# Patient Record
Sex: MALE | Race: WHITE | Employment: OTHER | URBAN - METROPOLITAN AREA
[De-identification: names, ages, dates, MRNs, and addresses within clinical notes are randomized per-mention and may not be internally consistent; named-entity substitution may affect disease eponyms.]

---

## 2010-05-07 LAB — EJECTION FRACTION: 30

## 2013-02-12 LAB — EJECTION FRACTION: 33

## 2015-07-01 LAB — EJECTION FRACTION: 45

## 2017-01-19 ENCOUNTER — TELEPHONE (OUTPATIENT)
Dept: FAMILY MEDICINE | Facility: CLINIC | Age: 77
End: 2017-01-19

## 2017-01-19 NOTE — TELEPHONE ENCOUNTER
.  Name of caller:   Tomás  Relationship to pt:  self  Reason for call:   Pt calling he got mail stating he is due for coloscopy but needs to know if he really needs one because he would have to start lovanox  Best phone number to reach pt at is:   [phone]  Ok to leave a message with medical info?   yes

## 2017-01-30 NOTE — TELEPHONE ENCOUNTER
Called [facility] - some of the GI doctors do, do it if the pt is healthy then they will do it up to the age of 80    Please advise     Thank you     Myra Holguin RN, BSN  [facility]

## 2017-01-30 NOTE — TELEPHONE ENCOUNTER
Since you had polyps on your last test in 2012 the gastroenterologist recommend a recheck in 5 years.  That being said they also often recommend not continuing screening after age 75 years old.       note to RNs: Please call [facility] and ask what they would like to do for this 75 yo with prior h/o polyps.

## 2017-02-02 NOTE — TELEPHONE ENCOUNTER
I called him and he would like to have the colonoscopy and will come in to adjust his anticoagulation prior to the procedure.

## 2017-02-13 ENCOUNTER — ANTICOAGULATION THERAPY VISIT (OUTPATIENT)
Dept: NURSING | Facility: CLINIC | Age: 77
End: 2017-02-13
Payer: COMMERCIAL

## 2017-02-13 DIAGNOSIS — Z95.2 S/P MITRAL VALVE REPLACEMENT: ICD-10-CM

## 2017-02-13 DIAGNOSIS — Z79.01 LONG-TERM (CURRENT) USE OF ANTICOAGULANTS: ICD-10-CM

## 2017-02-13 LAB — INR POINT OF CARE: 3.6 (ref 0.86–1.14)

## 2017-02-13 PROCEDURE — 36416 COLLJ CAPILLARY BLOOD SPEC: CPT

## 2017-02-13 PROCEDURE — 99207 ZZC NO CHARGE NURSE ONLY: CPT

## 2017-02-13 PROCEDURE — 85610 PROTHROMBIN TIME: CPT | Mod: QW

## 2017-02-13 NOTE — PROGRESS NOTES
ANTICOAGULATION FOLLOW-UP CLINIC VISIT    Patient Name:  Tomás Cruz  Date:  2/13/2017  Contact Type:  Face to Face    SUBJECTIVE:        OBJECTIVE    INR Protime   Date Value Ref Range Status   02/13/2017 3.6 (A) 0.86 - 1.14 Final       ASSESSMENT / PLAN  INR assessment THER    Recheck INR In: 6 WEEKS    INR Location Clinic      Anticoagulation Summary as of 2/13/2017     INR goal 2.5-3.5   Today's INR 3.6!   Maintenance plan 2 mg (2 mg x 1) every day   Full instructions 2 mg every day   Weekly total 14 mg   No change documented Rose Marie Azul, RN   Plan last modified Rose Marie Azul RN (2/26/2016)   Next INR check 4/10/2017   Target end date     Indications   Long-term (current) use of anticoagulants [Z79.01] [Z79.01]  S/P mitral valve replacement [Z95.2]         Anticoagulation Episode Summary     [facility]    Preferred lab     Send INR reminders to RV NURSE    Comments       Anticoagulation Care Providers     Provider Role Specialty Phone number    Mahamed Groves MD [facility] [phone]            See the Encounter Report to view Anticoagulation Flowsheet and Dosing Calendar (Go to Encounters tab in chart review, and find the Anticoagulation Therapy Visit)        Rose Marie Azul RN

## 2017-02-13 NOTE — MR AVS SNAPSHOT
Tomás Cruz   2/13/2017 1:00 PM   Anticoagulation Therapy Visit    Description:  76 year old male   Provider:  [facility]   Department:  [facility] Nurse           INR as of 2/13/2017     Today's INR 3.6!      Anticoagulation Summary as of 2/13/2017     INR goal 2.5-3.5   Today's INR 3.6!   Full instructions 2 mg every day   Next INR check 4/10/2017    Indications   Long-term (current) use of anticoagulants [Z79.01] [Z79.01]  S/P mitral valve replacement [Z95.2]         Contact Numbers     Clinic Number:         February 2017 Details    Sun Mon Tue Wed Thu Fri Sat        1               2               3               4                 5               6               7               8               9               10               11                 12               13      2 mg   See details      14      2 mg         15      2 mg         16      2 mg         17      2 mg         18      2 mg           19      2 mg         20      2 mg         21      2 mg         22      2 mg         23      2 mg         24      2 mg         25      2 mg           26      2 mg         27      2 mg         28      2 mg              Date Details   02/13 This INR check               How to take your warfarin dose     To take:  2 mg Take 1 of the 2 mg tablets.           March 2017 Details    Sun Mon Tue Wed Thu Fri Sat        1      2 mg         2      2 mg         3      2 mg         4      2 mg           5      2 mg         6      2 mg         7      2 mg         8      2 mg         9      2 mg         10      2 mg         11      2 mg           12      2 mg         13      2 mg         14      2 mg         15      2 mg         16      2 mg         17      2 mg         18      2 mg           19      2 mg         20      2 mg         21      2 mg         22      2 mg         23      2 mg         24      2 mg         25      2 mg           26      2 mg         27      2 mg         28      2 mg         29      2 mg          30      2 mg         31      2 mg           Date Details   No additional details            How to take your warfarin dose     To take:  2 mg Take 1 of the 2 mg tablets.           April 2017 Details    Sun Mon Tue Wed Thu Fri Sat           1      2 mg           2      2 mg         3      2 mg         4      2 mg         5      2 mg         6      2 mg         7      2 mg         8      2 mg           9      2 mg         10            11               12               13               14               15                 16               17               18               19               20               21               22                 23               24               25               26               27               28               29                 30                      Date Details   No additional details    Date of next INR:  4/10/2017         How to take your warfarin dose     To take:  2 mg Take 1 of the 2 mg tablets.

## 2017-02-16 ENCOUNTER — DOCUMENTATION ONLY (OUTPATIENT)
Dept: VASCULAR SURGERY | Facility: CLINIC | Age: 77
End: 2017-02-16

## 2017-03-20 ENCOUNTER — ANTICOAGULATION THERAPY VISIT (OUTPATIENT)
Dept: NURSING | Facility: CLINIC | Age: 77
End: 2017-03-20
Payer: COMMERCIAL

## 2017-03-20 DIAGNOSIS — Z95.2 S/P MITRAL VALVE REPLACEMENT: ICD-10-CM

## 2017-03-20 DIAGNOSIS — Z79.01 LONG-TERM (CURRENT) USE OF ANTICOAGULANTS: ICD-10-CM

## 2017-03-20 LAB — INR POINT OF CARE: 2.5 (ref 0.86–1.14)

## 2017-03-20 PROCEDURE — 36416 COLLJ CAPILLARY BLOOD SPEC: CPT

## 2017-03-20 PROCEDURE — 85610 PROTHROMBIN TIME: CPT | Mod: QW

## 2017-03-20 PROCEDURE — 99207 ZZC NO CHARGE NURSE ONLY: CPT

## 2017-03-20 NOTE — PROGRESS NOTES
ANTICOAGULATION FOLLOW-UP CLINIC VISIT    Patient Name:  Tomás Cruz  Date:  3/20/2017  Contact Type:  Face to Face    SUBJECTIVE:     Patient Findings     Positives No Problem Findings           OBJECTIVE    INR Protime   Date Value Ref Range Status   03/20/2017 2.5 (A) 0.86 - 1.14 Final       ASSESSMENT / PLAN  INR assessment THER    Recheck INR In: 10 DAYS    INR Location Clinic      Anticoagulation Summary as of 3/20/2017     INR goal 2.5-3.5   Today's INR 2.5   Maintenance plan 2 mg (2 mg x 1) every day   Full instructions 3/30: Hold; 3/31: Hold; 4/1: Hold; 4/2: Hold; 4/3: Hold; 4/4: 4 mg; 4/5: 4 mg; 4/6: 4 mg; Otherwise 2 mg every day   Weekly total 14 mg   Plan last modified Rose Marie Azul, RN (2/26/2016)   Next INR check 4/7/2017   Target end date     Indications   Long-term (current) use of anticoagulants [Z79.01] [Z79.01]  S/P mitral valve replacement [Z95.2]         Anticoagulation Episode Summary     [facility]    Preferred lab     Send INR reminders to RV NURSE    Comments       Anticoagulation Care Providers     Provider Role Specialty Phone number    Mahamed Groves MD [facility] [phone]            See the Encounter Report to view Anticoagulation Flowsheet and Dosing Calendar (Go to Encounters tab in chart review, and find the Anticoagulation Therapy Visit)        Rose Marie Azul RN

## 2017-03-20 NOTE — MR AVS SNAPSHOT
Tomás Cruz   3/20/2017 1:00 PM   Anticoagulation Therapy Visit    Description:  76 year old male   Provider:   ANTICOAGULATION CLINIC   Department:  R[facility] Nurse           INR as of 3/20/2017     Today's INR 2.5      Anticoagulation Summary as of 3/20/2017     INR goal 2.5-3.5   Today's INR 2.5   Full instructions 3/30: Hold; 3/31: Hold; 4/1: Hold; 4/2: Hold; 4/3: Hold; 4/4: 4 mg; 4/5: 4 mg; 4/6: 4 mg; Otherwise 2 mg every day   Next INR check 4/7/2017    Indications   Long-term (current) use of anticoagulants [Z79.01] [Z79.01]  S/P mitral valve replacement [Z95.2]         Description     Start holding coumadin 5 days prior to procedure.  Two days after holding coumadin, start lovenox 4/1.    Date: Instructions  3/30: Hold Coumadin  3/31: Hold Coumadin  4/1: Hold coumadin, start Lovenox AM and PM  4/2: Hold coumadin, Lovenox aM and PM  4/3: Hold coumadin, Lovenox in AM only (if at least 24 hours prior to procedure)   4/4: Day of procedure- 4mg coumadin if OK with surgeon, no lovenox  4/5: Coumadin 4 mg, Lovenox PM  4/6: Coumadin 4 mg, Lovenox AM and PM  4/7: , Lovenox AM and INR appointment        Your next Anticoagulation Clinic appointment(s)     Apr 07, 2017 11:00 AM CDT   Anticoagulation [facility] with  [facility] CLINIC   [facility]    [address] [place] [id number] [phone]              Contact Numbers     Clinic Number:         March 2017 Details    Sun Mon Tue Wed Thu Fri Sat        1               2               3               4                 5               6               7               8               9               10               11                 12               13               14               15               16               17               18                 19               20      2 mg   See details      21      2 mg         22      2 mg         23      2 mg         24      2 mg         25      2  mg           26      2 mg         27      2 mg         28      2 mg         29      2 mg         30      Hold         31      Hold           Date Details   03/20 This INR check               How to take your warfarin dose     To take:  2 mg Take 1 of the 2 mg tablets.    Hold Do not take your warfarin dose. See the Details table to the right for additional instructions.                April 2017 Details    Sun Mon Tue Wed Thu Fri Sat           1      Hold   See details        2      Hold         3      Hold         4      4 mg         5      4 mg         6      4 mg         7            8                 9               10               11               12               13               14               15                 16               17               18               19               20               21               22                 23               24               25               26               27               28               29                 30                      Date Details   04/01 Hold dose   lovenox AM and PM       Date of next INR:  4/7/2017         How to take your warfarin dose     To take:  2 mg Take 1 of the 2 mg tablets.    To take:  4 mg Take 2 of the 2 mg tablets.    Hold Do not take your warfarin dose. See the Details table to the right for additional instructions.

## 2017-04-04 ENCOUNTER — TRANSFERRED RECORDS (OUTPATIENT)
Dept: HEALTH INFORMATION MANAGEMENT | Facility: CLINIC | Age: 77
End: 2017-04-04

## 2017-04-07 ENCOUNTER — ANTICOAGULATION THERAPY VISIT (OUTPATIENT)
Dept: NURSING | Facility: CLINIC | Age: 77
End: 2017-04-07
Payer: COMMERCIAL

## 2017-04-07 VITALS — SYSTOLIC BLOOD PRESSURE: 120 MMHG | DIASTOLIC BLOOD PRESSURE: 70 MMHG

## 2017-04-07 DIAGNOSIS — Z79.01 LONG-TERM (CURRENT) USE OF ANTICOAGULANTS: ICD-10-CM

## 2017-04-07 DIAGNOSIS — Z95.2 S/P MITRAL VALVE REPLACEMENT: ICD-10-CM

## 2017-04-07 LAB — INR POINT OF CARE: 2.5 (ref 0.86–1.14)

## 2017-04-07 PROCEDURE — 85610 PROTHROMBIN TIME: CPT | Mod: QW

## 2017-04-07 PROCEDURE — 36416 COLLJ CAPILLARY BLOOD SPEC: CPT

## 2017-04-07 PROCEDURE — 99207 ZZC NO CHARGE NURSE ONLY: CPT

## 2017-04-07 NOTE — PROGRESS NOTES
ANTICOAGULATION FOLLOW-UP CLINIC VISIT    Patient Name:  Tomás Cruz  Date:  4/7/2017  Contact Type:  Face to Face    SUBJECTIVE:     Patient Findings     Positives Intentional hold of therapy           OBJECTIVE    INR Protime   Date Value Ref Range Status   04/07/2017 2.5 (A) 0.86 - 1.14 Final       ASSESSMENT / PLAN  No question data found.  Anticoagulation Summary as of 4/7/2017     INR goal 2.5-3.5   Today's INR 2.5   Maintenance plan 2 mg (2 mg x 1) every day   Full instructions 2 mg every day   Weekly total 14 mg   No change documented Myra Holguin RN   Plan last modified Rose Marie Azul RN (2/26/2016)   Next INR check 4/21/2017   Target end date     Indications   Long-term (current) use of anticoagulants [Z79.01] [Z79.01]  S/P mitral valve replacement [Z95.2]         Anticoagulation Episode Summary     [facility]    Preferred lab     Send INR reminders to RV NURSE    Comments       Anticoagulation Care Providers     Provider Role Specialty Phone number    Mahamed Groves MD [facility] [phone]            See the Encounter Report to view Anticoagulation Flowsheet and Dosing Calendar (Go to Encounters tab in chart review, and find the Anticoagulation Therapy Visit)    Dosage adjustment made based on physician directed care plan.    Myra Holguin, RN

## 2017-04-07 NOTE — MR AVS SNAPSHOT
Tomás Cruz   4/7/2017 11:00 AM   Anticoagulation Therapy Visit    Description:  76 year old male   Provider:   ANTICOAGULATION CLINIC   Department:   Nurse           INR as of 4/7/2017     Today's INR 2.5      Anticoagulation Summary as of 4/7/2017     INR goal 2.5-3.5   Today's INR 2.5   Full instructions 2 mg every day   Next INR check 4/21/2017    Indications   Long-term (current) use of anticoagulants [Z79.01] [Z79.01]  S/P mitral valve replacement [Z95.2]         Your next Anticoagulation Clinic appointment(s)     Apr 07, 2017 11:00 AM CDT   Antico[facility]  [facility]    [address] [place] [id number] [phone]              Contact Numbers     Clinic Number:         April 2017 Details    Sun Mon Tue Wed Thu Fri Sat           1                 2               3               4               5               6               7      2 mg   See details      8      2 mg           9      2 mg         10      2 mg         11      2 mg         12      2 mg         13      2 mg         14      2 mg         15      2 mg           16      2 mg         17      2 mg         18      2 mg         19      2 mg         20      2 mg         21            22                 23               24               25               26               27               28               29                 30                      Date Details   04/07 This INR check       Date of next INR:  4/21/2017         How to take your warfarin dose     To take:  2 mg Take 1 of the 2 mg tablets.

## 2017-04-21 ENCOUNTER — ANTICOAGULATION THERAPY VISIT (OUTPATIENT)
Dept: NURSING | Facility: CLINIC | Age: 77
End: 2017-04-21
Payer: COMMERCIAL

## 2017-04-21 DIAGNOSIS — Z79.01 LONG-TERM (CURRENT) USE OF ANTICOAGULANTS: ICD-10-CM

## 2017-04-21 DIAGNOSIS — Z95.2 S/P MITRAL VALVE REPLACEMENT: ICD-10-CM

## 2017-04-21 LAB — INR POINT OF CARE: 2.8 (ref 0.86–1.14)

## 2017-04-21 PROCEDURE — 36416 COLLJ CAPILLARY BLOOD SPEC: CPT

## 2017-04-21 PROCEDURE — 85610 PROTHROMBIN TIME: CPT | Mod: QW

## 2017-04-21 NOTE — PROGRESS NOTES
ANTICOAGULATION FOLLOW-UP CLINIC VISIT    Patient Name:  Tomás Cruz  Date:  4/21/2017  Contact Type:  Face to Face    SUBJECTIVE:        OBJECTIVE    INR Protime   Date Value Ref Range Status   04/21/2017 2.8 (A) 0.86 - 1.14 Final       ASSESSMENT / PLAN  INR assessment THER    Recheck INR In: 6 WEEKS    INR Location Clinic      Anticoagulation Summary as of 4/21/2017     INR goal 2.5-3.5   Today's INR 2.8   Maintenance plan 2 mg (2 mg x 1) every day   Full instructions 2 mg every day   Weekly total 14 mg   No change documented Mojgan Brasher RN   Plan last modified Rose Marie Azul RN (2/26/2016)   Next INR check 6/2/2017   Target end date     Indications   Long-term (current) use of anticoagulants [Z79.01] [Z79.01]  S/P mitral valve replacement [Z95.2]         Anticoagulation Episode Summary     [facility]    Preferred lab     Send INR reminders to RV NURSE    Comments       Anticoagulation Care Providers     Provider Role Specialty Phone number    Mahamed Groves MD [facility] [phone]            See the Encounter Report to view Anticoagulation Flowsheet and Dosing Calendar (Go to Encounters tab in chart review, and find the Anticoagulation Therapy Visit)    Dosage adjustment made based on physician directed care plan.    Mojgan Brasher RN

## 2017-04-21 NOTE — MR AVS SNAPSHOT
Tomás ELISE Cruz   4/21/2017 9:15 AM   Anticoagulation Therapy Visit    Description:  76 year old male   Provider:  [facility]   Department:  [facility] Nurse           INR as of 4/21/2017     Today's INR 2.8      Anticoagulation Summary as of 4/21/2017     INR goal 2.5-3.5   Today's INR 2.8   Full instructions 2 mg every day   Next INR check 6/2/2017    Indications   Long-term (current) use of anticoagulants [Z79.01] [Z79.01]  S/P mitral valve replacement [Z95.2]         Contact Numbers     Clinic Number:         April 2017 Details    Sun Mon Tue Wed Thu Fri Sat           1                 2               3               4               5               6               7               8                 9               10               11               12               13               14               15                 16               17               18               19               20               21      2 mg   See details      22      2 mg           23      2 mg         24      2 mg         25      2 mg         26      2 mg         27      2 mg         28      2 mg         29      2 mg           30      2 mg                Date Details   04/21 This INR check               How to take your warfarin dose     To take:  2 mg Take 1 of the 2 mg tablets.           May 2017 Details    Sun Mon Tue Wed Thu Fri Sat      1      2 mg         2      2 mg         3      2 mg         4      2 mg         5      2 mg         6      2 mg           7      2 mg         8      2 mg         9      2 mg         10      2 mg         11      2 mg         12      2 mg         13      2 mg           14      2 mg         15      2 mg         16      2 mg         17      2 mg         18      2 mg         19      2 mg         20      2 mg           21      2 mg         22      2 mg         23      2 mg         24      2 mg         25      2 mg         26      2 mg         27      2 mg           28      2 mg         29      2  mg         30      2 mg         31      2 mg             Date Details   No additional details            How to take your warfarin dose     To take:  2 mg Take 1 of the 2 mg tablets.           June 2017 Details    Sun Mon Tue Wed Thu Fri Sat         1      2 mg         2            3                 4               5               6               7               8               9               10                 11               12               13               14               15               16               17                 18               19               20               21               22               23               24                 25               26               27               28               29               30                 Date Details   No additional details    Date of next INR:  6/2/2017         How to take your warfarin dose     To take:  2 mg Take 1 of the 2 mg tablets.

## 2017-06-06 ENCOUNTER — ANTICOAGULATION THERAPY VISIT (OUTPATIENT)
Dept: NURSING | Facility: CLINIC | Age: 77
End: 2017-06-06
Payer: COMMERCIAL

## 2017-06-06 DIAGNOSIS — R94.4 ABNORMAL RENAL FUNCTION TEST: ICD-10-CM

## 2017-06-06 DIAGNOSIS — Z79.01 LONG-TERM (CURRENT) USE OF ANTICOAGULANTS: ICD-10-CM

## 2017-06-06 DIAGNOSIS — R94.4 ABNORMAL RENAL FUNCTION TEST: Primary | ICD-10-CM

## 2017-06-06 DIAGNOSIS — Z95.2 S/P MITRAL VALVE REPLACEMENT: ICD-10-CM

## 2017-06-06 DIAGNOSIS — R97.20 ELEVATED PROSTATE SPECIFIC ANTIGEN (PSA): ICD-10-CM

## 2017-06-06 LAB — INR POINT OF CARE: 2.9 (ref 0.86–1.14)

## 2017-06-06 PROCEDURE — 80053 COMPREHEN METABOLIC PANEL: CPT | Performed by: FAMILY MEDICINE

## 2017-06-06 PROCEDURE — G0103 PSA SCREENING: HCPCS | Performed by: FAMILY MEDICINE

## 2017-06-06 PROCEDURE — 85610 PROTHROMBIN TIME: CPT | Mod: QW

## 2017-06-06 PROCEDURE — 36416 COLLJ CAPILLARY BLOOD SPEC: CPT

## 2017-06-06 NOTE — MR AVS SNAPSHOT
Tomás Cruz   6/6/2017 3:00 PM   Anticoagulation Therapy Visit    Description:  76 year old male   Provider:  [facility]   Department:  [facility]           INR as of 6/6/2017     Today's INR 2.9      Anticoagulation Summary as of 6/6/2017     INR goal 2.5-3.5   Today's INR 2.9   Full instructions 2 mg every day   Next INR check 7/20/2017    Indications   Long-term (current) use of anticoagulants [Z79.01] [Z79.01]  S/P mitral valve replacement [Z95.2]         Contact Numbers     Clinic Number:         June 2017 Details    Sun Mon Tue Wed Thu Fri Sat         1               2               3                 4               5               6      2 mg   See details      7      2 mg         8      2 mg         9      2 mg         10      2 mg           11      2 mg         12      2 mg         13      2 mg         14      2 mg         15      2 mg         16      2 mg         17      2 mg           18      2 mg         19      2 mg         20      2 mg         21      2 mg         22      2 mg         23      2 mg         24      2 mg           25      2 mg         26      2 mg         27      2 mg         28      2 mg         29      2 mg         30      2 mg           Date Details   06/06 This INR check               How to take your warfarin dose     To take:  2 mg Take 1 of the 2 mg tablets.           July 2017 Details    Sun Mon Tue Wed Thu Fri Sat           1      2 mg           2      2 mg         3      2 mg         4      2 mg         5      2 mg         6      2 mg         7      2 mg         8      2 mg           9      2 mg         10      2 mg         11      2 mg         12      2 mg         13      2 mg         14      2 mg         15      2 mg           16      2 mg         17      2 mg         18      2 mg         19      2 mg         20            21               22                 23               24               25               26               27               28                29                 30               31                     Date Details   No additional details    Date of next INR:  7/20/2017         How to take your warfarin dose     To take:  2 mg Take 1 of the 2 mg tablets.

## 2017-06-06 NOTE — PROGRESS NOTES
ANTICOAGULATION FOLLOW-UP CLINIC VISIT    Patient Name:  Tomás Cruz  Date:  6/6/2017  Contact Type:  Face to Face    SUBJECTIVE:        OBJECTIVE    INR Protime   Date Value Ref Range Status   06/06/2017 2.9 (A) 0.86 - 1.14 Final       ASSESSMENT / PLAN  INR assessment THER    Recheck INR In: 6 WEEKS    INR Location Clinic      Anticoagulation Summary as of 6/6/2017     INR goal 2.5-3.5   Today's INR 2.9   Maintenance plan 2 mg (2 mg x 1) every day   Full instructions 2 mg every day   Weekly total 14 mg   No change documented Rose Marie Azul RN   Plan last modified Rose Marie Azul RN (2/26/2016)   Next INR check 7/20/2017   Target end date     Indications   Long-term (current) use of anticoagulants [Z79.01] [Z79.01]  S/P mitral valve replacement [Z95.2]         Anticoagulation Episode Summary     [facility]    Preferred lab     Send INR reminders to RV NURSE    Comments       Anticoagulation Care Providers     Provider Role Specialty Phone number    Mahamed Groves MD [facility] [phone]            See the Encounter Report to view Anticoagulation Flowsheet and Dosing Calendar (Go to Encounters tab in chart review, and find the Anticoagulation Therapy Visit)        Rose Marie Azul RN

## 2017-06-07 LAB
ALBUMIN SERPL-MCNC: 3.9 G/DL (ref 3.4–5)
ALP SERPL-CCNC: 72 U/L (ref 40–150)
ALT SERPL W P-5'-P-CCNC: 20 U/L (ref 0–70)
ANION GAP SERPL CALCULATED.3IONS-SCNC: 8 MMOL/L (ref 3–14)
AST SERPL W P-5'-P-CCNC: 15 U/L (ref 0–45)
BILIRUB SERPL-MCNC: 0.6 MG/DL (ref 0.2–1.3)
BUN SERPL-MCNC: 30 MG/DL (ref 7–30)
CALCIUM SERPL-MCNC: 8.8 MG/DL (ref 8.5–10.1)
CHLORIDE SERPL-SCNC: 109 MMOL/L (ref 94–109)
CO2 SERPL-SCNC: 26 MMOL/L (ref 20–32)
CREAT SERPL-MCNC: 1.06 MG/DL (ref 0.66–1.25)
GFR SERPL CREATININE-BSD FRML MDRD: 68 ML/MIN/1.7M2
GLUCOSE SERPL-MCNC: 112 MG/DL (ref 70–99)
POTASSIUM SERPL-SCNC: 4.7 MMOL/L (ref 3.4–5.3)
PROT SERPL-MCNC: 6.9 G/DL (ref 6.8–8.8)
PSA SERPL-ACNC: 4 UG/L (ref 0–4)
SODIUM SERPL-SCNC: 143 MMOL/L (ref 133–144)

## 2017-06-08 NOTE — PROGRESS NOTES
Dear Tomás,    Here is a summary of your recent test results:  -Liver and gallbladder tests (ALT,AST, Alk phos,bilirubin) are normal.  -Kidney function (GFR) is normal.  -Sodium is normal.  -Potassium is normal.  -Glucose is slight elevated and may be sign of early diabetes (prediabetes). ADVISE:: low carbohydrate diet, exercise, try to lose weight (if necessary) and recheck glucose in 12 months. (GLU,A1C, DX: prediabetes)  -PSA (prostate specific antigen) test is normal.  This indicates a low likelihood of prostate cancer.  ADVISE: yearly recheck.     For additional lab test information, labtestsonline.org is an excellent reference.           Thank you very much for trusting me and [facility] [place].     Healthy regards,  Marc Groves MD

## 2017-07-17 ENCOUNTER — ANTICOAGULATION THERAPY VISIT (OUTPATIENT)
Dept: NURSING | Facility: CLINIC | Age: 77
End: 2017-07-17
Payer: COMMERCIAL

## 2017-07-17 DIAGNOSIS — Z79.01 LONG-TERM (CURRENT) USE OF ANTICOAGULANTS: ICD-10-CM

## 2017-07-17 DIAGNOSIS — Z95.2 S/P MITRAL VALVE REPLACEMENT: ICD-10-CM

## 2017-07-17 LAB — INR POINT OF CARE: 3 (ref 0.86–1.14)

## 2017-07-17 PROCEDURE — 85610 PROTHROMBIN TIME: CPT | Mod: QW

## 2017-07-17 PROCEDURE — 36416 COLLJ CAPILLARY BLOOD SPEC: CPT

## 2017-07-17 NOTE — PROGRESS NOTES
ANTICOAGULATION FOLLOW-UP CLINIC VISIT    Patient Name:  Tomás Cruz  Date:  7/17/2017  Contact Type:  Face to Face    SUBJECTIVE:        OBJECTIVE    INR Protime   Date Value Ref Range Status   07/17/2017 3.0 (A) 0.86 - 1.14 Final       ASSESSMENT / PLAN  INR assessment THER    Recheck INR In: 6 WEEKS    INR Location Clinic      Anticoagulation Summary as of 7/17/2017     INR goal 2.5-3.5   Today's INR 3.0   Maintenance plan 2 mg (2 mg x 1) every day   Full instructions 2 mg every day   Weekly total 14 mg   No change documented Mojgan Brasher RN   Plan last modified Rose Marie Azul RN (2/26/2016)   Next INR check 8/28/2017   Target end date     Indications   Long-term (current) use of anticoagulants [Z79.01] [Z79.01]  S/P mitral valve replacement [Z95.2]         Anticoagulation Episode Summary     [facility]    Preferred lab     Send INR reminders to RV NURSE    Comments       Anticoagulation Care Providers     Provider Role Specialty Phone number    Mahamed Groves MD [facility] [phone]            See the Encounter Report to view Anticoagulation Flowsheet and Dosing Calendar (Go to Encounters tab in chart review, and find the Anticoagulation Therapy Visit)    Dosage adjustment made based on physician directed care plan.    Mojgan Brasher RN

## 2017-07-17 NOTE — MR AVS SNAPSHOT
Tomás Cruz   7/17/2017 10:00 AM   Anticoagulation Therapy Visit    Description:  76 year old male   Provider:  [facility]   Department:  [facility]           INR as of 7/17/2017     Today's INR 3.0      Anticoagulation Summary as of 7/17/2017     INR goal 2.5-3.5   Today's INR 3.0   Full instructions 2 mg every day   Next INR check 8/28/2017    Indications   Long-term (current) use of anticoagulants [Z79.01] [Z79.01]  S/P mitral valve replacement [Z95.2]         Contact Numbers     Clinic Number:         July 2017 Details    Sun Mon Tue Wed Thu Fri Sat           1                 2               3               4               5               6               7               8                 9               10               11               12               13               14               15                 16               17      2 mg   See details      18      2 mg         19      2 mg         20      2 mg         21      2 mg         22      2 mg           23      2 mg         24      2 mg         25      2 mg         26      2 mg         27      2 mg         28      2 mg         29      2 mg           30      2 mg         31      2 mg               Date Details   07/17 This INR check               How to take your warfarin dose     To take:  2 mg Take 1 of the 2 mg tablets.           August 2017 Details    Sun Mon Tue Wed Thu Fri Sat       1      2 mg         2      2 mg         3      2 mg         4      2 mg         5      2 mg           6      2 mg         7      2 mg         8      2 mg         9      2 mg         10      2 mg         11      2 mg         12      2 mg           13      2 mg         14      2 mg         15      2 mg         16      2 mg         17      2 mg         18      2 mg         19      2 mg           20      2 mg         21      2 mg         22      2 mg         23      2 mg         24      2 mg         25      2 mg         26      2 mg           27      2  mg         28            29               30               31                  Date Details   No additional details    Date of next INR:  8/28/2017         How to take your warfarin dose     To take:  2 mg Take 1 of the 2 mg tablets.

## 2017-08-21 ENCOUNTER — TRANSFERRED RECORDS (OUTPATIENT)
Dept: HEALTH INFORMATION MANAGEMENT | Facility: CLINIC | Age: 77
End: 2017-08-21

## 2017-09-11 ENCOUNTER — ANTICOAGULATION THERAPY VISIT (OUTPATIENT)
Dept: NURSING | Facility: CLINIC | Age: 77
End: 2017-09-11
Payer: COMMERCIAL

## 2017-09-11 VITALS — DIASTOLIC BLOOD PRESSURE: 68 MMHG | SYSTOLIC BLOOD PRESSURE: 136 MMHG

## 2017-09-11 DIAGNOSIS — Z95.2 S/P MITRAL VALVE REPLACEMENT: ICD-10-CM

## 2017-09-11 DIAGNOSIS — Z79.01 LONG-TERM (CURRENT) USE OF ANTICOAGULANTS: ICD-10-CM

## 2017-09-11 LAB — INR POINT OF CARE: 2.7 (ref 0.86–1.14)

## 2017-09-11 PROCEDURE — 99207 ZZC NO CHARGE NURSE ONLY: CPT

## 2017-09-11 PROCEDURE — 85610 PROTHROMBIN TIME: CPT | Mod: QW

## 2017-09-11 PROCEDURE — 36416 COLLJ CAPILLARY BLOOD SPEC: CPT

## 2017-09-11 NOTE — MR AVS SNAPSHOT
Tomás Cruz   9/11/2017 10:45 AM   Anticoagulation Therapy Visit    Description:  77 year old male   Provider:   [facility]   Department:   Nurse           INR as of 9/11/2017     Today's INR 2.7      Anticoagulation Summary as of 9/11/2017     INR goal 2.5-3.5   Today's INR 2.7   Full instructions 2 mg every day   Next INR check 10/23/2017    Indications   Long-term (current) use of anticoagulants [Z79.01] [Z79.01]  S/P mitral valve replacement [Z95.2]         Your next Anticoagulation Clinic appointment(s)     Sep 11, 2017 10:45 AM CDT   Anticoagulation Visit with  [facility]AGULATION CLINIC   [facility]    [address] [place] [id number] [phone]              Contact Numbers     Clinic Number:         September 2017 Details    Sun Mon Tue Wed Thu Fri Sat          1               2                 3               4               5               6               7               8               9                 10               11      2 mg   See details      12      2 mg         13      2 mg         14      2 mg         15      2 mg         16      2 mg           17      2 mg         18      2 mg         19      2 mg         20      2 mg         21      2 mg         22      2 mg         23      2 mg           24      2 mg         25      2 mg         26      2 mg         27      2 mg         28      2 mg         29      2 mg         30      2 mg          Date Details   09/11 This INR check               How to take your warfarin dose     To take:  2 mg Take 1 of the 2 mg tablets.           October 2017 Details    Sun Mon Tue Wed Thu Fri Sat     1      2 mg         2      2 mg         3      2 mg         4      2 mg         5      2 mg         6      2 mg         7      2 mg           8      2 mg         9      2 mg         10      2 mg         11      2 mg         12      2 mg         13      2 mg         14      2  mg           15      2 mg         16      2 mg         17      2 mg         18      2 mg         19      2 mg         20      2 mg         21      2 mg           22      2 mg         23            24               25               26               27               28                 29               30               31                    Date Details   No additional details    Date of next INR:  10/23/2017         How to take your warfarin dose     To take:  2 mg Take 1 of the 2 mg tablets.

## 2017-09-11 NOTE — PROGRESS NOTES
ANTICOAGULATION FOLLOW-UP CLINIC VISIT    Patient Name:  Tomás Cruz  Date:  9/11/2017  Contact Type:  Face to Face    SUBJECTIVE:     Patient Findings     Positives No Problem Findings           OBJECTIVE    INR Protime   Date Value Ref Range Status   09/11/2017 2.7 (A) 0.86 - 1.14 Final       ASSESSMENT / PLAN  No question data found.  Anticoagulation Summary as of 9/11/2017     INR goal 2.5-3.5   Today's INR 2.7   Maintenance plan 2 mg (2 mg x 1) every day   Full instructions 2 mg every day   Weekly total 14 mg   No change documented Myra Holguin RN   Plan last modified Rose Marie Azul RN (2/26/2016)   Next INR check 10/23/2017   Target end date     Indications   Long-term (current) use of anticoagulants [Z79.01] [Z79.01]  S/P mitral valve replacement [Z95.2]         Anticoagulation Episode Summary     INR check [facility]    Preferred lab     Send INR reminders to RV NURSE    Comments       Anticoagulation Care Providers     Provider Role Specialty Phone number    Mahamed Groves MD [facility] [phone]            See the Encounter Report to view Anticoagulation Flowsheet and Dosing Calendar (Go to Encounters tab in chart review, and find the Anticoagulation Therapy Visit)    Dosage adjustment made based on physician directed care plan.    Myra Holguin, RN

## 2017-09-11 NOTE — MR AVS SNAPSHOT
After Visit Summary   9/11/2017    Tomás Cruz    MRN: [id number]           Patient Information     Date Of Birth          1940        Visit Information        Provider Department      9/11/2017 10:45 AM [facility]        Today's Diagnoses     Long-term (current) use of anticoagulants        S/P mitral valve replacement           Follow-ups after your visit        Who to contact     If you have questions or need follow up information about today's clinic visit or your schedule please contact [facility] directly at [phone].  Normal or non-critical lab and imaging results will be communicated to you by theDrophart, letter or phone within 4 business days after the clinic has received the results. If you do not hear from us within 7 days, please contact the clinic through [employer] or phone. If you have a critical or abnormal lab result, we will notify you by phone as soon as possible.  Submit refill requests through [employer] or call your pharmacy and they will forward the refill request to us. Please allow 3 business days for your refill to be completed.          Additional Information About Your Visit        MyChart Information     [employer] gives you secure access to your electronic health record. If you see a primary care provider, you can also send messages to your care team and make appointments. If you have questions, please call your primary care clinic.  If you do not have a primary care provider, please call [phone] and they will assist you.        Care EveryWhere ID     This is your Care EveryWhere ID. This could be used by other organizations to access your [place] medical records  [id number]         Blood Pressure from Last 3 Encounters:   09/11/17 136/68   04/07/17 120/70   12/22/16 132/78    Weight from Last 3 Encounters:   10/25/16 205 lb (93 kg)   10/12/15 200 lb (90.7 kg)   09/23/14 195 lb (88.5 kg)              We  Performed the Following     INR point of care        Primary Care Provider Office Phone # Fax #    Mahamed Groves [phone]       [address] [place] [id number]        Equal Access to Services     CONRAD SUAREZ : Hadii clayton levin georgeo Soelidia, waaxda luqadaha, qaybta kaalmada adeegyada, estela elizabeth hang gomes. So [place] [phone].    ATENCIÓN: Si habla español, tiene a graham disposición servicios gratuitos de asistencia lingüística. Llame al [phone].    We comply with applicable federal civil rights laws and [place] laws. We do not discriminate on the basis of race, color, national origin, age, disability sex, sexual orientation or gender identity.            Thank you!     Thank you for choosing [facility]  for your care. Our goal is always to provide you with excellent care. Hearing back from our patients is one way we can continue to improve our services. Please take a few minutes to complete the written survey that you may receive in the mail after your visit with us. Thank you!             Your Updated Medication List - Protect others around you: Learn how to safely use, store and throw away your medicines at www.disposemymeds.org.          This list is accurate as of: 9/11/17  2:52 PM.  Always use your most recent med list.                   Brand Name Dispense Instructions for use Diagnosis    amoxicillin 500 MG capsule    AMOXIL    4 capsule    Take 4 capsules (2,000 mg) by mouth once as needed Prior to Dental appts    S/P mitral valve replacement       ciprofloxacin 500 MG tablet    CIPRO    28 tablet    Take 1 tablet (500 mg) by mouth 2 times daily    Elevated prostate specific antigen (PSA)       enoxaparin 100 MG/ML injection    LOVENOX    9 Syringe    Inject 0.93 mLs (93 mg) Subcutaneous 2 times daily    Long-term (current) use of anticoagulants, S/P mitral valve replacement       lisinopril 20 MG tablet    PRINIVIL/ZESTRIL    90  tablet    Take 1 tablet (20 mg) by mouth daily    Essential hypertension with goal blood pressure less than 140/90       metoprolol 50 MG 24 hr tablet    TOPROL-XL    90 tablet    Take 1 tablet (50 mg) by mouth daily    Essential hypertension with goal blood pressure less than 140/90       pravastatin 40 MG tablet    PRAVACHOL    90 tablet    Take 1 tablet (40 mg) by mouth daily    Hyperlipidemia LDL goal <100       sildenafil 20 MG tablet    REVATIO/VIAGRA    30 tablet    TAKE 2-5 TABLETS ( MG) BY MOUTH DAILY AS NEEDED    Impotence of organic origin       warfarin 2 MG tablet    COUMADIN    90 tablet    TAKE ONE TABLET (2MG) BY MOUTH DAILY, EXCEPT TWO TABLETS (4MG) ON FRIDAYS    S/P mitral valve replacement

## 2017-10-27 ENCOUNTER — ANTICOAGULATION THERAPY VISIT (OUTPATIENT)
Dept: NURSING | Facility: CLINIC | Age: 77
End: 2017-10-27
Payer: COMMERCIAL

## 2017-10-27 VITALS — SYSTOLIC BLOOD PRESSURE: 142 MMHG | DIASTOLIC BLOOD PRESSURE: 81 MMHG | HEART RATE: 67 BPM

## 2017-10-27 DIAGNOSIS — Z79.01 LONG-TERM (CURRENT) USE OF ANTICOAGULANTS: ICD-10-CM

## 2017-10-27 DIAGNOSIS — Z23 NEED FOR PROPHYLACTIC VACCINATION AND INOCULATION AGAINST INFLUENZA: Primary | ICD-10-CM

## 2017-10-27 DIAGNOSIS — Z95.2 S/P MITRAL VALVE REPLACEMENT: ICD-10-CM

## 2017-10-27 LAB — INR POINT OF CARE: 2.7 (ref 0.86–1.14)

## 2017-10-27 PROCEDURE — 90662 IIV NO PRSV INCREASED AG IM: CPT

## 2017-10-27 PROCEDURE — 85610 PROTHROMBIN TIME: CPT | Mod: QW

## 2017-10-27 PROCEDURE — G0008 ADMIN INFLUENZA VIRUS VAC: HCPCS

## 2017-10-27 PROCEDURE — 36416 COLLJ CAPILLARY BLOOD SPEC: CPT

## 2017-10-27 NOTE — PROGRESS NOTES
Injectable Influenza Immunization Documentation    1.  Is the person to be vaccinated sick today?   No    2. Does the person to be vaccinated have an allergy to a component   of the vaccine?   No  Egg Allergy Algorithm Link    3. Has the person to be vaccinated ever had a serious reaction   to influenza vaccine in the past?   No    4. Has the person to be vaccinated ever had Guillain-Barré syndrome?   No    Form completed by     ANEL Ham, RN, PHN  [facility] [phone]

## 2017-10-27 NOTE — MR AVS SNAPSHOT
Tomás Cruz   10/27/2017 1:45 PM   Anticoagulation Therapy Visit    Description:  77 year old male   Provider:   [facility]ATION CLINIC   Department:   Nurse           INR as of 10/27/2017     Today's INR 2.7      Anticoagulation Summary as of 10/27/2017     INR goal 2.5-3.5   Today's INR 2.7   Full instructions 2 mg every day   Next INR check 12/8/2017    Indications   Long-term (current) use of anticoagulants [Z79.01] [Z79.01]  S/P mitral valve replacement [Z95.2]         Your next Anticoagulation Clinic appointment(s)     Oct 27, 2017  1:45 PM CDT   Anticoagulation Visit with  ANTICOAGULATION CLINIC   [facility]    [address] [place] [address] [phone]              Contact Numbers     Clinic Number:         October 2017 Details    Sun Mon Tue Wed Thu Fri Sat     1               2               3               4               5               6               7                 8               9               10               11               12               13               14                 15               16               17               18               19               20               21                 22               23               24               25               26               27      2 mg   See details      28      2 mg           29      2 mg         30      2 mg         31      2 mg              Date Details   10/27 This INR check               How to take your warfarin dose     To take:  2 mg Take 1 of the 2 mg tablets.           November 2017 Details    Sun Mon Tue Wed Thu Fri Sat        1      2 mg         2      2 mg         3      2 mg         4      2 mg           5      2 mg         6      2 mg         7      2 mg         8      2 mg         9      2 mg         10      2 mg         11      2 mg           12      2 mg         13      2 mg         14      2 mg         15      2 mg         16       2 mg         17      2 mg         18      2 mg           19      2 mg         20      2 mg         21      2 mg         22      2 mg         23      2 mg         24      2 mg         25      2 mg           26      2 mg         27      2 mg         28      2 mg         29      2 mg         30      2 mg            Date Details   No additional details            How to take your warfarin dose     To take:  2 mg Take 1 of the 2 mg tablets.           December 2017 Details    Sun Mon Tue Wed Thu Fri Sat          1      2 mg         2      2 mg           3      2 mg         4      2 mg         5      2 mg         6      2 mg         7      2 mg         8            9                 10               11               12               13               14               15               16                 17               18               19               20               21               22               23                 24               25               26               27               28               29               30                 31                      Date Details   No additional details    Date of next INR:  12/8/2017         How to take your warfarin dose     To take:  2 mg Take 1 of the 2 mg tablets.

## 2017-10-27 NOTE — PROGRESS NOTES
ANTICOAGULATION FOLLOW-UP CLINIC VISIT    Patient Name:  Tomás Cruz  Date:  10/27/2017  Contact Type:  Face to Face    SUBJECTIVE:     Patient Findings     Positives No Problem Findings           OBJECTIVE    INR Protime   Date Value Ref Range Status   10/27/2017 2.7 (A) 0.86 - 1.14 Final       ASSESSMENT / PLAN  INR assessment THER    Recheck INR In: 6 WEEKS    INR Location Clinic      Anticoagulation Summary as of 10/27/2017     INR goal 2.5-3.5   Today's INR 2.7   Maintenance plan 2 mg (2 mg x 1) every day   Full instructions 2 mg every day   Weekly total 14 mg   No change documented Rose Marie Madden RN   Plan last modified Rose Marie Azul RN (2/26/2016)   Next INR check 12/8/2017   Target end date     Indications   Long-term (current) use of anticoagulants [Z79.01] [Z79.01]  S/P mitral valve replacement [Z95.2]         Anticoagulation Episode Summary     INR check location [facility]    Preferred lab     Send INR reminders to RV NURSE    Comments       Anticoagulation Care Providers     Provider Role Specialty Phone number    Mahamed Groves MD [facility] [phone]            See the Encounter Report to view Anticoagulation Flowsheet and Dosing Calendar (Go to Encounters tab in chart review, and find the Anticoagulation Therapy Visit)    Dosage adjustment made based on physician directed care plan.    Rose Marie Madden RN

## 2017-11-01 DIAGNOSIS — E78.5 HYPERLIPIDEMIA LDL GOAL <100: ICD-10-CM

## 2017-11-01 RX ORDER — PRAVASTATIN SODIUM 40 MG
TABLET ORAL
Qty: 90 TABLET | Refills: 0 | Status: SHIPPED | OUTPATIENT
Start: 2017-11-01 | End: 2017-11-09

## 2017-11-01 NOTE — TELEPHONE ENCOUNTER
Requested Prescriptions   Pending Prescriptions Disp Refills     pravastatin (PRAVACHOL) 40 MG tablet [Pharmacy Med Name: PRAVASTATIN SODIUM 40 MG TAB] 90 tablet 3     Sig: TAKE 1 TABLET (40 MG) BY MOUTH DAILY    Statins Protocol Failed    11/1/2017  1:24 AM       Failed - LDL on file in past 12 months    Recent Labs   Lab Test  10/25/16   0937   LDL  89            Passed - No abnormal creatine kinase in past 12 months    No lab results found.         Passed - Recent or future visit with authorizing provider    Patient had office visit in the last year or has a visit in the next 30 days with authorizing provider.  See chart review.              Passed - Patient is age 18 or older        Medication is being filled for 1 time refill only due to:  Patient needs labs statins.   Naila Brasher RN  [facility]

## 2017-11-02 DIAGNOSIS — N52.9 IMPOTENCE OF ORGANIC ORIGIN: ICD-10-CM

## 2017-11-02 RX ORDER — SILDENAFIL CITRATE 20 MG/1
TABLET ORAL
Qty: 30 TABLET | Refills: 0 | Status: SHIPPED | OUTPATIENT
Start: 2017-11-02 | End: 2017-11-09

## 2017-11-02 NOTE — TELEPHONE ENCOUNTER
Medication Detail         Disp Refills Start End ANDREW     sildenafil (REVATIO/VIAGRA) 20 MG tablet 30 tablet 11 10/25/2016  No     Sig: TAKE 2-5 TABLETS ( MG) BY MOUTH DAILY AS NEEDED     Class: Local Print     Last Office Visit with FMAMADO, [facility]MILLIE or [facility] prescribing provider: 10/25/2016                                                   Next 5 appointments (look out 90 days)     Nov 09, 2017  8:40 AM CST   PHYSICAL with Mahamed Groves MD   [facility]    [address] [place] [address] [phone]                  PX scheduled for 11/09/2017 -  only filled for 30 days.     Laura Wolfe RN  [employer] [facility]

## 2017-11-07 NOTE — PATIENT INSTRUCTIONS
Services Typically covered by Medicare Recommended Completed   Vaccines    Pneumonoccol    Influenza    Hepatitis B (if medium/high risk)     Once for patients after age 65    Yearly  Medium/high risk factors:    End Stage Kidney Disease    Hemophiliacs who received Factor XIII or IX concentrates    Clients of institutions for developmentally disabled    Persons who live in same house as a Hepatitis B carrier    Homosexual men    Illicit injectable drug users    Health care workers     Mammogram Covered: One-time screen between age 35-39, annually for age 40+     Pap and Pelvic Exam Covered: Annually if  high risk,  or childbearing age with abnormal Pap in last 3 years.  Q24 months for all other women     Prostate Cancer Screening    Digital rectal exam    PSA Covered: Annually for all men > age 50     Corolrectal Cancer Screening Screening colonoscopy every 10 years, more often for high risk patients     Diabetes Self-Management Training Requires referral by treating physician for patient with diabetes     Diabetes Screening    Fasting blood sugar or glucose tolerance test   Once yearly, twice yearly if prediabetic     Cardiovascular Screening Blood Tests    Total Cholesterol    HDL    Triglycerides Every 5 years     Medical Nutrition Therapy for Diabetes or Renal Disease Requires referral by treating physician for patient with diabetes or kidney disease     Glaucoma Screening Annually for patients with one of the following risk factors:    Diabetes Mellitus    Family history of Glaucoma    -American age 50 and over    -American age 65 and over     Bone Mass Measurement Every 24 months if one of the following risk factors:    Estrogen deficiency    Vertebral abnormalities on x-ray indicative of Osteoporosis, Osteopenia, or Vertebral fracture    Receiving/expected to receive the equivalent of at least 5 mg of Prednisone per day for > 3 months    Hyperparathyroidism    Patient being monitored for  response to Osteoporosis Therapy     One-time AAA screen  Must be ordered as part of Medicare IPPE   Any patient with a family history of AAA    Males Age 65-75, with history of smoking at least 100 cigarettes in lifetime     Smoking Cessation Counseling Beneficiaries who use tobacco are eligible to receive 2 cessation attempts per year; each attempt includes maximum of 4 sessions     HIV Screening Annually for beneficiaries at increased risk:       Increased risk for HIV infection is defined in the  National Coverage Determinations (NCD) Manual,  Publication 100-03 Sections 190.14 (diagnostic) and 210.7 (screening). See http://www.cms.gov/manuals/downloads/kot893g8_Vckm7.pdf and http://www.cms.gov/manuals/downloads/tql358u4_Ovbu1.pdf on the Internet.  Three times per pregnancy for beneficiaries who are pregnant.     Future Annual Wellness Visit Annually, for all beneficiaries.       Preventive Health Recommendations:       Male Ages 65 and over    Yearly exam:             See your health care provider every year in order to  o   Review health changes.   o   Discuss preventive care.    o   Review your medicines if your doctor has prescribed any.    Talk with your health care provider about whether you should have a test to screen for prostate cancer (PSA).    Every 3 years, have a diabetes test (fasting glucose). If you are at risk for diabetes, you should have this test more often.    Every 5 years, have a cholesterol test. Have this test more often if you are at risk for high cholesterol or heart disease.     Every 10 years, have a colonoscopy. Or, have a yearly FIT test (stool test). These exams will check for colon cancer.    Talk to with your health care provider about screening for Abdominal Aortic Aneurysm if you have a family history of AAA or have a history of smoking.  Shots:     Get a flu shot each year.     Get a tetanus shot every 10 years.     Talk to your doctor about your pneumonia vaccines. There  are now two you should receive - Pneumovax (PPSV 23) and Prevnar (PCV 13).    Talk to your doctor about a shingles vaccine.     Talk to your doctor about the hepatitis B vaccine.  Nutrition:     Eat at least 5 servings of fruits and vegetables each day.     Eat whole-grain bread, whole-wheat pasta and brown rice instead of white grains and rice.     Talk to your doctor about Calcium and Vitamin D.   Lifestyle    Exercise for at least 150 minutes a week (30 minutes a day, 5 days a week). This will help you control your weight and prevent disease.     Limit alcohol to one drink per day.     No smoking.     Wear sunscreen to prevent skin cancer.     See your dentist every six months for an exam and cleaning.     See your eye doctor every 1 to 2 years to screen for conditions such as glaucoma, macular degeneration and cataracts.  Preventive Health Recommendations:       No smoking.   Wear sunscreen to prevent skin cancer.   See your dentist every six months for an exam and cleaning.   See your eye doctor every 1 to 2 years to screen for conditions such as glaucoma, macular degeneration and cataracts.

## 2017-11-07 NOTE — PROGRESS NOTES
"  SUBJECTIVE:   Tomás Cruz is a 77 year old male who presents for Preventive Visit.    Are you in the first 12 months of your Medicare Part B coverage?  No    Healthy Habits:    Do you get at least three servings of calcium containing foods daily (dairy, green leafy vegetables, etc.)? {YES/NO, DAIRY INTAKE:214987::\"yes\"}    Amount of exercise or daily activities, outside of work: {AMOUNT EXERCISE:053017}    Problems taking medications regularly {Yes /No default:690787::\"No\"}    Medication side effects: {Yes /No default.:051001::\"No\"}    Have you had an eye exam in the past two years? {YESNOBLANK:567160}    Do you see a dentist twice per year? {YESNOBLANK:690926}    Do you have sleep apnea, excessive snoring or daytime drowsiness?{YESNOBLANK:009480}    {AWV Cognitive Screenin}        Hyperlipidemia Follow-Up      Rate your low fat/cholesterol diet?: { :429271::\"good\"}    Taking statin?  { :818668::\"No\"}    Other lipid medications/supplements?:  { :993364::\"none\"}    Hypertension Follow-up      Outpatient blood pressures {ISCHECKIN}    Low Salt Diet: { :462812::\"no added salt\"}            Reviewed and updated as needed this visit by clinical staff         Reviewed and updated as needed this visit by Provider      Social History   Substance Use Topics     Smoking status: Former Smoker     Quit date: 1982     Smokeless tobacco: Never Used     Alcohol use No       The patient does not drink >3 drinks per day nor >7 drinks per week.    Today's PHQ-2 Score:   PHQ-2 (  [employer]) 10/25/2016 10/12/2015   Q1: Little interest or pleasure in doing things 0 0   Q2: Feeling down, depressed or hopeless 0 0   PHQ-2 Score 0 0     {PHQ-2 LOOK IN ASSESSMENTS (Optional) :389662}  Do you feel safe in your environment - {YES/NO/NA:665392}    Do you have a Health Care Directive?: {HEALTHCARE DIRECTIVE STATUS:551371}    Current providers sharing in care for this patient include:   Patient Care Team:  Yaneli" "Mahamed URIBE MD as PCP - General      Hearing impairment: {NO/YES:865361}    Ability to successfully perform activities of daily living: {YES/NO (MEDICARE):461909::\"Yes, no assistance needed\"}     Fall risk:  {Document Fall Risk in the Assessments Section of the Navigator:941226}    Home safety:  {IPPE SAFETY CONCERNS:058742::\"none identified\"}  {If any of the above assessments are answered yes, consider ordering appropriate referrals (Optional):700231::\"click delete button to remove this line now\"}    The following health maintenance items are reviewed in Epic and correct as of today:  Health Maintenance   Topic Date Due     HF ACTION PLAN Q3 YR  2017     LIPID MONITORING Q1 YEAR  10/25/2017     MICROALBUMIN Q1 YEAR  10/25/2017     CBC Q1 YR  10/25/2017     FALL RISK ASSESSMENT  10/25/2017     WELLNESS VISIT Q1 YR  10/25/2017     BMP Q6 MOS  2017     ALT Q1 YR  2018     BMP Q1 YR  2018     PSA Q1 YR  2018     ADVANCE DIRECTIVE PLANNING Q5 YRS  10/25/2021     COLONOSCOPY Q5 YR  2022     TETANUS Q10 YR  10/12/2025     INFLUENZA VACCINE (SYSTEM ASSIGNED)  Completed     PNEUMOCOCCAL  Completed     {Chronicprobdata (Optional):594539}    {Decision Support (Optional):471003}    ROS:  {ROS COMP:892556}    OBJECTIVE:   There were no vitals taken for this visit. Estimated body mass index is 27.42 kg/(m^2) as calculated from the following:    Height as of 10/25/16: 6' 0.5\" (1.842 m).    Weight as of 10/25/16: 205 lb (93 kg).  EXAM:   {Exam :715978}    ASSESSMENT / PLAN:   {Diag Picklist:907009}    End of Life Planning:  Patient currently has an advanced directive: { :845045}    COUNSELING:  {Medicare Counselin}    {BP Counseling- Complete if BP >= 120/80  (Optional):270761}    Estimated body mass index is 27.42 kg/(m^2) as calculated from the following:    Height as of 10/25/16: 6' 0.5\" (1.842 m).    Weight as of 10/25/16: 205 lb (93 kg).  {Weight Management Plan -- Complete if patient " has an abnormal BMI (Optional):122211}   reports that he quit smoking about 35 years ago. He has never used smokeless tobacco.  {Tobacco Cessation -- Complete if patient is a smoker (Optional):111381}    Appropriate preventive services were discussed with this patient, including applicable screening as appropriate for cardiovascular disease, diabetes, osteopenia/osteoporosis, and glaucoma.  As appropriate for age/gender, discussed screening for colorectal cancer, prostate cancer, breast cancer, and cervical cancer. Checklist reviewing preventive services available has been given to the patient.    Reviewed patients plan of care and provided an AVS. The {CarePlan:793477} for Tomás meets the Care Plan requirement. This Care Plan has been established and reviewed with the {PATIENT, FAMILY MEMBER, CAREGIVER:089111}.    Counseling Resources:  ATP IV Guidelines  Pooled Cohorts Equation Calculator  Breast Cancer Risk Calculator  FRAX Risk Assessment  ICSI Preventive Guidelines  Dietary Guidelines for Americans, 2010  USDA's MyPlate  ASA Prophylaxis  Lung CA Screening    Mahamed Groves MD  [facility]

## 2017-11-09 ENCOUNTER — OFFICE VISIT (OUTPATIENT)
Dept: FAMILY MEDICINE | Facility: CLINIC | Age: 77
End: 2017-11-09
Payer: COMMERCIAL

## 2017-11-09 ENCOUNTER — RADIANT APPOINTMENT (OUTPATIENT)
Dept: GENERAL RADIOLOGY | Facility: CLINIC | Age: 77
End: 2017-11-09
Attending: FAMILY MEDICINE
Payer: COMMERCIAL

## 2017-11-09 VITALS
BODY MASS INDEX: 28.1 KG/M2 | DIASTOLIC BLOOD PRESSURE: 78 MMHG | WEIGHT: 212 LBS | SYSTOLIC BLOOD PRESSURE: 122 MMHG | TEMPERATURE: 98.2 F | HEART RATE: 73 BPM | OXYGEN SATURATION: 98 % | HEIGHT: 73 IN

## 2017-11-09 DIAGNOSIS — M17.11 ARTHRITIS OF KNEE, RIGHT: ICD-10-CM

## 2017-11-09 DIAGNOSIS — Z71.89 ADVANCED DIRECTIVES, COUNSELING/DISCUSSION: ICD-10-CM

## 2017-11-09 DIAGNOSIS — I42.9 CARDIOMYOPATHY, UNSPECIFIED TYPE (H): ICD-10-CM

## 2017-11-09 DIAGNOSIS — M25.561 RIGHT KNEE PAIN, UNSPECIFIED CHRONICITY: ICD-10-CM

## 2017-11-09 DIAGNOSIS — M25.521 RIGHT ELBOW PAIN: ICD-10-CM

## 2017-11-09 DIAGNOSIS — Z00.00 MEDICARE ANNUAL WELLNESS VISIT, SUBSEQUENT: Primary | ICD-10-CM

## 2017-11-09 DIAGNOSIS — R94.4 DECREASED GFR: ICD-10-CM

## 2017-11-09 DIAGNOSIS — I10 ESSENTIAL HYPERTENSION WITH GOAL BLOOD PRESSURE LESS THAN 140/90: ICD-10-CM

## 2017-11-09 DIAGNOSIS — R97.20 ELEVATED PROSTATE SPECIFIC ANTIGEN (PSA): ICD-10-CM

## 2017-11-09 DIAGNOSIS — Z95.810 AUTOMATIC IMPLANTABLE CARDIOVERTER-DEFIBRILLATOR IN SITU: ICD-10-CM

## 2017-11-09 DIAGNOSIS — I48.0 PAROXYSMAL ATRIAL FIBRILLATION (H): ICD-10-CM

## 2017-11-09 DIAGNOSIS — Z91.81 AT RISK FOR FALLING: ICD-10-CM

## 2017-11-09 DIAGNOSIS — Z95.2 S/P MITRAL VALVE REPLACEMENT: ICD-10-CM

## 2017-11-09 DIAGNOSIS — N52.9 IMPOTENCE OF ORGANIC ORIGIN: ICD-10-CM

## 2017-11-09 DIAGNOSIS — Z79.01 LONG-TERM (CURRENT) USE OF ANTICOAGULANTS: ICD-10-CM

## 2017-11-09 DIAGNOSIS — L57.0 ACTINIC KERATOSIS: ICD-10-CM

## 2017-11-09 DIAGNOSIS — E78.5 HYPERLIPIDEMIA LDL GOAL <100: ICD-10-CM

## 2017-11-09 LAB
ANION GAP SERPL CALCULATED.3IONS-SCNC: 7 MMOL/L (ref 3–14)
BUN SERPL-MCNC: 29 MG/DL (ref 7–30)
CALCIUM SERPL-MCNC: 9 MG/DL (ref 8.5–10.1)
CHLORIDE SERPL-SCNC: 106 MMOL/L (ref 94–109)
CHOLEST SERPL-MCNC: 146 MG/DL
CO2 SERPL-SCNC: 26 MMOL/L (ref 20–32)
CREAT SERPL-MCNC: 1.16 MG/DL (ref 0.66–1.25)
CREAT UR-MCNC: 117 MG/DL
GFR SERPL CREATININE-BSD FRML MDRD: 61 ML/MIN/1.7M2
GLUCOSE SERPL-MCNC: 110 MG/DL (ref 70–99)
HDLC SERPL-MCNC: 38 MG/DL
LDLC SERPL CALC-MCNC: 78 MG/DL
MICROALBUMIN UR-MCNC: 25 MG/L
MICROALBUMIN/CREAT UR: 21.37 MG/G CR (ref 0–17)
NONHDLC SERPL-MCNC: 108 MG/DL
POTASSIUM SERPL-SCNC: 4.3 MMOL/L (ref 3.4–5.3)
SODIUM SERPL-SCNC: 139 MMOL/L (ref 133–144)
TRIGL SERPL-MCNC: 150 MG/DL

## 2017-11-09 PROCEDURE — 99397 PER PM REEVAL EST PAT 65+ YR: CPT | Mod: 25 | Performed by: FAMILY MEDICINE

## 2017-11-09 PROCEDURE — 73560 X-RAY EXAM OF KNEE 1 OR 2: CPT | Mod: RT

## 2017-11-09 PROCEDURE — 82043 UR ALBUMIN QUANTITATIVE: CPT | Performed by: FAMILY MEDICINE

## 2017-11-09 PROCEDURE — 36415 COLL VENOUS BLD VENIPUNCTURE: CPT | Performed by: FAMILY MEDICINE

## 2017-11-09 PROCEDURE — 17000 DESTRUCT PREMALG LESION: CPT | Performed by: FAMILY MEDICINE

## 2017-11-09 PROCEDURE — 80048 BASIC METABOLIC PNL TOTAL CA: CPT | Performed by: FAMILY MEDICINE

## 2017-11-09 PROCEDURE — 80061 LIPID PANEL: CPT | Performed by: FAMILY MEDICINE

## 2017-11-09 RX ORDER — PRAVASTATIN SODIUM 40 MG
40 TABLET ORAL DAILY
Qty: 90 TABLET | Refills: 3 | Status: SHIPPED | OUTPATIENT
Start: 2017-11-09 | End: 2018-11-15

## 2017-11-09 RX ORDER — SILDENAFIL CITRATE 20 MG/1
40-100 TABLET ORAL DAILY PRN
Qty: 30 TABLET | Refills: 11 | Status: SHIPPED | OUTPATIENT
Start: 2017-11-09 | End: 2018-12-05

## 2017-11-09 RX ORDER — LISINOPRIL 20 MG/1
20 TABLET ORAL DAILY
Qty: 90 TABLET | Refills: 3 | Status: SHIPPED | OUTPATIENT
Start: 2017-11-09 | End: 2018-11-15

## 2017-11-09 RX ORDER — METOPROLOL SUCCINATE 50 MG/1
50 TABLET, EXTENDED RELEASE ORAL DAILY
Qty: 90 TABLET | Refills: 3 | Status: SHIPPED | OUTPATIENT
Start: 2017-11-09 | End: 2018-11-15

## 2017-11-09 RX ORDER — PRAVASTATIN SODIUM 40 MG
40 TABLET ORAL DAILY
Qty: 90 TABLET | Refills: 3 | Status: SHIPPED | OUTPATIENT
Start: 2017-11-09 | End: 2017-11-09

## 2017-11-09 RX ORDER — SILDENAFIL CITRATE 20 MG/1
40-100 TABLET ORAL DAILY PRN
Qty: 30 TABLET | Refills: 11 | Status: SHIPPED | OUTPATIENT
Start: 2017-11-09 | End: 2017-11-09

## 2017-11-09 RX ORDER — WARFARIN SODIUM 2 MG/1
TABLET ORAL
Qty: 90 TABLET | Refills: 3 | Status: SHIPPED | OUTPATIENT
Start: 2017-11-09 | End: 2018-11-15

## 2017-11-09 RX ORDER — AMOXICILLIN 500 MG/1
2000 CAPSULE ORAL
Qty: 4 CAPSULE | Refills: 4 | Status: SHIPPED | OUTPATIENT
Start: 2017-11-09 | End: 2018-05-17

## 2017-11-09 NOTE — NURSING NOTE
"Chief Complaint   Patient presents with     Wellness Visit       Initial /78 (BP Location: Left arm, Patient Position: Sitting, Cuff Size: Adult Large)  Pulse 73  Temp 98.2  F (36.8  C) (Oral)  Ht 6' 0.5\" (1.842 m)  Wt 212 lb (96.2 kg)  SpO2 98%  BMI 28.36 kg/m2 Estimated body mass index is 28.36 kg/(m^2) as calculated from the following:    Height as of this encounter: 6' 0.5\" (1.842 m).    Weight as of this encounter: 212 lb (96.2 kg).  Medication Reconciliation: complete  "

## 2017-11-09 NOTE — PROGRESS NOTES
Dear Tomás,    Here is a summary of your recent test results:  -Kidney function (GFR) is normal.  -Sodium is normal.  -Potassium is normal.  -Glucose is slight elevated and may be sign of early diabetes (prediabetes). ADVISE:: low carbohydrate diet, exercise, try to lose weight (if necessary) and recheck glucose in 12 months. (GLU,A1C, DX: prediabetes)  -Cholesterol levels are at your goal levels.  ADVISE: Continuing your medication, a regular exercise program with at least 30 minutes of aerobic exercise 3-4 days/week ( 45 minutes 4-6 days/week if weight loss needed), and a low saturated fat,/low carbohydrate diet are helpful to maintain this.  Rechecking your fasting cholesterol panel in 12 months is recommended (Lipid w/ LDL reflex).  -Microalbumin (urine protein) level is elevated. This is suggestive of early damage to your kidneys from high blood pressure.  ADVISE: avoiding anti-inflamatory agents such as ibuprofen (Advil, Motrin) or naproxen (Aleve) as much as possible, keeping your blood pressure in a normal range, and continuing your medication that helps protect your kidneys.  Recheck in 1 year.    For additional lab test information, labtestsonline.org is an excellent reference.           Thank you very much for trusting me and [facility].     Healthy regards,  Marc Groves MD

## 2017-11-09 NOTE — MR AVS SNAPSHOT
After Visit Summary   11/9/2017    Tomás Cruz    MRN: [id number]           Patient Information     Date Of Birth          1940        Visit Information        Provider Department      11/9/2017 8:40 AM Mahamed Groves MD [facility]        Today's Diagnoses     Medicare annual wellness visit, subsequent    -  1    At risk for falling        IMPOTENCE, ORGANIC ORIGN        Hyperlipidemia LDL goal <100        Essential hypertension with goal blood pressure less than 140/90        S/P mitral valve replacement        Elevated prostate specific antigen (PSA)        Paroxysmal atrial fibrillation (H)        Decreased GFR        Advanced directives, counseling/discussion        Right knee pain, unspecified chronicity        Cardiomyopathy, unspecified type (H)        Right elbow pain        Long-term (current) use of anticoagulants [Z79.01]        Automatic implantable cardioverter-defibrillator in situ        Actinic keratosis        Arthritis of knee, right          Care Instructions          Services Typically covered by Medicare Recommended Completed   Vaccines    Pneumonoccol    Influenza    Hepatitis B (if medium/high risk)     Once for patients after age 65    Yearly  Medium/high risk factors:    End Stage Kidney Disease    Hemophiliacs who received Factor XIII or IX concentrates    Clients of institutions for developmentally disabled    Persons who live in same house as a Hepatitis B carrier    Homosexual men    Illicit injectable drug users    Health care workers     Mammogram Covered: One-time screen between age 35-39, annually for age 40+     Pap and Pelvic Exam Covered: Annually if  high risk,  or childbearing age with abnormal Pap in last 3 years.  Q24 months for all other women     Prostate Cancer Screening    Digital rectal exam    PSA Covered: Annually for all men > age 50     Corolrectal Cancer Screening Screening colonoscopy every 10 years, more often for high  risk patients     Diabetes Self-Management Training Requires referral by treating physician for patient with diabetes     Diabetes Screening    Fasting blood sugar or glucose tolerance test   Once yearly, twice yearly if prediabetic     Cardiovascular Screening Blood Tests    Total Cholesterol    HDL    Triglycerides Every 5 years     Medical Nutrition Therapy for Diabetes or Renal Disease Requires referral by treating physician for patient with diabetes or kidney disease     Glaucoma Screening Annually for patients with one of the following risk factors:    Diabetes Mellitus    Family history of Glaucoma    -American age 50 and over    -American age 65 and over     Bone Mass Measurement Every 24 months if one of the following risk factors:    Estrogen deficiency    Vertebral abnormalities on x-ray indicative of Osteoporosis, Osteopenia, or Vertebral fracture    Receiving/expected to receive the equivalent of at least 5 mg of Prednisone per day for > 3 months    Hyperparathyroidism    Patient being monitored for response to Osteoporosis Therapy     One-time AAA screen  Must be ordered as part of Medicare IPPE   Any patient with a family history of AAA    Males Age 65-75, with history of smoking at least 100 cigarettes in lifetime     Smoking Cessation Counseling Beneficiaries who use tobacco are eligible to receive 2 cessation attempts per year; each attempt includes maximum of 4 sessions     HIV Screening Annually for beneficiaries at increased risk:       Increased risk for HIV infection is defined in the  National Coverage Determinations (NCD) Manual,  Publication 100-03 Sections 190.14 (diagnostic) and 210.7 (screening). See http://www.cms.gov/manuals/downloads/iuh052b9_Eakv9.pdf and http://www.cms.gov/manuals/downloads/qty567z4_Fivh8.pdf on the Internet.  Three times per pregnancy for beneficiaries who are pregnant.     Future Annual Wellness Visit Annually, for all beneficiaries.        Preventive Health Recommendations:       Male Ages 65 and over    Yearly exam:             See your health care provider every year in order to  o   Review health changes.   o   Discuss preventive care.    o   Review your medicines if your doctor has prescribed any.    Talk with your health care provider about whether you should have a test to screen for prostate cancer (PSA).    Every 3 years, have a diabetes test (fasting glucose). If you are at risk for diabetes, you should have this test more often.    Every 5 years, have a cholesterol test. Have this test more often if you are at risk for high cholesterol or heart disease.     Every 10 years, have a colonoscopy. Or, have a yearly FIT test (stool test). These exams will check for colon cancer.    Talk to with your health care provider about screening for Abdominal Aortic Aneurysm if you have a family history of AAA or have a history of smoking.  Shots:     Get a flu shot each year.     Get a tetanus shot every 10 years.     Talk to your doctor about your pneumonia vaccines. There are now two you should receive - Pneumovax (PPSV 23) and Prevnar (PCV 13).    Talk to your doctor about a shingles vaccine.     Talk to your doctor about the hepatitis B vaccine.  Nutrition:     Eat at least 5 servings of fruits and vegetables each day.     Eat whole-grain bread, whole-wheat pasta and brown rice instead of white grains and rice.     Talk to your doctor about Calcium and Vitamin D.   Lifestyle    Exercise for at least 150 minutes a week (30 minutes a day, 5 days a week). This will help you control your weight and prevent disease.     Limit alcohol to one drink per day.     No smoking.     Wear sunscreen to prevent skin cancer.     See your dentist every six months for an exam and cleaning.     See your eye doctor every 1 to 2 years to screen for conditions such as glaucoma, macular degeneration and cataracts.  Preventive Health Recommendations:       No smoking.  "  Wear sunscreen to prevent skin cancer.   See your dentist every six months for an exam and cleaning.   See your eye doctor every 1 to 2 years to screen for conditions such as glaucoma, macular degeneration and cataracts.          Follow-ups after your visit        Follow-up notes from your care team     Return in about 1 year (around 11/9/2018) for Wellness visit and fasting labs.      Who to contact     If you have questions or need follow up information about today's clinic visit or your schedule please contact [facility] directly at [phone].  Normal or non-critical lab and imaging results will be communicated to you by IndiaMARThart, letter or phone within 4 business days after the clinic has received the results. If you do not hear from us within 7 days, please contact the clinic through Envoyt or phone. If you have a critical or abnormal lab result, we will notify you by phone as soon as possible.  Submit refill requests through Apofore or call your pharmacy and they will forward the refill request to us. Please allow 3 business days for your refill to be completed.          Additional Information About Your Visit        IndiaMARThart Information     Apofore gives you secure access to your electronic health record. If you see a primary care provider, you can also send messages to your care team and make appointments. If you have questions, please call your primary care clinic.  If you do not have a primary care provider, please call [phone] and they will assist you.        Care EveryWhere ID     This is your Care EveryWhere ID. This could be used by other organizations to access your [facility] medical records  [id number]        Your Vitals Were     Pulse Temperature Height Pulse Oximetry BMI (Body Mass Index)       73 98.2  F (36.8  C) (Oral) 6' 0.5\" (1.842 m) 98% 28.36 kg/m2        Blood Pressure from Last 3 Encounters:   11/09/17 122/78   10/27/17 142/81   09/11/17 136/68    Weight from " Last 3 Encounters:   11/09/17 212 lb (96.2 kg)   10/25/16 205 lb (93 kg)   10/12/15 200 lb (90.7 kg)              We Performed the Following     Albumin Random Urine Quantitative with Creat Ratio     BASIC METABOLIC PANEL     DESTRUCT PREMALIGNANT LESION, FIRST     Lipid panel reflex to direct LDL Fasting          Today's Medication Changes          These changes are accurate as of: 11/9/17 10:08 AM.  If you have any questions, ask your nurse or doctor.               Start taking these medicines.        Dose/Directions    pravastatin 40 MG tablet   Commonly known as:  PRAVACHOL   Used for:  Hyperlipidemia LDL goal <100   Started by:  Mahmaed Groves MD        Dose:  40 mg   Take 1 tablet (40 mg) by mouth daily   Quantity:  90 tablet   Refills:  3       sildenafil 20 MG tablet   Commonly known as:  REVATIO   Used for:  Impotence of organic origin   Started by:  Mahamed Groves MD        Dose:   mg   Take 2-5 tablets ( mg) by mouth daily as needed   Quantity:  30 tablet   Refills:  11         Stop taking these medicines if you haven't already. Please contact your care team if you have questions.     ciprofloxacin 500 MG tablet   Commonly known as:  CIPRO   Stopped by:  Mahamed Groves MD                Where to get your medicines      These medications were sent to [facility]/pharmacy #[id number] [place], [place] - [address], [place] [address]     Phone:  [phone]     amoxicillin 500 MG capsule    lisinopril 20 MG tablet    metoprolol 50 MG 24 hr tablet    pravastatin 40 MG tablet    warfarin 2 MG tablet         Some of these will need a paper prescription and others can be bought over the counter.  Ask your nurse if you have questions.     Bring a paper prescription for each of these medications     sildenafil 20 MG tablet                Primary Care Provider Office Phone # Fax #    Mahamed Groves [phone]       [address] [place] [address]  [phone]        Equal Access to Services     [facility]: Hadii clayton levin ana Morales, waaxda luqadaha, qaybta kaalmada bradleyadilenedelores, waxholli gunner padilladretomy gomes. So [facility] [phone].    ATENCIÓN: Si habla español, tiene a graham disposición servicios gratuitos de asistencia lingüística. Jordaname al [phone].    We comply with applicable federal civil rights laws and [place] laws. We do not discriminate on the basis of race, color, national origin, age, disability, sex, sexual orientation, or gender identity.            Thank you!     Thank you for choosing [facility]  for your care. Our goal is always to provide you with excellent care. Hearing back from our patients is one way we can continue to improve our services. Please take a few minutes to complete the written survey that you may receive in the mail after your visit with us. Thank you!             Your Updated Medication List - Protect others around you: Learn how to safely use, store and throw away your medicines at www.disposemymeds.org.          This list is accurate as of: 11/9/17 10:08 AM.  Always use your most recent med list.                   Brand Name Dispense Instructions for use Diagnosis    amoxicillin 500 MG capsule    AMOXIL    4 capsule    Take 4 capsules (2,000 mg) by mouth once as needed Prior to Dental appts    S/P mitral valve replacement       lisinopril 20 MG tablet    PRINIVIL/ZESTRIL    90 tablet    Take 1 tablet (20 mg) by mouth daily    Essential hypertension with goal blood pressure less than 140/90       metoprolol 50 MG 24 hr tablet    TOPROL-XL    90 tablet    Take 1 tablet (50 mg) by mouth daily    Essential hypertension with goal blood pressure less than 140/90       pravastatin 40 MG tablet    PRAVACHOL    90 tablet    Take 1 tablet (40 mg) by mouth daily    Hyperlipidemia LDL goal <100       sildenafil 20 MG tablet    REVATIO    30 tablet    Take 2-5 tablets ( mg) by mouth daily as needed     Impotence of organic origin       warfarin 2 MG tablet    COUMADIN    90 tablet    TAKE ONE TABLET (2MG) BY MOUTH DAILY, EXCEPT TWO TABLETS (4MG) ON FRIDAYS    S/P mitral valve replacement

## 2017-11-09 NOTE — PROGRESS NOTES
SUBJECTIVE:   Tomás Cruz is a 77 year old male who presents for Preventive Visit.    Are you in the first 12 months of your Medicare Part B coverage?  No    Healthy Habits:    Do you get at least three servings of calcium containing foods daily (dairy, green leafy vegetables, etc.)? yes    Amount of exercise or daily activities, outside of work: 3-4 times a week on treadmill    Problems taking medications regularly No    Medication side effects: No    Have you had an eye exam in the past two years? no    Do you see a dentist twice per year? yes    Do you have sleep apnea, excessive snoring or daytime drowsiness?no    COGNITIVE SCREEN  1) Repeat 3 items (Banana, Sunrise, Chair)    2) Clock draw: NORMAL  3) 3 item recall: Recalls 3 objects  Results: 3 items recalled: COGNITIVE IMPAIRMENT LESS LIKELY    Mini-CogTM Copyright [employer]. Licensed by the author for use in [employer]; reprinted with permission ([email or web address]). All rights reserved.        Hyperlipidemia Follow-Up      Rate your low fat/cholesterol diet?: poor    Taking statin?  Yes, no muscle aches from statin    Other lipid medications/supplements?:  None     - Labs pending; Pt is doing well on medication, no side effect complaints at today's visit.    Hypertension Follow-up      Outpatient blood pressures are not being checked.    Low Salt Diet: low salt     - Labs pending; Pt is doing well, with BP measuring 122/78 at today's visit.    Right Knee Pain -- Tomás reports chronic knee pain, similar to what he experienced with his left knee before he had replacement surgery. He states often the pain will keep him awake at night, trying to find a position to sleep in that doesn't exacerbate his symptoms. Tomás notes the pain also keeps him from doing large amounts of physical activity.     Right Elbow Pain -- Tomás reports pain in his elbow, as well as weakness. He states the pain has persisted for some time, and believes it is due to his  years of metal detector use. Tomás will intermittently experience difficulty lifting objects, such as a jug of milk or his metal detector. He is unsure if it is an issue with the joint or the surrounding muscles and tendons.       Reviewed and updated as needed this visit by clinical staffTobacco  Allergies  Meds  Med Hx  Surg Hx  Fam Hx  Soc Hx      Reviewed and updated as needed this visit by Provider  Surg Hx  Fam Hx        Social History   Substance Use Topics     Smoking status: Former Smoker     Quit date: 6/1/1982     Smokeless tobacco: Never Used     Alcohol use No     The patient does not drink >3 drinks per day nor >7 drinks per week.    Today's PHQ-2 Score:   PHQ-2 ( 1999 [employer]) 11/9/2017 10/25/2016   Q1: Little interest or pleasure in doing things 0 0   Q2: Feeling down, depressed or hopeless 0 0   PHQ-2 Score 0 0     Do you feel safe in your environment - Yes    Do you have a Health Care Directive?: has information at home to fill out    Current providers sharing in care for this patient include:   Patient Care Team:  Mahamed Groves MD as PCP - General      Hearing impairment: No    Ability to successfully perform activities of daily living: Yes, no assistance needed     Fall risk:    Home safety:  none identified  Fallen 2 or more times in the past year?: No  Any fall with injury in the past year?: No      The following health maintenance items are reviewed in Epic and correct as of today:  Health Maintenance   Topic Date Due     LIPID MONITORING Q1 YEAR  10/25/2017     MICROALBUMIN Q1 YEAR  10/25/2017     CBC Q1 YR  10/25/2017     FALL RISK ASSESSMENT  10/25/2017     BMP Q6 MOS  12/06/2017     ALT Q1 YR  06/06/2018     BMP Q1 YR  06/06/2018     PSA Q1 YR  06/06/2018     WELLNESS VISIT Q1 YR  11/09/2018     HF ACTION PLAN Q3 YR  11/09/2020     COLONOSCOPY Q5 YR  04/04/2022     ADVANCE DIRECTIVE PLANNING Q5 YRS  11/09/2022     TETANUS Q10 YR  10/12/2025     INFLUENZA VACCINE (SYSTEM  "ASSIGNED)  Completed     PNEUMOCOCCAL  Completed     Labs reviewed in EPIC      ROS:  Constitutional, HEENT, cardiovascular, pulmonary, GI, , musculoskeletal, neuro, skin, endocrine and psych systems are negative, except as otherwise noted.    This document serves as a record of the services and decisions personally performed and made by Mahamed Groves MD. It was created on her behalf by Alejandra Khalil, a trained medical scribe. The creation of this document is based the provider's statements to the medical scribe.  Scribe Alejandra Khalil 9:01 AM, November 9, 2017    OBJECTIVE:   /78 (BP Location: Left arm, Patient Position: Sitting, Cuff Size: Adult Large)  Pulse 73  Temp 98.2  F (36.8  C) (Oral)  Ht 1.842 m (6' 0.5\")  Wt 96.2 kg (212 lb)  SpO2 98%  BMI 28.36 kg/m2 Estimated body mass index is 28.36 kg/(m^2) as calculated from the following:    Height as of this encounter: 1.842 m (6' 0.5\").    Weight as of this encounter: 96.2 kg (212 lb).  EXAM:   GENERAL: healthy, alert and no distress  EYES: Eyes grossly normal to inspection, PERRL and conjunctivae and sclerae normal  HENT: ear canals and TM's normal, nose and mouth without ulcers or lesions  NECK: no adenopathy, no asymmetry, masses, or scars and thyroid normal to palpation  RESP: lungs clear to auscultation - no rales, rhonchi or wheezes  BREAST: normal without masses, tenderness or nipple discharge and no palpable axillary masses or adenopathy  CV: regular rate and rhythm, normal S1 S2, no S3 or S4, no murmur, click or rub, no peripheral edema and peripheral pulses strong  ABDOMEN: soft, nontender, no hepatosplenomegaly, no masses and bowel sounds normal   (male): testicles normal without atrophy or masses, no hernias and penis normal without urethral discharge  MS: Right elbow - reduced extension of 160 degrees; Right knee - medial joint line and medial patella tenderness; otherwise no gross musculoskeletal defects noted, no edema  SKIN: Right side " of nose bridge - 9 mm pink patch with rough scale; otherwise no suspicious lesions or rashes  NEURO: Normal strength and tone, mentation intact and speech normal  PSYCH: mentation appears normal, affect normal/bright    ASSESSMENT / PLAN:   Tomás was seen today for wellness visit.    Diagnoses and all orders for this visit:    Medicare annual wellness visit, subsequent    IMPOTENCE, ORGANIC ORIGIN - No concerns today  -     sildenafil (REVATIO) 20 MG tablet; Take 2-5 tablets ( mg) by mouth daily as needed    Hyperlipidemia LDL goal <100 - Labs pending; Pt doing well on medication  -     Lipid panel reflex to direct LDL Fasting  -     Discontinue: pravastatin (PRAVACHOL) 40 MG tablet; Take 1 tablet (40 mg) by mouth daily  -     pravastatin (PRAVACHOL) 40 MG tablet; Take 1 tablet (40 mg) by mouth daily    Essential hypertension with goal blood pressure less than 140/90 - Labs pending; Pt doing well, BP measuring 122/78 at today's visit  -     BASIC METABOLIC PANEL  -     Albumin Random Urine Quantitative with Creat Ratio  -     lisinopril (PRINIVIL/ZESTRIL) 20 MG tablet; Take 1 tablet (20 mg) by mouth daily  -     metoprolol (TOPROL-XL) 50 MG 24 hr tablet; Take 1 tablet (50 mg) by mouth daily    Elevated prostate specific antigen (PSA) - Labs pending    Paroxysmal atrial fibrillation (H)    Decreased GFR    Right knee pain, unspecified chronicity/Arthritis of knee, right - X-ray revealed limited cartilage in joint on inside of knee; No treatment for now, will consider steroid injections if needed   -     XR Knee Standing Right 2 Views; Future    Cardiomyopathy, unspecified type (H) - Pt doing well    Right elbow pain - Pt experiences stiffness, due to long-term use of metal detector; Will work on strengthening, as well as stretching    Actinic keratosis - Treated with cryotherapy at today's visit  -     DESTRUCT PREMALIGNANT LESION, FIRST    S/P mitral valve replacement - Pt doing well  -     warfarin  "(COUMADIN) 2 MG tablet; TAKE ONE TABLET (2MG) BY MOUTH DAILY, EXCEPT TWO TABLETS (4MG) ON FRIDAYS  -     amoxicillin (AMOXIL) 500 MG capsule; Take 4 capsules (2,000 mg) by mouth once as needed Prior to Dental appts    Long-term (current) use of anticoagulants [Z79.01]    Automatic implantable cardioverter-defibrillator in situ - stable and followed by cardiology    At risk for falling - No concerns today    Advanced directives, counseling/discussion - Pt has information, will discuss at home      End of Life Planning:  Patient currently has an advanced directive: No.  I have verified the patient's ablity to prepare an advanced directive/make health care decisions.  Literature was provided to assist patient in preparing an advanced directive.    COUNSELING:  Reviewed preventive health counseling, as reflected in patient instructions  Estimated body mass index is 28.36 kg/(m^2) as calculated from the following:    Height as of this encounter: 1.842 m (6' 0.5\").    Weight as of this encounter: 96.2 kg (212 lb).   reports that he quit smoking about 35 years ago. He has never used smokeless tobacco.        Appropriate preventive services were discussed with this patient, including applicable screening as appropriate for cardiovascular disease, diabetes, osteopenia/osteoporosis, and glaucoma.  As appropriate for age/gender, discussed screening for colorectal cancer, prostate cancer, breast cancer, and cervical cancer. Checklist reviewing preventive services available has been given to the patient.    Reviewed patients plan of care and provided an AVS. The Basic Care Plan (routine screening as documented in Health Maintenance) for Tomás meets the Care Plan requirement. This Care Plan has been established and reviewed with the Patient.    Counseling Resources:  ATP IV Guidelines  Pooled Cohorts Equation Calculator  Breast Cancer Risk Calculator  FRAX Risk Assessment  ICSI Preventive Guidelines  Dietary Guidelines for " Americans, 2010  USDA's MyPlate  ASA Prophylaxis  Lung CA Screening    The information in this document, created by the medical scribe for me, accurately reflects the services I personally performed and the decisions made by me. I have reviewed and approved this document for accuracy prior to leaving the patient care area.  9:01 AM, 11/09/17    Mahamed Groves MD  [facility] [place]

## 2017-11-29 ENCOUNTER — TELEPHONE (OUTPATIENT)
Dept: FAMILY MEDICINE | Facility: CLINIC | Age: 77
End: 2017-11-29

## 2017-11-29 DIAGNOSIS — M17.11 ARTHRITIS OF KNEE, RIGHT: Primary | ICD-10-CM

## 2017-11-29 NOTE — TELEPHONE ENCOUNTER
Joint Pain-     Right knee pain, unspecified chronicity/Arthritis of knee, right - X-ray revealed limited cartilage in joint on inside of knee; No treatment for now, will consider steroid injections if needed                        -     XR Knee Standing Right 2 Views; Future    Tylenol pt has been taking, they report this is not effective. Pt has not tried heat or ice, but notes they want a knee replacement if they are unable to control the pain more.  Advised we will route this to another provider on staff per pt request for possible steroid injection referral per RL above.    Cell [phone], not ok to LM.    Naila Brasher RN  [facility]

## 2017-11-29 NOTE — TELEPHONE ENCOUNTER
The patient indicates understanding of these issues and agrees with the plan.  Scheduled with LP for tomorrow.  Naila Brasher RN  [facility]

## 2017-11-29 NOTE — TELEPHONE ENCOUNTER
Please call patient.  He should make an appointment with me or RL for a knee cortisone injection.  This will likely help his knee pain significantly.  If this does not work then we can refer him to a surgeon.      Nahomy Damon MS, PA-C

## 2017-11-30 ENCOUNTER — OFFICE VISIT (OUTPATIENT)
Dept: FAMILY MEDICINE | Facility: CLINIC | Age: 77
End: 2017-11-30
Payer: COMMERCIAL

## 2017-11-30 VITALS
DIASTOLIC BLOOD PRESSURE: 82 MMHG | OXYGEN SATURATION: 96 % | TEMPERATURE: 97.4 F | BODY MASS INDEX: 28.93 KG/M2 | WEIGHT: 218.25 LBS | HEART RATE: 82 BPM | HEIGHT: 73 IN | SYSTOLIC BLOOD PRESSURE: 128 MMHG

## 2017-11-30 DIAGNOSIS — M17.11 PRIMARY OSTEOARTHRITIS OF RIGHT KNEE: Primary | ICD-10-CM

## 2017-11-30 PROCEDURE — 99214 OFFICE O/P EST MOD 30 MIN: CPT | Mod: 25 | Performed by: PHYSICIAN ASSISTANT

## 2017-11-30 PROCEDURE — 20610 DRAIN/INJ JOINT/BURSA W/O US: CPT | Performed by: PHYSICIAN ASSISTANT

## 2017-11-30 RX ORDER — TRIAMCINOLONE ACETONIDE 40 MG/ML
80 INJECTION, SUSPENSION INTRA-ARTICULAR; INTRAMUSCULAR ONCE
Qty: 2 ML | Refills: 0 | OUTPATIENT
Start: 2017-11-30 | End: 2017-11-30

## 2017-11-30 NOTE — PROGRESS NOTES
SUBJECTIVE:   Tomás Cruz is a 77 year old male who presents to clinic today for the following health issues:    Joint or Musculoskeletal Pain  Patient reports significant right knee pain that is not responding to Tylenol.  He had a TKA of the left knee in 2006 by Dr. Castro and had a tough recovery (is doing well now).  He does not currently have a relationship with a surgeon.  He wants to avoid surgery of the right knee if possible.  He never had cortisone injections of the left knee prior to his surgery.  He is not diabetic.  He is on warfarin and cannot take NSAIDs.        Problem list and histories reviewed & adjusted, as indicated.  Additional history: as documented    Patient Active Problem List   Diagnosis     Impotence of organic origin     Hypersomnia with sleep apnea     Cardiomyopathy, nonischemic     Polyp of colon     Hyperlipidemia LDL goal <100     Advanced directives, counseling/discussion     Syncope     S/P mitral valve replacement     Health Care Home     Long-term (current) use of anticoagulants [Z79.01]     Essential tremor     Paroxysmal atrial fibrillation (H)     History of prosthetic heart valve     Automatic implantable cardioverter-defibrillator in situ     Elevated prostate specific antigen (PSA)     Decreased GFR     Essential hypertension with goal blood pressure less than 140/90     Arthritis of knee, right     Past Surgical History:   Procedure Laterality Date     ARTHROSCOPY KNEE Left 2001    left knee arthroscopy     AS TOTAL KNEE ARTHROPLASTY Left 2006    Dr. Castro     CARDIAC SURGERY  2003    Mitral valve replacement     CATARACT IOL, RT/LT  2014    Cataracts, bilateral     HERNIA REPAIR       IMPLANT AUTOMATIC IMPLANTABLE CARDIOVERTER DEFIBRILLATOR  5/2013     KIDNEY SURGERY  2008    Laparoscopic right radical nephrectomy.- due to complex hemorrhagic cyst       Social History   Substance Use Topics     Smoking status: Former Smoker     Quit date: 6/1/1982     Smokeless  "tobacco: Never Used     Alcohol use No     Family History   Problem Relation Age of Onset     Adopted: Yes     Unknown/Adopted Mother      Unknown/Adopted Father      DIABETES No family hx of      Coronary Artery Disease No family hx of          Current Outpatient Prescriptions   Medication Sig Dispense Refill     lisinopril (PRINIVIL/ZESTRIL) 20 MG tablet Take 1 tablet (20 mg) by mouth daily 90 tablet 3     metoprolol (TOPROL-XL) 50 MG 24 hr tablet Take 1 tablet (50 mg) by mouth daily 90 tablet 3     warfarin (COUMADIN) 2 MG tablet TAKE ONE TABLET (2MG) BY MOUTH DAILY, EXCEPT TWO TABLETS (4MG) ON FRIDAYS 90 tablet 3     amoxicillin (AMOXIL) 500 MG capsule Take 4 capsules (2,000 mg) by mouth once as needed Prior to Dental appts 4 capsule 4     sildenafil (REVATIO) 20 MG tablet Take 2-5 tablets ( mg) by mouth daily as needed 30 tablet 11     pravastatin (PRAVACHOL) 40 MG tablet Take 1 tablet (40 mg) by mouth daily 90 tablet 3     No Known Allergies    Reviewed and updated as needed this visit by clinical staff  Tobacco  Allergies  Meds  Problems  Med Hx  Surg Hx  Fam Hx  Soc Hx        Reviewed and updated as needed this visit by Provider  Tobacco  Allergies  Meds  Problems  Med Hx  Surg Hx  Fam Hx  Soc Hx        ROS:  Constitutional, HEENT, cardiovascular, pulmonary, GI, , musculoskeletal, neuro, skin, endocrine and psych systems are negative, except as otherwise noted.    OBJECTIVE:   /82  Pulse 82  Temp 97.4  F (36.3  C) (Tympanic)  Ht 6' 0.5\" (1.842 m)  Wt 218 lb 4 oz (99 kg)  SpO2 96%  BMI 29.19 kg/m2  Body mass index is 29.19 kg/(m^2).     GENERAL: healthy, alert and no distress  MS: well healed incision over the left knee.  2+ effusion of right knee with medial joint line tenderness.  Decreased flexion of knee secondary to pain, full extension.  SKIN: no suspicious lesions or rashes  NEURO: Normal strength and tone, mentation intact and speech normal  PSYCH: mentation appears " normal, affect normal/bright    Recent Results (from the past 744 hour(s))   XR Knee Standing Right 2 Views    Narrative    KNEE STANDING RIGHT TWO VIEWS  11/9/2017 9:39 AM     HISTORY: Right elbow pain.    COMPARISON: None.      Impression    IMPRESSION: Moderate joint space narrowing of the right medial knee  compartment. Mild knee effusion. Small patellar osteophytes. No acute  fracture. Cortical irregularity in the proximal fibula may be partial  calcification of interosseous ligament. Periosteal reaction underlying  fracture or other process is not entirely excluded. Clinically  correlate with specific pain at this location.    MILADYS MIGUEL MD     PROCEDURE:  After discussing the risks, benefits and alternatives to a right knee intra-articular cortisone injection the patient elected to proceed with the injection.  The anterior aspect of the right knee was prepped with a betadine solution.  Using a sterile technique and a 22 gauge needle, 4 cc's of 1% lidocaine, 4 cc's of 0.25% marcaine, and 80 mg in Kenalog were injected into the right knee intra-articular.  The patient tolerated the procedure well and there were no immediate adverse effects.        ASSESSMENT/PLAN:   Tomás was seen today for other.    Diagnoses and all orders for this visit:    Primary osteoarthritis of right knee  Extensive discussion of right knee pathophysiology with osteoarthritis.  Placed referral to orthopedics and recommended Dr[facility] Dias at [facility] to establish care so that if he would like to proceed with surgery he is able to do so.  Injection given in the clinic today.  Advised f/u injection schedule (no more than 3 per calendar year and at least 3 months between injections for a single joint).  Pt voiced understanding.  -     Large Joint/Bursa injection and/or drainage (Shoulder, Knee)  -     Kenalog  80 MG         []  -     triamcinolone acetonide (KENALOG) 40 MG/ML injection; 2 mLs (80 mg) by INTRA-ARTICULAR route once for 1  dose  -     ORTHO  REFERRAL    Greater than 25 minutes were spent with the patient in discussion (not including injection). The majority of this time was coordinating care and counseling regarding the above diagnosis .      Nahomy Damon PA-C  [facility] [place]

## 2017-11-30 NOTE — MR AVS SNAPSHOT
After Visit Summary   11/30/2017    Tomás Cruz    MRN: [id number]           Patient Information     Date Of Birth          1940        Visit Information        Provider Department      11/30/2017 8:40 AM Nahomy Damon PA-C [facility], [place]        Today's Diagnoses     Primary osteoarthritis of right knee    -  1      Care Instructions      Osteoarthritis: Injections and Surgery     Talk with your healthcare provider about your treatment options.     Injections or surgery may help if you have pain or movement problems that severely limit your activities. Your healthcare provider can tell you more about these treatment choices and their risks and complications.  Injections  Medicine can be injected directly into the affected joint. These shots take a few minutes and are done in your healthcare provider s office:    Corticosteroid or steroid injections may ease swelling and pain. The medicine is injected into the joint--for example, the knee or hip. Steroid injections do have risks, so healthcare providers limit the number of injections used in any one joint.     Lubricant supplementation injections use hyaluronic acid, a substance similar to one found naturally in the joint. It may help the joint work more smoothly. These injections are only for osteoarthritis in the knees.  Surgery  Choices for surgery include:    Arthroscopy. The surgeon looks at and works inside the joint using special instruments put through very small incisions. The cartilage is smoothed. Any pieces of cartilage that have broken off are removed.    Total joint replacement. The entire joint is taken out and replaced with a manmade joint using metal, ceramic, and/or plastic. This is most often done with the knee or hip joint.    Other surgery. There are other surgical procedures specific to certain joints. For example, joint resurfacing may be done on the hip joint.  Date Last Reviewed: 2/14/2016     "[id number] [employer]. [address] [place] [address], [place] [address]. All rights reserved. This information is not intended as a substitute for professional medical care. Always follow your healthcare professional's instructions.                Follow-ups after your visit        Who to contact     If you have questions or need follow up information about today's clinic visit or your schedule please contact [facility] directly at [phone].  Normal or non-critical lab and imaging results will be communicated to you by Roadnethart, letter or phone within 4 business days after the clinic has received the results. If you do not hear from us within 7 days, please contact the clinic through Proginet or phone. If you have a critical or abnormal lab result, we will notify you by phone as soon as possible.  Submit refill requests through Proginet or call your pharmacy and they will forward the refill request to us. Please allow 3 business days for your refill to be completed.          Additional Information About Your Visit        [employer] Information     Proginet gives you secure access to your electronic health record. If you see a primary care provider, you can also send messages to your care team and make appointments. If you have questions, please call your primary care clinic.  If you do not have a primary care provider, please call [phone] and they will assist you.        Care EveryWhere ID     This is your Care EveryWhere ID. This could be used by other organizations to access your [facility] medical records  [id number]        Your Vitals Were     Pulse Temperature Height Pulse Oximetry BMI (Body Mass Index)       82 97.4  F (36.3  C) (Tympanic) 6' 0.5\" (1.842 m) 96% 29.19 kg/m2        Blood Pressure from Last 3 Encounters:   11/30/17 128/82   11/09/17 122/78   10/27/17 142/81    Weight from Last 3 Encounters:   11/30/17 218 lb 4 oz (99 kg)   11/09/17 212 lb (96.2 kg)   10/25/16 205 lb (93 " kg)              We Performed the Following     Kenalog  80 MG         []     Large Joint/Bursa injection and/or drainage (Shoulder, Knee)          Today's Medication Changes          These changes are accurate as of: 11/30/17  9:19 AM.  If you have any questions, ask your nurse or doctor.               Start taking these medicines.        Dose/Directions    triamcinolone acetonide 40 MG/ML injection   Commonly known as:  KENALOG   Used for:  Primary osteoarthritis of right knee   Started by:  Nahomy Damon PA-C        Dose:  80 mg   2 mLs (80 mg) by INTRA-ARTICULAR route once for 1 dose   Quantity:  2 mL   Refills:  0            Where to get your medicines      Some of these will need a paper prescription and others can be bought over the counter.  Ask your nurse if you have questions.     You don't need a prescription for these medications     triamcinolone acetonide 40 MG/ML injection                Primary Care Provider Office Phone # Fax #    Mahamed Groves [phone]       [address] [place] [id number]        Equal Access to Services     [facility]: Hadii clayton ku hadasho Soomaali, waaxda luqadaha, qaybta kaalmada adeegyada, waxay idiin hayalyse mauricio . So [facility] [phone].    ATENCIÓN: Si habla español, tiene a graham disposición servicios gratuitos de asistencia lingüística. Llame al [phone].    We comply with applicable federal civil rights laws and [place] laws. We do not discriminate on the basis of race, color, national origin, age, disability, sex, sexual orientation, or gender identity.            Thank you!     Thank you for choosing [facility]  for your care. Our goal is always to provide you with excellent care. Hearing back from our patients is one way we can continue to improve our services. Please take a few minutes to complete the written survey that you may receive in the mail after your visit with us. Thank you!              Your Updated Medication List - Protect others around you: Learn how to safely use, store and throw away your medicines at www.disposemymeds.org.          This list is accurate as of: 11/30/17  9:19 AM.  Always use your most recent med list.                   Brand Name Dispense Instructions for use Diagnosis    amoxicillin 500 MG capsule    AMOXIL    4 capsule    Take 4 capsules (2,000 mg) by mouth once as needed Prior to Dental appts    S/P mitral valve replacement       lisinopril 20 MG tablet    PRINIVIL/ZESTRIL    90 tablet    Take 1 tablet (20 mg) by mouth daily    Essential hypertension with goal blood pressure less than 140/90       metoprolol 50 MG 24 hr tablet    TOPROL-XL    90 tablet    Take 1 tablet (50 mg) by mouth daily    Essential hypertension with goal blood pressure less than 140/90       pravastatin 40 MG tablet    PRAVACHOL    90 tablet    Take 1 tablet (40 mg) by mouth daily    Hyperlipidemia LDL goal <100       sildenafil 20 MG tablet    REVATIO    30 tablet    Take 2-5 tablets ( mg) by mouth daily as needed    Impotence of organic origin       triamcinolone acetonide 40 MG/ML injection    KENALOG    2 mL    2 mLs (80 mg) by INTRA-ARTICULAR route once for 1 dose    Primary osteoarthritis of right knee       warfarin 2 MG tablet    COUMADIN    90 tablet    TAKE ONE TABLET (2MG) BY MOUTH DAILY, EXCEPT TWO TABLETS (4MG) ON FRIDAYS    S/P mitral valve replacement

## 2017-11-30 NOTE — PATIENT INSTRUCTIONS
Osteoarthritis: Injections and Surgery     Talk with your healthcare provider about your treatment options.     Injections or surgery may help if you have pain or movement problems that severely limit your activities. Your healthcare provider can tell you more about these treatment choices and their risks and complications.  Injections  Medicine can be injected directly into the affected joint. These shots take a few minutes and are done in your healthcare provider s office:    Corticosteroid or steroid injections may ease swelling and pain. The medicine is injected into the joint--for example, the knee or hip. Steroid injections do have risks, so healthcare providers limit the number of injections used in any one joint.     Lubricant supplementation injections use hyaluronic acid, a substance similar to one found naturally in the joint. It may help the joint work more smoothly. These injections are only for osteoarthritis in the knees.  Surgery  Choices for surgery include:    Arthroscopy. The surgeon looks at and works inside the joint using special instruments put through very small incisions. The cartilage is smoothed. Any pieces of cartilage that have broken off are removed.    Total joint replacement. The entire joint is taken out and replaced with a manmade joint using metal, ceramic, and/or plastic. This is most often done with the knee or hip joint.    Other surgery. There are other surgical procedures specific to certain joints. For example, joint resurfacing may be done on the hip joint.  Date Last Reviewed: 2/14/2016 2000-2017 [employer]. [address] [place] [address], [place] [address]. All rights reserved. This information is not intended as a substitute for professional medical care. Always follow your healthcare professional's instructions.

## 2017-11-30 NOTE — NURSING NOTE
"Chief Complaint   Patient presents with     Other     right knee injection        Initial /82  Pulse 82  Temp 97.4  F (36.3  C) (Tympanic)  Ht 6' 0.5\" (1.842 m)  Wt 218 lb 4 oz (99 kg)  SpO2 96%  BMI 29.19 kg/m2 Estimated body mass index is 29.19 kg/(m^2) as calculated from the following:    Height as of this encounter: 6' 0.5\" (1.842 m).    Weight as of this encounter: 218 lb 4 oz (99 kg).  Medication Reconciliation: complete   Nahed Hardin, CAR      "

## 2018-01-18 DIAGNOSIS — Z95.2 S/P MITRAL VALVE REPLACEMENT: ICD-10-CM

## 2018-01-18 DIAGNOSIS — I10 ESSENTIAL HYPERTENSION WITH GOAL BLOOD PRESSURE LESS THAN 140/90: ICD-10-CM

## 2018-01-19 ENCOUNTER — TELEPHONE (OUTPATIENT)
Dept: FAMILY MEDICINE | Facility: CLINIC | Age: 78
End: 2018-01-19

## 2018-01-19 RX ORDER — METOPROLOL SUCCINATE 50 MG/1
TABLET, EXTENDED RELEASE ORAL
Qty: 90 TABLET | Refills: 1 | OUTPATIENT
Start: 2018-01-19

## 2018-01-19 RX ORDER — LISINOPRIL 20 MG/1
TABLET ORAL
Qty: 90 TABLET | Refills: 1 | OUTPATIENT
Start: 2018-01-19

## 2018-01-19 RX ORDER — WARFARIN SODIUM 2 MG/1
TABLET ORAL
Qty: 90 TABLET | Refills: 0 | OUTPATIENT
Start: 2018-01-19

## 2018-01-19 NOTE — LETTER
January 19, 2018      Tomás Cruz  [address] [address]        Dear Tomás,       My staff have been attempting to reach you: you are due for an INR     Your last INR was on 10/27/2017     We need to monitor your INR while you are on coumadin or also known as warfarin. This blood test is a vital part in your treatment to ensure you are taking the correct dose.     Please call the clinic to make this appointment.     We look forward to seeing you    Anticoagulation nursing staff at [place]        Sincerely,            Marc Groves M.D.

## 2018-01-19 NOTE — TELEPHONE ENCOUNTER
Patient is overdue for INR recheck.    Left non-detailed VM for patient to call back.  Letter sent.    If patient calls back please schedule him for INR recheck appointment.    ANEL Ham, RN, PHN  [facility]

## 2018-01-19 NOTE — TELEPHONE ENCOUNTER
"Requested Prescriptions   Pending Prescriptions Disp Refills     warfarin (COUMADIN) 2 MG tablet [Pharmacy Med Name: WARFARIN SODIUM 2 MG TABLET] 90 tablet 0     Sig: TAKE ONE TABLET (2MG) BY MOUTH DAILY, EXCEPT TWO TABLETS (4MG) ON FRIDAYS    Vitamin K Antagonists Failed    1/18/2018 11:12 AM       Failed - INR is within goal in the past 6 weeks    Confirm INR is within goal in the past 6 weeks.     Recent Labs   Lab Test 10/27/17   INR  2.7*     INR goal 2.5-3.5            Passed - Recent or future visit with authorizing provider's specialty    Patient had office visit in the last year or has a visit in the next 30 days with authorizing provider.  See \"Patient Info\" tab in inbasket, or \"Choose Columns\" in Meds & Orders section of the refill encounter.     LOV: 11/30/2017           Passed - Patient is 18 years of age or older        lisinopril (PRINIVIL/ZESTRIL) 20 MG tablet [Pharmacy Med Name: LISINOPRIL 20 MG TABLET] 90 tablet 3     Sig: TAKE 1 TABLET (20 MG) BY MOUTH DAILY    ACE Inhibitors (Including Combos) Protocol Passed    1/18/2018 11:12 AM       Passed - Blood pressure under 140/90    BP Readings from Last 3 Encounters:   11/30/17 128/82   11/09/17 122/78   10/27/17 142/81                Passed - Recent or future visit with authorizing provider's specialty    Patient had office visit in the last year or has a visit in the next 30 days with authorizing provider.  See \"Patient Info\" tab in inbasket, or \"Choose Columns\" in Meds & Orders section of the refill encounter.     LOV: 11/30/2017         Passed - Patient is age 18 or older       Passed - Normal serum creatinine on file in past 12 months    Recent Labs   Lab Test  11/09/17   0949   CR  1.16            Passed - Normal serum potassium on file in past 12 months    Recent Labs   Lab Test  11/09/17   0949   POTASSIUM  4.3             metoprolol succinate (TOPROL-XL) 50 MG 24 hr tablet [Pharmacy Med Name: METOPROLOL SUCC ER 50 MG TAB] 90 tablet 3     Sig: " "TAKE 1 TABLET (50 MG) BY MOUTH DAILY    Beta-Blockers Protocol Passed    1/18/2018 11:12 AM       Passed - Blood pressure under 140/90    BP Readings from Last 3 Encounters:   11/30/17 128/82   11/09/17 122/78   10/27/17 142/81            Passed - Patient is age 6 or older       Passed - Recent or future visit with authorizing provider's specialty    Patient had office visit in the last year or has a visit in the next 30 days with authorizing provider.  See \"Patient Info\" tab in inbasket, or \"Choose Columns\" in Meds & Orders section of the refill encounter.     LOV: 11/30/2017          Lisinopril, Metoprolol, Warfarin - #90 x 3 refills sent on 11/09/2017 (should have refills @ pharmacy)      Laura Wolfe RN  [facility]      "

## 2018-01-30 ENCOUNTER — ANTICOAGULATION THERAPY VISIT (OUTPATIENT)
Dept: NURSING | Facility: CLINIC | Age: 78
End: 2018-01-30
Payer: COMMERCIAL

## 2018-01-30 VITALS — SYSTOLIC BLOOD PRESSURE: 131 MMHG | DIASTOLIC BLOOD PRESSURE: 76 MMHG | HEART RATE: 68 BPM

## 2018-01-30 DIAGNOSIS — Z79.01 LONG-TERM (CURRENT) USE OF ANTICOAGULANTS: ICD-10-CM

## 2018-01-30 DIAGNOSIS — Z95.2 S/P MITRAL VALVE REPLACEMENT: ICD-10-CM

## 2018-01-30 LAB — INR POINT OF CARE: 2.9 (ref 0.86–1.14)

## 2018-01-30 PROCEDURE — 36416 COLLJ CAPILLARY BLOOD SPEC: CPT

## 2018-01-30 PROCEDURE — 85610 PROTHROMBIN TIME: CPT | Mod: QW

## 2018-01-30 NOTE — PROGRESS NOTES
ANTICOAGULATION FOLLOW-UP CLINIC VISIT    Patient Name:  Tomás Cruz  Date:  1/30/2018  Contact Type:  Face to Face    SUBJECTIVE:     Patient Findings     Positives No Problem Findings           OBJECTIVE    INR Protime   Date Value Ref Range Status   01/30/2018 2.9 (A) 0.86 - 1.14 Final       ASSESSMENT / PLAN  INR assessment THER    Recheck INR In: 6 WEEKS    INR Location Clinic      Anticoagulation Summary as of 1/30/2018     INR goal 2.5-3.5   Today's INR 2.9   Maintenance plan 2 mg (2 mg x 1) every day   Full instructions 2 mg every day   Weekly total 14 mg   Plan last modified Rose Marie Azul RN (2/26/2016)   Next INR check 3/13/2018   Target end date     Indications   Long-term (current) use of anticoagulants [Z79.01] [Z79.01]  S/P mitral valve replacement [Z95.2]         Anticoagulation Episode Summary     INR check location [facility]    Preferred lab     Send INR reminders to RV NURSE    Comments       Anticoagulation Care Providers     Provider Role Specialty Phone number    Mahamed Groves MD [facility] [phone]            See the Encounter Report to view Anticoagulation Flowsheet and Dosing Calendar (Go to Encounters tab in chart review, and find the Anticoagulation Therapy Visit)    Dosage adjustment made based on physician directed care plan.    Rose Marie Madden, KIRSTEN

## 2018-01-30 NOTE — MR AVS SNAPSHOT
Tomás Cruz   1/30/2018 1:45 PM   Anticoagulation Therapy Visit    Description:  77 year old male   Provider:  [facility]   Department:  [facility]           INR as of 1/30/2018     Today's INR 2.9      Anticoagulation Summary as of 1/30/2018     INR goal 2.5-3.5   Today's INR 2.9   Full instructions 2 mg every day   Next INR check 3/13/2018    Indications   Long-term (current) use of anticoagulants [Z79.01] [Z79.01]  S/P mitral valve replacement [Z95.2]         Contact Numbers     Clinic Number:         January 2018 Details    Sun Mon Tue Wed Thu Fri Sat      1               2               3               4               5               6                 7               8               9               10               11               12               13                 14               15               16               17               18               19               20                 21               22               23               24               25               26               27                 28               29               30      2 mg   See details      31      2 mg             Date Details   01/30 This INR check               How to take your warfarin dose     To take:  2 mg Take 1 of the 2 mg tablets.           February 2018 Details    Sun Mon Tue Wed Thu Fri Sat         1      2 mg         2      2 mg         3      2 mg           4      2 mg         5      2 mg         6      2 mg         7      2 mg         8      2 mg         9      2 mg         10      2 mg           11      2 mg         12      2 mg         13      2 mg         14      2 mg         15      2 mg         16      2 mg         17      2 mg           18      2 mg         19      2 mg         20      2 mg         21      2 mg         22      2 mg         23      2 mg         24      2 mg           25      2 mg         26      2 mg         27      2 mg         28      2 mg             Date Details   No  additional details            How to take your warfarin dose     To take:  2 mg Take 1 of the 2 mg tablets.           March 2018 Details    Sun Mon Tue Wed Thu Fri Sat         1      2 mg         2      2 mg         3      2 mg           4      2 mg         5      2 mg         6      2 mg         7      2 mg         8      2 mg         9      2 mg         10      2 mg           11      2 mg         12      2 mg         13            14               15               16               17                 18               19               20               21               22               23               24                 25               26               27               28               29               30               31                Date Details   No additional details    Date of next INR:  3/13/2018         How to take your warfarin dose     To take:  2 mg Take 1 of the 2 mg tablets.

## 2018-03-29 ENCOUNTER — ANTICOAGULATION THERAPY VISIT (OUTPATIENT)
Dept: NURSING | Facility: CLINIC | Age: 78
End: 2018-03-29

## 2018-03-29 DIAGNOSIS — Z79.01 LONG-TERM (CURRENT) USE OF ANTICOAGULANTS: ICD-10-CM

## 2018-03-29 DIAGNOSIS — Z95.2 S/P MITRAL VALVE REPLACEMENT: ICD-10-CM

## 2018-04-02 ENCOUNTER — TELEPHONE (OUTPATIENT)
Dept: FAMILY MEDICINE | Facility: CLINIC | Age: 78
End: 2018-04-02

## 2018-04-02 NOTE — TELEPHONE ENCOUNTER
Next 5 appointments (look out 90 days)     Apr 05, 2018 11:40 AM CDT   Pre-Op physical with Mahamed Groves MD   [facility] ([facility])    [address] [place] [id number] [phone]                   Date and Result of Last PT/INR:   Lab Results   Component Value Date    INR 2.9 01/30/2018    INR 2.7 10/27/2017    INR 2.13 05/15/2013    INR 1.36 05/14/2013            Pt has an appointment for 4/5/2018    Myra Holguin RN, BSN  [facility]

## 2018-04-03 NOTE — PATIENT INSTRUCTIONS
Before Your Surgery      Call your surgeon if there is any change in your health. This includes signs of a cold or flu (such as a sore throat, runny nose, cough, rash or fever).    Do not smoke, drink alcohol or take over the counter medicine (unless your surgeon or primary care doctor tells you to) for the 24 hours before and after surgery.    If you take prescribed drugs: Follow your doctor s orders about which medicines to take and which to stop until after surgery.    Eating and drinking prior to surgery: follow the instructions from your surgeon    Take a shower or bath the night before surgery. Use the soap your surgeon gave you to gently clean your skin. If you do not have soap from your surgeon, use your regular soap. Do not shave or scrub the surgery site.  Wear clean pajamas and have clean sheets on your bed.     HOLDS lisinopril the morning of surgery

## 2018-04-03 NOTE — PROGRESS NOTES
[address] [address]  [phone]  Dept: [phone]    PRE-OP EVALUATION:  Today's date: 2018    Tomás Cruz (: 1940) presents for pre-operative evaluation assessment as requested by Dr. Dias.  He requires evaluation and anesthesia risk assessment prior to undergoing surgery/procedure for treatment of Right Knee pain    Proposed Surgery/ Procedure: Partial Knee replacement Right  Date of Surgery/ Procedure: 2018  Time of Surgery/ Procedure: 9 :00 AM  Hospital/Surgical Facility: [facility]  Fax number for surgical facility: [phone]  Primary Physician: Mahamed Groves  Type of Anesthesia Anticipated: General    Patient has a Health Care Directive or Living Will:  NO    1. YES - DO YOU HAVE A HISTORY OF HEART ATTACK, STROKE, STENT, BYPASS OR SURGERY ON AN ARTERY IN THE HEAD, NECK, HEART OR LEG? Stroke  2. NO - Do you ever have any pain or discomfort in your chest?  3. NO - Do you have a history of  Heart Failure? Pt does not know  4. NO - Are you troubled by shortness of breath when: walking on the level, up a slight hill or at night?  5. NO - Do you currently have a cold, bronchitis or other respiratory infection?  6. NO - Do you have a cough, shortness of breath or wheezing?  7. NO - Do you sometimes get pains in the calves of your legs when you walk?  8. NO - Do you or anyone in your family have previous history of blood clots?  9. NO - Do you or does anyone in your family have a serious bleeding problem such as prolonged bleeding following surgeries or cuts?  10. NO - Have you ever had problems with anemia or been told to take iron pills?  11. NO - Have you had any abnormal blood loss such as black, tarry or bloody stools, or abnormal vaginal bleeding?  12. NO - Have you ever had a blood transfusion? Pt does not know  13. NO - Have you or any of your relatives ever had problems with anesthesia?  14. NO - Do  you have sleep apnea, excessive snoring or daytime drowsiness?  15. YES - DO YOU HAVE ANY PROSTHETIC HEART VALVES? Artificial valve  16. YES - DO YOU HAVE PROSTHETIC JOINTS? Left knee        HPI:     HPI related to upcoming procedure: Pt will be undergoing a Unicompartmental knee arthroplasty for treatment of arthritis/knee pain in his right knee.       MEDICAL HISTORY:     Patient Active Problem List    Diagnosis Date Noted     Essential hypertension with goal blood pressure less than 140/90 11/09/2017     Priority: High     Paroxysmal atrial fibrillation (H) 02/15/2012     Priority: High     Overview:   MAZE procedure done at the time of mitral valve surgery with restoration of sinus rhythm.  -- Recurrence noted on admission to [facility] 5/2013.         Hyperlipidemia LDL goal <100 10/31/2010     Priority: High     Cardiomyopathy, nonischemic 10/02/2008     Priority: High     Echo 5/10/13- EF ~35%   Implantable defibrillator - 5/2013       Arthritis of knee, right 11/09/2017     Priority: Medium     Elevated prostate specific antigen (PSA) 10/31/2016     Priority: Medium     Decreased GFR 10/31/2016     Priority: Medium     Essential tremor 10/25/2016     Priority: Medium     Long-term (current) use of anticoagulants [Z79.01] 02/26/2016     Priority: Medium     Automatic implantable cardioverter-defibrillator in situ 03/05/2014     Priority: Medium     Overview:   Implanted for nonischemic cardiomyopathy, EF 30-35%, syncope of unclear cause.       S/P mitral valve replacement 05/20/2013     Priority: Medium     Syncope 05/10/2013     Priority: Medium     History of prosthetic heart valve 02/15/2012     Priority: Medium     Overview:   Mechanical prosthesis, October of 2003:  a. Done for severe mitral regurgitation from mitral valve prolapse,  myxomatous degeneration.  b. Normal coronary arteries preoperatively.  -- Normal prosthetic valve function by echo 2/2013       Polyp of colon      Priority: Medium      Hypersomnia with sleep apnea 06/26/2007     Priority: Medium     Problem list name updated by automated process. Provider to review       Impotence of organic origin 03/22/2005     Priority: Medium     Health Care Home 05/20/2013     Priority: Low     EMERGENCY CARE PLAN  Presenting Problem Signs and Symptoms Treatment Plan    Questions or conerns during clinic hours    I will call the clinic directly     Questions or conerns outside clinic hours    I will call the 24 hour nurse line at [phone]    Patient needs to schedule an appointment    I will call the 24 hour scheduling team at [phone] or clinic directly    Same day treatment     I will call the clinic first, nurse line if after hours, urgent care and express care if needed                          Clinic Care Coordinator:Abdelrahman Kwan, [phone]  **Pt not active in care coordination at this time.       Advanced directives, counseling/discussion 02/03/2012     Priority: Low     Advance Care Planning 11/10/2017: ACP Review of Chart / Resources Provided:  Reviewed chart for advance care plan.  Tomás Cruz has been provided information and resources to begin or update their advance care plan.  Added by Mahamed Groves                Past Medical History:   Diagnosis Date     Atrial fibrillation (H)     Transient around time of MVR.  Had MAZE procedure by report.     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     Hypertension goal BP (blood pressure) < 140/90 4/05     Mitral valve disorders(424.0)     s/p mitral valve replacment- 2003; SBE prophylaxsis and anticoagulated; echo 2/13- EF 30-35%     Non-ischemic cardiomyopathy (H)     EF as above.  Follows with [facility].     Polyp of colon      Renal mass      Benign per patient report, Right kidney removed.     Past Surgical History:   Procedure Laterality Date     ARTHROSCOPY KNEE Left 2001    left knee arthroscopy     AS TOTAL KNEE ARTHROPLASTY Left 2006    Dr. Castro     CARDIAC SURGERY  2003    Mitral valve replacement     CATARACT IOL, RT/LT  2014    Cataracts, bilateral     HERNIA REPAIR       IMPLANT AUTOMATIC IMPLANTABLE CARDIOVERTER DEFIBRILLATOR  5/2013     KIDNEY SURGERY  2008    Laparoscopic right radical nephrectomy.- due to complex hemorrhagic cyst     Current Outpatient Prescriptions   Medication Sig Dispense Refill     enoxaparin (LOVENOX) 100 MG/ML injection Inject one syringe (1ml) every 12 hours subcut 12 Syringe 0     lisinopril (PRINIVIL/ZESTRIL) 20 MG tablet Take 1 tablet (20 mg) by mouth daily 90 tablet 3     metoprolol (TOPROL-XL) 50 MG 24 hr tablet Take 1 tablet (50 mg) by mouth daily 90 tablet 3     warfarin (COUMADIN) 2 MG tablet TAKE ONE TABLET (2MG) BY MOUTH DAILY, EXCEPT TWO TABLETS (4MG) ON FRIDAYS 90 tablet 3     amoxicillin (AMOXIL) 500 MG capsule Take 4 capsules (2,000 mg) by mouth once as needed Prior to Dental appts 4 capsule 4     sildenafil (REVATIO) 20 MG tablet Take 2-5 tablets ( mg) by mouth daily as needed 30 tablet 11     pravastatin (PRAVACHOL) 40 MG tablet Take 1 tablet (40 mg) by mouth daily 90 tablet 3     OTC products: None, except as noted above    No Known Allergies   Latex Allergy: NO    Social History   Substance Use Topics     Smoking status: Former Smoker     Quit date: 6/1/1982     Smokeless tobacco: Never Used     Alcohol use No     History   Drug Use No       REVIEW OF SYSTEMS:   Constitutional, HEENT, cardiovascular, pulmonary, GI, , musculoskeletal, neuro, skin, endocrine and psych systems are negative, except as otherwise noted.    This document serves as a record of the services and decisions personally performed and made by Mahamed Groves MD. It was created on his behalf by Alejandra Khalil, a trained  "medical scribe. The creation of this document is based the provider's statements to the medical scribe.  Gris Khalil 12:19 PM, 2018    EXAM:   /88  Pulse 79  Temp 98.2  F (36.8  C) (Oral)  Ht 6' 0.5\" (1.842 m)  Wt 210 lb (95.3 kg)  SpO2 98%  BMI 28.09 kg/m2    GENERAL APPEARANCE: healthy, alert and no distress     EYES: EOMI,  PERRL     HENT: ear canals and TM's normal and nose and mouth without ulcers or lesions     NECK: no adenopathy, no asymmetry, masses, or scars and thyroid normal to palpation     RESP: lungs clear to auscultation - no rales, rhonchi or wheezes     CV: regular rates and rhythm, normal S1 S2, no S3 or S4 and no murmur, click or rub     ABDOMEN:  soft, nontender, no HSM or masses and bowel sounds normal     MS: extremities normal- no gross deformities noted, no evidence of inflammation in joints, FROM in all extremities.     SKIN: no suspicious lesions or rashes     NEURO: Normal strength and tone, sensory exam grossly normal, mentation intact and speech normal     PSYCH: mentation appears normal. and affect normal/bright     LYMPHATICS: No cervical adenopathy    DIAGNOSTICS:   EK/5/18:  Sinus  Rhythm  -First degree A-V block  -Frequent pvcs -ventricular trigeminy   Usman = 272 -Left atrial enlargement.    Recent Labs   Lab Test 18   0949 10/27/17   06/06/17   1447   10/25/16   0937   08/07/15   0807   12   0858   HGB   --    --    --    --    --    --   16.3   --   15.9   < >   --    PLT   --    --    --    --    --    --   207   --   170   < >   --    INR  2.9*   --   2.7*   < >   --    < >   --    < >   --    < >   --    NA   --   139   --    --   143   --   140   --   140   < >   --    POTASSIUM   --   4.3   --    --   4.7   --   4.6   --   4.4   < >   --    CR   --   1.16   --    --   1.06   --   1.26*   --   1.13   < >   --    A1C   --    --    --    --    --    --    --    --    --    --   5.5    < > = values in this interval not " displayed.    CMP and CBC are pending.   IMPRESSION:   Reason for surgery/procedure: Pt will be undergoing an Unicompartmental knee arthroplasty for treatment of arthritis/knee pain in his right knee.     The proposed surgical procedure is considered INTERMEDIATE risk.    REVISED CARDIAC RISK INDEX  The patient has the following serious cardiovascular risks for perioperative complications such as (MI, PE, VFib and 3  AV Block):  No serious cardiac risks  INTERPRETATION: 1 risks: Class II (low risk - 0.9% complication rate)    The patient has the following additional risks for perioperative complications:  No identified additional risks      ICD-10-CM    1. Preop general physical exam Z01.818 EKG 12-lead complete w/read - Clinics   2. Right knee pain, unspecified chronicity M25.561    3. Paroxysmal atrial fibrillation (H) I48.0    4. Cardiomyopathy, unspecified type (H) I42.9 CBC with platelets   5. Essential hypertension with goal blood pressure less than 140/90 I10 Comprehensive metabolic panel (BMP + Alb, Alk Phos, ALT, AST, Total. Bili, TP)     CBC with platelets   6. Long-term (current) use of anticoagulants [Z79.01] Z79.01    7. Automatic implantable cardioverter-defibrillator in situ Z95.810        RECOMMENDATIONS:         --Patient is to take all scheduled medications on the day of surgery EXCEPT for modifications listed below.    Anticoagulant or Antiplatelet Medication Use  WARFARIN: Bridging therapy with Lovenox/IV heparin recommended to start 5 days before surgery (7 days if INR range 2.5 to 3.5).         ACE Inhibitor or Angiotensin Receptor Blocker (ARB) Use  Ace inhibitor or Angiotensin Receptor Blocker (ARB) and should HOLD this medication for the 24 hours prior to surgery.      APPROVAL GIVEN to proceed with proposed procedure, without further diagnostic evaluation       The information in this document, created by the medical scribe for me, accurately reflects the services I personally performed and  the decisions made by me. I have reviewed and approved this document for accuracy prior to leaving the patient care area.  12:19 PM, 04/05/18    Signed Electronically by: Mahamed Groves MD    Copy of this evaluation report is provided to requesting physician.    [facility] Preop Guidelines

## 2018-04-05 ENCOUNTER — OFFICE VISIT (OUTPATIENT)
Dept: FAMILY MEDICINE | Facility: CLINIC | Age: 78
End: 2018-04-05
Payer: COMMERCIAL

## 2018-04-05 ENCOUNTER — ANTICOAGULATION THERAPY VISIT (OUTPATIENT)
Dept: NURSING | Facility: CLINIC | Age: 78
End: 2018-04-05
Payer: COMMERCIAL

## 2018-04-05 VITALS
SYSTOLIC BLOOD PRESSURE: 130 MMHG | WEIGHT: 210 LBS | HEART RATE: 79 BPM | BODY MASS INDEX: 27.83 KG/M2 | HEIGHT: 73 IN | OXYGEN SATURATION: 98 % | DIASTOLIC BLOOD PRESSURE: 88 MMHG | TEMPERATURE: 98.2 F

## 2018-04-05 DIAGNOSIS — Z95.810 AUTOMATIC IMPLANTABLE CARDIOVERTER-DEFIBRILLATOR IN SITU: ICD-10-CM

## 2018-04-05 DIAGNOSIS — I10 ESSENTIAL HYPERTENSION WITH GOAL BLOOD PRESSURE LESS THAN 140/90: ICD-10-CM

## 2018-04-05 DIAGNOSIS — I48.0 PAROXYSMAL ATRIAL FIBRILLATION (H): ICD-10-CM

## 2018-04-05 DIAGNOSIS — Z95.2 S/P MITRAL VALVE REPLACEMENT: ICD-10-CM

## 2018-04-05 DIAGNOSIS — M25.561 RIGHT KNEE PAIN, UNSPECIFIED CHRONICITY: ICD-10-CM

## 2018-04-05 DIAGNOSIS — Z79.01 LONG-TERM (CURRENT) USE OF ANTICOAGULANTS: ICD-10-CM

## 2018-04-05 DIAGNOSIS — I42.9 CARDIOMYOPATHY, UNSPECIFIED TYPE (H): ICD-10-CM

## 2018-04-05 DIAGNOSIS — Z01.818 PREOP GENERAL PHYSICAL EXAM: Primary | ICD-10-CM

## 2018-04-05 LAB
ALBUMIN SERPL-MCNC: 4.1 G/DL (ref 3.4–5)
ALP SERPL-CCNC: 71 U/L (ref 40–150)
ALT SERPL W P-5'-P-CCNC: 18 U/L (ref 0–70)
ANION GAP SERPL CALCULATED.3IONS-SCNC: 4 MMOL/L (ref 3–14)
AST SERPL W P-5'-P-CCNC: 19 U/L (ref 0–45)
BILIRUB SERPL-MCNC: 0.7 MG/DL (ref 0.2–1.3)
BUN SERPL-MCNC: 24 MG/DL (ref 7–30)
CALCIUM SERPL-MCNC: 9.1 MG/DL (ref 8.5–10.1)
CHLORIDE SERPL-SCNC: 107 MMOL/L (ref 94–109)
CO2 SERPL-SCNC: 28 MMOL/L (ref 20–32)
CREAT SERPL-MCNC: 1.08 MG/DL (ref 0.66–1.25)
ERYTHROCYTE [DISTWIDTH] IN BLOOD BY AUTOMATED COUNT: 14.3 % (ref 10–15)
GFR SERPL CREATININE-BSD FRML MDRD: 66 ML/MIN/1.7M2
GLUCOSE SERPL-MCNC: 74 MG/DL (ref 70–99)
HCT VFR BLD AUTO: 49.2 % (ref 40–53)
HGB BLD-MCNC: 16.7 G/DL (ref 13.3–17.7)
INR POINT OF CARE: 3.6 (ref 0.86–1.14)
MCH RBC QN AUTO: 30.1 PG (ref 26.5–33)
MCHC RBC AUTO-ENTMCNC: 33.9 G/DL (ref 31.5–36.5)
MCV RBC AUTO: 89 FL (ref 78–100)
PLATELET # BLD AUTO: 287 10E9/L (ref 150–450)
POTASSIUM SERPL-SCNC: 4.6 MMOL/L (ref 3.4–5.3)
PROT SERPL-MCNC: 7.1 G/DL (ref 6.8–8.8)
RBC # BLD AUTO: 5.54 10E12/L (ref 4.4–5.9)
SODIUM SERPL-SCNC: 139 MMOL/L (ref 133–144)
WBC # BLD AUTO: 8.5 10E9/L (ref 4–11)

## 2018-04-05 PROCEDURE — 36416 COLLJ CAPILLARY BLOOD SPEC: CPT

## 2018-04-05 PROCEDURE — 80053 COMPREHEN METABOLIC PANEL: CPT | Performed by: FAMILY MEDICINE

## 2018-04-05 PROCEDURE — 93000 ELECTROCARDIOGRAM COMPLETE: CPT | Performed by: FAMILY MEDICINE

## 2018-04-05 PROCEDURE — 85610 PROTHROMBIN TIME: CPT | Mod: QW

## 2018-04-05 PROCEDURE — 85027 COMPLETE CBC AUTOMATED: CPT | Performed by: FAMILY MEDICINE

## 2018-04-05 PROCEDURE — 99214 OFFICE O/P EST MOD 30 MIN: CPT | Performed by: FAMILY MEDICINE

## 2018-04-05 NOTE — PROGRESS NOTES
Dear Tomás,    Here is a summary of your recent test results:  -Liver and gallbladder tests are normal. (ALT,AST, Alk phos, bilirubin), kidney function is normal (Cr, GFR), Sodium is normal, Potassium is normal, Calcium is normal, Glucose is normal (diabetes screening test).   -Normal red blood cell (hgb) levels, normal white blood cell count and normal platelet levels.    Thank you very much for trusting me and [facility].     Healthy regards,  Marc Groves MD

## 2018-04-05 NOTE — NURSING NOTE
"Chief Complaint   Patient presents with     Pre-Op Exam       Initial /88  Pulse 79  Temp 98.2  F (36.8  C) (Oral)  Ht 6' 0.5\" (1.842 m)  Wt 210 lb (95.3 kg)  SpO2 98%  BMI 28.09 kg/m2 Estimated body mass index is 28.09 kg/(m^2) as calculated from the following:    Height as of this encounter: 6' 0.5\" (1.842 m).    Weight as of this encounter: 210 lb (95.3 kg).  Medication Reconciliation: complete  "

## 2018-04-05 NOTE — MR AVS SNAPSHOT
Tomás Cruz   4/5/2018 11:15 AM   Anticoagulation Therapy Visit    Description:  77 year old male   Provider:   ANTICOAGULATION CLINIC   Department:  [facility] Nurse           INR as of 4/5/2018     Today's INR 3.6!      Anticoagulation Summary as of 4/5/2018     INR goal 2.5-3.5   Today's INR 3.6!   Full instructions 4/18: Hold; 4/19: Hold; 4/20: Hold; 4/21: Hold; 4/22: Hold; 4/23: 4 mg; 4/24: 4 mg; Otherwise 2 mg every day   Next INR check 4/27/2018    Indications   Long-term (current) use of anticoagulants [Z79.01] [Z79.01]  S/P mitral valve replacement [Z95.2]         Description     Start holding coumadin 5 days prior to procedure.  Two days after holding coumadin, start lovenox twice daily 12 hours apart        4/18/2018: Hold Coumadin  4/19/2018: Hold Coumadin  4/20/2018: Hold coumadin, start Lovenox twice daily 12 hours apart  4/21/2018: Hold coumadin, Lovenox twice daily 12 hours apart  4/22/2018: Hold coumadin, Lovenox in AM only (if at least 24 hours prior to procedure)  4/23/2018: Day of procedure- 4mg coumadin if OK with surgeon, no lovenox  4/24/2018: Coumadin 4mg, Lovenox twice daily 12 hours apart  4/25/2018: Coumadin 2mg, Lovenox twice daily 12 hours apart  4/26/2018: Coumadin 2mg, Lovenox twice daily 12 hours apart  4/27/2018: Lovenox in AM, INR appointment 10am at [facility]      Your next Anticoagulation Clinic appointment(s)     Apr 27, 2018 10:00 AM CDT   Anticoagulation Visit with  ANTICOAGULATION CLINIC   [facility] ([facility])    [address] [place] [address] S[facility] [phone]              Contact Numbers     Clinic Number:         April 2018 Details    Sun Mon Tue Wed Thu Fri Sat     1               2               3               4               5      2 mg   See details      6      2 mg         7      2 mg           8      2 mg         9      2 mg         10      2 mg         11      2 mg         12      2 mg          13      2 mg         14      2 mg           15      2 mg         16      2 mg         17      2 mg         18      Hold         19      Hold         20      Hold         21      Hold           22      Hold         23      4 mg   See details      24      4 mg         25      2 mg         26      2 mg         27            28                 29               30                     Date Details   04/05 This INR check      04/23 Take 4 mg (2 mg tablets x 2)   surgery on knee       Date of next INR:  4/27/2018         How to take your warfarin dose     To take:  2 mg Take 1 of the 2 mg tablets.    To take:  4 mg Take 2 of the 2 mg tablets.    Hold Do not take your warfarin dose. See the Details table to the right for additional instructions.

## 2018-04-05 NOTE — MR AVS SNAPSHOT
After Visit Summary   4/5/2018    Tomás Cruz    MRN: [id number]           Patient Information     Date Of Birth          1940        Visit Information        Provider Department      4/5/2018 11:40 AM Mahamed Groves MD [facility]        Today's Diagnoses     Preop general physical exam    -  1    Right knee pain, unspecified chronicity        Paroxysmal atrial fibrillation (H)        Cardiomyopathy, unspecified type (H)        Essential hypertension with goal blood pressure less than 140/90        Long-term (current) use of anticoagulants [Z79.01]        Automatic implantable cardioverter-defibrillator in situ          Care Instructions      Before Your Surgery      Call your surgeon if there is any change in your health. This includes signs of a cold or flu (such as a sore throat, runny nose, cough, rash or fever).    Do not smoke, drink alcohol or take over the counter medicine (unless your surgeon or primary care doctor tells you to) for the 24 hours before and after surgery.    If you take prescribed drugs: Follow your doctor s orders about which medicines to take and which to stop until after surgery.    Eating and drinking prior to surgery: follow the instructions from your surgeon    Take a shower or bath the night before surgery. Use the soap your surgeon gave you to gently clean your skin. If you do not have soap from your surgeon, use your regular soap. Do not shave or scrub the surgery site.  Wear clean pajamas and have clean sheets on your bed.     HOLDS lisinopril the morning of surgery            Follow-ups after your visit        Your next 10 appointments already scheduled     Apr 27, 2018 10:00 AM CDT   Anticoagulation Visit with  ANTICOAGULATION CLINIC   [facility]    [address] [place] [id number] [phone]              Who to contact     If you have questions or need follow up  "information about today's clinic visit or your schedule please contact [facility] directly at [phone].  Normal or non-critical lab and imaging results will be communicated to you by MyChart, letter or phone within 4 business days after the clinic has received the results. If you do not hear from us within 7 days, please contact the clinic through [employer] or phone. If you have a critical or abnormal lab result, we will notify you by phone as soon as possible.  Submit refill requests through [employer] or call your pharmacy and they will forward the refill request to us. Please allow 3 business days for your refill to be completed.          Additional Information About Your Visit        [employer] Information     [employer] gives you secure access to your electronic health record. If you see a primary care provider, you can also send messages to your care team and make appointments. If you have questions, please call your primary care clinic.  If you do not have a primary care provider, please call [phone] and they will assist you.        Care EveryWhere ID     This is your Care EveryWhere ID. This could be used by other organizations to access your [facility] medical records  [id number]        Your Vitals Were     Pulse Temperature Height Pulse Oximetry BMI (Body Mass Index)       79 98.2  F (36.8  C) (Oral) 6' 0.5\" (1.842 m) 98% 28.09 kg/m2        Blood Pressure from Last 3 Encounters:   04/05/18 130/88   01/30/18 131/76   11/30/17 128/82    Weight from Last 3 Encounters:   04/05/18 210 lb (95.3 kg)   11/30/17 218 lb 4 oz (99 kg)   11/09/17 212 lb (96.2 kg)              We Performed the Following     CBC with platelets     Comprehensive metabolic panel (BMP + Alb, Alk Phos, ALT, AST, Total. Bili, TP)     EKG 12-lead complete w/read - Clinics          Today's Medication Changes          These changes are accurate as of 4/5/18 12:36 PM.  If you have any questions, ask your nurse or doctor.          "      Start taking these medicines.        Dose/Directions    enoxaparin 100 MG/ML injection   Commonly known as:  LOVENOX   Used for:  Long-term (current) use of anticoagulants, S/P mitral valve replacement        Inject one syringe (1ml) every 12 hours subcut   Quantity:  12 Syringe   Refills:  0            Where to get your medicines      These medications were sent to [facility]/pharmacy #[id number] - [place], [place] - [address]  [address], [place] [address]     Phone:  [phone]     enoxaparin 100 MG/ML injection                Primary Care Provider Office Phone # Fax #    Mahamed Groves [phone]       [address] [place] [address]        Equal Access to Services     CONRAD SUAREZ : Hadii aad ku hadasho Soelidia, waaxda luqadaha, qaybta kaalmada adeegyada, estela mauricio . So [facility] [phone].    ATENCIÓN: Si habla español, tiene a graham disposición servicios gratuitos de asistencia lingüística. [facility] [phone].    We comply with applicable federal civil rights laws and [place] laws. We do not discriminate on the basis of race, color, national origin, age, disability, sex, sexual orientation, or gender identity.            Thank you!     Thank you for choosing [facility]  for your care. Our goal is always to provide you with excellent care. Hearing back from our patients is one way we can continue to improve our services. Please take a few minutes to complete the written survey that you may receive in the mail after your visit with us. Thank you!             Your Updated Medication List - Protect others around you: Learn how to safely use, store and throw away your medicines at www.disposemymeds.org.          This list is accurate as of 4/5/18 12:36 PM.  Always use your most recent med list.                   Brand Name Dispense Instructions for use Diagnosis    amoxicillin 500 MG capsule    AMOXIL    4 capsule     Take 4 capsules (2,000 mg) by mouth once as needed Prior to Dental appts    S/P mitral valve replacement       enoxaparin 100 MG/ML injection    LOVENOX    12 Syringe    Inject one syringe (1ml) every 12 hours subcut    Long-term (current) use of anticoagulants, S/P mitral valve replacement       lisinopril 20 MG tablet    PRINIVIL/ZESTRIL    90 tablet    Take 1 tablet (20 mg) by mouth daily    Essential hypertension with goal blood pressure less than 140/90       metoprolol succinate 50 MG 24 hr tablet    TOPROL-XL    90 tablet    Take 1 tablet (50 mg) by mouth daily    Essential hypertension with goal blood pressure less than 140/90       pravastatin 40 MG tablet    PRAVACHOL    90 tablet    Take 1 tablet (40 mg) by mouth daily    Hyperlipidemia LDL goal <100       sildenafil 20 MG tablet    REVATIO    30 tablet    Take 2-5 tablets ( mg) by mouth daily as needed    Impotence of organic origin       warfarin 2 MG tablet    COUMADIN    90 tablet    TAKE ONE TABLET (2MG) BY MOUTH DAILY, EXCEPT TWO TABLETS (4MG) ON FRIDAYS    S/P mitral valve replacement

## 2018-04-05 NOTE — PROGRESS NOTES
Start holding coumadin 5 days prior to procedure.  Two days after holding coumadin, start lovenox twice daily 12 hours apart      4/18/2018: Hold Coumadin  4/19/2018: Hold Coumadin  4/20/2018: Hold coumadin, start Lovenox twice daily 12 hours apart  4/21/2018: Hold coumadin, Lovenox twice daily 12 hours apart  4/22/2018: Hold coumadin, Lovenox in AM only (if at least 24 hours prior to procedure)  4/23/2018: Day of procedure- 4mg coumadin if OK with surgeon, no lovenox  4/24/2018: Coumadin 4mg, Lovenox twice daily 12 hours apart  4/25/2018: Coumadin 2mg, Lovenox twice daily 12 hours apart  4/26/2018: Coumadin 2mg, Lovenox twice daily 12 hours apart  4/27/2018: Lovenox in AM, INR appointment 10am at [facility]      Ordered Lovenox to [facility] pharmacy per pt request.         ANTICOAGULATION FOLLOW-UP CLINIC VISIT    Patient Name:  Tomás Cruz  Date:  4/5/2018  Contact Type:  Face to Face    SUBJECTIVE:        OBJECTIVE    INR Protime   Date Value Ref Range Status   04/05/2018 3.6 (A) 0.86 - 1.14 Final       ASSESSMENT / PLAN  INR assessment THER    Recheck INR In: 3 WEEKS    INR Location Clinic      Anticoagulation Summary as of 4/5/2018     INR goal 2.5-3.5   Today's INR 3.6!   Maintenance plan 2 mg (2 mg x 1) every day   Full instructions 2 mg every day   Weekly total 14 mg   No change documented Mojgan Brasher RN   Plan last modified Rose Marie Azul RN (2/26/2016)   Next INR check 4/27/2018   Target end date     Indications   Long-term (current) use of anticoagulants [Z79.01] [Z79.01]  S/P mitral valve replacement [Z95.2]         Anticoagulation Episode Summary     [facility]    Preferred lab     Send INR reminders to RV NURSE    Comments       Anticoagulation Care Providers     Provider Role Specialty Phone number    Mahamed Groves MD [facility] [phone]            See the Encounter Report to view Anticoagulation Flowsheet and Dosing Calendar (Go  to Encounters tab in chart review, and find the Anticoagulation Therapy Visit)    Dosage adjustment made based on physician directed care plan.    Mojgan Brasher RN

## 2018-04-10 ENCOUNTER — TRANSFERRED RECORDS (OUTPATIENT)
Dept: HEALTH INFORMATION MANAGEMENT | Facility: CLINIC | Age: 78
End: 2018-04-10

## 2018-04-23 ENCOUNTER — TRANSFERRED RECORDS (OUTPATIENT)
Dept: HEALTH INFORMATION MANAGEMENT | Facility: CLINIC | Age: 78
End: 2018-04-23

## 2018-04-26 ENCOUNTER — ANTICOAGULATION THERAPY VISIT (OUTPATIENT)
Dept: NURSING | Facility: CLINIC | Age: 78
End: 2018-04-26
Payer: COMMERCIAL

## 2018-04-26 DIAGNOSIS — Z95.2 S/P MITRAL VALVE REPLACEMENT: ICD-10-CM

## 2018-04-26 DIAGNOSIS — Z79.01 LONG-TERM (CURRENT) USE OF ANTICOAGULANTS: ICD-10-CM

## 2018-04-26 LAB — INR POINT OF CARE: 3.7 (ref 0.86–1.14)

## 2018-04-26 PROCEDURE — 85610 PROTHROMBIN TIME: CPT | Mod: QW

## 2018-04-26 PROCEDURE — 36416 COLLJ CAPILLARY BLOOD SPEC: CPT

## 2018-04-26 NOTE — PROGRESS NOTES
ANTICOAGULATION FOLLOW-UP CLINIC VISIT    Patient Name:  Tomás Cruz  Date:  4/26/2018  Contact Type:  Face to Face    SUBJECTIVE:        OBJECTIVE    INR Protime   Date Value Ref Range Status   04/26/2018 3.7 (A) 0.86 - 1.14 Final       ASSESSMENT / PLAN  INR assessment THER    Recheck INR In: 8 DAYS    INR Location Clinic      Anticoagulation Summary as of 4/26/2018     INR goal 2.5-3.5   Today's INR 3.7!   Maintenance plan 2 mg (2 mg x 1) every day   Full instructions 4/26: 3 mg; Otherwise 2 mg every day   Weekly total 14 mg   Plan last modified Rose Marie Azul RN (2/26/2016)   Next INR check 5/4/2018   Target end date     Indications   Long-term (current) use of anticoagulants [Z79.01] [Z79.01]  S/P mitral valve replacement [Z95.2]         Anticoagulation Episode Summary     [facility]    Preferred lab     Send INR reminders to RV NURSE    Comments       Anticoagulation Care Providers     Provider Role Specialty Phone number    Mahamed Groves MD [facility] [phone]            See the Encounter Report to view Anticoagulation Flowsheet and Dosing Calendar (Go to Encounters tab in chart review, and find the Anticoagulation Therapy Visit)    Dosage adjustment made based on physician directed care plan.    Mojgan Brasher, KIRSTEN

## 2018-04-26 NOTE — MR AVS SNAPSHOT
Tomás Cruz   4/26/2018 10:30 AM   Anticoagulation Therapy Visit    Description:  77 year old male   Provider:   ANTICOAGULATION CLINIC   Department:   Nurse           INR as of 4/26/2018     Today's INR 3.7!      Anticoagulation Summary as of 4/26/2018     INR goal 2.5-3.5   Today's INR 3.7!   Full instructions 4/26: 3 mg; Otherwise 2 mg every day   Next INR check 5/4/2018    Indications   Long-term (current) use of anticoagulants [Z79.01] [Z79.01]  S/P mitral valve replacement [Z95.2]         Description     Pt took dose this am.        Your next Anticoagulation Clinic appointment(s)     May 04, 2018 10:15 AM CDT   Anticoagulation Visit with  ANTICOAGULATION CLINIC   [facility]    [address] [place] [id number]   [phone]              Contact Numbers     Clinic Number:         April 2018 Details    Sun Mon Tue Wed Thu Fri Sat     1               2               3               4               5               6               7                 8               9               10               11               12               13               14                 15               16               17               18               19               20               21                 22               23               24               25               26      3 mg   See details      27      2 mg         28      2 mg           29      2 mg         30      2 mg               Date Details   04/26 This INR check               How to take your warfarin dose     To take:  2 mg Take 1 of the 2 mg tablets.    To take:  3 mg Take 1.5 of the 2 mg tablets.           May 2018 Details    Sun Mon Tue Wed Thu Fri Sat       1      2 mg         2      2 mg         3      2 mg         4            5                 6               7               8               9               10               11               12                 13               14                15               16               17               18               19                 20               21               22               23               24               25               26                 27               28               29               30               31                  Date Details   No additional details    Date of next INR:  5/4/2018         How to take your warfarin dose     To take:  2 mg Take 1 of the 2 mg tablets.

## 2018-05-01 ENCOUNTER — DOCUMENTATION ONLY (OUTPATIENT)
Dept: OTHER | Facility: CLINIC | Age: 78
End: 2018-05-01

## 2018-05-01 DIAGNOSIS — Z71.89 ADVANCED DIRECTIVES, COUNSELING/DISCUSSION: Chronic | ICD-10-CM

## 2018-05-04 ENCOUNTER — ANTICOAGULATION THERAPY VISIT (OUTPATIENT)
Dept: NURSING | Facility: CLINIC | Age: 78
End: 2018-05-04
Payer: COMMERCIAL

## 2018-05-04 DIAGNOSIS — Z79.01 LONG-TERM (CURRENT) USE OF ANTICOAGULANTS: ICD-10-CM

## 2018-05-04 DIAGNOSIS — Z95.2 S/P MITRAL VALVE REPLACEMENT: ICD-10-CM

## 2018-05-04 LAB — INR POINT OF CARE: 4.9 (ref 0.86–1.14)

## 2018-05-04 PROCEDURE — 36416 COLLJ CAPILLARY BLOOD SPEC: CPT

## 2018-05-04 PROCEDURE — 85610 PROTHROMBIN TIME: CPT | Mod: QW

## 2018-05-04 NOTE — MR AVS SNAPSHOT
Tomás Cruz   5/4/2018 2:00 PM   Anticoagulation Therapy Visit    Description:  77 year old male   Provider:   ANTICOAGULATION CLINIC   Department:   Nurse           INR as of 5/4/2018     Today's INR 4.9!      Anticoagulation Summary as of 5/4/2018     INR goal 2.5-3.5   Today's INR 4.9!   Full instructions 5/5: Hold; 5/6: Hold; Otherwise 2 mg every day   Next INR check 5/7/2018    Indications   Long-term (current) use of anticoagulants [Z79.01] [Z79.01]  S/P mitral valve replacement [Z95.2]         Description     Pt already took dose for today. Will return Monday for recheck. Pt starts therapy on Monday as well.      Your next Anticoagulation Clinic appointment(s)     May 07, 2018  1:30 PM CDT   Anticoagulation Visit with  ANTICOAGULATION CLINIC   [facility]    [address] [place] [id number] [phone]              Contact Numbers     Clinic Number:         May 2018 Details    Sun Mon Tue Wed Thu Fri Sat       1               2               3               4      2 mg   See details      5      Hold           6      Hold         7            8               9               10               11               12                 13               14               15               16               17               18               19                 20               21               22               23               24               25               26                 27               28               29               30               31                  Date Details   05/04 This INR check       Date of next INR:  5/7/2018         How to take your warfarin dose     To take:  2 mg Take 1 of the 2 mg tablets.    Hold Do not take your warfarin dose. See the Details table to the right for additional instructions.

## 2018-05-04 NOTE — PROGRESS NOTES
ANTICOAGULATION FOLLOW-UP CLINIC VISIT    Patient Name:  Tomás Cruz  Date:  5/4/2018  Contact Type:  Face to Face    SUBJECTIVE:     Patient Findings     Positives Dental/Other procedures           OBJECTIVE    INR Protime   Date Value Ref Range Status   05/04/2018 4.9 (A) 0.86 - 1.14 Final       ASSESSMENT / PLAN  INR assessment SUPRA    Recheck INR In: 2 DAYS    INR Location Clinic      Anticoagulation Summary as of 5/4/2018     INR goal 2.5-3.5   Today's INR 4.9!   Maintenance plan 2 mg (2 mg x 1) every day   Full instructions 5/5: Hold; 5/6: Hold; Otherwise 2 mg every day   Weekly total 14 mg   Plan last modified Rose Marie Azul RN (2/26/2016)   Next INR check 5/7/2018   Target end date     Indications   Long-term (current) use of anticoagulants [Z79.01] [Z79.01]  S/P mitral valve replacement [Z95.2]         Anticoagulation Episode Summary     [facility]    Preferred lab     Send INR reminders to RV NURSE    Comments       Anticoagulation Care Providers     Provider Role Specialty Phone number    Mahamed Groves MD [facility] [phone]            See the Encounter Report to view Anticoagulation Flowsheet and Dosing Calendar (Go to Encounters tab in chart review, and find the Anticoagulation Therapy Visit)    Dosage adjustment made based on physician directed care plan.    Mojgan Brasher RN

## 2018-05-07 ENCOUNTER — ANTICOAGULATION THERAPY VISIT (OUTPATIENT)
Dept: NURSING | Facility: CLINIC | Age: 78
End: 2018-05-07
Payer: COMMERCIAL

## 2018-05-07 VITALS — SYSTOLIC BLOOD PRESSURE: 128 MMHG | DIASTOLIC BLOOD PRESSURE: 62 MMHG

## 2018-05-07 DIAGNOSIS — Z79.01 LONG-TERM (CURRENT) USE OF ANTICOAGULANTS: ICD-10-CM

## 2018-05-07 DIAGNOSIS — Z95.2 S/P MITRAL VALVE REPLACEMENT: ICD-10-CM

## 2018-05-07 LAB — INR POINT OF CARE: 1.8 (ref 0.86–1.14)

## 2018-05-07 PROCEDURE — 36416 COLLJ CAPILLARY BLOOD SPEC: CPT

## 2018-05-07 PROCEDURE — 85610 PROTHROMBIN TIME: CPT | Mod: QW

## 2018-05-07 NOTE — PROGRESS NOTES
ANTICOAGULATION FOLLOW-UP CLINIC VISIT    Patient Name:  Tomás Cruz  Date:  5/7/2018  Contact Type:  Face to Face    SUBJECTIVE:     Patient Findings     Positives Hospital admission           OBJECTIVE    INR Protime   Date Value Ref Range Status   05/07/2018 1.8 (A) 0.86 - 1.14 Final       ASSESSMENT / PLAN  No question data found.  Anticoagulation Summary as of 5/7/2018     INR goal 2.5-3.5   Today's INR 1.8!   Maintenance plan 2 mg (2 mg x 1) every day   Full instructions 5/7: 4 mg; Otherwise 2 mg every day   Weekly total 14 mg   Plan last modified Rose Marie Azul RN (2/26/2016)   Next INR check 5/11/2018   Target end date     Indications   Long-term (current) use of anticoagulants [Z79.01] [Z79.01]  S/P mitral valve replacement [Z95.2]         Anticoagulation Episode Summary     [facility]    Preferred lab     Send INR reminders to RV NURSE    Comments       Anticoagulation Care Providers     Provider Role Specialty Phone number    Mahamed Groves MD [facility] [phone]            See the Encounter Report to view Anticoagulation Flowsheet and Dosing Calendar (Go to Encounters tab in chart review, and find the Anticoagulation Therapy Visit)    Dosage adjustment made based on physician directed care plan.    Myra Holguin RN

## 2018-05-10 ENCOUNTER — ANTICOAGULATION THERAPY VISIT (OUTPATIENT)
Dept: NURSING | Facility: CLINIC | Age: 78
End: 2018-05-10
Payer: COMMERCIAL

## 2018-05-10 DIAGNOSIS — Z79.01 LONG-TERM (CURRENT) USE OF ANTICOAGULANTS: ICD-10-CM

## 2018-05-10 DIAGNOSIS — Z95.2 S/P MITRAL VALVE REPLACEMENT: ICD-10-CM

## 2018-05-10 LAB — INR POINT OF CARE: 2.5 (ref 0.86–1.14)

## 2018-05-10 PROCEDURE — 36416 COLLJ CAPILLARY BLOOD SPEC: CPT

## 2018-05-10 PROCEDURE — 85610 PROTHROMBIN TIME: CPT | Mod: QW

## 2018-05-10 NOTE — PROGRESS NOTES
ANTICOAGULATION FOLLOW-UP CLINIC VISIT    Patient Name:  Tomás Cruz  Date:  5/10/2018  Contact Type:  Face to Face    SUBJECTIVE:     Patient Findings     Positives Inflammation (pain and inflammation with right knee surgery)           OBJECTIVE    INR Protime   Date Value Ref Range Status   05/10/2018 2.5 (A) 0.86 - 1.14 Final       ASSESSMENT / PLAN  INR assessment THER    Recheck INR In: 1 WEEK    INR Location Clinic      Anticoagulation Summary as of 5/10/2018     INR goal 2.5-3.5   Today's INR 2.5   Maintenance plan 2 mg (2 mg x 1) every day   Full instructions 5/12: 1 mg; 5/14: 1 mg; Otherwise 2 mg every day   Weekly total 14 mg   Plan last modified Rose Marie Azlu RN (2/26/2016)   Next INR check 5/17/2018   Target end date     Indications   Long-term (current) use of anticoagulants [Z79.01] [Z79.01]  S/P mitral valve replacement [Z95.2]         Anticoagulation Episode Summary     [facility]    Preferred lab     Send INR reminders to RV NURSE    Comments       Anticoagulation Care Providers     Provider Role Specialty Phone number    Mahamed Groves MD [facility] [phone]            See the Encounter Report to view Anticoagulation Flowsheet and Dosing Calendar (Go to Encounters tab in chart review, and find the Anticoagulation Therapy Visit)    Dosage adjustment made based on physician directed care plan.    Mojgan Brasher RN

## 2018-05-10 NOTE — MR AVS SNAPSHOT
Tomás Cruz   5/10/2018 1:00 PM   Anticoagulation Therapy Visit    Description:  77 year old male   Provider:   ANTICOAGULATION CLINIC   Department:   Nurse           INR as of 5/10/2018     Today's INR 2.5      Anticoagulation Summary as of 5/10/2018     INR goal 2.5-3.5   Today's INR 2.5   Full instructions 5/12: 1 mg; 5/14: 1 mg; Otherwise 2 mg every day   Next INR check 5/17/2018    Indications   Long-term (current) use of anticoagulants [Z79.01] [Z79.01]  S/P mitral valve replacement [Z95.2]         Your next Anticoagulation Clinic appointment(s)     May 17, 2018 11:15 AM CDT   Anticoagulation Visit with  ANTICOAGULATION CLINIC   [facility]    [address] [place] [id number]   [phone]              Contact Numbers     Clinic Number:         May 2018 Details    Sun Mon Tue Wed Thu Fri Sat       1               2               3               4               5                 6               7               8               9               10      2 mg   See details      11      2 mg         12      1 mg           13      2 mg         14      1 mg         15      2 mg         16      2 mg         17            18               19                 20               21               22               23               24               25               26                 27               28               29               30               31                  Date Details   05/10 This INR check       Date of next INR:  5/17/2018         How to take your warfarin dose     To take:  1 mg Take 0.5 of a 2 mg tablet.    To take:  2 mg Take 1 of the 2 mg tablets.

## 2018-05-17 ENCOUNTER — TELEPHONE (OUTPATIENT)
Dept: FAMILY MEDICINE | Facility: CLINIC | Age: 78
End: 2018-05-17

## 2018-05-17 ENCOUNTER — ANTICOAGULATION THERAPY VISIT (OUTPATIENT)
Dept: NURSING | Facility: CLINIC | Age: 78
End: 2018-05-17
Payer: COMMERCIAL

## 2018-05-17 DIAGNOSIS — Z79.01 LONG-TERM (CURRENT) USE OF ANTICOAGULANTS: ICD-10-CM

## 2018-05-17 DIAGNOSIS — Z95.2 S/P MITRAL VALVE REPLACEMENT: ICD-10-CM

## 2018-05-17 LAB — INR POINT OF CARE: 2.3 (ref 0.86–1.14)

## 2018-05-17 PROCEDURE — 85610 PROTHROMBIN TIME: CPT | Mod: QW

## 2018-05-17 PROCEDURE — 36416 COLLJ CAPILLARY BLOOD SPEC: CPT

## 2018-05-17 NOTE — TELEPHONE ENCOUNTER
Pt in for INR today.     Pt noted they would like to follow up with RL regarding their essential tremors. Pt has had these for years. Pt would like to know if there is anything else they can do about these, they are now making the pt self conscious especially when eating out (dropping food)or being social(taking a drink, etc.).    Pt does not take any medications for these symptoms.     Please advise.    Naila Brasher RN  [facility]

## 2018-05-17 NOTE — PROGRESS NOTES
ANTICOAGULATION FOLLOW-UP CLINIC VISIT    Patient Name:  Tomás Cruz  Date:  5/17/2018  Contact Type:  Face to Face    SUBJECTIVE:        OBJECTIVE    INR Protime   Date Value Ref Range Status   05/17/2018 2.3 (A) 0.86 - 1.14 Final       ASSESSMENT / PLAN  INR assessment SUB    Recheck INR In: 2 WEEKS    INR Location Clinic      Anticoagulation Summary as of 5/17/2018     INR goal 2.5-3.5   Today's INR 2.3!   Maintenance plan 2 mg (2 mg x 1) every day   Full instructions 5/18: 1 mg; 5/25: 1 mg; Otherwise 2 mg every day   Weekly total 14 mg   Plan last modified Rose Marie Azul RN (2/26/2016)   Next INR check 5/31/2018   Target end date     Indications   Long-term (current) use of anticoagulants [Z79.01] [Z79.01]  S/P mitral valve replacement [Z95.2]         Anticoagulation Episode Summary     [facility]    Preferred lab     Send INR reminders to RV NURSE    Comments       Anticoagulation Care Providers     Provider Role Specialty Phone number    Mahamed Groves MD [facility] [phone]            See the Encounter Report to view Anticoagulation Flowsheet and Dosing Calendar (Go to Encounters tab in chart review, and find the Anticoagulation Therapy Visit)    Dosage adjustment made based on physician directed care plan.    Mojgan Brasher RN

## 2018-05-17 NOTE — MR AVS SNAPSHOT
Tomás Cruz   5/17/2018 11:15 AM   Anticoagulation Therapy Visit    Description:  77 year old male   Provider:   ANTICOAGULATION CLINIC   Department:   Nurse           INR as of 5/17/2018     Today's INR 2.3!      Anticoagulation Summary as of 5/17/2018     INR goal 2.5-3.5   Today's INR 2.3!   Full instructions 5/18: 1 mg; 5/25: 1 mg; Otherwise 2 mg every day   Next INR check 5/31/2018    Indications   Long-term (current) use of anticoagulants [Z79.01] [Z79.01]  S/P mitral valve replacement [Z95.2]         Your next Anticoagulation Clinic appointment(s)     May 17, 2018 11:15 AM CDT   Anticoagulation Visit with  ANTICOAGULATION CLINIC   [facility]    [address] [place] [id number]   [phone]            May 31, 2018 11:15 AM CDT   Anticoagulation Visit with  ANTICOAGULATION CLINIC   [facility]    [address] [place] [id number]   [phone]              Contact Numbers     Clinic Number:         May 2018 Details    Sun Mon Tue Wed Thu Fri Sat       1               2               3               4               5                 6               7               8               9               10               11               12                 13               14               15               16               17      2 mg   See details      18      1 mg         19      2 mg           20      2 mg         21      2 mg         22      2 mg         23      2 mg         24      2 mg         25      1 mg         26      2 mg           27      2 mg         28      2 mg         29      2 mg         30      2 mg         31               Date Details   05/17 This INR check       Date of next INR:  5/31/2018         How to take your warfarin dose     To take:  1 mg Take 0.5 of a 2 mg tablet.    To take:  2 mg Take 1 of the 2 mg tablets.

## 2018-05-18 NOTE — TELEPHONE ENCOUNTER
Sometimes a beta blocker medication (similar to his metoprolol can help but cannot take both.)  I would recommend an office visit to discuss options and check on his exam.

## 2018-05-21 NOTE — TELEPHONE ENCOUNTER
Called patient @ [phone]    Advised of MD KIM message below - Patient stated an understanding and agreed with plan.  OV scheduled for 05/31/2018      Laura Wolfe RN  [facility] [place]

## 2018-05-31 ENCOUNTER — ANTICOAGULATION THERAPY VISIT (OUTPATIENT)
Dept: NURSING | Facility: CLINIC | Age: 78
End: 2018-05-31
Payer: COMMERCIAL

## 2018-05-31 ENCOUNTER — OFFICE VISIT (OUTPATIENT)
Dept: FAMILY MEDICINE | Facility: CLINIC | Age: 78
End: 2018-05-31
Payer: COMMERCIAL

## 2018-05-31 VITALS
TEMPERATURE: 98.7 F | BODY MASS INDEX: 24.09 KG/M2 | WEIGHT: 204 LBS | HEART RATE: 101 BPM | OXYGEN SATURATION: 100 % | DIASTOLIC BLOOD PRESSURE: 80 MMHG | SYSTOLIC BLOOD PRESSURE: 136 MMHG | HEIGHT: 77 IN

## 2018-05-31 DIAGNOSIS — Z95.2 S/P MITRAL VALVE REPLACEMENT: ICD-10-CM

## 2018-05-31 DIAGNOSIS — Z79.01 LONG-TERM (CURRENT) USE OF ANTICOAGULANTS: ICD-10-CM

## 2018-05-31 DIAGNOSIS — G25.2 INTENTION TREMOR: Primary | ICD-10-CM

## 2018-05-31 LAB — INR POINT OF CARE: 3.3 (ref 0.86–1.14)

## 2018-05-31 PROCEDURE — 85610 PROTHROMBIN TIME: CPT | Mod: QW

## 2018-05-31 PROCEDURE — 36416 COLLJ CAPILLARY BLOOD SPEC: CPT

## 2018-05-31 PROCEDURE — 99213 OFFICE O/P EST LOW 20 MIN: CPT | Performed by: FAMILY MEDICINE

## 2018-05-31 NOTE — PROGRESS NOTES
ANTICOAGULATION FOLLOW-UP CLINIC VISIT    Patient Name:  Tomás Cruz  Date:  5/31/2018  Contact Type:  Face to Face    SUBJECTIVE:     Patient Findings     Positives No Problem Findings           OBJECTIVE    INR Protime   Date Value Ref Range Status   05/17/2018 2.3 (A) 0.86 - 1.14 Final       ASSESSMENT / PLAN  INR assessment THER    Recheck INR In: 3 WEEKS    INR Location Clinic      Anticoagulation Summary as of 5/31/2018     INR goal 2.5-3.5   Today's INR    Warfarin maintenance plan 2 mg (2 mg x 1) every day   Full warfarin instructions 6/1: 1 mg; 6/8: 1 mg; 6/15: 1 mg; Otherwise 2 mg every day   Weekly warfarin total 14 mg   Plan last modified Rose Marie Azul RN (2/26/2016)   Next INR check 6/21/2018   Target end date     Indications   Long-term (current) use of anticoagulants [Z79.01] [Z79.01]  S/P mitral valve replacement [Z95.2]         Anticoagulation Episode Summary     [facility]    Preferred lab     Send INR reminders to RV NURSE    Comments       Anticoagulation Care Providers     Provider Role Specialty Phone number    Mahamed Groves MD [facility] [phone]            See the Encounter Report to view Anticoagulation Flowsheet and Dosing Calendar (Go to Encounters tab in chart review, and find the Anticoagulation Therapy Visit)    Dosage adjustment made based on physician directed care plan.    Mojgan Brasher RN

## 2018-05-31 NOTE — MR AVS SNAPSHOT
Tomás Cruz   5/31/2018 11:15 AM   Anticoagulation Therapy Visit    Description:  77 year old male   Provider:   ANTICOAGULATION CLINIC   Department:   Nurse           INR as of 5/31/2018     Today's INR 3.3      Anticoagulation Summary as of 5/31/2018     INR goal 2.5-3.5   Today's INR 3.3   Full warfarin instructions 6/1: 1 mg; 6/8: 1 mg; 6/15: 1 mg; Otherwise 2 mg every day   Next INR check 6/21/2018    Indications   Long-term (current) use of anticoagulants [Z79.01] [Z79.01]  S/P mitral valve replacement [Z95.2]         Your next Anticoagulation Clinic appointment(s)     Jun 21, 2018 10:00 AM CDT   Anticoagulation Visit with  ANT[facility]AGULATION CLINIC   [facility]    [address] [place] [id number] [phone]              Contact Numbers     Clinic Number:         May 2018 Details    Sun Mon Tue Wed Thu Fri Sat       1               2               3               4               5                 6               7               8               9               10               11               12                 13               14               15               16               17               18               19                 20               21               22               23               24               25               26                 27               28               29               30               31      2 mg   See details         Date Details   05/31 This INR check               How to take your warfarin dose     To take:  2 mg Take 1 of the 2 mg tablets.           June 2018 Details    Sun Mon Tue Wed Thu Fri Sat          1      1 mg         2      2 mg           3      2 mg         4      2 mg         5      2 mg         6      2 mg         7      2 mg         8      1 mg         9      2 mg           10      2 mg         11      2 mg         12      2 mg         13      2 mg         14      2 mg          15      1 mg         16      2 mg           17      2 mg         18      2 mg         19      2 mg         20      2 mg         21            22               23                 24               25               26               27               28               29               30                Date Details   No additional details    Date of next INR:  6/21/2018         How to take your warfarin dose     To take:  1 mg Take 0.5 of a 2 mg tablet.    To take:  2 mg Take 1 of the 2 mg tablets.

## 2018-05-31 NOTE — MR AVS SNAPSHOT
"              After Visit Summary   5/31/2018    Tomás Cruz    MRN: [id number]           Patient Information     Date Of Birth          1940        Visit Information        Provider Department      5/31/2018 11:00 AM Mahamed Groves MD [facility]        Today's Diagnoses     Intention tremor    -  1       Follow-ups after your visit        Follow-up notes from your care team     Return if symptoms worsen or fail to improve.      Who to contact     If you have questions or need follow up information about today's clinic visit or your schedule please contact [facility] directly at [phone].  Normal or non-critical lab and imaging results will be communicated to you by MyChart, letter or phone within 4 business days after the clinic has received the results. If you do not hear from us within 7 days, please contact the clinic through [employer] or phone. If you have a critical or abnormal lab result, we will notify you by phone as soon as possible.  Submit refill requests through [employer] or call your pharmacy and they will forward the refill request to us. Please allow 3 business days for your refill to be completed.          Additional Information About Your Visit        MyChart Information     [employer] gives you secure access to your electronic health record. If you see a primary care provider, you can also send messages to your care team and make appointments. If you have questions, please call your primary care clinic.  If you do not have a primary care provider, please call [phone] and they will assist you.        Care EveryWhere ID     This is your Care EveryWhere ID. This could be used by other organizations to access your [place] medical records  [id number]        Your Vitals Were     Pulse Temperature Height Pulse Oximetry BMI (Body Mass Index)       101 98.7  F (37.1  C) (Oral) 6' 5\" (1.956 m) 100% 24.19 kg/m2        Blood Pressure from Last 3 Encounters: "   05/31/18 136/80   05/07/18 128/62   04/05/18 130/88    Weight from Last 3 Encounters:   05/31/18 204 lb (92.5 kg)   04/05/18 210 lb (95.3 kg)   11/30/17 218 lb 4 oz (99 kg)              Today, you had the following     No orders found for display       Primary Care Provider Office Phone # Fax #    Mahamed Groves [phone]       [facility] [id number]        Equal Access to Services     [facility]: Hadii aad ku hadasho Soomaali, waaxda luqadaha, qaybta kaalmada adeegyada, waxholli mauricio . So [facility] [phone].    ATENCIÓN: Si habla español, tiene a graham disposición servicios gratuitos de asistencia lingüística. [facility] [phone].    We comply with applicable federal civil rights laws and [place] laws. We do not discriminate on the basis of race, color, national origin, age, disability, sex, sexual orientation, or gender identity.            Thank you!     Thank you for choosing [facility]  for your care. Our goal is always to provide you with excellent care. Hearing back from our patients is one way we can continue to improve our services. Please take a few minutes to complete the written survey that you may receive in the mail after your visit with us. Thank you!             Your Updated Medication List - Protect others around you: Learn how to safely use, store and throw away your medicines at www.disposemymeds.org.          This list is accurate as of 5/31/18 11:29 AM.  Always use your most recent med list.                   Brand Name Dispense Instructions for use Diagnosis    lisinopril 20 MG tablet    PRINIVIL/ZESTRIL    90 tablet    Take 1 tablet (20 mg) by mouth daily    Essential hypertension with goal blood pressure less than 140/90       metoprolol succinate 50 MG 24 hr tablet    TOPROL-XL    90 tablet    Take 1 tablet (50 mg) by mouth daily    Essential hypertension with goal blood pressure less than 140/90        pravastatin 40 MG tablet    PRAVACHOL    90 tablet    Take 1 tablet (40 mg) by mouth daily    Hyperlipidemia LDL goal <100       sildenafil 20 MG tablet    REVATIO    30 tablet    Take 2-5 tablets ( mg) by mouth daily as needed    Impotence of organic origin       warfarin 2 MG tablet    COUMADIN    90 tablet    TAKE ONE TABLET (2MG) BY MOUTH DAILY, EXCEPT TWO TABLETS (4MG) ON FRIDAYS    S/P mitral valve replacement

## 2018-05-31 NOTE — PROGRESS NOTES
"  SUBJECTIVE:                                                    Tomás Cruz is a 77 year old male who presents to clinic today for the following health issues:    Essential Tremors -- Pt reports symptoms in bilateral hands, persisting for many years now [+10]. He states it has worsened recently, begun to embarrass him when he is out to eat & will spill foods such as corn/peas/etc. Tomás is still able to write, shoot pool & denies any head tremors. The tremors do not occur at rest, but rather with intention.         Problem list and histories reviewed & adjusted, as indicated.  Additional history: as documented    ROS:  Constitutional, HEENT, cardiovascular, pulmonary, GI, , musculoskeletal, neuro, skin, endocrine and psych systems are negative, except as otherwise noted.      This document serves as a record of the services and decisions personally performed and made by Mahamed Groves MD. It was created on his behalf by Alejandra Khalil, a trained medical scribe. The creation of this document is based the provider's statements to the medical scribe.  Scribe Alejandra Khalil 11:19 AM, May 31, 2018    OBJECTIVE:                                                    /80  Pulse 101  Temp 98.7  F (37.1  C) (Oral)  Ht 1.956 m (6' 5\")  Wt 92.5 kg (204 lb)  SpO2 100%  BMI 24.19 kg/m2 Body mass index is 24.19 kg/(m^2).   GENERAL: healthy, alert, well nourished, well hydrated, no distress  MS: extremities- no gross deformities noted, no edema  SKIN: no suspicious lesions, no rashes  PSYCH: Alert and oriented times 3; speech- coherent , normal rate and volume; able to articulate logical thoughts, able to abstract reason, no tangential thoughts, no hallucinations or delusions, affect- normal    Diagnostic test results:  None     ASSESSMENT/PLAN:         Tomás was seen today for tremors.    Diagnoses and all orders for this visit:    Intention tremor - Assured Parkinson's is not a concern today; Pt declines treatment at " "this time, information given & he will continue to monitor        Risks, benefits and alternatives of treatments discussed. Plan agreed on.      Followup: Return if symptoms worsen or fail to improve.    See patient instructions.       BMI:   Estimated body mass index is 24.19 kg/(m^2) as calculated from the following:    Height as of this encounter: 1.956 m (6' 5\").    Weight as of this encounter: 92.5 kg (204 lb).         The information in this document, created by the medical scribe for me, accurately reflects the services I personally performed and the decisions made by me. I have reviewed and approved this document for accuracy prior to leaving the patient care area.  11:18 AM, 05/31/18        Marc Groves MD   Pager: [phone]    "

## 2018-06-21 ENCOUNTER — ANTICOAGULATION THERAPY VISIT (OUTPATIENT)
Dept: NURSING | Facility: CLINIC | Age: 78
End: 2018-06-21
Payer: COMMERCIAL

## 2018-06-21 DIAGNOSIS — Z95.2 S/P MITRAL VALVE REPLACEMENT: ICD-10-CM

## 2018-06-21 DIAGNOSIS — Z79.01 LONG-TERM (CURRENT) USE OF ANTICOAGULANTS: ICD-10-CM

## 2018-06-21 LAB — INR POINT OF CARE: 2.3 (ref 0.86–1.14)

## 2018-06-21 PROCEDURE — 85610 PROTHROMBIN TIME: CPT | Mod: QW

## 2018-06-21 PROCEDURE — 36416 COLLJ CAPILLARY BLOOD SPEC: CPT

## 2018-06-21 NOTE — MR AVS SNAPSHOT
Tomás Cruz   6/21/2018 10:00 AM   Anticoagulation Therapy Visit    Description:  77 year old male   Provider:   ANTICOAGULATION CLINIC   Department:   Nurse           INR as of 6/21/2018     Today's INR 2.3!      Anticoagulation Summary as of 6/21/2018     INR goal 2.5-3.5   Today's INR 2.3!   Full warfarin instructions 2 mg every day   Next INR check 7/19/2018    Indications   Long-term (current) use of anticoagulants [Z79.01] [Z79.01]  S/P mitral valve replacement [Z95.2]         Your next Anticoagulation Clinic appointment(s)     Jul 19, 2018 10:00 AM CDT   Anticoagulation Visit with  ANTICOAGULATION CLINIC   [facility]    [address] [place] [address] [phone] [phone]              Contact Numbers     Clinic Number:         June 2018 Details    Sun Mon Tue Wed Thu Fri Sat          1               2                 3               4               5               6               7               8               9                 10               11               12               13               14               15               16                 17               18               19               20               21      2 mg   See details      22      2 mg         23      2 mg           24      2 mg         25      2 mg         26      2 mg         27      2 mg         28      2 mg         29      2 mg         30      2 mg          Date Details   06/21 This INR check               How to take your warfarin dose     To take:  2 mg Take 1 of the 2 mg tablets.           July 2018 Details    Sun Mon Tue Wed Thu Fri Sat     1      2 mg         2      2 mg         3      2 mg         4      2 mg         5      2 mg         6      2 mg         7      2 mg           8      2 mg         9      2 mg         10      2 mg         11      2 mg         12      2 mg         13      2 mg         14      2 mg           15      2 mg         16       2 mg         17      2 mg         18      2 mg         19            20               21                 22               23               24               25               26               27               28                 29               30               31                    Date Details   No additional details    Date of next INR:  7/19/2018         How to take your warfarin dose     To take:  2 mg Take 1 of the 2 mg tablets.

## 2018-06-21 NOTE — PROGRESS NOTES
ANTICOAGULATION FOLLOW-UP CLINIC VISIT    Patient Name:  Tomás Cruz  Date:  6/21/2018  Contact Type:  Face to Face    SUBJECTIVE:     Patient Findings     Positives Missed doses (pt missed one dose this week, will resume normal 2mg daily)           OBJECTIVE    INR Protime   Date Value Ref Range Status   06/21/2018 2.3 (A) 0.86 - 1.14 Final       ASSESSMENT / PLAN  INR assessment SUB    Recheck INR In: 4 WEEKS    INR Location Clinic      Anticoagulation Summary as of 6/21/2018     INR goal 2.5-3.5   Today's INR 2.3!   Warfarin maintenance plan 2 mg (2 mg x 1) every day   Full warfarin instructions 2 mg every day   Weekly warfarin total 14 mg   No change documented Mojgan Brasher RN   Plan last modified Rose Marie Azul RN (2/26/2016)   Next INR check 7/19/2018   Target end date     Indications   Long-term (current) use of anticoagulants [Z79.01] [Z79.01]  S/P mitral valve replacement [Z95.2]         Anticoagulation Episode Summary     [facility]    Preferred lab     Send INR reminders to RV NURSE    Comments       Anticoagulation Care Providers     Provider Role Specialty Phone number    Mahamed Groves MD [facility] [phone]            See the Encounter Report to view Anticoagulation Flowsheet and Dosing Calendar (Go to Encounters tab in chart review, and find the Anticoagulation Therapy Visit)    Dosage adjustment made based on physician directed care plan.    Mojgan Brasher RN

## 2018-07-17 ENCOUNTER — TELEPHONE (OUTPATIENT)
Dept: FAMILY MEDICINE | Facility: CLINIC | Age: 78
End: 2018-07-17

## 2018-07-17 NOTE — TELEPHONE ENCOUNTER
Patient calling to report:    ABDOMINAL PAIN     Onset: Sunday    Description:   Character: Dull ache, pressing on it makes it hurt, otherwise just standing, sitting he does not feel it  Location: left side - middle  Radiation: None    Intensity: moderate - but only when pressing on it    Progression of Symptoms:  same and intermittent    Accompanying Signs & Symptoms:  Fever/Chills: no   Gas/Bloating: no   Nausea: no   Vomiting: no   Diarrhea: no   Constipation:no   Blood stool: no  Dysuria or Hematuria: no: YES - however, states that he has prostate issues. Unable to completely empty bladder at times and has to go more frequently   History:   Trauma: YES- possibly - patient was helping lift some heavy luggage on Sunday - did not notice pain during that time, but noticed after the fact  Previous similar pain: no    Previous tests done: none    Precipitating factors:   Does the pain change with:     Food: no      BM: no     Urination: no     Alleviating factors:  Laying down, changing positions, not applying pressure to the area    Therapies Tried and outcome: none    LMP:  not applicable      Patient has had right kidney removed so, is concerned about left kidney. He also has a history of hernia repair in this area and is wondering if he did something when he was lifting luggage on Sunday.    Patient requested appointment with MD KIM on Thursday as he is already scheduled for an INR that day. Appointment scheduled with MD KIM for 10:00am on Thursday. Reviewed signs and symptoms that would require emergent treatment.    Patient verbalized understanding and agrees with plan.    Will call back if further questions or concerns.    Faiza Collado RN, BSN

## 2018-07-19 ENCOUNTER — ANTICOAGULATION THERAPY VISIT (OUTPATIENT)
Dept: NURSING | Facility: CLINIC | Age: 78
End: 2018-07-19
Payer: COMMERCIAL

## 2018-07-19 ENCOUNTER — OFFICE VISIT (OUTPATIENT)
Dept: FAMILY MEDICINE | Facility: CLINIC | Age: 78
End: 2018-07-19
Payer: COMMERCIAL

## 2018-07-19 VITALS
HEIGHT: 77 IN | WEIGHT: 204 LBS | TEMPERATURE: 98.3 F | HEART RATE: 90 BPM | BODY MASS INDEX: 24.09 KG/M2 | DIASTOLIC BLOOD PRESSURE: 80 MMHG | OXYGEN SATURATION: 98 % | SYSTOLIC BLOOD PRESSURE: 118 MMHG

## 2018-07-19 DIAGNOSIS — Z79.01 LONG-TERM (CURRENT) USE OF ANTICOAGULANTS: ICD-10-CM

## 2018-07-19 DIAGNOSIS — Z95.2 S/P MITRAL VALVE REPLACEMENT: ICD-10-CM

## 2018-07-19 DIAGNOSIS — R10.32 LLQ ABDOMINAL PAIN: Primary | ICD-10-CM

## 2018-07-19 DIAGNOSIS — Z12.5 SCREENING FOR PROSTATE CANCER: ICD-10-CM

## 2018-07-19 DIAGNOSIS — R97.20 ELEVATED PROSTATE SPECIFIC ANTIGEN (PSA): ICD-10-CM

## 2018-07-19 LAB
BASOPHILS # BLD AUTO: 0 10E9/L (ref 0–0.2)
BASOPHILS NFR BLD AUTO: 0.5 %
DIFFERENTIAL METHOD BLD: NORMAL
EOSINOPHIL # BLD AUTO: 0.1 10E9/L (ref 0–0.7)
EOSINOPHIL NFR BLD AUTO: 1.3 %
ERYTHROCYTE [DISTWIDTH] IN BLOOD BY AUTOMATED COUNT: 14.6 % (ref 10–15)
HCT VFR BLD AUTO: 48.3 % (ref 40–53)
HGB BLD-MCNC: 15.6 G/DL (ref 13.3–17.7)
INR POINT OF CARE: 3 (ref 0.86–1.14)
LYMPHOCYTES # BLD AUTO: 0.9 10E9/L (ref 0.8–5.3)
LYMPHOCYTES NFR BLD AUTO: 10.9 %
MCH RBC QN AUTO: 28.9 PG (ref 26.5–33)
MCHC RBC AUTO-ENTMCNC: 32.3 G/DL (ref 31.5–36.5)
MCV RBC AUTO: 90 FL (ref 78–100)
MONOCYTES # BLD AUTO: 0.8 10E9/L (ref 0–1.3)
MONOCYTES NFR BLD AUTO: 10.8 %
NEUTROPHILS # BLD AUTO: 5.9 10E9/L (ref 1.6–8.3)
NEUTROPHILS NFR BLD AUTO: 76.5 %
PLATELET # BLD AUTO: 318 10E9/L (ref 150–450)
PSA SERPL-ACNC: 5.3 UG/L (ref 0–4)
RBC # BLD AUTO: 5.39 10E12/L (ref 4.4–5.9)
WBC # BLD AUTO: 7.8 10E9/L (ref 4–11)

## 2018-07-19 PROCEDURE — 85025 COMPLETE CBC W/AUTO DIFF WBC: CPT | Performed by: FAMILY MEDICINE

## 2018-07-19 PROCEDURE — 85610 PROTHROMBIN TIME: CPT | Mod: QW

## 2018-07-19 PROCEDURE — 99207 ZZC NO CHARGE NURSE ONLY: CPT

## 2018-07-19 PROCEDURE — 99213 OFFICE O/P EST LOW 20 MIN: CPT | Performed by: FAMILY MEDICINE

## 2018-07-19 PROCEDURE — 36416 COLLJ CAPILLARY BLOOD SPEC: CPT

## 2018-07-19 PROCEDURE — G0103 PSA SCREENING: HCPCS | Performed by: FAMILY MEDICINE

## 2018-07-19 NOTE — MR AVS SNAPSHOT
Tomás Cruz   7/19/2018 10:30 AM   Anticoagulation Therapy Visit    Description:  77 year old male   Provider:   ANTICOAGULATION CLINIC   Department:   Nurse           INR as of 7/19/2018     Today's INR 3.0      Anticoagulation Summary as of 7/19/2018     INR goal 2.5-3.5   Today's INR 3.0   Full warfarin instructions 2 mg every day   Next INR check 8/23/2018    Indications   Long-term (current) use of anticoagulants [Z79.01] [Z79.01]  S/P mitral valve replacement [Z95.2]         Your next Anticoagulation Clinic appointment(s)     Aug 23, 2018 10:15 AM CDT   Anticoagulation Visit with  ANTICOAGULATION CLINIC   [facility]    [address] [place] [phone]              Contact Numbers     Clinic Number:         July 2018 Details    Sun Mon Tue Wed Thu Fri Sat     1               2               3               4               5               6               7                 8               9               10               11               12               13               14                 15               16               17               18               19      2 mg   See details      20      2 mg         21      2 mg           22      2 mg         23      2 mg         24      2 mg         25      2 mg         26      2 mg         27      2 mg         28      2 mg           29      2 mg         30      2 mg         31      2 mg              Date Details   07/19 This INR check               How to take your warfarin dose     To take:  2 mg Take 1 of the 2 mg tablets.           August 2018 Details    Sun Mon Tue Wed Thu Fri Sat        1      2 mg         2      2 mg         3      2 mg         4      2 mg           5      2 mg         6      2 mg         7      2 mg         8      2 mg         9      2 mg         10      2 mg         11      2 mg           12      2 mg         13      2 mg         14      2 mg          15      2 mg         16      2 mg         17      2 mg         18      2 mg           19      2 mg         20      2 mg         21      2 mg         22      2 mg         23            24               25                 26               27               28               29               30               31                 Date Details   No additional details    Date of next INR:  8/23/2018         How to take your warfarin dose     To take:  2 mg Take 1 of the 2 mg tablets.

## 2018-07-19 NOTE — PROGRESS NOTES
ANTICOAGULATION FOLLOW-UP CLINIC VISIT    Patient Name:  Tomás Cruz  Date:  7/19/2018  Contact Type:  Face to Face    SUBJECTIVE:     Patient Findings     Positives Other complaints (recent abdominal pain, possible lifting injury)           OBJECTIVE    INR Protime   Date Value Ref Range Status   07/19/2018 3.0 (A) 0.86 - 1.14 Final       ASSESSMENT / PLAN  INR assessment THER    Recheck INR In: 5 WEEKS    INR Location Clinic      Anticoagulation Summary as of 7/19/2018     INR goal 2.5-3.5   Today's INR 3.0   Warfarin maintenance plan 2 mg (2 mg x 1) every day   Full warfarin instructions 2 mg every day   Weekly warfarin total 14 mg   No change documented Mojgan Brasher RN   Plan last modified Rose Marie Azul RN (2/26/2016)   Next INR check 8/23/2018   Target end date     Indications   Long-term (current) use of anticoagulants [Z79.01] [Z79.01]  S/P mitral valve replacement [Z95.2]         Anticoagulation Episode Summary     IN[facility] check location [facility]    Preferred lab     Send INR reminders to RV NURSE    Comments       Anticoagulation Care Providers     Provider Role Specialty Phone number    Mahamed Groves MD [facility] [phone]            See the Encounter Report to view Anticoagulation Flowsheet and Dosing Calendar (Go to Encounters tab in chart review, and find the Anticoagulation Therapy Visit)    Dosage adjustment made based on physician directed care plan.    Mojgan Brasher RN

## 2018-07-19 NOTE — MR AVS SNAPSHOT
After Visit Summary   7/19/2018    Tomás Cruz    MRN: [id number]           Patient Information     Date Of Birth          1940        Visit Information        Provider Department      7/19/2018 10:00 AM Mahamed Groves MD [facility]        Today's Diagnoses     LLQ abdominal pain    -  1    Elevated prostate specific antigen (PSA)        Screening for prostate cancer           Follow-ups after your visit        Follow-up notes from your care team     Return in about 2 weeks (around 8/2/2018), or if symptoms worsen or fail to improve, for recheck.      Your next 10 appointments already scheduled     Aug 23, 2018 10:15 AM CDT   Anticoagulation Visit with  ANTICOAGULATION CLINIC   [facility]    [address] [place] [address] [phone]              Who to contact     If you have questions or need follow up information about today's clinic visit or your schedule please contact [facility] directly at [phone].  Normal or non-critical lab and imaging results will be communicated to you by [employer], letter or phone within 4 business days after the clinic has received the results. If you do not hear from us within 7 days, please contact the clinic through [employer] or phone. If you have a critical or abnormal lab result, we will notify you by phone as soon as possible.  Submit refill requests through [employer] or call your pharmacy and they will forward the refill request to us. Please allow 3 business days for your refill to be completed.          Additional Information About Your Visit        MyChart Information     [employer] gives you secure access to your electronic health record. If you see a primary care provider, you can also send messages to your care team and make appointments. If you have questions, please call your primary care clinic.  If you do not have a primary care provider,  "please call [phone] and they will assist you.        Care EveryWhere ID     This is your Care EveryWhere ID. This could be used by other organizations to access your [place] medical records  [id number]        Your Vitals Were     Pulse Temperature Height Pulse Oximetry BMI (Body Mass Index)       90 98.3  F (36.8  C) (Oral) 6' 5\" (1.956 m) 98% 24.19 kg/m2        Blood Pressure from Last 3 Encounters:   07/19/18 118/80   05/31/18 136/80   05/07/18 128/62    Weight from Last 3 Encounters:   07/19/18 204 lb (92.5 kg)   05/31/18 204 lb (92.5 kg)   04/05/18 210 lb (95.3 kg)              We Performed the Following     CBC with platelets and differential     PROSTATE SPEC ANTIGEN SCREEN        Primary Care Provider Office Phone # Fax #    Mahamed Groves [phone]       [address] [place] [id number]        Equal Access to Services     [facility]: Hadii aad ku hadasho Soomaali, waaxda luqadaha, qaybta kaalmada adeegyada, waxay idiin haysudeepn jude mauricio . So [facility] [phone].    ATENCIÓN: Si habla español, tiene a graham disposición servicios gratuitos de asistencia lingüística. [facility] [phone].    We comply with applicable federal civil rights laws and [place] laws. We do not discriminate on the basis of race, color, national origin, age, disability, sex, sexual orientation, or gender identity.            Thank you!     Thank you for choosing [facility]  for your care. Our goal is always to provide you with excellent care. Hearing back from our patients is one way we can continue to improve our services. Please take a few minutes to complete the written survey that you may receive in the mail after your visit with us. Thank you!             Your Updated Medication List - Protect others around you: Learn how to safely use, store and throw away your medicines at www.disposemymeds.org.          This list is accurate as of 7/19/18 11:59 PM.  Always use " your most recent med list.                   Brand Name Dispense Instructions for use Diagnosis    lisinopril 20 MG tablet    PRINIVIL/ZESTRIL    90 tablet    Take 1 tablet (20 mg) by mouth daily    Essential hypertension with goal blood pressure less than 140/90       metoprolol succinate 50 MG 24 hr tablet    TOPROL-XL    90 tablet    Take 1 tablet (50 mg) by mouth daily    Essential hypertension with goal blood pressure less than 140/90       pravastatin 40 MG tablet    PRAVACHOL    90 tablet    Take 1 tablet (40 mg) by mouth daily    Hyperlipidemia LDL goal <100       sildenafil 20 MG tablet    REVATIO    30 tablet    Take 2-5 tablets ( mg) by mouth daily as needed    Impotence of organic origin       warfarin 2 MG tablet    COUMADIN    90 tablet    TAKE ONE TABLET (2MG) BY MOUTH DAILY, EXCEPT TWO TABLETS (4MG) ON FRIDAYS    S/P mitral valve replacement

## 2018-07-19 NOTE — PROGRESS NOTES
Dear Tomás,    Here is a summary of your recent test results:  -PSA is elevated and I would like you to see a urologist about this.  -Normal red blood cell (hgb) levels, normal white blood cell count and normal platelet levels.    For additional lab test information, labtestsonline.org is an excellent reference.           Thank you very much for trusting me and [facility].     Healthy regards,  Marc Groves MD

## 2018-07-19 NOTE — PROGRESS NOTES
"  SUBJECTIVE:                                                      Tomás Cruz is a 77 year old male who presents to clinic today for the following health issues:    ABDOMINAL PAIN     Onset: Sunday    Description:   Character: Dull ache, pressing on it makes it hurt, otherwise just standing, sitting he does not feel it  Location: left side - middle to lower  Radiation: None    Intensity: moderate - but only when pressing on it    Progression of Symptoms:  same and intermittent    Accompanying Signs & Symptoms:  Fever/Chills: no   Gas/Bloating: no   Nausea: no   Vomiting: no   Diarrhea: no   Constipation:no   Blood stool: no  Dysuria or Hematuria: no: YES - however, states that he has prostate issues. Unable to completely empty bladder at times and has to go more frequently   History:   Trauma: YES- possibly - patient was helping lift some heavy luggage on Sunday - did not notice pain during that time, but noticed after the fact  Previous similar pain: no    Previous tests done: none    Precipitating factors:   Does the pain change with:     Food: no      BM: no     Urination: no     Alleviating factors:  Laying down, changing positions, not applying pressure to the area    Therapies Tried and outcome: none    LMP:  not applicable       Patient has had right kidney removed so, is concerned about left kidney. He also has a history of hernia repair in this area and is wondering if he did something when he was lifting luggage on Sunday.    Pain is getting better today    Problem list and histories reviewed & adjusted, as indicated.  Additional history: as documented    ROS:  Constitutional, HEENT, cardiovascular, pulmonary, GI, , musculoskeletal, neuro, skin, endocrine and psych systems are negative, except as otherwise noted.    OBJECTIVE:                                                      /80  Pulse 90  Temp 98.3  F (36.8  C) (Oral)  Ht 6' 5\" (1.956 m)  Wt 204 lb (92.5 kg)  SpO2 98%  BMI 24.19 " kg/m2 Body mass index is 24.19 kg/(m^2).   GENERAL: healthy, alert, well nourished, well hydrated, no distress  HENT: ear canals- normal; TMs- normal; Nose- normal; Mouth- no ulcers, no lesions  NECK: no tenderness, no adenopathy, no asymmetry, no masses, no stiffness; thyroid- normal to palpation  RESP: lungs clear to auscultation - no rales, no rhonchi, no wheezes  CV: regular rates and rhythm, normal S1 S2, no S3 or S4 and no murmur, no click or rub -  ABDOMEN: soft,  Mild LLQ tenderness, no  hepatosplenomegaly, no masses, normal bowel sounds  MS: extremities- no gross deformities noted, no edema  SKIN: no suspicious lesions, no rashes  BACK: no CVA tenderness, no paralumbar tenderness  PSYCH: Alert and oriented times 3; speech- coherent , normal rate and volume; able to articulate logical thoughts, able to abstract reason, no tangential thoughts, no hallucinations or delusions, affect- normal    Diagnostic test results:  Results for orders placed or performed in visit on 07/19/18 (from the past 48 hour(s))   PROSTATE SPEC ANTIGEN SCREEN   Result Value Ref Range    PSA 5.30 (H) 0 - 4 ug/L   CBC with platelets and differential   Result Value Ref Range    WBC 7.8 4.0 - 11.0 10e9/L    RBC Count 5.39 4.4 - 5.9 10e12/L    Hemoglobin 15.6 13.3 - 17.7 g/dL    Hematocrit 48.3 40.0 - 53.0 %    MCV 90 78 - 100 fl    MCH 28.9 26.5 - 33.0 pg    MCHC 32.3 31.5 - 36.5 g/dL    RDW 14.6 10.0 - 15.0 %    Platelet Count 318 150 - 450 10e9/L    Diff Method Automated Method     % Neutrophils 76.5 %    % Lymphocytes 10.9 %    % Monocytes 10.8 %    % Eosinophils 1.3 %    % Basophils 0.5 %    Absolute Neutrophil 5.9 1.6 - 8.3 10e9/L    Absolute Lymphocytes 0.9 0.8 - 5.3 10e9/L    Absolute Monocytes 0.8 0.0 - 1.3 10e9/L    Absolute Eosinophils 0.1 0.0 - 0.7 10e9/L    Absolute Basophils 0.0 0.0 - 0.2 10e9/L         ASSESSMENT/PLAN:                                                      Tomás was seen today for abdominal pain.    Diagnoses  and all orders for this visit:    LLQ abdominal pain - suspect abd wall strain as the symptoms are more superficial.   -     CBC with platelets and differential    Elevated prostate specific antigen (PSA) - will refer back to urology  -     PROSTATE SPEC ANTIGEN SCREEN    Screening for prostate cancer        Risks, benefits and alternatives of treatments discussed. Plan agreed on.      Followup: Return in about 2 weeks (around 8/2/2018), or if symptoms worsen or fail to improve, for recheck.    See patient instructions.         Marc Groves MD   Pager: [phone]

## 2018-08-29 ENCOUNTER — ANTICOAGULATION THERAPY VISIT (OUTPATIENT)
Dept: NURSING | Facility: CLINIC | Age: 78
End: 2018-08-29
Payer: COMMERCIAL

## 2018-08-29 DIAGNOSIS — Z79.01 LONG-TERM (CURRENT) USE OF ANTICOAGULANTS: ICD-10-CM

## 2018-08-29 DIAGNOSIS — Z95.2 S/P MITRAL VALVE REPLACEMENT: ICD-10-CM

## 2018-08-29 LAB — INR POINT OF CARE: 3.2 (ref 0.86–1.14)

## 2018-08-29 PROCEDURE — 85610 PROTHROMBIN TIME: CPT | Mod: QW

## 2018-08-29 PROCEDURE — 36416 COLLJ CAPILLARY BLOOD SPEC: CPT

## 2018-08-29 NOTE — MR AVS SNAPSHOT
Tomás Cruz   8/29/2018 10:45 AM   Anticoagulation Therapy Visit    Description:  78 year old male   Provider:   ANTICOAGULATION CLINIC   Department:   Nurse           INR as of 8/29/2018     Today's INR 3.2      Anticoagulation Summary as of 8/29/2018     INR goal 2.5-3.5   Today's INR 3.2   Full warfarin instructions 2 mg every day   Next INR check 10/10/2018    Indications   Long-term (current) use of anticoagulants [Z79.01] [Z79.01]  S/P mitral valve replacement [Z95.2]         Your next Anticoagulation Clinic appointment(s)     Aug 29, 2018 10:45 AM CDT   Anticoagulation Visit with  ANTICOAGULATION CLINIC   [facility]    [address] [place] [address] [facility] [id number] [phone]              Contact Numbers     Clinic Number:         August 2018 Details    Sun Mon Tue Wed Thu Fri Sat        1               2               3               4                 5               6               7               8               9               10               11                 12               13               14               15               16               17               18                 19               20               21               22               23               24               25                 26               27               28               29      2 mg   See details      30      2 mg         31      2 mg           Date Details   08/29 This INR check               How to take your warfarin dose     To take:  2 mg Take 1 of the 2 mg tablets.           September 2018 Details    Sun Mon Tue Wed Thu Fri Sat           1      2 mg           2      2 mg         3      2 mg         4      2 mg         5      2 mg         6      2 mg         7      2 mg         8      2 mg           9      2 mg         10      2 mg         11      2 mg         12      2 mg         13      2 mg         14      2 mg         15      2 mg           16       2 mg         17      2 mg         18      2 mg         19      2 mg         20      2 mg         21      2 mg         22      2 mg           23      2 mg         24      2 mg         25      2 mg         26      2 mg         27      2 mg         28      2 mg         29      2 mg           30      2 mg                Date Details   No additional details            How to take your warfarin dose     To take:  2 mg Take 1 of the 2 mg tablets.           October 2018 Details    Sun Mon Tue Wed Thu Fri Sat      1      2 mg         2      2 mg         3      2 mg         4      2 mg         5      2 mg         6      2 mg           7      2 mg         8      2 mg         9      2 mg         10            11               12               13                 14               15               16               17               18               19               20                 21               22               23               24               25               26               27                 28               29               30               31                   Date Details   No additional details    Date of next INR:  10/10/2018         How to take your warfarin dose     To take:  2 mg Take 1 of the 2 mg tablets.

## 2018-08-29 NOTE — PROGRESS NOTES
ANTICOAGULATION FOLLOW-UP CLINIC VISIT    Patient Name:  Tomás Cruz  Date:  8/29/2018  Contact Type:  Face to Face    SUBJECTIVE:     Patient Findings     Positives No Problem Findings           OBJECTIVE    INR Protime   Date Value Ref Range Status   08/29/2018 3.2 (A) 0.86 - 1.14 Final       ASSESSMENT / PLAN  No question data found.  Anticoagulation Summary as of 8/29/2018     INR goal 2.5-3.5   Today's INR 3.2   Warfarin maintenance plan 2 mg (2 mg x 1) every day   Full warfarin instructions 2 mg every day   Weekly warfarin total 14 mg   No change documented Myra Holguin RN   Plan last modified Rose Marie Azul RN (2/26/2016)   Next INR check 10/10/2018   Target end date     Indications   Long-term (current) use of anticoagulants [Z79.01] [Z79.01]  S/P mitral valve replacement [Z95.2]         Anticoagulation Episode Summary     INR check location [facility]    Preferred lab     Send INR reminders to RV NURSE    Comments       Anticoagulation Care Providers     Provider Role Specialty Phone number    Mahamed Groves MD [facility] [phone]            See the Encounter Report to view Anticoagulation Flowsheet and Dosing Calendar (Go to Encounters tab in chart review, and find the Anticoagulation Therapy Visit)    Dosage adjustment made based on physician directed care plan.    Myra Holguin, RN

## 2018-08-30 ENCOUNTER — TRANSFERRED RECORDS (OUTPATIENT)
Dept: HEALTH INFORMATION MANAGEMENT | Facility: CLINIC | Age: 78
End: 2018-08-30

## 2018-08-30 LAB — EJECTION FRACTION: 28

## 2018-09-05 ENCOUNTER — PRE VISIT (OUTPATIENT)
Dept: UROLOGY | Facility: CLINIC | Age: 78
End: 2018-09-05

## 2018-09-05 NOTE — TELEPHONE ENCOUNTER
MEDICAL RECORDS REQUEST   [facility]  Urology Clinic  [address] [place] [address]  PHONE: [phone]  Fax: [phone]        FUTURE VISIT INFORMATION                                                   Tomás Cruz, : 1940 scheduled for future visit at [facility]    APPOINTMENT INFORMATION:    Date: 10/10/2018    Provider:  Мария Saleem    Reason for Visit/Diagnosis: Elevated PSA    REFERRAL INFORMATION:    Referring provider:  self    Specialty: self    Referring providers clinic:  self    Clinic contact number:  self    RECORDS REQUESTED FOR VISIT                                                     NOTES  STATUS/DETAILS   OFFICE NOTE from referring provider  yes   OFFICE NOTE from other specialist  no   DISCHARGE SUMMARY from hospital  no   DISCHARGE REPORT from the ER  no   OPERATIVE REPORT  no   MEDICATION LIST  no   PSA (LAB)  yes       PRE-VISIT CHECKLIST      Record collection complete Yes   Appointment appropriately scheduled           (right time/right provider) Yes   MyChart activation Yes   Questionnaire complete If no, please explain in process     Completed by: Karla Cochran

## 2018-09-26 ENCOUNTER — TELEPHONE (OUTPATIENT)
Dept: FAMILY MEDICINE | Facility: CLINIC | Age: 78
End: 2018-09-26

## 2018-09-26 NOTE — TELEPHONE ENCOUNTER
Patient calling    Patient stated that he has had some dizziness/lightheadedness, intermittently, infrequently - last episode was 08/31/2018. Associated with N/V, sweating  Patient did see cardiologist (Dr. Oakes) after this episode (SEE 09/06/2018 OV) and patient was told it might be some kind of vertigo - should Follow-up with ENT.     Per 09/06/2018 OV with Dr. Oakes:   RECOMMENDATIONS:  1. We will add him on for a device check today to look for any arrhythmic events that correspond to his symptoms of last Friday evening  2. If we do not see any arrhythmias I will plan to have him follow-up with his primary physician to consider whether he might have benign positional vertigo  3. If we do see ventricular tachycardia or other arrhythmia we will plan an EP consult, and increase his beta-blocker  4. Other than the above, will plan routine follow-up with me in 1 year      Patient stated that he was told there were no arrhythmias noted during these episodes per his cardiologist  Patient has been having episodes of light-headedness intermittently - the past week he has been getting short periods (a few minutes) of light-headedness every day.      Dizziness  Onset: Right after heart operation (12-14 years ago). Had 1 episode (evaluated by EMT) then nothing since this past year    Description:   Do you feel faint: YES  Does it feel like the surroundings (bed, room) are moving: YES  Unsteady/off balance: YES  Have you passed out or fallen: no     Intensity: moderate    Progression of Symptoms:  worsening and intermittent    Accompanying Signs & Symptoms:  Heart palpitations: no   Nausea, vomiting: YES- 1x  Weakness in arms or legs: no  Fatigue: YES  Vision or speech changes: no   Ringing in ears (Tinnitus): no   Hearing Loss: no     History:   Head trauma/concussion hx: no   Previous similar symptoms: no   Recent bleeding history: no     Precipitating factors:   Worse with activity or head movement: YES  Any new  "medications (BP?): no   Alcohol/drug abuse/withdrawal: no     Alleviating factors:   Does staying in a fixed position give relief:  YES    Therapies Tried and outcome: closing eyes - \"helps to a certain point\"     DENIES: CP, SOB, Difficulty Breathing, Numbness/Tingling, HA, Vision/Hearing Changes    Advised OV - scheduled for 09/27/2018 with MD KIM    Advised patient that if new or worsening symptoms appear (reviewed new & worsening symptoms) to call the clinic or be seen in the the ER  Patient stated an understanding and agreed with plan.    Laura Wolfe RN  [place] Triage  "

## 2018-09-27 ENCOUNTER — OFFICE VISIT (OUTPATIENT)
Dept: FAMILY MEDICINE | Facility: CLINIC | Age: 78
End: 2018-09-27
Payer: COMMERCIAL

## 2018-09-27 VITALS
DIASTOLIC BLOOD PRESSURE: 76 MMHG | WEIGHT: 206 LBS | TEMPERATURE: 97.9 F | HEIGHT: 74 IN | BODY MASS INDEX: 26.44 KG/M2 | HEART RATE: 88 BPM | SYSTOLIC BLOOD PRESSURE: 128 MMHG | OXYGEN SATURATION: 100 %

## 2018-09-27 DIAGNOSIS — Z23 NEED FOR PROPHYLACTIC VACCINATION AND INOCULATION AGAINST INFLUENZA: Primary | ICD-10-CM

## 2018-09-27 DIAGNOSIS — R42 RECURRENT VERTIGO: ICD-10-CM

## 2018-09-27 PROCEDURE — G0008 ADMIN INFLUENZA VIRUS VAC: HCPCS | Performed by: FAMILY MEDICINE

## 2018-09-27 PROCEDURE — 99214 OFFICE O/P EST MOD 30 MIN: CPT | Mod: 25 | Performed by: FAMILY MEDICINE

## 2018-09-27 PROCEDURE — 90662 IIV NO PRSV INCREASED AG IM: CPT | Performed by: FAMILY MEDICINE

## 2018-09-27 NOTE — MR AVS SNAPSHOT
After Visit Summary   9/27/2018    Tomás Cruz    MRN: [id number]           Patient Information     Date Of Birth          1940        Visit Information        Provider Department      9/27/2018 3:40 PM Mahamed Groves MD [facility] [place]        Today's Diagnoses     Need for prophylactic vaccination and inoculation against influenza    -  1    Recurrent vertigo           Follow-ups after your visit        Additional Services     OTOLARYNGOLOGY REFERRAL       Your provider has referred you to: N: [facility] [phone]   [email or web address]    Please be aware that coverage of these services is subject to the terms and limitations of your health insurance plan.  Call member services at your health plan with any benefit or coverage questions.      Please bring the following with you to your appointment:    (1) Any X-Rays, CTs or MRIs which have been performed.  Contact the facility where they were done to arrange for  prior to your scheduled appointment.  Any new CT, MRI or other procedures ordered by your specialist must be performed at a [facility] or coordinated by your clinic's referral office.  (2) List of current medications  (3) This referral request   (4) Any documents/labs given to you for this referral                  Your next 10 appointments already scheduled     Oct 10, 2018  4:20 PM CDT   (Arrive by 4:05 PM)   New Patient Visit with Мария Saleem MD   [facility] ([facility])    [address]  [address] [facility] [address] [phone]              Future tests that were ordered for you today     Open Future Orders        Priority Expected Expires Ordered    MR Brain w/o Contrast Routine  9/27/2019 9/27/2018            Who to contact     If you have questions or need follow  "up information about today's clinic visit or your schedule please contact [facility] directly at [phone].  Normal or non-critical lab and imaging results will be communicated to you by MyChart, letter or phone within 4 business days after the clinic has received the results. If you do not hear from us within 7 days, please contact the clinic through [employer] or phone. If you have a critical or abnormal lab result, we will notify you by phone as soon as possible.  Submit refill requests through [employer] or call your pharmacy and they will forward the refill request to us. Please allow 3 business days for your refill to be completed.          Additional Information About Your Visit        [employer] Information     [employer] gives you secure access to your electronic health record. If you see a primary care provider, you can also send messages to your care team and make appointments. If you have questions, please call your primary care clinic.  If you do not have a primary care provider, please call [phone] and they will assist you.        Care EveryWhere ID     This is your Care EveryWhere ID. This could be used by other organizations to access your [place] medical records  [id number]        Your Vitals Were     Pulse Temperature Height Pulse Oximetry BMI (Body Mass Index)       88 97.9  F (36.6  C) (Tympanic) 6' 2\" (1.88 m) 100% 26.45 kg/m2        Blood Pressure from Last 3 Encounters:   09/27/18 128/76   07/19/18 118/80   05/31/18 136/80    Weight from Last 3 Encounters:   09/27/18 206 lb (93.4 kg)   07/19/18 204 lb (92.5 kg)   05/31/18 204 lb (92.5 kg)              We Performed the Following          ADMIN VACCINE, FIRST [71271]     FLU VACCINE, INCREASED ANTIGEN, PRESV FREE, AGE 65+ [03838]     OTOLARYNGOLOGY REFERRAL        Primary Care Provider Office Phone # Fax #    Mahamed Groves [phone] [address] [facility] [address]        Equal Access to " Services     [facility]: Hadii aad ku hadlindsayveronica Soelidia, waaxda luqadaha, qaybta kaalmada [place], estela mauricio . So [facility] [phone].    ATENCIÓN: Si ardenla alondra, tiene a graham disposición servicios gratuitos de asistencia lingüística. Llame al [phone].    We comply with applicable federal civil rights laws and [place] laws. We do not discriminate on the basis of race, color, national origin, age, disability, sex, sexual orientation, or gender identity.            Thank you!     Thank you for choosing [facility]  for your care. Our goal is always to provide you with excellent care. Hearing back from our patients is one way we can continue to improve our services. Please take a few minutes to complete the written survey that you may receive in the mail after your visit with us. Thank you!             Your Updated Medication List - Protect others around you: Learn how to safely use, store and throw away your medicines at www.disposemymeds.org.          This list is accurate as of 9/27/18  5:39 PM.  Always use your most recent med list.                   Brand Name Dispense Instructions for use Diagnosis    lisinopril 20 MG tablet    PRINIVIL/ZESTRIL    90 tablet    Take 1 tablet (20 mg) by mouth daily    Essential hypertension with goal blood pressure less than 140/90       metoprolol succinate 50 MG 24 hr tablet    TOPROL-XL    90 tablet    Take 1 tablet (50 mg) by mouth daily    Essential hypertension with goal blood pressure less than 140/90       pravastatin 40 MG tablet    PRAVACHOL    90 tablet    Take 1 tablet (40 mg) by mouth daily    Hyperlipidemia LDL goal <100       sildenafil 20 MG tablet    REVATIO    30 tablet    Take 2-5 tablets ( mg) by mouth daily as needed    Impotence of organic origin       warfarin 2 MG tablet    COUMADIN    90 tablet    TAKE ONE TABLET (2MG) BY MOUTH DAILY, EXCEPT TWO TABLETS (4MG) ON FRIDAYS    S/P mitral valve  replacement

## 2018-09-27 NOTE — PROGRESS NOTES
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 The patient was just sitting down when it started (not moving his head in an unusual way). His symptoms passed after about 5 minutes or maybe even less. He denies ear pain, hearing changes, recent illness, headaches, or rapid heart rate. He wonders about what is causing it.     9/6/18: saw cardiology Dr. Oakes; thought that it might be vertigo and recommended following up with ENT    Per 09/06/2018 OV with Dr. Oakes:     RECOMMENDATIONS:  1. We will add him on for a device check today to look for any arrhythmic events that correspond to his symptoms of last Friday evening  2. If we do not see any arrhythmias I will plan to have him follow-up with his primary physician to consider whether he might have benign positional vertigo  3. If we do see ventricular tachycardia or other arrhythmia we will plan an EP consult, and increase his beta-blocker  4. Other than the above, will plan routine follow-up with me in 1 year    Patient was told that there were no arrhythmias noted during these episodes according to cardiologist    In the past week: Since the episode on 8/31 he has not had major vertigo symptom. He does notice short periods (a few minutes) of dizziness every day intermittently. These symptoms are not as bad as before but still cause him to " "walk a bit unsteadily. They feel like precursors to what he felt on 8/31. Tapping his head with his hand a few times seems to help. He is really scared about what it might be.     Patient's questions today: What is causing it, what to do to stop it, worries about heart.   Onset: Right after heart operation (12-14 years ago). Had 1 episode (evaluated by EMT) then nothing since this past year    Description:   Do you feel faint: YES  Does it feel like the surroundings (bed, room) are moving: YES  Unsteady/off balance: YES  Have you passed out or fallen: no     Intensity: moderate    Progression of Symptoms:  worsening and intermittent    Accompanying Signs & Symptoms:  Heart palpitations: no   Nausea, vomiting: YES- 1x  Weakness in arms or legs: no  Fatigue: YES  Vision or speech changes: no   Ringing in ears (Tinnitus): no   Hearing Loss: no     History:   Head trauma/concussion hx: no   Previous similar symptoms: no   Recent bleeding history: no     Precipitating factors:   Worse with activity or head movement: YES  Any new medications (BP?): no   Alcohol/drug abuse/withdrawal: no     Alleviating factors:   Does staying in a fixed position give relief:  YES     Therapies Tried and outcome: closing eyes - \"helps to a certain point\"        Intermittent Left Hand Numbness: The patient occasionally notices left hand numbness which seems to involve his entire hand.     Hyperlipidemia:    Medications: pravastatin 40mg  Recent Labs   Lab Test  11/09/17   0949  10/25/16   0937  08/07/15   0807  06/24/14   0839   CHOL  146  143  143  148   HDL  38*  31*  30*  29*   LDL  78  89  88  95   TRIG  150*  117  123  120   CHOLHDLRATIO   --    --   4.8  5.2*       Hypertension:    Medications: lisinopril 20mg  BP Readings from Last 3 Encounters:   09/27/18 128/76   07/19/18 118/80   05/31/18 136/80     Atrial Fibrillation/Chronic Anticoagulation:    Medications: metoprolol 50mg, warfarin  INR   Date Value Ref Range Status " "  05/15/2013 2.13 (H) 0.86 - 1.14 Final     INR Protime   Date Value Ref Range Status   08/29/2018 3.2 (A) 0.86 - 1.14 Final         Sleep Apnea: Body mass index is 26.45 kg/(m^2).  Wt Readings from Last 5 Encounters:   09/27/18 206 lb (93.4 kg)   07/19/18 204 lb (92.5 kg)   05/31/18 204 lb (92.5 kg)   04/05/18 210 lb (95.3 kg)   11/30/17 218 lb 4 oz (99 kg)      BPH: The patient will be seeing urology on 10/10/18. He continues to notice urinary frequency, slow stream, and small volume urination but does not have nocturia.  PSA   Date Value Ref Range Status   07/19/2018 5.30 (H) 0 - 4 ug/L Final     Comment:     Assay Method:  Chemiluminescence using Siemens Vista analyzer          Problem list and histories reviewed & adjusted, as indicated.  Additional history: as documented    ROS:  Constitutional, HEENT, cardiovascular, pulmonary, GI, , musculoskeletal, neuro, skin, endocrine and psych systems are negative, except as otherwise noted.    OBJECTIVE:                                                      /76  Pulse 88  Temp 97.9  F (36.6  C) (Tympanic)  Ht 6' 2\" (1.88 m)  Wt 206 lb (93.4 kg)  SpO2 100%  BMI 26.45 kg/m2 Body mass index is 26.45 kg/(m^2).   GENERAL: healthy, alert and no distress  EYES: Eyes grossly normal to inspection, extraocular movements normal - no nystagmus.  HENT: no worsening of symptoms with [place]-Hallpike. ear canals- normal; TMs- normal; Nose- normal externally; Mouth- no ulcers, no lesions  RESP: lungs clear to auscultation - no rales, no rhonchi, no wheezes  CV: Louder S1 consistent with replacement mitral valve; regular rates and rhythm, normal S2, no S3 or S4 and no murmur, no click or rub, no peripheral edema  ABDOMEN: soft, no tenderness, no  hepatosplenomegaly, no masses, normal bowel sounds  MS: extremities- no gross deformities noted  NEURO: strength and tone- grossly normal, mentation- intact, speech- normal  BACK: no CVA tenderness  PSYCH: speech- coherent , normal " rate and volume; able to articulate logical thoughts, able to abstract reason, no tangential thoughts, no hallucinations or delusions, affect- normal    Diagnostic test results:  None    ASSESSMENT/PLAN:                                                      Tomás was seen today for dizziness.    Diagnoses and all orders for this visit:    Recurrent vertigo - has had about 3 larger episodes over the past year, but symptoms have gotten more frequent in the past month. Hx of temporal lobe stroke. Negative [place]-Hallpike today. Worry about underlying neurological cause - Provided the patient with an ENT referral today as well to further assess cause. Will monitor.  -     OTOLARYNGOLOGY REFERRAL  -    Canceled due to pacemaker - MR Brain w/o Contrast; Future    Need for prophylactic vaccination and inoculation against influenza - immunized today.  -     FLU VACCINE, INCREASED ANTIGEN, PRESV FREE, AGE 65+ [66282]  -          ADMIN VACCINE, FIRST [48939]      Risks, benefits and alternatives of treatments discussed. Plan agreed on.      Followup: Return in about 2 weeks (around 10/11/2018), or if symptoms worsen or fail to improve.    See patient instructions.       Bryce Weston, MS3 Student, has participated in the care of this patient.     Provider Disclosure:  I agree with above History, Review of Systems, Physical exam and Plan.  I have reviewed the content of the documentation and have edited it as needed. I have personally performed the services documented here and the documentation accurately represents those services and the decisions I have made.      Electronically signed by:          Marc Groves M.D.   Pager: [phone]

## 2018-10-01 ENCOUNTER — TELEPHONE (OUTPATIENT)
Dept: FAMILY MEDICINE | Facility: CLINIC | Age: 78
End: 2018-10-01

## 2018-10-01 NOTE — TELEPHONE ENCOUNTER
Pt calling     Stating he was told that his pacemaker is not compatible with an MRI - he did have the pacemaker nurse review this     Please advise on what pt should be doing in place of the head MRI     Best # [phone] NOT OK TO JOSE Holguin RN, BSN  [facility]

## 2018-10-01 NOTE — TELEPHONE ENCOUNTER
Called # below     Advised pt on the information below     Patient stated an understanding and agreed with plan.    Myra Holguin RN, BSN  [facility]

## 2018-10-03 ENCOUNTER — PRE VISIT (OUTPATIENT)
Dept: UROLOGY | Facility: CLINIC | Age: 78
End: 2018-10-03

## 2018-10-03 NOTE — TELEPHONE ENCOUNTER
Reason for visit: Elevated PSA consult     Relevant information: self referred     Records/imaging/labs: all records available    Pt called: no need for a call    Rooming: regular

## 2018-10-09 ENCOUNTER — TELEPHONE (OUTPATIENT)
Dept: FAMILY MEDICINE | Facility: CLINIC | Age: 78
End: 2018-10-09

## 2018-10-09 ENCOUNTER — TRANSFERRED RECORDS (OUTPATIENT)
Dept: HEALTH INFORMATION MANAGEMENT | Facility: CLINIC | Age: 78
End: 2018-10-09

## 2018-10-09 NOTE — TELEPHONE ENCOUNTER
Reason for Call:  Other call back    Detailed comments: Pt called this afternoon wanting to speak with Dr. Groves or a member of his team in regards to the ENT doctor Dr. Groves referred him to that he saw today 10/09/2018. Please give pt a call back when you can. Thank you.    Phone Number Patient can be reached at: Home number on file [phone] (home)    Best Time:     Can we leave a detailed message on this number? YES    Call taken on 10/9/2018 at 4:32 PM by Clare Mcleod

## 2018-10-10 ENCOUNTER — OFFICE VISIT (OUTPATIENT)
Dept: UROLOGY | Facility: CLINIC | Age: 78
End: 2018-10-10
Payer: COMMERCIAL

## 2018-10-10 VITALS
HEART RATE: 55 BPM | BODY MASS INDEX: 26.31 KG/M2 | WEIGHT: 205 LBS | HEIGHT: 74 IN | DIASTOLIC BLOOD PRESSURE: 90 MMHG | SYSTOLIC BLOOD PRESSURE: 150 MMHG

## 2018-10-10 DIAGNOSIS — R97.20 ELEVATED PROSTATE SPECIFIC ANTIGEN (PSA): Primary | ICD-10-CM

## 2018-10-10 ASSESSMENT — ENCOUNTER SYMPTOMS
TINGLING: 0
ORTHOPNEA: 0
FLANK PAIN: 0
HYPERTENSION: 1
SPEECH CHANGE: 0
HEMATURIA: 0
WEAKNESS: 0
LOSS OF CONSCIOUSNESS: 0
TREMORS: 1
DYSURIA: 0
SLEEP DISTURBANCES DUE TO BREATHING: 0
DISTURBANCES IN COORDINATION: 0
NUMBNESS: 0
HYPOTENSION: 0
DIFFICULTY URINATING: 1
SEIZURES: 0
PARALYSIS: 0
MEMORY LOSS: 0
PALPITATIONS: 0
LIGHT-HEADEDNESS: 1
HEADACHES: 0
EXERCISE INTOLERANCE: 0
SYNCOPE: 0
DIZZINESS: 1
LEG PAIN: 1

## 2018-10-10 ASSESSMENT — PAIN SCALES - GENERAL: PAINLEVEL: NO PAIN (0)

## 2018-10-10 NOTE — PROGRESS NOTES
Chief Complaint:   Elevated PSA         Consult or Referral:   Mr. Tomás Cruz is a 78 year old male seen in consultation from Dr. Groves.         History of Present Illness:   Mr. Mckeon is a very pleasant 78-year-old male who presents with history of elevated PSA.  His most recent PSA on July 18 was 5.3.  In 2016 his PSA was up to 5.5, the patient denies ever getting a biopsy or ever having a prostate MRI.  In 2017 his PSA had dropped to 4.0.  He endorses frequent urination, weak stream, difficulty starting stream, difficulty emptying bladder. Denies nocturia.     Of note, the patient is on Coumadin for mechanical heart valve.         Past Medical History:     Past Medical History:   Diagnosis Date     Atrial fibrillation (H)     Transient around time of MVR.  Had MAZE procedure by report.     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     Hypertension goal BP (blood pressure) < 140/90 4/05     Mitral valve disorders(424.0)     s/p mitral valve replacment- 2003; SBE prophylaxsis and anticoagulated; echo 2/13- EF 30-35%     Non-ischemic cardiomyopathy (H)     EF as above.  Follows with [facility].     Polyp of colon      Renal mass     Benign per patient report, Right kidney removed.            Past Surgical History:     Past Surgical History:   Procedure Laterality Date     ARTHROSCOPY KNEE Left 2001    left knee arthroscopy     AS TOTAL KNEE ARTHROPLASTY Left 2006    Dr. Castro     CARDIAC SURGERY  2003    Mitral valve replacement     CATARACT IOL, RT/LT  2014    Cataracts, bilateral     HERNIA REPAIR       IMPLANT AUTOMATIC IMPLANTABLE CARDIOVERTER DEFIBRILLATOR  5/2013     KIDNEY SURGERY  2008    Laparoscopic right radical nephrectomy.- due to complex hemorrhagic cyst             Medications     Current Outpatient Prescriptions   Medication     lisinopril (PRINIVIL/ZESTRIL) 20 MG tablet     metoprolol (TOPROL-XL) 50 MG 24 hr tablet     pravastatin (PRAVACHOL) 40 MG tablet     sildenafil (REVATIO) 20 MG tablet     warfarin (COUMADIN) 2 MG tablet     No current facility-administered medications for this visit.             Family History:     Family History   Problem Relation Age of Onset     Adopted: Yes     Unknown/Adopted Mother      Unknown/Adopted Father      Diabetes No family hx of      Coronary Artery Disease No family hx of             Social History:     Social History     Social History     Marital status:      Spouse name: Ava     Number of children: 4     Years of education: N/A     Occupational History     retired postman [employer]      Retired     Social History Main Topics     Smoking status: Former Smoker     Quit date: 6/1/1982     Smokeless tobacco: Never Used     Alcohol use No     Drug use: No     Sexual activity: Yes     Partners: Female     Other Topics Concern     Caffeine Concern No     Exercise No     rec 30minutes; 4-5 x/wk; hard with knee pain     Seat Belt Yes     Parent/Sibling W/ Cabg, Mi Or Angioplasty Before 65f 55m? No     Social History Narrative            Allergies:   Review of patient's allergies indicates no known allergies.         Review of Systems:  From intake questionnaire   Answers for HPI/ROS submitted by the patient on 10/10/2018   General Symptoms: No  Skin Symptoms: No  HENT Symptoms: No  EYE SYMPTOMS: No  HEART SYMPTOMS: Yes  LUNG SYMPTOMS: No  INTESTINAL SYMPTOMS: No  URINARY SYMPTOMS: Yes  REPRODUCTIVE SYMPTOMS: Yes  SKELETAL SYMPTOMS: No  BLOOD SYMPTOMS: No  NERVOUS SYSTEM SYMPTOMS: Yes  MENTAL HEALTH SYMPTOMS: No  Chest pain or pressure: Yes  Fast or irregular heartbeat: No  Pain in legs with walking: Yes  Trouble breathing while lying down: No  Fingers or toes appear blue: No  High blood pressure:  "Yes  Low blood pressure: No  Fainting: No  Murmurs: No  Pacemaker: No  Varicose veins: No  Edema or swelling: No  Wake up at night with shortness of breath: No  Light-headedness: Yes  Exercise intolerance: No  Trouble holding urine or incontinence: Yes  Pain or burning: No  Trouble starting or stopping: Yes  Increased frequency of urination: Yes  Blood in urine: No  Decreased frequency of urination: No  Frequent nighttime urination: No  Flank pain: No  Difficulty emptying bladder: Yes  Trouble with coordination: No  Dizziness or trouble with balance: Yes  Fainting or black-out spells: No  Memory loss: No  Headache: No  Seizures: No  Speech problems: No  Tingling: No  Tremor: Yes  Weakness: No  Difficulty walking: No  Paralysis: No  Numbness: No  Scrotal pain or swelling: No  Erectile dysfunction: Yes  Penile discharge: No  Genital ulcers: No  Reduced libido: Yes         Physical Exam:     Patient is a 78 year old  male   Vitals: Blood pressure 150/90, pulse 55, height 1.88 m (6' 2\"), weight 93 kg (205 lb).  General Appearance Adult: Alert, no acute distress, oriented  HENT: throat/mouth:normal, good dentition  Neck: No adenopathy,masses or thyromegaly  Lungs: no respiratory distress, or pursed lip breathing  Heart: No obvious jugular venous distension present  Abdomen: soft, nontender, no organomegaly or masses, Body mass index is 26.32 kg/(m^2).,  Lymphatics: No cervical or supraclavicular adenopathy  Musculoskeltal: extremities normal, no peripheral edema  Skin: no suspicious lesions or rashes  Neuro: Alert, oriented, speech and mentation normal  Psych: affect and mood normal  Gait: Normal  : penis, scrotum, testes normal. Prostate moderately enlarged and smooth, no nodules.       Labs and Pathology:    I reviewed all applicable laboratory and pathology data and went over findings with patient  Significant for   Component PSA   Latest Ref Rng & Units 0 - 4 ug/L   5/28/2013 3.23   10/12/2015 3.82   10/25/2016 " 5.55 (H)   11/16/2016 4.80 (H)   6/6/2017 4.00   7/19/2018 5.30 (H)         Imaging:    No imaging available.      Outside and Past Medical records:    I spent 10 minutes reviewing outside and past medical records.         Assessment and Plan:   78-year-old gentleman with elevated PSA to 5.3.  No family history of prostate cancer.     -Prostate MRI. Patient has a pacemaker and this may be contraindicated.  Instructed patient to describe the pacemaker parameters when scheduling appointment.  -Due to patient's mechanical heart valve and anticoagulated status, we are hesitant to pursue a biopsy at this point without more information that it is indicated.   -RTC 1 month to discuss results of MRI and biopsy decision.    Faiza Siddiqui MD   Urology PGY2      Patient seen and examined with the resident.  Visit time 30 minutes and >50% spent in counseling.  I agree with the resident's note and plan of care.       Мария Saleem MD  Urology Staff

## 2018-10-10 NOTE — TELEPHONE ENCOUNTER
Returned patient call regarding ENT visit on 10/8/2018.  Per patient ENT is referring patient for additional testing.  Patient states the ENT provider was not able to answer patients list of questions.  Patient does not want to see either one of the providers he has been referred.    Patient wants to know if his condition is life threatening, is there anything he can do to prevent it from happening again, how do we treat it.  Patient did not feel like the ENT provider was listening to him and was mostly concerned about testing his hearing.     Will route to provider as ANNEL.      Laura MATTHEWS RN  [employer] [facility]

## 2018-10-10 NOTE — PATIENT INSTRUCTIONS
MRI and 4K lab test.      It was a pleasure meeting with you today.  Thank you for allowing me and my team the privilege of caring for you today.  YOU are the reason we are here, and I truly hope we provided you with the excellent service you deserve.  Please let us know if there is anything else we can do for you so that we can be sure you are leaving completely satisfied with your care experience.

## 2018-10-10 NOTE — MR AVS SNAPSHOT
After Visit Summary   10/10/2018    Tomás Cruz    MRN: [id number]           Patient Information     Date Of Birth          1940        Visit Information        Provider Department      10/10/2018 4:20 PM Мария Saleem MD [facility]        Today's Diagnoses     Elevated prostate specific antigen (PSA)    -  1      Care Instructions    MRI and 4K lab test.      It was a pleasure meeting with you today.  Thank you for allowing me and my team the privilege of caring for you today.  YOU are the reason we are here, and I truly hope we provided you with the excellent service you deserve.  Please let us know if there is anything else we can do for you so that we can be sure you are leaving completely satisfied with your care experience.                  Follow-ups after your visit        Who to contact     Please call your clinic at [phone] to:    Ask questions about your health    Make or cancel appointments    Discuss your medicines    Learn about your test results    Speak to your doctor            Additional Information About Your Visit        [employer] Information     [employer] gives you secure access to your electronic health record. If you see a primary care provider, you can also send messages to your care team and make appointments. If you have questions, please call your primary care clinic.  If you do not have a primary care provider, please call [phone] and they will assist you.      [employer] is an electronic gateway that provides easy, online access to your medical records. With [employer], you can request a clinic appointment, read your test results, renew a prescription or communicate with your care team.     To access your existing account, please contact your [facility] or call [phone] for assistance.        Care EveryWhere ID     This is your Care EveryWhere ID. This could be used by other  "organizations to access your [place] medical records  [id number]        Your Vitals Were     Pulse Height BMI (Body Mass Index)             55 1.88 m (6' 2\") 26.32 kg/m2          Blood Pressure from Last 3 Encounters:   10/10/18 150/90   09/27/18 128/76   07/19/18 118/80    Weight from Last 3 Encounters:   10/10/18 93 kg (205 lb)   09/27/18 93.4 kg (206 lb)   07/19/18 92.5 kg (204 lb)               Primary Care Provider Office Phone # Fax #    Mahamed Groves [phone]       [address] [facility] [address]        Equal Access to Services     CONRAD SUAREZ : Hadii clayton vazquezo Soelidia, waaxda luqadaha, qaybta kaalmada adeegyada, estela mauricio . So [facility] [phone].    ATENCIÓN: Si habla español, tiene a graham disposición servicios gratuitos de asistencia lingüística. [facility] [phone].    We comply with applicable federal civil rights laws and [place] laws. We do not discriminate on the basis of race, color, national origin, age, disability, sex, sexual orientation, or gender identity.            Thank you!     Thank you for choosing [facility] AND [facility]  for your care. Our goal is always to provide you with excellent care. Hearing back from our patients is one way we can continue to improve our services. Please take a few minutes to complete the written survey that you may receive in the mail after your visit with us. Thank you!             Your Updated Medication List - Protect others around you: Learn how to safely use, store and throw away your medicines at www.disposemymeds.org.          This list is accurate as of 10/10/18 11:59 PM.  Always use your most recent med list.                   Brand Name Dispense Instructions for use Diagnosis    lisinopril 20 MG tablet    PRINIVIL/ZESTRIL    90 tablet    Take 1 tablet (20 mg) by mouth daily    Essential hypertension with goal blood pressure less than 140/90    "    metoprolol succinate 50 MG 24 hr tablet    TOPROL-XL    90 tablet    Take 1 tablet (50 mg) by mouth daily    Essential hypertension with goal blood pressure less than 140/90       pravastatin 40 MG tablet    PRAVACHOL    90 tablet    Take 1 tablet (40 mg) by mouth daily    Hyperlipidemia LDL goal <100       sildenafil 20 MG tablet    REVATIO    30 tablet    Take 2-5 tablets ( mg) by mouth daily as needed    Impotence of organic origin       warfarin 2 MG tablet    COUMADIN    90 tablet    TAKE ONE TABLET (2MG) BY MOUTH DAILY, EXCEPT TWO TABLETS (4MG) ON FRIDAYS    S/P mitral valve replacement

## 2018-10-10 NOTE — NURSING NOTE
"Chief Complaint   Patient presents with     Consult     elevated PSA       Blood pressure 150/90, pulse 55, height 1.88 m (6' 2\"), weight 93 kg (205 lb). Body mass index is 26.32 kg/(m^2).    Patient Active Problem List   Diagnosis     Impotence of organic origin     Hypersomnia with sleep apnea     Cardiomyopathy, nonischemic     Polyp of colon     Hyperlipidemia LDL goal <100     Advance Care Planning     Syncope     S/P mitral valve replacement     Health Care Home     Long-term (current) use of anticoagulants [Z79.01]     Essential tremor     Paroxysmal atrial fibrillation (H)     History of prosthetic heart valve     Automatic implantable cardioverter-defibrillator in situ     Elevated prostate specific antigen (PSA)     Decreased GFR     Essential hypertension with goal blood pressure less than 140/90     Arthritis of knee, right     Intention tremor     Cerebral infarction (H)       No Known Allergies    Current Outpatient Prescriptions   Medication Sig Dispense Refill     lisinopril (PRINIVIL/ZESTRIL) 20 MG tablet Take 1 tablet (20 mg) by mouth daily 90 tablet 3     metoprolol (TOPROL-XL) 50 MG 24 hr tablet Take 1 tablet (50 mg) by mouth daily 90 tablet 3     pravastatin (PRAVACHOL) 40 MG tablet Take 1 tablet (40 mg) by mouth daily 90 tablet 3     sildenafil (REVATIO) 20 MG tablet Take 2-5 tablets ( mg) by mouth daily as needed 30 tablet 11     warfarin (COUMADIN) 2 MG tablet TAKE ONE TABLET (2MG) BY MOUTH DAILY, EXCEPT TWO TABLETS (4MG) ON FRIDAYS 90 tablet 3       Social History   Substance Use Topics     Smoking status: Former Smoker     Quit date: 6/1/1982     Smokeless tobacco: Never Used     Alcohol use No       Alejandra Bush LPN  10/10/2018  4:52 PM    "

## 2018-10-10 NOTE — LETTER
10/10/2018       RE: Tomás Cruz  [address] [facility] [id number]     Dear Colleague,    Thank you for referring your patient, Tomás Crzu, to the [facility] UROLOGY AND INST FOR PROSTATE AND UROLOGIC CANCERS at [facility]. Please see a copy of my visit note below.          Chief Complaint:   Elevated PSA         Consult or Referral:   Mr. Tomás Cruz is a 78 year old male seen in consultation from Dr. Groves.         History of Present Illness:   Mr. Mckeon is a very pleasant 78-year-old male who presents with history of elevated PSA.  His most recent PSA on July 18 was 5.3.  In 2016 his PSA was up to 5.5, the patient denies ever getting a biopsy or ever having a prostate MRI.  In 2017 his PSA had dropped to 4.0.  He endorses frequent urination, weak stream, difficulty starting stream, difficulty emptying bladder. Denies nocturia.     Of note, the patient is on Coumadin for mechanical heart valve.         Past Medical History:     Past Medical History:   Diagnosis Date     Atrial fibrillation (H)     Transient around time of MVR.  Had MAZE procedure by report.     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     Hypertension goal BP (blood pressure) < 140/90 4/05     Mitral valve disorders(424.0)     s/p mitral valve replacment- 2003; SBE prophylaxsis and anticoagulated; echo 2/13- EF 30-35%     Non-ischemic cardiomyopathy (H)     EF as above.  Follows with [facility].     Polyp of colon      Renal mass     Benign per patient report, Right kidney removed.            Past Surgical History:     Past Surgical History:   Procedure Laterality Date     ARTHROSCOPY KNEE Left 2001    left knee arthroscopy     AS TOTAL KNEE  "ARTHROPLASTY Left 2006    Dr. Castro     CARDIAC SURGERY  2003    Mitral valve replacement     CATARACT IOL, RT/LT  2014    Cataracts, bilateral     HERNIA REPAIR       IMPLANT AUTOMATIC IMPLANTABLE CARDIOVERTER DEFIBRILLATOR  5/2013     KIDNEY SURGERY  2008    Laparoscopic right radical nephrectomy.- due to complex hemorrhagic cyst            Medications     Current Outpatient Prescriptions   Medication     lisinopril (PRINIVIL/ZESTRIL) 20 MG tablet     metoprolol (TOPROL-XL) 50 MG 24 hr tablet     pravastatin (PRAVACHOL) 40 MG tablet     sildenafil (REVATIO) 20 MG tablet     warfarin (COUMADIN) 2 MG tablet     No current facility-administered medications for this visit.             Family History:     Family History   Problem Relation Age of Onset     Adopted: Yes     Unknown/Adopted Mother      Unknown/Adopted Father      Diabetes No family hx of      Coronary Artery Disease No family hx of             Social History:     Social History     Social History     Marital status:      Spouse name: Ava     Number of children: 4     Years of education: N/A     Occupational History     retired postman [employer]      Retired     Social History Main Topics     Smoking status: Former Smoker     Quit date: 6/1/1982     Smokeless tobacco: Never Used     Alcohol use No     Drug use: No     Sexual activity: Yes     Partners: Female     Other Topics Concern     Caffeine Concern No     Exercise No     rec 30minutes; 4-5 x/wk; hard with knee pain     Seat Belt Yes     Parent/Sibling W/ Cabg, Mi Or Angioplasty Before 65f 55m? No     Social History Narrative            Allergies:   Review of patient's allergies indicates no known allergies.           Physical Exam:     Patient is a 78 year old  male   Vitals: Blood pressure 150/90, pulse 55, height 1.88 m (6' 2\"), weight 93 kg (205 lb).  General Appearance Adult: Alert, no acute distress, oriented  HENT: throat/mouth:normal, good dentition  Neck: No " adenopathy,masses or thyromegaly  Lungs: no respiratory distress, or pursed lip breathing  Heart: No obvious jugular venous distension present  Abdomen: soft, nontender, no organomegaly or masses, Body mass index is 26.32 kg/(m^2).,  Lymphatics: No cervical or supraclavicular adenopathy  Musculoskeltal: extremities normal, no peripheral edema  Skin: no suspicious lesions or rashes  Neuro: Alert, oriented, speech and mentation normal  Psych: affect and mood normal  Gait: Normal  : penis, scrotum, testes normal. Prostate moderately enlarged and smooth, no nodules.       Labs and Pathology:    I reviewed all applicable laboratory and pathology data and went over findings with patient  Significant for   Component PSA   Latest Ref Rng & Units 0 - 4 ug/L   5/28/2013 3.23   10/12/2015 3.82   10/25/2016 5.55 (H)   11/16/2016 4.80 (H)   6/6/2017 4.00   7/19/2018 5.30 (H)         Imaging:    No imaging available.      Outside and Past Medical records:    I spent 10 minutes reviewing outside and past medical records.         Assessment and Plan:   78-year-old gentleman with elevated PSA to 5.3.  No family history of prostate cancer.     -Prostate MRI. Patient has a pacemaker and this may be contraindicated.  Instructed patient to describe the pacemaker parameters when scheduling appointment.  -Due to patient's mechanical heart valve and anticoagulated status, we are hesitant to pursue a biopsy at this point without more information that it is indicated.   -RTC 1 month to discuss results of MRI and biopsy decision.    Faiza Siddiqui MD   Urology PGY2      Patient seen and examined with the resident.  Visit time 30 minutes and >50% spent in counseling.  I agree with the resident's note and plan of care.       Мария Saleem MD  Urology Staff

## 2018-10-11 ENCOUNTER — TELEPHONE (OUTPATIENT)
Dept: UROLOGY | Facility: CLINIC | Age: 78
End: 2018-10-11

## 2018-10-11 NOTE — TELEPHONE ENCOUNTER
Message forward to Dr. Saleem and his RNCC Lindsey Benítez. Patient called stating that his pacemaker is not compatible with the MRI that was ordered. Can you please let me know what's the plan for the patient ? Patient can be reach @ [phone].    Thanks, Elissa Madsen MA

## 2018-10-17 ENCOUNTER — ANTICOAGULATION THERAPY VISIT (OUTPATIENT)
Dept: NURSING | Facility: CLINIC | Age: 78
End: 2018-10-17
Payer: COMMERCIAL

## 2018-10-17 DIAGNOSIS — Z95.2 S/P MITRAL VALVE REPLACEMENT: ICD-10-CM

## 2018-10-17 LAB — INR POINT OF CARE: 3 (ref 0.86–1.14)

## 2018-10-17 PROCEDURE — 85610 PROTHROMBIN TIME: CPT | Mod: QW

## 2018-10-17 PROCEDURE — 36416 COLLJ CAPILLARY BLOOD SPEC: CPT

## 2018-10-17 NOTE — PROGRESS NOTES
ANTICOAGULATION FOLLOW-UP CLINIC VISIT    Patient Name:  Tomás Cruz  Date:  10/17/2018  Contact Type:  Face to Face    SUBJECTIVE:        OBJECTIVE    INR Protime   Date Value Ref Range Status   10/17/2018 3.0 (A) 0.86 - 1.14 Final       ASSESSMENT / PLAN  INR assessment THER    Recheck INR In: 6 WEEKS    INR Location Clinic      Anticoagulation Summary as of 10/17/2018     INR goal 2.5-3.5   Today's INR 3.0   Warfarin maintenance plan 2 mg (2 mg x 1) every day   Full warfarin instructions 2 mg every day   Weekly warfarin total 14 mg   No change documented Mojgan Brasher RN   Plan last modified Rose Marie Azul RN (2/26/2016)   Next INR check 11/28/2018   Target end date     Indications   Long-term (current) use of anticoagulants [Z79.01] [Z79.01]  S/P mitral valve replacement [Z95.2]         Anticoagulation Episode Summary     INR check location [facility]    Preferred lab     Send INR reminders to RV NURSE    Comments       Anticoagulation Care Providers     Provider Role Specialty Phone number    Mahamed Groves MD [facility] [phone]            See the Encounter Report to view Anticoagulation Flowsheet and Dosing Calendar (Go to Encounters tab in chart review, and find the Anticoagulation Therapy Visit)    Dosage adjustment made based on physician directed care plan.    Mojgan Brasher RN

## 2018-10-17 NOTE — MR AVS SNAPSHOT
Tomás Cruz   10/17/2018 1:30 PM   Anticoagulation Therapy Visit    Description:  78 year old male   Provider:   ANTICOAGULATION CLINIC   Department:   Nurse           INR as of 10/17/2018     Today's INR 3.0      Anticoagulation Summary as of 10/17/2018     INR goal 2.5-3.5   Today's INR 3.0   Full warfarin instructions 2 mg every day   Next INR check 11/28/2018    Indications   Long-term (current) use of anticoagulants [Z79.01] [Z79.01]  S/P mitral valve replacement [Z95.2]         Your next Anticoagulation Clinic appointment(s)     Oct 17, 2018  1:30 PM CDT   Anticoagulation [facility] with  [facility] CLIN[facility]    [address] [place] [address] [facility] [id number] [phone]              Contact Numbers     Clinic Number:         October 2018 Details    Sun Mon Tue Wed Thu Fri Sat      1               2               3               4               5               6                 7               8               9               10               11               12               13                 14               15               16               17      2 mg   See details      18      2 mg         19      2 mg         20      2 mg           21      2 mg         22      2 mg         23      2 mg         24      2 mg         25      2 mg         26      2 mg         27      2 mg           28      2 mg         29      2 mg         30      2 mg         31      2 mg             Date Details   10/17 This INR check               How to take your warfarin dose     To take:  2 mg Take 1 of the 2 mg tablets.           November 2018 Details    Sun Mon Tue Wed Thu Fri Sat         1      2 mg         2      2 mg         3      2 mg           4      2 mg         5      2 mg         6      2 mg         7      2 mg         8      2 mg         9      2 mg         10      2 mg           11      2 mg         12      2 mg         13      2 mg          14      2 mg         15      2 mg         16      2 mg         17      2 mg           18      2 mg         19      2 mg         20      2 mg         21      2 mg         22      2 mg         23      2 mg         24      2 mg           25      2 mg         26      2 mg         27      2 mg         28            29               30                 Date Details   No additional details    Date of next INR:  11/28/2018         How to take your warfarin dose     To take:  2 mg Take 1 of the 2 mg tablets.

## 2018-10-22 ENCOUNTER — CARE COORDINATION (OUTPATIENT)
Dept: UROLOGY | Facility: CLINIC | Age: 78
End: 2018-10-22

## 2018-10-22 NOTE — PROGRESS NOTES
Patient's pacemaker is not compatible for MRI per Shawn Appiah. Pt has been informed. Want to give you a heads up and not sure what Dr. Saleem would like to proceed next.     Per Dr. Saleem, Can we order a 4k?  He is on anticoagulants and we want to avoid a prostate biopsy unless he has concern for high risk disease.  Will consider regular biopsy if 4k is high     Patient informed and agrees with plan.  He will go in at his convenience and have this done.

## 2018-10-23 DIAGNOSIS — R97.20 ELEVATED PROSTATE SPECIFIC ANTIGEN (PSA): ICD-10-CM

## 2018-10-23 PROCEDURE — 36415 COLL VENOUS BLD VENIPUNCTURE: CPT | Performed by: UROLOGY

## 2018-10-23 PROCEDURE — 40000953: Mod: 90 | Performed by: UROLOGY

## 2018-10-23 PROCEDURE — 99000 SPECIMEN HANDLING OFFICE-LAB: CPT | Performed by: UROLOGY

## 2018-10-28 LAB
4K BIOPSY HISTORY: NO
4K DIGITAL RECTAL EXAM: ABNORMAL
4K PSA FREE: 1.27 NG/ML
4K PSA PERCENT FREE: 31 %
4K PSA TOTAL: 4.16 NG/ML
4K SCORE: 7 %
Lab: ABNORMAL
Lab: YES

## 2018-11-13 NOTE — PATIENT INSTRUCTIONS
Services Typically covered by Medicare Recommended Completed   Vaccines    Pneumonoccol    Influenza    Hepatitis B (if medium/high risk)     Once for patients after age 65    Yearly  Medium/high risk factors:    End Stage Kidney Disease    Hemophiliacs who received Factor XIII or IX concentrates    Clients of institutions for developmentally disabled    Persons who live in same house as a Hepatitis B carrier    Homosexual men    Illicit injectable drug users    Health care workers     Mammogram Covered: One-time screen between age 35-39, annually for age 40+     Pap and Pelvic Exam Covered: Annually if  high risk,  or childbearing age with abnormal Pap in last 3 years.  Q24 months for all other women     Prostate Cancer Screening    Digital rectal exam    PSA Covered: Annually for all men > age 50     Corolrectal Cancer Screening Screening colonoscopy every 10 years, more often for high risk patients     Diabetes Self-Management Training Requires referral by treating physician for patient with diabetes     Diabetes Screening    Fasting blood sugar or glucose tolerance test   Once yearly, twice yearly if prediabetic     Cardiovascular Screening Blood Tests    Total Cholesterol    HDL    Triglycerides Every 5 years     Medical Nutrition Therapy for Diabetes or Renal Disease Requires referral by treating physician for patient with diabetes or kidney disease     Glaucoma Screening Annually for patients with one of the following risk factors:    Diabetes Mellitus    Family history of Glaucoma    -American age 50 and over    -American age 65 and over     Bone Mass Measurement Every 24 months if one of the following risk factors:    Estrogen deficiency    Vertebral abnormalities on x-ray indicative of Osteoporosis, Osteopenia, or Vertebral fracture    Receiving/expected to receive the equivalent of at least 5 mg of Prednisone per day for > 3 months    Hyperparathyroidism    Patient being monitored for  response to Osteoporosis Therapy     One-time AAA screen  Must be ordered as part of Medicare IPPE   Any patient with a family history of AAA    Males Age 65-75, with history of smoking at least 100 cigarettes in lifetime     Smoking Cessation Counseling Beneficiaries who use tobacco are eligible to receive 2 cessation attempts per year; each attempt includes maximum of 4 sessions     HIV Screening Annually for beneficiaries at increased risk:       Increased risk for HIV infection is defined in the  National Coverage Determinations (NCD) Manual,  Publication 100-03 Sections 190.14 (diagnostic) and 210.7 (screening). See http://www.cms.gov/manuals/downloads/dfa520u4_Mibc9.pdf and http://www.cms.gov/manuals/downloads/xgh069s2_Fzgt8.pdf on the Internet.  Three times per pregnancy for beneficiaries who are pregnant.     Future Annual Wellness Visit Annually, for all beneficiaries.       Preventive Health Recommendations:     See your health care provider every year to    Review health changes.     Discuss preventive care.      Review your medicines if your doctor has prescribed any.    Talk with your health care provider about whether you should have a test to screen for prostate cancer (PSA).    Every 3 years, have a diabetes test (fasting glucose). If you are at risk for diabetes, you should have this test more often.    Every 5 years, have a cholesterol test. Have this test more often if you are at risk for high cholesterol or heart disease.     Every 10 years, have a colonoscopy. Or, have a yearly FIT test (stool test). These exams will check for colon cancer.    Talk to with your health care provider about screening for Abdominal Aortic Aneurysm if you have a family history of AAA or have a history of smoking.  Shots:     Get a flu shot each year.     Get a tetanus shot every 10 years.     Talk to your doctor about your pneumonia vaccines. There are now two you should receive - Pneumovax (PPSV 23) and Prevnar (PCV  13).    Talk to your pharmacist about a shingles vaccine.     Talk to your doctor about the hepatitis B vaccine.  Nutrition:     Eat at least 5 servings of fruits and vegetables each day.     Eat whole-grain bread, whole-wheat pasta and brown rice instead of white grains and rice.     Get adequate Calcium and Vitamin D.   Lifestyle    Exercise for at least 150 minutes a week (30 minutes a day, 5 days a week). This will help you control your weight and prevent disease.     Limit alcohol to one drink per day.     No smoking.     Wear sunscreen to prevent skin cancer.     See your dentist every six months for an exam and cleaning.     See your eye doctor every 1 to 2 years to screen for conditions such as glaucoma, macular degeneration and cataracts.    Personalized Prevention Plan  You are due for the preventive services outlined below.  Your care team is available to assist you in scheduling these services.  If you have already completed any of these items, please share that information with your care team to update in your medical record.    Health Maintenance Due   Topic Date Due     Basic Metabolic Lab - every 6 months  10/05/2018     Cholesterol Lab - yearly  11/09/2018     Microalbumin Lab - yearly  11/09/2018     FALL RISK ASSESSMENT  11/09/2018     Wellness Visit with your Primary Provider - yearly  11/09/2018     Depression Assessment 2 - yearly  11/09/2018

## 2018-11-13 NOTE — PROGRESS NOTES
"    SUBJECTIVE:   Tomás Cruz is a 78 year old male who presents for Preventive Visit.    Are you in the first 12 months of your Medicare Part B coverage?  No    Physical Health:    In general, how would you rate your overall physical health? good    Outside of work, how many days during the week do you exercise? Not alot    Outside of work, approximately how many minutes a day do you exercise?not applicable    If you drink alcohol do you typically have >3 drinks per day or >7 drinks per week? No    Do you usually eat at least 4 servings of fruit and vegetables a day, include whole grains & fiber and avoid regularly eating high fat or \"junk\" foods? NO    Do you have any problems taking medications regularly?  No    Do you have any side effects from medications? none    Needs assistance for the following daily activities: no assistance needed    Which of the following safety concerns are present in your home?:  none identified     Hearing impairment: No    In the past 6 months, have you been bothered by leaking of urine? Seeing urologist for prostate    Mental Health:    In general, how would you rate your overall mental or emotional health? good  PHQ-2 Score:      Fall risk:      Fallen 2 or more times in the past year?: No  Any fall with injury in the past year?: No    COGNITIVE SCREEN  1) Repeat 3 items (Leader, Season, Table)    2) Clock draw: NORMAL  3) 3 item recall: Recalls 2 objects   Results: NORMAL clock, 1-2 items recalled: COGNITIVE IMPAIRMENT LESS LIKELY    Mini-CogTM Copyright S Rohini. Licensed by the author for use in [facility]; reprinted with permission ([email or web address]). All rights reserved.      Hyperlipidemia Follow-Up    Rate your low fat/cholesterol diet?: poor    Taking statin?  Yes, no muscle aches from statin    Other lipid medications/supplements?:  none    Manas is using his pravastatin daily without complication however doesn't restrict his diet.     Hypertension " Follow-up    Outpatient blood pressures are not being checked.    Low Salt Diet: low salt    Manas is using lisinopril and metoprolol daily without complication. His BP in clinic was elevated (146/86)      Reviewed and updated as needed this visit by clinical staff  Tobacco  Allergies  Meds  Problems  Med Hx  Surg Hx  Fam Hx  Soc Hx          Reviewed and updated as needed this visit by Provider  Allergies  Meds  Problems  Surg Hx  Fam Hx        Social History   Substance Use Topics     Smoking status: Former Smoker     Quit date: 6/1/1982     Smokeless tobacco: Never Used     Alcohol use No                           Do you feel safe in your environment - Yes    Do you have a Health Care Directive?: Yes: Advance Directive has been received and scanned.    Current providers sharing in care for this patient include:   Patient Care Team:  Mahamed Groves MD as PCP - Sammie Mansfield MD as MD (Cardiology)  Мария Saleem MD as MD (Urology)  Lindsey Benítez RN as Registered Nurse (Oncology)    The following health maintenance items are reviewed in Epic and correct as of today:  Health Maintenance   Topic Date Due     BMP Q6 MOS  10/05/2018     LIPID MONITORING Q1 YEAR  11/09/2018     MICROALBUMIN Q1 YEAR  11/09/2018     FALL RISK ASSESSMENT  11/09/2018     PHQ-2 Q1 YR  11/09/2018     ALT Q1 YR  04/05/2019     BMP Q1 YR  04/05/2019     PSA Q1 YR  07/19/2019     CBC Q1 YR  07/19/2019     WELLNESS VISIT Q1 YR  11/15/2019     HF ACTION PLAN Q3 YR  11/09/2020     COLONOSCOPY Q5 YR  04/04/2022     ADVANCE DIRECTIVE PLANNING Q5 YRS  05/01/2023     TETANUS Q10 YR  10/12/2025     PNEUMOCOCCAL  Completed     INFLUENZA VACCINE  Completed     Labs reviewed in EPIC    ROS:  Constitutional, HEENT, cardiovascular, pulmonary, GI, , musculoskeletal, neuro, skin, endocrine and psych systems are negative, except as otherwise noted.  This document serves as a record of the services and decisions personally  "performed and made by Mahamed Groves MD. It was created on his behalf by Pollo Lynn, a trained medical scribe. The creation of this document is based the provider's statements to the medical scribe.  Scribe Pollo Lynn 10:12 AM, November 15, 2018    OBJECTIVE:   /86  Pulse 55  Temp 99.1  F (37.3  C) (Oral)  Ht 1.88 m (6' 2\")  Wt 93 kg (205 lb)  SpO2 99%  BMI 26.32 kg/m2 Estimated body mass index is 26.32 kg/(m^2) as calculated from the following:    Height as of this encounter: 1.88 m (6' 2\").    Weight as of this encounter: 93 kg (205 lb).     EXAM:   GENERAL: healthy, alert and no distress  EYES: Eyes grossly normal to inspection, PERRL and conjunctivae and sclerae normal  HENT: ear canals and TM's normal, nose and mouth without ulcers or lesions  NECK: no adenopathy, no asymmetry, masses, or scars and thyroid normal to palpation  RESP: lungs clear to auscultation - no rales, rhonchi or wheezes  CV: regular rate and rhythm, normal S1 S2, no S3 or S4, no murmur, click or rub, no peripheral edema and peripheral pulses strong  ABDOMEN: soft, nontender, no hepatosplenomegaly, no masses and bowel sounds normal   (male): normal male genitalia without lesions or urethral discharge, no hernia  MS: no gross musculoskeletal defects noted, no edema  SKIN: no suspicious lesions or rashes, 3mm skin colored papule along left medial cheek     Medication list reviewed.     ASSESSMENT / PLAN:   Tomás was seen today for wellness visit.    Diagnoses and all orders for this visit:    Medicare annual wellness visit, subsequent    Essential hypertension with goal blood pressure less than 140/90 - Controlled, continue medication  -     BASIC METABOLIC PANEL  -     Albumin Random Urine Quantitative with Creat Ratio  -     lisinopril (PRINIVIL/ZESTRIL) 20 MG tablet; Take 1 tablet (20 mg) by mouth daily  -     metoprolol succinate (TOPROL-XL) 50 MG 24 hr tablet; Take 1 tablet (50 mg) by mouth daily    Hyperlipidemia LDL goal <100 -  " "2017 result was stable, continue medication  -     Lipid panel reflex to direct LDL Fasting  -     pravastatin (PRAVACHOL) 40 MG tablet; Take 1 tablet (40 mg) by mouth daily    Erectile dysfunction, unspecified erectile dysfunction type - Begin new medication, switched from Revatio  -     sildenafil (VIAGRA) 100 MG tablet; Take 1 tablet (100 mg) by mouth daily as needed (erectile dysfunction) 30 min to 4 hrs before sex. Do not use with nitroglycerin, terazosin or doxazosin.    S/P mitral valve replacement - Ongoing, continue medication  -     warfarin (COUMADIN) 2 MG tablet; TAKE ONE TABLET (2MG) BY MOUTH DAILY, EXCEPT TWO TABLETS (4MG) ON FRIDAYS  -     CBC with platelets    Elevated prostate specific antigen (PSA)  -     PSA, screen    Cerebral infarction, unspecified mechanism (H) - - stable    Paroxysmal atrial fibrillation (H) - Stable, no concerns, continue medication  -     warfarin (COUMADIN) 2 MG tablet; TAKE ONE TABLET (2MG) BY MOUTH DAILY, EXCEPT TWO TABLETS (4MG) ON FRIDAYS  -     CBC with platelets      End of Life Planning:  Patient currently has an advanced directive: Yes.  Practitioner is supportive of decision.    COUNSELING:  Reviewed preventive health counseling, as reflected in patient instructions       Regular exercise       Healthy diet/nutrition       Vision screening       Hearing screening    BP Readings from Last 1 Encounters:   11/15/18 146/86     Estimated body mass index is 26.32 kg/(m^2) as calculated from the following:    Height as of this encounter: 1.88 m (6' 2\").    Weight as of this encounter: 93 kg (205 lb).    BP Screening:   Last 3 BP Readings:    BP Readings from Last 3 Encounters:   11/15/18 146/86   10/10/18 150/90   09/27/18 128/76       The following was recommended to the patient:  Re-screen within 4 weeks and recommend lifestyle modifications   reports that he quit smoking about 36 years ago. He has never used smokeless tobacco.    Appropriate preventive services were " discussed with this patient, including applicable screening as appropriate for cardiovascular disease, diabetes, osteopenia/osteoporosis, and glaucoma.  As appropriate for age/gender, discussed screening for colorectal cancer, prostate cancer, breast cancer, and cervical cancer. Checklist reviewing preventive services available has been given to the patient.    Reviewed patients plan of care and provided an AVS. The Basic Care Plan (routine screening as documented in Health Maintenance) for Tomás meets the Care Plan requirement. This Care Plan has been established and reviewed with the Patient.    Counseling Resources:  ATP IV Guidelines  Pooled Cohorts Equation Calculator  Breast Cancer Risk Calculator  FRAX Risk Assessment  ICSI Preventive Guidelines  Dietary Guidelines for Americans, 2010  USDA's MyPlate  ASA Prophylaxis  Lung CA Screening    The information in this document, created by the medical scribe for me, accurately reflects the services I personally performed and the decisions made by me. I have reviewed and approved this document for accuracy prior to leaving the patient care area.  10:31 AM, 11/15/18    Mahamed Groves MD  [facility]

## 2018-11-15 ENCOUNTER — OFFICE VISIT (OUTPATIENT)
Dept: FAMILY MEDICINE | Facility: CLINIC | Age: 78
End: 2018-11-15
Payer: COMMERCIAL

## 2018-11-15 VITALS
TEMPERATURE: 99.1 F | WEIGHT: 205 LBS | DIASTOLIC BLOOD PRESSURE: 86 MMHG | HEART RATE: 55 BPM | OXYGEN SATURATION: 99 % | HEIGHT: 74 IN | BODY MASS INDEX: 26.31 KG/M2 | SYSTOLIC BLOOD PRESSURE: 146 MMHG

## 2018-11-15 DIAGNOSIS — Z95.2 S/P MITRAL VALVE REPLACEMENT: ICD-10-CM

## 2018-11-15 DIAGNOSIS — Z00.00 MEDICARE ANNUAL WELLNESS VISIT, SUBSEQUENT: Primary | ICD-10-CM

## 2018-11-15 DIAGNOSIS — I63.9 CEREBRAL INFARCTION, UNSPECIFIED MECHANISM (H): ICD-10-CM

## 2018-11-15 DIAGNOSIS — R97.20 ELEVATED PROSTATE SPECIFIC ANTIGEN (PSA): ICD-10-CM

## 2018-11-15 DIAGNOSIS — I48.0 PAROXYSMAL ATRIAL FIBRILLATION (H): ICD-10-CM

## 2018-11-15 DIAGNOSIS — N52.9 ERECTILE DYSFUNCTION, UNSPECIFIED ERECTILE DYSFUNCTION TYPE: ICD-10-CM

## 2018-11-15 DIAGNOSIS — I10 ESSENTIAL HYPERTENSION WITH GOAL BLOOD PRESSURE LESS THAN 140/90: ICD-10-CM

## 2018-11-15 DIAGNOSIS — E78.5 HYPERLIPIDEMIA LDL GOAL <100: ICD-10-CM

## 2018-11-15 LAB
CREAT UR-MCNC: 124 MG/DL
ERYTHROCYTE [DISTWIDTH] IN BLOOD BY AUTOMATED COUNT: 15.6 % (ref 10–15)
HCT VFR BLD AUTO: 51.7 % (ref 40–53)
HGB BLD-MCNC: 16.4 G/DL (ref 13.3–17.7)
MCH RBC QN AUTO: 28.4 PG (ref 26.5–33)
MCHC RBC AUTO-ENTMCNC: 31.7 G/DL (ref 31.5–36.5)
MCV RBC AUTO: 89 FL (ref 78–100)
MICROALBUMIN UR-MCNC: 77 MG/L
MICROALBUMIN/CREAT UR: 61.77 MG/G CR (ref 0–17)
PLATELET # BLD AUTO: 319 10E9/L (ref 150–450)
RBC # BLD AUTO: 5.78 10E12/L (ref 4.4–5.9)
WBC # BLD AUTO: 9.6 10E9/L (ref 4–11)

## 2018-11-15 PROCEDURE — 80061 LIPID PANEL: CPT | Performed by: FAMILY MEDICINE

## 2018-11-15 PROCEDURE — G0439 PPPS, SUBSEQ VISIT: HCPCS | Performed by: FAMILY MEDICINE

## 2018-11-15 PROCEDURE — 82043 UR ALBUMIN QUANTITATIVE: CPT | Performed by: FAMILY MEDICINE

## 2018-11-15 PROCEDURE — 36415 COLL VENOUS BLD VENIPUNCTURE: CPT | Performed by: FAMILY MEDICINE

## 2018-11-15 PROCEDURE — 85027 COMPLETE CBC AUTOMATED: CPT | Performed by: FAMILY MEDICINE

## 2018-11-15 PROCEDURE — 80048 BASIC METABOLIC PNL TOTAL CA: CPT | Performed by: FAMILY MEDICINE

## 2018-11-15 RX ORDER — SILDENAFIL 100 MG/1
100 TABLET, FILM COATED ORAL DAILY PRN
Qty: 12 TABLET | Refills: 11 | Status: SHIPPED | OUTPATIENT
Start: 2018-11-15 | End: 2019-12-20

## 2018-11-15 RX ORDER — METOPROLOL SUCCINATE 50 MG/1
50 TABLET, EXTENDED RELEASE ORAL DAILY
Qty: 90 TABLET | Refills: 3 | Status: SHIPPED | OUTPATIENT
Start: 2018-11-15 | End: 2019-04-13

## 2018-11-15 RX ORDER — WARFARIN SODIUM 2 MG/1
TABLET ORAL
Qty: 90 TABLET | Refills: 3 | Status: SHIPPED | OUTPATIENT
Start: 2018-11-15 | End: 2019-04-13

## 2018-11-15 RX ORDER — LISINOPRIL 20 MG/1
20 TABLET ORAL DAILY
Qty: 90 TABLET | Refills: 3 | Status: ON HOLD | OUTPATIENT
Start: 2018-11-15 | End: 2019-04-17

## 2018-11-15 RX ORDER — PRAVASTATIN SODIUM 40 MG
40 TABLET ORAL DAILY
Qty: 90 TABLET | Refills: 3 | Status: SHIPPED | OUTPATIENT
Start: 2018-11-15 | End: 2019-11-13

## 2018-11-15 RX ORDER — SILDENAFIL CITRATE 20 MG/1
40-100 TABLET ORAL DAILY PRN
Qty: 30 TABLET | Refills: 11 | Status: CANCELLED | OUTPATIENT
Start: 2018-11-15

## 2018-11-15 NOTE — MR AVS SNAPSHOT
After Visit Summary   11/15/2018    Tomás Cruz    MRN: [id number]           Patient Information     Date Of Birth          1940        Visit Information        Provider Department      11/15/2018 9:20 AM Mahamed Groves MD [facility] [place]        Today's Diagnoses     Medicare annual wellness visit, subsequent    -  1    Essential hypertension with goal blood pressure less than 140/90        Hyperlipidemia LDL goal <100        Erectile dysfunction, unspecified erectile dysfunction type        S/P mitral valve replacement        Elevated prostate specific antigen (PSA)        Cerebral infarction, unspecified mechanism (H)        Paroxysmal atrial fibrillation (H)          Care Instructions          Services Typically covered by Medicare Recommended Completed   Vaccines    Pneumonoccol    Influenza    Hepatitis B (if medium/high risk)     Once for patients after age 65    Yearly  Medium/high risk factors:    End Stage Kidney Disease    Hemophiliacs who received Factor XIII or IX concentrates    Clients of institutions for developmentally disabled    Persons who live in same house as a Hepatitis B carrier    Homosexual men    Illicit injectable drug users    Health care workers     Mammogram Covered: One-time screen between age 35-39, annually for age 40+     Pap and Pelvic Exam Covered: Annually if  high risk,  or childbearing age with abnormal Pap in last 3 years.  Q24 months for all other women     Prostate Cancer Screening    Digital rectal exam    PSA Covered: Annually for all men > age 50     Corolrectal Cancer Screening Screening colonoscopy every 10 years, more often for high risk patients     Diabetes Self-Management Training Requires referral by treating physician for patient with diabetes     Diabetes Screening    Fasting blood sugar or glucose tolerance test   Once yearly, twice yearly if prediabetic     Cardiovascular Screening Blood Tests    Total  Cholesterol    HDL    Triglycerides Every 5 years     Medical Nutrition Therapy for Diabetes or Renal Disease Requires referral by treating physician for patient with diabetes or kidney disease     Glaucoma Screening Annually for patients with one of the following risk factors:    Diabetes Mellitus    Family history of Glaucoma    -American age 50 and over    -American age 65 and over     Bone Mass Measurement Every 24 months if one of the following risk factors:    Estrogen deficiency    Vertebral abnormalities on x-ray indicative of Osteoporosis, Osteopenia, or Vertebral fracture    Receiving/expected to receive the equivalent of at least 5 mg of Prednisone per day for > 3 months    Hyperparathyroidism    Patient being monitored for response to Osteoporosis Therapy     One-time AAA screen  Must be ordered as part of Medicare IPPE   Any patient with a family history of AAA    Males Age 65-75, with history of smoking at least 100 cigarettes in lifetime     Smoking Cessation Counseling Beneficiaries who use tobacco are eligible to receive 2 cessation attempts per year; each attempt includes maximum of 4 sessions     HIV Screening Annually for beneficiaries at increased risk:       Increased risk for HIV infection is defined in the  National Coverage Determinations (NCD) Manual,  Publication 100-03 Sections 190.14 (diagnostic) and 210.7 (screening). See http://www.cms.gov/manuals/downloads/evh148v3_Ebhm9.pdf and http://www.cms.gov/manuals/downloads/bxb443v1_Bcvj5.pdf on the Internet.  Three times per pregnancy for beneficiaries who are pregnant.     Future Annual Wellness Visit Annually, for all beneficiaries.       Preventive Health Recommendations:     See your health care provider every year to    Review health changes.     Discuss preventive care.      Review your medicines if your doctor has prescribed any.    Talk with your health care provider about whether you should have a test to screen for  prostate cancer (PSA).    Every 3 years, have a diabetes test (fasting glucose). If you are at risk for diabetes, you should have this test more often.    Every 5 years, have a cholesterol test. Have this test more often if you are at risk for high cholesterol or heart disease.     Every 10 years, have a colonoscopy. Or, have a yearly FIT test (stool test). These exams will check for colon cancer.    Talk to with your health care provider about screening for Abdominal Aortic Aneurysm if you have a family history of AAA or have a history of smoking.  Shots:     Get a flu shot each year.     Get a tetanus shot every 10 years.     Talk to your doctor about your pneumonia vaccines. There are now two you should receive - Pneumovax (PPSV 23) and Prevnar (PCV 13).    Talk to your pharmacist about a shingles vaccine.     Talk to your doctor about the hepatitis B vaccine.  Nutrition:     Eat at least 5 servings of fruits and vegetables each day.     Eat whole-grain bread, whole-wheat pasta and brown rice instead of white grains and rice.     Get adequate Calcium and Vitamin D.   Lifestyle    Exercise for at least 150 minutes a week (30 minutes a day, 5 days a week). This will help you control your weight and prevent disease.     Limit alcohol to one drink per day.     No smoking.     Wear sunscreen to prevent skin cancer.     See your dentist every six months for an exam and cleaning.     See your eye doctor every 1 to 2 years to screen for conditions such as glaucoma, macular degeneration and cataracts.    Personalized Prevention Plan  You are due for the preventive services outlined below.  Your care team is available to assist you in scheduling these services.  If you have already completed any of these items, please share that information with your care team to update in your medical record.    Health Maintenance Due   Topic Date Due     Basic Metabolic Lab - every 6 months  10/05/2018     Cholesterol Lab - yearly   11/09/2018     Microalbumin Lab - yearly  11/09/2018     FALL RISK ASSESSMENT  11/09/2018     Wellness Visit with your Primary Provider - yearly  11/09/2018     Depression Assessment 2 - yearly  11/09/2018             Follow-ups after your visit        Follow-up notes from your care team     Return in about 1 year (around 11/15/2019) for Wellness visit & fasting labs.      Your next 10 appointments already scheduled     Apr 03, 2019  3:40 PM CDT   (Arrive by 3:25 PM)   Return Visit with Мария Saleem MD   [facility] ([facility])    [address]  4th Floor  [facility] [address] [phone]              Who to contact     If you have questions or need follow up information about today's clinic visit or your schedule please contact [facility] directly at [phone].  Normal or non-critical lab and imaging results will be communicated to you by MyChart, letter or phone within 4 business days after the clinic has received the results. If you do not hear from us within 7 days, please contact the clinic through SPARQhart or phone. If you have a critical or abnormal lab result, we will notify you by phone as soon as possible.  Submit refill requests through 4tiitoo or call your pharmacy and they will forward the refill request to us. Please allow 3 business days for your refill to be completed.          Additional Information About Your Visit        MyChart Information     4tiitoo gives you secure access to your electronic health record. If you see a primary care provider, you can also send messages to your care team and make appointments. If you have questions, please call your primary care clinic.  If you do not have a primary care provider, please call [phone] and they will assist you.        Care EveryWhere ID     This is your Care EveryWhere ID. This could be used by other organizations to access your  "[facility] medical records  [id number]        Your Vitals Were     Pulse Temperature Height Pulse Oximetry BMI (Body Mass Index)       55 99.1  F (37.3  C) (Oral) 6' 2\" (1.88 m) 99% 26.32 kg/m2        Blood Pressure from Last 3 Encounters:   11/15/18 146/86   10/10/18 150/90   09/27/18 128/76    Weight from Last 3 Encounters:   11/15/18 205 lb (93 kg)   10/10/18 205 lb (93 kg)   09/27/18 206 lb (93.4 kg)              We Performed the Following     Albumin Random Urine Quantitative with Creat Ratio     BASIC METABOLIC PANEL     CBC with platelets     Lipid panel reflex to direct LDL Fasting     PSA, screen          Today's Medication Changes          These changes are accurate as of 11/15/18 10:31 AM.  If you have any questions, ask your nurse or doctor.               Start taking these medicines.        Dose/Directions    sildenafil 100 MG tablet   Commonly known as:  VIAGRA   Used for:  Erectile dysfunction, unspecified erectile dysfunction type   Started by:  Mahamed Groves MD        Dose:  100 mg   Take 1 tablet (100 mg) by mouth daily as needed (erectile dysfunction) 30 min to 4 hrs before sex. Do not use with nitroglycerin, terazosin or doxazosin.   Quantity:  12 tablet   Refills:  11            Where to get your medicines      These medications were sent to [facility]/pharmacy #[id number] [place], [place] - [address], [place] [address]     Phone:  [phone]     lisinopril 20 MG tablet    metoprolol succinate 50 MG 24 hr tablet    pravastatin 40 MG tablet    warfarin 2 MG tablet         Some of these will need a paper prescription and others can be bought over the counter.  Ask your nurse if you have questions.     Bring a paper prescription for each of these medications     sildenafil 100 MG tablet                Primary Care Provider Office Phone # Fax #    Mahamed Groves -[phone]       [address] [place] [address]        Equal Access to " Services     [facility]: Hadii aad ku hadlindsayveronica Soelidia, waaxda luqadaha, qaybta kaalmada [place], estela mauricio . So [facility] [phone].    ATENCIÓN: Si habla alondra, tiene a graham disposición servicios gratuitos de asistencia lingüística. Llame al [phone].    We comply with applicable federal civil rights laws and [place] laws. We do not discriminate on the basis of race, color, national origin, age, disability, sex, sexual orientation, or gender identity.            Thank you!     Thank you for choosing [facility]  for your care. Our goal is always to provide you with excellent care. Hearing back from our patients is one way we can continue to improve our services. Please take a few minutes to complete the written survey that you may receive in the mail after your visit with us. Thank you!             Your Updated Medication List - Protect others around you: Learn how to safely use, store and throw away your medicines at www.disposemymeds.org.          This list is accurate as of 11/15/18 10:31 AM.  Always use your most recent med list.                   Brand Name Dispense Instructions for use Diagnosis    lisinopril 20 MG tablet    PRINIVIL/ZESTRIL    90 tablet    Take 1 tablet (20 mg) by mouth daily    Essential hypertension with goal blood pressure less than 140/90       metoprolol succinate 50 MG 24 hr tablet    TOPROL-XL    90 tablet    Take 1 tablet (50 mg) by mouth daily    Essential hypertension with goal blood pressure less than 140/90       pravastatin 40 MG tablet    PRAVACHOL    90 tablet    Take 1 tablet (40 mg) by mouth daily    Hyperlipidemia LDL goal <100       sildenafil 100 MG tablet    VIAGRA    12 tablet    Take 1 tablet (100 mg) by mouth daily as needed (erectile dysfunction) 30 min to 4 hrs before sex. Do not use with nitroglycerin, terazosin or doxazosin.    Erectile dysfunction, unspecified erectile dysfunction type       sildenafil 20  MG tablet    REVATIO    30 tablet    Take 2-5 tablets ( mg) by mouth daily as needed    Impotence of organic origin       warfarin 2 MG tablet    COUMADIN    90 tablet    TAKE ONE TABLET (2MG) BY MOUTH DAILY, EXCEPT TWO TABLETS (4MG) ON FRIDAYS    S/P mitral valve replacement, Paroxysmal atrial fibrillation (H)

## 2018-11-16 LAB
ANION GAP SERPL CALCULATED.3IONS-SCNC: 9 MMOL/L (ref 3–14)
BUN SERPL-MCNC: 28 MG/DL (ref 7–30)
CALCIUM SERPL-MCNC: 9.3 MG/DL (ref 8.5–10.1)
CHLORIDE SERPL-SCNC: 106 MMOL/L (ref 94–109)
CHOLEST SERPL-MCNC: 130 MG/DL
CO2 SERPL-SCNC: 26 MMOL/L (ref 20–32)
CREAT SERPL-MCNC: 1.14 MG/DL (ref 0.66–1.25)
GFR SERPL CREATININE-BSD FRML MDRD: 62 ML/MIN/1.7M2
GLUCOSE SERPL-MCNC: 119 MG/DL (ref 70–99)
HDLC SERPL-MCNC: 32 MG/DL
LDLC SERPL CALC-MCNC: 71 MG/DL
NONHDLC SERPL-MCNC: 98 MG/DL
POTASSIUM SERPL-SCNC: 4.6 MMOL/L (ref 3.4–5.3)
SODIUM SERPL-SCNC: 141 MMOL/L (ref 133–144)
TRIGL SERPL-MCNC: 136 MG/DL

## 2018-11-16 NOTE — PROGRESS NOTES
Dear Tomás,    Here is a summary of your recent test results:  -Normal red blood cell (hgb) levels, normal white blood cell count and normal platelet levels.  -Cholesterol levels are at your goal levels.  ADVISE: continuing your medication, a regular exercise program with at least 150 minutes of aerobic exercise per week, and eating a low saturated fat/low carbohydrate diet.  Also, you should recheck this fasting cholesterol panel in 12 months.  -Kidney function (GFR) is normal.  -Sodium is normal.  -Potassium is normal.  -Calcium is normal.  -Glucose is slight elevated and may be a sign of early diabetes (prediabetes). ADVISE:: eating a low carbohydrate diet, exercising, trying to lose weight (if necessary) and rechecking your glucose level in 12 months.  -Microalbumin (urine protein) level is elevated. This is suggestive of early damage to your kidneys from high blood pressure.  ADVISE: avoiding anti-inflamatory agents such as ibuprofen (Advil, Motrin) or naproxen (Aleve) as much as possible, keeping your blood pressure in a normal range, and continuing your medication (lisinopril) that helps protect your kidneys.  Also, this should be rechecked in 1 year.     For additional lab test information, labtestsonline.org is an excellent reference.           Thank you very much for trusting me and [facility].     Healthy regards,  Marc Groves MD

## 2018-12-05 ENCOUNTER — OFFICE VISIT (OUTPATIENT)
Dept: FAMILY MEDICINE | Facility: CLINIC | Age: 78
End: 2018-12-05
Payer: COMMERCIAL

## 2018-12-05 VITALS
SYSTOLIC BLOOD PRESSURE: 158 MMHG | WEIGHT: 204 LBS | TEMPERATURE: 98.5 F | OXYGEN SATURATION: 100 % | HEIGHT: 74 IN | DIASTOLIC BLOOD PRESSURE: 90 MMHG | HEART RATE: 78 BPM | BODY MASS INDEX: 26.18 KG/M2

## 2018-12-05 DIAGNOSIS — R19.7 DIARRHEA, UNSPECIFIED TYPE: Primary | ICD-10-CM

## 2018-12-05 LAB
BASOPHILS # BLD AUTO: 0 10E9/L (ref 0–0.2)
BASOPHILS NFR BLD AUTO: 0.5 %
CRP SERPL-MCNC: <2.9 MG/L (ref 0–8)
DIFFERENTIAL METHOD BLD: ABNORMAL
EOSINOPHIL # BLD AUTO: 0.1 10E9/L (ref 0–0.7)
EOSINOPHIL NFR BLD AUTO: 1.6 %
ERYTHROCYTE [DISTWIDTH] IN BLOOD BY AUTOMATED COUNT: 15.9 % (ref 10–15)
ERYTHROCYTE [SEDIMENTATION RATE] IN BLOOD BY WESTERGREN METHOD: 3 MM/H (ref 0–20)
HCT VFR BLD AUTO: 53.5 % (ref 40–53)
HGB BLD-MCNC: 17.4 G/DL (ref 13.3–17.7)
LYMPHOCYTES # BLD AUTO: 1 10E9/L (ref 0.8–5.3)
LYMPHOCYTES NFR BLD AUTO: 11.4 %
MCH RBC QN AUTO: 28.5 PG (ref 26.5–33)
MCHC RBC AUTO-ENTMCNC: 32.5 G/DL (ref 31.5–36.5)
MCV RBC AUTO: 88 FL (ref 78–100)
MONOCYTES # BLD AUTO: 1 10E9/L (ref 0–1.3)
MONOCYTES NFR BLD AUTO: 11.6 %
NEUTROPHILS # BLD AUTO: 6.4 10E9/L (ref 1.6–8.3)
NEUTROPHILS NFR BLD AUTO: 74.9 %
PLATELET # BLD AUTO: 331 10E9/L (ref 150–450)
RBC # BLD AUTO: 6.1 10E12/L (ref 4.4–5.9)
WBC # BLD AUTO: 8.6 10E9/L (ref 4–11)

## 2018-12-05 PROCEDURE — 36415 COLL VENOUS BLD VENIPUNCTURE: CPT | Performed by: PHYSICIAN ASSISTANT

## 2018-12-05 PROCEDURE — 86140 C-REACTIVE PROTEIN: CPT | Performed by: PHYSICIAN ASSISTANT

## 2018-12-05 PROCEDURE — 85652 RBC SED RATE AUTOMATED: CPT | Performed by: PHYSICIAN ASSISTANT

## 2018-12-05 PROCEDURE — 85025 COMPLETE CBC W/AUTO DIFF WBC: CPT | Performed by: PHYSICIAN ASSISTANT

## 2018-12-05 PROCEDURE — 80053 COMPREHEN METABOLIC PANEL: CPT | Performed by: PHYSICIAN ASSISTANT

## 2018-12-05 PROCEDURE — 99214 OFFICE O/P EST MOD 30 MIN: CPT | Performed by: PHYSICIAN ASSISTANT

## 2018-12-05 NOTE — MR AVS SNAPSHOT
After Visit Summary   12/5/2018    Tomás Cruz    MRN: [id number]           Patient Information     Date Of Birth          1940        Visit Information        Provider Department      12/5/2018 11:40 AM Belen Pearce PA-C [facility]        Today's Diagnoses     Diarrhea, unspecified type    -  1       Follow-ups after your visit        Follow-up notes from your care team     Return in about 1 week (around 12/12/2018) for If not improving, please be seen sooner if needed.      Your next 10 appointments already scheduled     Apr 03, 2019  3:40 PM CDT   (Arrive by 3:25 PM)   Return Visit with Мария Saleem MD   [facility] and [facility] ([facility])    [address] [place] [address]  [facility] [address] [phone]              Future tests that were ordered for you today     Open Future Orders        Priority Expected Expires Ordered    Enteric Bacteria and Virus Panel by ISAAC Stool Routine  12/5/2019 12/5/2018    Ova and Parasite Exam Routine Routine  12/5/2019 12/5/2018    Clostridium difficile Toxin B PCR Routine  1/4/2019 12/5/2018    H Pylori antigen, stool Routine  1/4/2019 12/5/2018    Giardia antigen Routine  12/5/2019 12/5/2018            Who to contact     If you have questions or need follow up information about today's clinic visit or your schedule please contact [facility] directly at [phone].  Normal or non-critical lab and imaging results will be communicated to you by MyChart, letter or phone within 4 business days after the clinic has received the results. If you do not hear from us within 7 days, please contact the clinic through MyChart or phone. If you have a critical or abnormal lab result, we will notify you by phone as soon as possible.  Submit refill requests through ITN or call your pharmacy and they will forward the refill request to us. Please  "allow 3 business days for your refill to be completed.          Additional Information About Your Visit        MyChart Information     [employer] gives you secure access to your electronic health record. If you see a primary care provider, you can also send messages to your care team and make appointments. If you have questions, please call your primary care clinic.  If you do not have a primary care provider, please call [phone] and they will assist you.        Care EveryWhere ID     This is your Care EveryWhere ID. This could be used by other organizations to access your [place] medical records  [id number]        Your Vitals Were     Pulse Temperature Height Pulse Oximetry BMI (Body Mass Index)       78 98.5  F (36.9  C) (Oral) 6' 2\" (1.88 m) 100% 26.19 kg/m2        Blood Pressure from Last 3 Encounters:   12/05/18 158/90   11/15/18 146/86   10/10/18 150/90    Weight from Last 3 Encounters:   12/05/18 204 lb (92.5 kg)   11/15/18 205 lb (93 kg)   10/10/18 205 lb (93 kg)              We Performed the Following     CBC with platelets differential     Comprehensive metabolic panel     CRP, inflammation     ESR: Erythrocyte sedimentation rate        Primary Care Provider Office Phone # Fax #    Mahamed Groves [phone]       [address] [place] [address]        Equal Access to Services     MARY SUAREZ : Hadii aad ku hadasho Soomaali, waaxda luqadaha, qaybta kaalmada adeegyada, estela gomes. So [facility] [phone].    ATENCIÓN: Si habla español, tiene a graham disposición servicios gratuitos de asistencia lingüística. Llame al [phone].    We comply with applicable federal civil rights laws and [place] laws. We do not discriminate on the basis of race, color, national origin, age, disability, sex, sexual orientation, or gender identity.            Thank you!     Thank you for choosing [facility]  for your care. Our goal is always to " provide you with excellent care. Hearing back from our patients is one way we can continue to improve our services. Please take a few minutes to complete the written survey that you may receive in the mail after your visit with us. Thank you!             Your Updated Medication List - Protect others around you: Learn how to safely use, store and throw away your medicines at www.disposemymeds.org.          This list is accurate as of 12/5/18 12:25 PM.  Always use your most recent med list.                   Brand Name Dispense Instructions for use Diagnosis    lisinopril 20 MG tablet    PRINIVIL/ZESTRIL    90 tablet    Take 1 tablet (20 mg) by mouth daily    Essential hypertension with goal blood pressure less than 140/90       metoprolol succinate ER 50 MG 24 hr tablet    TOPROL-XL    90 tablet    Take 1 tablet (50 mg) by mouth daily    Essential hypertension with goal blood pressure less than 140/90       pravastatin 40 MG tablet    PRAVACHOL    90 tablet    Take 1 tablet (40 mg) by mouth daily    Hyperlipidemia LDL goal <100       sildenafil 100 MG tablet    VIAGRA    12 tablet    Take 1 tablet (100 mg) by mouth daily as needed (erectile dysfunction) 30 min to 4 hrs before sex. Do not use with nitroglycerin, terazosin or doxazosin.    Erectile dysfunction, unspecified erectile dysfunction type       warfarin 2 MG tablet    COUMADIN    90 tablet    TAKE ONE TABLET (2MG) BY MOUTH DAILY, EXCEPT TWO TABLETS (4MG) ON FRIDAYS    S/P mitral valve replacement, Paroxysmal atrial fibrillation (H)

## 2018-12-05 NOTE — PROGRESS NOTES
"  SUBJECTIVE:                                                    Tomás Cruz is a 78 year old male who presents to clinic today for the following health issues:    Diarrhea  Onset: x2 weeks    Description:   Consistency of stool: loose, yellow and explosive - after eats gets rumbling in abdomen  Blood in stool: no   Number of loose stools in past 24 hours: 3    Progression of Symptoms:  Worsening because it hasn't stopped    Accompanying Signs & Symptoms:  Fever: no   Nausea or vomiting; no   Abdominal pain: no   Episodes of constipation: no   Weight loss: no     History:   Ill contacts: no   Recent use of antibiotics: no    Recent travels: no          Recent medication-new or changes(Rx or OTC): no     Precipitating factors:   nothing    Alleviating factors:   Pepto bismol - minor relief  Stopped eating honey nut cheerios for breakfast - having toast - no changes    Heart surgery 12 years ago - was not supposed to take pepto or Imodium - took 1 time    Wife left for 2 weeks approx 1 month ago - was not eating the best - unhealthy foods    Therapies Tried and outcome:  Imodium AD; Outcome: minor relief    Patient is here today for 2 weeks of loose stools.  He denies any nausea or vomiting with the symptoms.  Patient also denies any abdominal pain.  He states that he has not had fevers, chills or sweats.        Problem list and histories reviewed & adjusted, as indicated.  Additional history: as documented      ROS:  Constitutional, HEENT, cardiovascular, pulmonary, GI, , musculoskeletal, neuro, skin, endocrine and psych systems are negative, except as otherwise noted.    OBJECTIVE:                                                    /90 (BP Location: Left arm, Patient Position: Chair, Cuff Size: Adult Regular)  Pulse 78  Temp 98.5  F (36.9  C) (Oral)  Ht 6' 2\" (1.88 m)  Wt 204 lb (92.5 kg)  SpO2 100%  BMI 26.19 kg/m2  Body mass index is 26.19 kg/(m^2).  GENERAL: healthy, alert and no " distress  EYES: Eyes grossly normal to inspection, PERRL and conjunctivae and sclerae normal  RESP: lungs clear to auscultation - no rales, rhonchi or wheezes  CV: regular rate and rhythm, normal S1 S2, no S3 or S4, no murmur, click or rub, no peripheral edema and peripheral pulses strong  ABDOMEN: soft, nontender, no hepatosplenomegaly, no masses and bowel sounds normal  MS: no gross musculoskeletal defects noted, no edema  NEURO: Normal strength and tone, mentation intact and speech normal  PSYCH: mentation appears normal, affect normal/bright    Diagnostic Test Results:  Labs - pending     ASSESSMENT/PLAN:                                                      Tomás was seen today for gastrointestinal problem.    Diagnoses and all orders for this visit:    Diarrhea, unspecified type  -     Enteric Bacteria and Virus Panel by ISAAC Stool; Future  -     Ova and Parasite Exam Routine; Future  -     Clostridium difficile Toxin B PCR; Future  -     H Pylori antigen, stool; Future  -     Giardia antigen; Future  -     CBC with platelets differential  -     ESR: Erythrocyte sedimentation rate  -     CRP, inflammation  -     Comprehensive metabolic panel    Patient was seen today for symptoms of loose stools over the past 2 weeks.  He has not had any other associated symptoms with the abdominal pain.  Physical exam today is within normal limits and the vitals are normal as well.    Labs and stool samples are pending.      Followup: Return in about 1 week (around 12/12/2018) for If not improving, please be seen sooner if needed.     -- I have discussed the patient's diagnosis, and my plan of treatment with the patient and/or family. Patient is aware to followup if symptoms do not improve.  Patient has been advised to be seen sooner or seek more immediate care if symptoms change or worsen.  Patient agrees with and understands the plan today.     See Patient Instructions        Belen Pearce PA-C    [facility]  [place]

## 2018-12-06 DIAGNOSIS — R19.7 DIARRHEA, UNSPECIFIED TYPE: ICD-10-CM

## 2018-12-06 LAB
ALBUMIN SERPL-MCNC: 4.1 G/DL (ref 3.4–5)
ALP SERPL-CCNC: 82 U/L (ref 40–150)
ALT SERPL W P-5'-P-CCNC: 19 U/L (ref 0–70)
ANION GAP SERPL CALCULATED.3IONS-SCNC: 6 MMOL/L (ref 3–14)
AST SERPL W P-5'-P-CCNC: 23 U/L (ref 0–45)
BILIRUB SERPL-MCNC: 0.9 MG/DL (ref 0.2–1.3)
BUN SERPL-MCNC: 24 MG/DL (ref 7–30)
CALCIUM SERPL-MCNC: 9.5 MG/DL (ref 8.5–10.1)
CHLORIDE SERPL-SCNC: 106 MMOL/L (ref 94–109)
CO2 SERPL-SCNC: 27 MMOL/L (ref 20–32)
CREAT SERPL-MCNC: 1.3 MG/DL (ref 0.66–1.25)
GFR SERPL CREATININE-BSD FRML MDRD: 53 ML/MIN/1.7M2
GLUCOSE SERPL-MCNC: 108 MG/DL (ref 70–99)
POTASSIUM SERPL-SCNC: 4.7 MMOL/L (ref 3.4–5.3)
PROT SERPL-MCNC: 7.4 G/DL (ref 6.8–8.8)
SODIUM SERPL-SCNC: 139 MMOL/L (ref 133–144)

## 2018-12-06 PROCEDURE — 87506 IADNA-DNA/RNA PROBE TQ 6-11: CPT | Performed by: PHYSICIAN ASSISTANT

## 2018-12-06 PROCEDURE — 87329 GIARDIA AG IA: CPT | Performed by: PHYSICIAN ASSISTANT

## 2018-12-06 PROCEDURE — 87338 HPYLORI STOOL AG IA: CPT | Performed by: PHYSICIAN ASSISTANT

## 2018-12-06 PROCEDURE — 87209 SMEAR COMPLEX STAIN: CPT | Performed by: PHYSICIAN ASSISTANT

## 2018-12-06 PROCEDURE — 87177 OVA AND PARASITES SMEARS: CPT | Performed by: PHYSICIAN ASSISTANT

## 2018-12-07 LAB
C COLI+JEJUNI+LARI FUSA STL QL NAA+PROBE: NOT DETECTED
EC STX1 GENE STL QL NAA+PROBE: NOT DETECTED
EC STX2 GENE STL QL NAA+PROBE: NOT DETECTED
ENTERIC PATHOGEN COMMENT: NORMAL
G LAMBLIA AG STL QL IA: NORMAL
H PYLORI AG STL QL IA: NORMAL
NOROV GI+II ORF1-ORF2 JNC STL QL NAA+PR: NOT DETECTED
O+P STL MICRO: NORMAL
RVA NSP5 STL QL NAA+PROBE: NOT DETECTED
SALMONELLA SP RPOD STL QL NAA+PROBE: NOT DETECTED
SHIGELLA SP+EIEC IPAH STL QL NAA+PROBE: NOT DETECTED
SPECIMEN SOURCE: NORMAL
V CHOL+PARA RFBL+TRKH+TNAA STL QL NAA+PR: NOT DETECTED
Y ENTERO RECN STL QL NAA+PROBE: NOT DETECTED

## 2018-12-09 NOTE — RESULT ENCOUNTER NOTE
Josie England ,    The results from your recent lab work are within normal limits, but the kidney function is mildly decreased.    -Liver and gallbladder tests (ALT,AST, Alk phos,bilirubin) are normal.  -Kidney function (GFR) is decreased.  ADVISE: rechecking this in 3 months  -Sodium is normal.  -Potassium is normal.  -Calcium is normal.  -Glucose (diabetic screening test) is normal.      Thank you for choosing [place] for your health care needs,      Belen Pearce PA-C

## 2018-12-10 NOTE — RESULT ENCOUNTER NOTE
Josie England ,    The results from your recent stool sample testing is within normal limits.  No abnormalities were seen.        Thank you for choosing [place] for your health care needs,      Belen Pearce PA-C

## 2018-12-27 ENCOUNTER — TELEPHONE (OUTPATIENT)
Dept: FAMILY MEDICINE | Facility: CLINIC | Age: 78
End: 2018-12-27

## 2018-12-27 NOTE — TELEPHONE ENCOUNTER
Patient calling    Stated he saw BERKLEY HARPER on 12/05/2018 and is still having the same problem now. There was no improvement in symptoms.     Patient stated he never received the results from this OV either. He is very upset about this.   RN apologized to patient and informed patient the results were sent to him via [employer]. Went over results from 12/06/2018 with patient.     DENIES: new/worsening symptoms, blood in stool    Patient would like to know what else can be done to stop the diarrhea?    Ph. [phone] (OK to leave a detailed VM)    Routing to PCP for further review/recommendations/orders.    Laura Wolfe RN  [place] [facility]

## 2019-01-08 ENCOUNTER — ANTICOAGULATION THERAPY VISIT (OUTPATIENT)
Dept: NURSING | Facility: CLINIC | Age: 79
End: 2019-01-08
Payer: COMMERCIAL

## 2019-01-08 VITALS — DIASTOLIC BLOOD PRESSURE: 80 MMHG | SYSTOLIC BLOOD PRESSURE: 130 MMHG

## 2019-01-08 DIAGNOSIS — Z95.2 S/P MITRAL VALVE REPLACEMENT: ICD-10-CM

## 2019-01-08 LAB — INR POINT OF CARE: 2.4 (ref 0.86–1.14)

## 2019-01-08 PROCEDURE — 36416 COLLJ CAPILLARY BLOOD SPEC: CPT

## 2019-01-08 PROCEDURE — 85610 PROTHROMBIN TIME: CPT | Mod: QW

## 2019-01-08 NOTE — PROGRESS NOTES
ANTICOAGULATION FOLLOW-UP CLINIC VISIT    Patient Name:  Tomás Cruz  Date:  2019  Contact Type:  Face to Face    SUBJECTIVE:        OBJECTIVE    INR Protime   Date Value Ref Range Status   2019 2.4 (A) 0.86 - 1.14 Final       ASSESSMENT / PLAN  INR assessment THER    Recheck INR In: 6 WEEKS    INR Location Clinic      Anticoagulation Summary  As of 2019    INR goal:   2.5-3.5   TTR:   91.3 % (2.8 y)   INR used for dosin.4! (2019)   Warfarin maintenance plan:   2 mg (2 mg x 1) every day   Full warfarin instructions:   2 mg every day   Weekly warfarin total:   14 mg   No change documented:   Rose Marie Madden RN   Plan last modified:   Rose Marie Azul RN (2016)   Next INR check:   2019   Target end date:       Indications    Long-term (current) use of anticoagulants [Z79.01] [Z79.01]  S/P mitral valve replacement [Z95.2]             Anticoagulation Episode Summary     INR check location:   [facility]    Preferred lab:       Send INR reminders to:    NURSE    Comments:         Anticoagulation Care Providers     Provider Role Specialty Phone number    Mahamed Groves MD [facility] [phone]            See the Encounter Report to view Anticoagulation Flowsheet and Dosing Calendar (Go to Encounters tab in chart review, and find the Anticoagulation Therapy Visit)    Dosage adjustment made based on physician directed care plan.    Rose Marie Madden, RN

## 2019-01-16 ENCOUNTER — OFFICE VISIT (OUTPATIENT)
Dept: FAMILY MEDICINE | Facility: CLINIC | Age: 79
End: 2019-01-16
Payer: COMMERCIAL

## 2019-01-16 VITALS
HEIGHT: 74 IN | OXYGEN SATURATION: 98 % | TEMPERATURE: 97.2 F | BODY MASS INDEX: 26.69 KG/M2 | SYSTOLIC BLOOD PRESSURE: 132 MMHG | DIASTOLIC BLOOD PRESSURE: 76 MMHG | HEART RATE: 54 BPM | WEIGHT: 208 LBS

## 2019-01-16 DIAGNOSIS — R19.5 LOOSE STOOLS: Primary | ICD-10-CM

## 2019-01-16 PROCEDURE — 99213 OFFICE O/P EST LOW 20 MIN: CPT | Performed by: PHYSICIAN ASSISTANT

## 2019-01-16 ASSESSMENT — MIFFLIN-ST. JEOR: SCORE: 1738.23

## 2019-01-16 NOTE — PROGRESS NOTES
"  SUBJECTIVE:                                                    Tomás Cruz is a 78 year old male who presents to clinic today for the following health issues:      Concern - Continues to have Diarrhea  Onset: Since Nov 20,2018    Description:   Loose stools after he eats, within 20 -30 minutes  Has a loose stool    Intensity: moderate    Progression of Symptoms:  same    Accompanying Signs & Symptoms:      Previous history of similar problem:   No    Precipitating factors:   Worsened by: After eating    Alleviating factors:  Improved by: nothing    Therapies Tried and outcome: Pepto edil      He says that about 15-20 minutes after eating he needs to go to the bathroom to have a BM.  He says that the BM is not watery, but it is semi-formed.    He says he is having 3-4 bowel movements in a day.  He used to have about one BM a day.      Click here for [place] stool scale.  He describes the stool as a 4-5.  He feels like sometimes there is mucus to the stool.  He denies any abdominal pain associated with the BM.    He says that they are small and there is not much to them.  He denies nausea and vomiting.      He says that the rest of his life is pretty much normal, but about 15-20 minutes after he eats he needs to have a BM.       Wt Readings from Last 4 Encounters:   01/16/19 94.3 kg (208 lb)   12/05/18 92.5 kg (204 lb)   11/15/18 93 kg (205 lb)   10/10/18 93 kg (205 lb)     Problem list and histories reviewed & adjusted, as indicated.  Additional history: as documented      ROS:  Constitutional, HEENT, cardiovascular, pulmonary, GI, , musculoskeletal, neuro, skin, endocrine and psych systems are negative, except as otherwise noted.    OBJECTIVE:                                                    /76   Pulse 54   Temp 97.2  F (36.2  C) (Tympanic)   Ht 1.88 m (6' 2\")   Wt 94.3 kg (208 lb)   SpO2 98%   BMI 26.71 kg/m    Body mass index is 26.71 kg/m .  GENERAL: healthy, alert and no distress  EYES: " Eyes grossly normal to inspection, PERRL and conjunctivae and sclerae normal  RESP: lungs clear to auscultation - no rales, rhonchi or wheezes  CV: regular rate and rhythm, normal S1 S2, no S3 or S4, no murmur, click or rub, no peripheral edema and peripheral pulses strong  ABDOMEN: soft, nontender, no hepatosplenomegaly, no masses and bowel sounds normal  MS: no gross musculoskeletal defects noted, no edema  NEURO: Normal strength and tone, mentation intact and speech normal  PSYCH: mentation appears normal, affect normal/bright    Diagnostic Test Results:  none      ASSESSMENT/PLAN:                                                      Tomás was seen today for diarrhea.    Diagnoses and all orders for this visit:    Loose stools  -     Fecal colorectal cancer screen (FIT); Future  -     GASTROENTEROLOGY ADULT REF CONSULT ONLY      Discussed with patient that the symptoms may be due to dietary concerns and therefore he has been advised to keep a food journal.  Also, FIT test has been ordered today, if this is positive, will move patient to colonoscopy.    Referral placed for patient to followup with GI for further evaluation as symptoms have persisted.    Patient to seek more immediate care if symptoms change or worsen in any way.      Followup: Return in about 4 weeks (around 2/13/2019) for Specialty followup, please be seen sooner if needed.    -- I have discussed the patient's diagnosis, and my plan of treatment with the patient and/or family. Patient is aware to followup if symptoms do not improve.  Patient has been advised to be seen sooner or seek more immediate care if symptoms change or worsen.  Patient agrees with and understands the plan today.     See Patient Instructions        Belen Pearce PA-C    [facility]

## 2019-01-18 DIAGNOSIS — R19.5 LOOSE STOOLS: ICD-10-CM

## 2019-01-19 DIAGNOSIS — Z95.2 S/P MITRAL VALVE REPLACEMENT: ICD-10-CM

## 2019-01-20 LAB — HEMOCCULT STL QL IA: NEGATIVE

## 2019-01-20 PROCEDURE — 82274 ASSAY TEST FOR BLOOD FECAL: CPT | Performed by: PHYSICIAN ASSISTANT

## 2019-01-21 RX ORDER — WARFARIN SODIUM 2 MG/1
TABLET ORAL
Qty: 90 TABLET | Refills: 0 | Status: SHIPPED | OUTPATIENT
Start: 2019-01-21 | End: 2019-04-13

## 2019-01-21 NOTE — TELEPHONE ENCOUNTER
"Requested Prescriptions   Pending Prescriptions Disp Refills     warfarin (COUMADIN) 2 MG tablet [Pharmacy Med Name: WARFARIN SODIUM 2 MG TABLET] 90 tablet 3     Sig: TAKE ONE TABLET (2MG) BY MOUTH DAILY, EXCEPT TWO TABLETS (4MG) ON FRIDAYS    Vitamin K Antagonists Failed - 1/19/2019  6:30 PM       Failed - INR is within goal in the past 6 weeks    Confirm INR is within goal in the past 6 weeks.     Recent Labs   Lab Test 01/08/19   INR 2.4*                      Passed - Recent (12 mo) or future (30 days) visit within the authorizing provider's specialty    Patient had office visit in the last 12 months or has a visit in the next 30 days with authorizing provider or within the authorizing provider's specialty.  See \"Patient Info\" tab in inbasket, or \"Choose Columns\" in Meds & Orders section of the refill encounter.             Passed - Medication is active on med list       Passed - Patient is 18 years of age or older        Prescription approved per [facility] Refill Protocol.  Naila Brasher RN  [place] [facility]    "

## 2019-01-22 ENCOUNTER — TELEPHONE (OUTPATIENT)
Dept: FAMILY MEDICINE | Facility: CLINIC | Age: 79
End: 2019-01-22

## 2019-01-22 ENCOUNTER — TRANSFERRED RECORDS (OUTPATIENT)
Dept: HEALTH INFORMATION MANAGEMENT | Facility: CLINIC | Age: 79
End: 2019-01-22

## 2019-01-22 NOTE — TELEPHONE ENCOUNTER
Reason for Call: Request for an order or referral:    Order or referral being requested: hold orders for blood thinner medicine prior to scheduling colonoscopy.    Date needed: as soon as possible    Has the patient been seen by the PCP for this problem? YES    Additional comments: waiting for order first before doing the colonoscopy at [facility].    Phone number Patient can be reached at:  Other phone number:  [facility] calling, Veena scheduling calling to give us info -please call her at [phone] option 1 (twice). But wants verbal orders. Fax is [phone] attn: Veena.    Best Time: [facility] policy would be dont take meds for 4 days prior.    Can we leave a detailed message on this number?  Not Applicable    Call taken on 1/22/2019 at 4:04 PM by Nadia Parmar

## 2019-01-22 NOTE — TELEPHONE ENCOUNTER
[facility] is currently closed.  Will call tomorrow.      Rose Marie Madden, BS, RN, N  [facility]) [phone]

## 2019-01-23 NOTE — TELEPHONE ENCOUNTER
Start holding coumadin 5 days prior to procedure.  Two days after holding coumadin, start lovenox twice daily 12 hours apart.    Holding Instructions before colonoscopy:    (date)?: Hold Coumadin  ?: Hold Coumadin  ?: Hold coumadin, start Lovenox twice daily 12 hours apart  ?: Hold coumadin, Lovenox twice daily 12 hours apart  ?: Hold coumadin, Lovenox in AM only (if at least 24 hours prior to procedure)  ?: Day of procedure- (double normal daily dose) coumadin if OK with surgeon, no lovenox  ?: Coumadin (double daily dose), Lovenox twice daily 12 hours apart  ?: Coumadin ?, Lovenox twice daily 12 hours apart  ?: Coumadin ?, Lovenox twice daily 12 hours apart  ?: Lovenox in AM, INR appointment ?    Pt takes 4mg on Friday and 2mg ROW.      They will have the pt call us back for specific instructions and ordering lovenox/further scheduling/dosing.  Naila Brasher RN  [facility]

## 2019-01-27 NOTE — RESULT ENCOUNTER NOTE
Josie England ,    The results from your recent lab work are within normal limits.    -FIT test (screening test for colon cancer) was normal. ADVISE: rechecking this test in 1 year.      Thank you for choosing [facility] for your health care needs,      Belen Pearce PA-C    Of Note:   I called the patient to followup with him regarding his symptoms and recent office visit.  He reports that he has seen GI and they will be proceeding with a colonoscopy.  The GI specialist thinks this may be due to underlying constipation, and therefore the colon prep may help this patient and his symptoms.  Patient did not have any further questions or concerns.

## 2019-02-07 NOTE — TELEPHONE ENCOUNTER
Patient coming in for INR on 2/11/2019 and below will be reviewed at visit.    Rose Marie Madden, BS, RN, N  [facility]) [phone]

## 2019-02-11 ENCOUNTER — ANTICOAGULATION THERAPY VISIT (OUTPATIENT)
Dept: NURSING | Facility: CLINIC | Age: 79
End: 2019-02-11
Payer: COMMERCIAL

## 2019-02-11 DIAGNOSIS — Z95.2 S/P MITRAL VALVE REPLACEMENT: ICD-10-CM

## 2019-02-11 LAB — INR POINT OF CARE: 2.8 (ref 0.86–1.14)

## 2019-02-11 PROCEDURE — 36416 COLLJ CAPILLARY BLOOD SPEC: CPT

## 2019-02-11 PROCEDURE — 85610 PROTHROMBIN TIME: CPT | Mod: QW

## 2019-02-11 NOTE — PROGRESS NOTES
ANTICOAGULATION FOLLOW-UP CLINIC VISIT    Patient Name:  Tomás Cruz  Date:  2019  Contact Type:  Face to Face    SUBJECTIVE:     Patient Findings     Positives:   No Problem Findings, Unexplained INR or factor level change    Comments:   Pt will be having a colonoscopy on 2019           OBJECTIVE    INR Protime   Date Value Ref Range Status   2019 2.8 (A) 0.86 - 1.14 Final       ASSESSMENT / PLAN  No question data found.  Anticoagulation Summary  As of 2019    INR goal:   2.5-3.5   TTR:   90.8 % (2.9 y)   INR used for dosin.8 (2019)   Warfarin maintenance plan:   2 mg (2 mg x 1) every day   Full warfarin instructions:   : Hold; : Hold; : Hold; : Hold; : Hold; : 4 mg; : 4 mg; Otherwise 2 mg every day   Weekly warfarin total:   14 mg   Plan last modified:   Rose Marie Azul RN (2016)   Next INR check:   2019   Target end date:       Indications    Long-term (current) use of anticoagulants [Z79.01] [Z79.01]  S/P mitral valve replacement [Z95.2]             Anticoagulation Episode Summary     INR check location:   [facility]    Preferred lab:       Send INR reminders to:    NURSE    Comments:         Anticoagulation Care Providers     Provider Role Specialty Phone number    Mahamed Groves MD [facility] [phone]            See the Encounter Report to view Anticoagulation Flowsheet and Dosing Calendar (Go to Encounters tab in chart review, and find the Anticoagulation Therapy Visit)    Dosage adjustment made based on physician directed care plan.    Myra Holguin RN

## 2019-02-11 NOTE — PATIENT INSTRUCTIONS
Start holding coumadin 5 days prior to procedure.  Two days after holding coumadin, start lovenox 0.94 ML  2 times a day -  12 hours apart .    Date: Instructions  2/16/2019: Hold Coumadin    2/17/2019: Hold Coumadin    2/18/2019: Hold coumadin, start Lovenox 0.94 ML  2 times a day -  12 hours apart    2/19/2019: Hold coumadin, Lovenox 0.94 ML  2 times a day -  12 hours apart    2/20/2019: Hold coumadin, Lovenox in AM only (if at least 24 hours prior to procedure)    2/21/2019: Day of procedure- 4 mg  coumadin if OK with surgeon, no lovenox    2/22/2019: Coumadin 4 mg , Lovenox 0.94 ML  2 times a day -  12 hours apart    2/23/2019: Coumadin 2 mg , Lovenox 0.94 ML  2 times a day -  12 hours apart    2/24/2019: Coumadin 2 mg , Lovenox 0.94 ML  2 times a day -  12 hours apart    2/25/2019: Lovenox in AM, INR appointment 9:45

## 2019-02-21 ENCOUNTER — HOSPITAL ENCOUNTER (OUTPATIENT)
Facility: CLINIC | Age: 79
Discharge: HOME | End: 2019-02-21
Attending: INTERNAL MEDICINE | Admitting: INTERNAL MEDICINE
Payer: COMMERCIAL

## 2019-02-21 VITALS
OXYGEN SATURATION: 96 % | SYSTOLIC BLOOD PRESSURE: 119 MMHG | RESPIRATION RATE: 14 BRPM | HEART RATE: 69 BPM | HEIGHT: 74 IN | DIASTOLIC BLOOD PRESSURE: 80 MMHG | WEIGHT: 198 LBS | BODY MASS INDEX: 25.41 KG/M2

## 2019-02-21 LAB — COLONOSCOPY: NORMAL

## 2019-02-21 PROCEDURE — 88305 TISSUE EXAM BY PATHOLOGIST: CPT | Mod: 26 | Performed by: INTERNAL MEDICINE

## 2019-02-21 PROCEDURE — G0500 MOD SEDAT ENDO SERVICE >5YRS: HCPCS | Performed by: INTERNAL MEDICINE

## 2019-02-21 PROCEDURE — 25000128 H RX IP 250 OP 636: Performed by: INTERNAL MEDICINE

## 2019-02-21 PROCEDURE — 99153 MOD SED SAME PHYS/QHP EA: CPT | Performed by: INTERNAL MEDICINE

## 2019-02-21 PROCEDURE — 88305 TISSUE EXAM BY PATHOLOGIST: CPT | Performed by: INTERNAL MEDICINE

## 2019-02-21 PROCEDURE — 45380 COLONOSCOPY AND BIOPSY: CPT | Performed by: INTERNAL MEDICINE

## 2019-02-21 RX ORDER — FLUMAZENIL 0.1 MG/ML
0.2 INJECTION, SOLUTION INTRAVENOUS
Status: CANCELLED | OUTPATIENT
Start: 2019-02-21 | End: 2019-02-21

## 2019-02-21 RX ORDER — MULTIPLE VITAMINS W/ MINERALS TAB 9MG-400MCG
1 TAB ORAL DAILY
COMMUNITY

## 2019-02-21 RX ORDER — ONDANSETRON 2 MG/ML
4 INJECTION INTRAMUSCULAR; INTRAVENOUS EVERY 6 HOURS PRN
Status: CANCELLED | OUTPATIENT
Start: 2019-02-21

## 2019-02-21 RX ORDER — NALOXONE HYDROCHLORIDE 0.4 MG/ML
.1-.4 INJECTION, SOLUTION INTRAMUSCULAR; INTRAVENOUS; SUBCUTANEOUS
Status: CANCELLED | OUTPATIENT
Start: 2019-02-21 | End: 2019-02-22

## 2019-02-21 RX ORDER — ONDANSETRON 4 MG/1
4 TABLET, ORALLY DISINTEGRATING ORAL EVERY 6 HOURS PRN
Status: CANCELLED | OUTPATIENT
Start: 2019-02-21

## 2019-02-21 RX ORDER — FENTANYL CITRATE 50 UG/ML
INJECTION, SOLUTION INTRAMUSCULAR; INTRAVENOUS PRN
Status: DISCONTINUED | OUTPATIENT
Start: 2019-02-21 | End: 2019-02-21 | Stop reason: HOSPADM

## 2019-02-21 ASSESSMENT — MIFFLIN-ST. JEOR: SCORE: 1692.87

## 2019-02-21 NOTE — DISCHARGE INSTRUCTIONS
DIVERTICULOSIS  You have diverticulosis. This means that small pouches have formed in the wall of the colon (large intestine). Most often, this problem causes no symptoms. But the pouches in the colon are at risk for becoming infected or inflamed. When this happens, the condition is called diverticulitis.  The doctor will talk with you about how to manage your condition. Diet changes may be all that are needed to help control diverticulosis and prevent progression to diverticulitis. If you develop diverticulitis, other treatments will likely be needed.  HOME CARE  Medications: You may be told to take fiber supplements daily. Fiber adds bulk to the stool so that it passes through the colon more easily. Stool softeners may be recommended. You may also be given medications for pain relief. Be sure to take all medications as directed.  General Care:    Eat unprocessed foods that are high in fiber. Whole grains, fruits, and vegetables are good choices.    Drink 6 to 8 glasses of water daily.    Watch for changes in your bowel movements. Tell the doctor if you notice any changes.    Begin an exercise program. Ask your doctor how to get started.    Get plenty of rest and sleep.  FOLLOW UP as advised by the doctor or our staff. Regular visits may be needed to check on your health. Be sure to keep all your appointments.  GET PROMPT MEDICAL ATTENTION if any of the following occurs:    Fever of 100.6 F (38 C) or higher, or as directed by your healthcare provider    Severe cramps in the lower left side of the abdomen or pain that is getting progressively worse.    Tenderness in the lower left side of the abdomen or worsening pain throughout the abdomen    Diarrhea or constipation that does not improve within 24 hours    Nausea and vomiting    Bleeding from the rectum      1476-0139 [facility], [address] [place] [address], [place] [address]. All rights reserved. This information is not intended as a substitute for professional  medical care. Always follow your healthcare professional's instructions.    Eating a High-Fiber Diet  Fiber is what gives strength and structure to plants. Most grains, beans, vegetables, and fruits contain fiber. Foods rich in fiber are often low in calories and fat, and they fill you up more. They may also reduce your risks for certain health problems. To find out the amount of fiber in canned, packaged, or frozen foods, read the  Nutrition Facts  label. It tells you how much fiber is in a serving.      Types of Fiber and Their Benefits  There are two types of fiber: insoluble and soluble. They both aid digestion and help you maintain a healthy weight.  Insoluble fiber: This is found in whole grains, cereals, certain fruits and vegetables (such as apple skin, corn, and carrots). Insoluble fiber may prevent constipation and reduce the risk of certain types of cancer.   Soluble fiber: This type of fiber is in oats, beans, and certain fruits and vegetables (such as strawberries and peas). Soluble fiber can reduce cholesterol (which may help lower the risk of heart disease), and helps control blood sugar levels.  Look for High-Fiber Foods  Whole-grain breads and cereals: Try to eat 6-8 ounces a day. Include wheat and oat bran cereals, whole-wheat muffins or toast, and corn tortillas in your meals.  Fruits: Try to eat 2 cups a day. Apples, oranges, strawberries, pears, and bananas are good sources. (Note: Fruit juice is low in fiber.)  Vegetables: Try to eat 3 cups a day. Add asparagus, carrots, broccoli, peas, and corn to your meals.  Legumes (beans): One cup of cooked lentils gives you over 15 grams of fiber. Try navy beans, lentils, and chickpeas.  Seeds:  A small handful of seeds gives you about 3 grams of fiber. Try sunflower seeds.    Keep Track of Your Fiber  A healthy diet includes 31 grams of fiber a day if you have a 2,000-calorie diet. Keep track of how much fiber you eat. Start by reading food labels. Then  eat a variety of foods high in fiber. Ask your doctor about supplemental fiber products.            5078-7972 [employer], [address] [facility], [place] [address]. All rights reserved. This information is not intended as a substitute for professional medical care. Always follow your healthcare professional's instructions.    HIGH FIBER DIET  Fiber is present in all fruits, vegetables, cereals and grains. Fiber passes through the body undigested. A high fiber diet helps food move through the intestinal tract. The added bulk is helpful in preventing constipation. In people with diverticulosis it serves to clean out the pouches along the colon wall while preventing new ones from forming. A high fiber diet also reduces the risk of colon cancer, decreases blood cholesterol and prevents high blood sugar in people with diabetes.    The foods listed below are high in fiber and should be included in your diet. If you are not used to high fiber foods, start with 1 or 2 foods from this list. Every 3-4 days add a new one to your diet until you are eating 4 high fiber foods per day. This should give you 20-35 Gm of fiber/day. It is also important to drink a lot of water when you are on this diet (6-8 glasses a day). Water causes the fiber to swell and increases the benefit.    FOODS HIGH IN DIETARY FIBER:  BREADS: Made with 100% whole wheat flour; wayne, wheat or rye crackers; tortillas, bran muffins  CEREALS: Whole grain cereal with bran (Chex, Raisin Bran, Corn Bran), oatmeal, rolled oats, granola, wheat flakes, brown rice  NUTS: Any nuts  FRUITS: All fresh fruits along with edible skins, (bananas, citrus fruit, mangoes, pears, prunes, raisins, apples, pineapple, apricot, melon, jams and marmalades), fruit juices (especially prune juice)  VEGETABLES: All types, preferably raw or lightly cooked: especially, celery, eggplant, potatoes, spinach, broccoli, brussel sprouts, winter squash, carrots, cauliflower, soybeans,  lentils, fresh and dried beans of all kinds  OTHER: Popcorn, any spices      6220-2444 [facility], [address] [place] [address], [place] [address]. All rights reserved. This information is not intended as a substitute for professional medical care. Always follow your healthcare professional's instructions.

## 2019-02-21 NOTE — CONSULTS
Pre-Endoscopy History and Physical     Tomás Cruz MRN# [id number]   YOB: 1940 Age: 78 year old     Date of Procedure: 2/21/2019  Primary care provider: Mahamed Groves  Type of Endoscopy: colonoscopy  Reason for Procedure: diarrhea, history of polyps  Type of Anesthesia Anticipated: Moderate (conscious) sedation    HPI:    Tomás is a 78 year old male who will be undergoing the above procedure.      A history and physical has been performed. The patient's medications and allergies have been reviewed. The risks and benefits of the procedure and the sedation options and risks were discussed with the patient.  All questions were answered and informed consent was obtained.      He denies a personal or family history of anesthesia complications or bleeding disorders.     No Known Allergies     No current facility-administered medications for this encounter.        Patient Active Problem List   Diagnosis     Impotence of organic origin     Hypersomnia with sleep apnea     Cardiomyopathy, nonischemic     Polyp of colon     Hyperlipidemia LDL goal <100     Advance Care Planning     Syncope     S/P mitral valve replacement     Health Care Home     Long-term (current) use of anticoagulants [Z79.01]     Essential tremor     Paroxysmal atrial fibrillation (H)     History of prosthetic heart valve     Automatic implantable cardioverter-defibrillator in situ     Elevated prostate specific antigen (PSA)     Decreased GFR     Essential hypertension with goal blood pressure less than 140/90     Arthritis of knee, right     Intention tremor     Cerebral infarction (H)        Past Medical History:   Diagnosis Date     Atrial fibrillation (H)     Transient around time of MVR.  Had MAZE procedure by report.     CVA (cerebral infarction)      Disc disorder of lumbar region 10/05, 1/07, 8/08    moderate to severe foraminal stenosis - rt      HYPERSOMNI W SLEEP APNEA 6/07    Patient reports he was evaluated with a  "sleep study and told he does not have significant sleep apnea.     Hypertension goal BP (blood pressure) < 140/90      Mitral valve disorders(424.0)     s/p mitral valve replacment- ; SBE prophylaxsis and anticoagulated; echo - EF 30-35%     Non-ischemic cardiomyopathy (H)     EF as above.  Follows with [facility].     Polyp of colon      Renal mass     Benign per patient report, Right kidney removed.        Past Surgical History:   Procedure Laterality Date     ARTHROSCOPY KNEE Left     left knee arthroscopy     ARTHROSCOPY KNEE  2018    right knee partial     AS TOTAL KNEE ARTHROPLASTY Left     Dr. Castro     CARDIAC SURGERY      Mitral valve replacement     CATARACT IOL, RT/LT      Cataracts, bilateral     COLONOSCOPY      [facility]     COLONOSCOPY  2019    Dr. Santiago COBB     HERNIA REPAIR       IMPLANT AUTOMATIC IMPLANTABLE CARDIOVERTER DEFIBRILLATOR  2013     KIDNEY SURGERY      Laparoscopic right radical nephrectomy.- due to complex hemorrhagic cyst       Social History     Tobacco Use     Smoking status: Former Smoker     Last attempt to quit: 1982     Years since quittin.7     Smokeless tobacco: Never Used   Substance Use Topics     Alcohol use: No       Family History   Adopted: Yes   Problem Relation Age of Onset     Unknown/Adopted Mother      Unknown/Adopted Father      No Known Problems Daughter      No Known Problems Daughter      No Known Problems Daughter      No Known Problems Daughter      Diabetes No family hx of      Coronary Artery Disease No family hx of        REVIEW OF SYSTEMS:     5 point ROS negative except as noted above in HPI, including Gen., Resp., CV, GI &  system review.    PHYSICAL EXAM:   Resp 16   Ht 1.88 m (6' 2\")   Wt 89.8 kg (198 lb)   BMI 25.42 kg/m   Estimated body mass index is 25.42 kg/m  as calculated from the following:    Height as of this encounter: 1.88 m (6' 2\").    Weight as of this " encounter: 89.8 kg (198 lb).   GENERAL APPEARANCE: healthy  MENTAL STATUS: alert  AIRWAY EXAM: Mallampatti Class I (visualization of the soft palate, fauces, uvula, anterior and posterior pillars)  RESP: lungs clear to auscultation - no rales, rhonchi or wheezes  CV: regular rates and rhythm    DIAGNOSTICS:      Not indicated    IMPRESSION     ASA Class 2 - Mild systemic disease    PLAN:     Colonoscopy    The above has been forwarded to the consulting provider.    Signed Electronically by: Ronald Henderson  February 21, 2019

## 2019-02-22 LAB — COPATH REPORT: NORMAL

## 2019-02-25 ENCOUNTER — ANTICOAGULATION THERAPY VISIT (OUTPATIENT)
Dept: NURSING | Facility: CLINIC | Age: 79
End: 2019-02-25
Payer: COMMERCIAL

## 2019-02-25 DIAGNOSIS — Z95.2 S/P MITRAL VALVE REPLACEMENT: ICD-10-CM

## 2019-02-25 LAB — INR POINT OF CARE: 2.4 (ref 0.86–1.14)

## 2019-02-25 PROCEDURE — 85610 PROTHROMBIN TIME: CPT | Mod: QW

## 2019-02-25 PROCEDURE — 36416 COLLJ CAPILLARY BLOOD SPEC: CPT

## 2019-02-25 NOTE — PROGRESS NOTES
ANTICOAGULATION FOLLOW-UP CLINIC VISIT    Patient Name:  Tomás Cruz  Date:  2019  Contact Type:  Face to Face    SUBJECTIVE:     Patient Findings     Positives:   Intentional hold of therapy    Comments:   Advised pt to stop the lovenox            OBJECTIVE    INR Protime   Date Value Ref Range Status   2019 2.4 (A) 0.86 - 1.14 Final       ASSESSMENT / PLAN  No question data found.  Anticoagulation Summary  As of 2019    INR goal:   2.5-3.5   TTR:   90.6 % (3 y)   INR used for dosin.4! (2019)   Warfarin maintenance plan:   2 mg (2 mg x 1) every day   Full warfarin instructions:   2 mg every day   Weekly warfarin total:   14 mg   Plan last modified:   Rose Marie Azul RN (2016)   Next INR check:   3/11/2019   Target end date:       Indications    Long-term (current) use of anticoagulants [Z79.01] [Z79.01]  S/P mitral valve replacement [Z95.2]             Anticoagulation Episode Summary     INR check location:   [facility]    Preferred lab:       Send INR reminders to:    NURSE    Comments:         Anticoagulation Care Providers     Provider Role Specialty Phone number    Mahamed Groves MD [facility] [phone]            See the Encounter Report to view Anticoagulation Flowsheet and Dosing Calendar (Go to Encounters tab in chart review, and find the Anticoagulation Therapy Visit)    Dosage adjustment made based on physician directed care plan.    Myra Holguin RN

## 2019-03-04 DIAGNOSIS — Z95.2 S/P MITRAL VALVE REPLACEMENT: ICD-10-CM

## 2019-03-04 RX ORDER — AMOXICILLIN 500 MG/1
2000 CAPSULE ORAL
Qty: 4 CAPSULE | Refills: 1 | Status: SHIPPED | OUTPATIENT
Start: 2019-03-04 | End: 2019-10-31

## 2019-03-04 NOTE — TELEPHONE ENCOUNTER
"Requested Prescriptions   Pending Prescriptions Disp Refills     amoxicillin (AMOXIL) 500 MG capsule [Pharmacy Med Name: AMOXICILLIN 500 MG CAPSULE]    (Discontinued)   4 capsule 1    Last Written Prescription Date:  11.9.17  Last Fill Quantity: 4,  # refills: 4   Last office visit: 1/16/2019 with prescribing provider:     Future Office Visit:     Sig: TAKE 4 CAPSULES (2,000 MG) BY MOUTH ONCE AS NEEDED PRIOR TO DENTAL APPTS    Oral Acne/Rosacea Medications Protocol Failed - 3/4/2019  1:50 PM       Failed - Confirmation of diagnosis is required    Please confirm diagnosis is acne or rosacea.     If NOT acne or rosacea; refer request to provider for further evaluation.    If diagnosis IS acne or rosacea, OK to refill BASED ON PREVIOUS REFILL CLINICAL NOTE RECOMMENDATION.         Failed - Medication is active on med list       Passed - Patient is 12 years of age or older       Passed - Recent (12 mo) or future (30 days) visit within the authorizing provider's specialty    Patient had office visit in the last 12 months or has a visit in the next 30 days with authorizing provider or within the authorizing provider's specialty.  See \"Patient Info\" tab in inbasket, or \"Choose Columns\" in Meds & Orders section of the refill encounter.                "

## 2019-03-04 NOTE — TELEPHONE ENCOUNTER
Routing refill request to provider for review/approval because:  Drug not on the [facility] refill protocol   For dental prophylaxis      ANEL Ham, RN, N  [facility] [phone]

## 2019-03-11 ENCOUNTER — ANTICOAGULATION THERAPY VISIT (OUTPATIENT)
Dept: NURSING | Facility: CLINIC | Age: 79
End: 2019-03-11
Payer: COMMERCIAL

## 2019-03-11 DIAGNOSIS — Z95.2 S/P MITRAL VALVE REPLACEMENT: ICD-10-CM

## 2019-03-11 LAB — INR POINT OF CARE: 2.4 (ref 0.86–1.14)

## 2019-03-11 PROCEDURE — 36416 COLLJ CAPILLARY BLOOD SPEC: CPT

## 2019-03-11 PROCEDURE — 85610 PROTHROMBIN TIME: CPT | Mod: QW

## 2019-03-11 NOTE — PROGRESS NOTES
ANTICOAGULATION FOLLOW-UP CLINIC VISIT    Patient Name:  Tomás Cruz  Date:  3/11/2019  Contact Type:  Face to Face    SUBJECTIVE:     Patient Findings     Positives:   Other complaints (RECENT COLONOSCOPY)           OBJECTIVE    INR Protime   Date Value Ref Range Status   2019 2.4 (A) 0.86 - 1.14 Final       ASSESSMENT / PLAN  INR assessment THER    Recheck INR In: 6 WEEKS    INR Location Clinic      Anticoagulation Summary  As of 3/11/2019    INR goal:   2.5-3.5   TTR:   89.5 % (3 y)   INR used for dosin.4! (3/11/2019)   Warfarin maintenance plan:   2 mg (2 mg x 1) every day   Full warfarin instructions:   2 mg every day   Weekly warfarin total:   14 mg   Plan last modified:   Mojgan Brasher RN (3/11/2019)   Next INR check:   2019   Target end date:       Indications    Long-term (current) use of anticoagulants [Z79.01] [Z79.01]  S/P mitral valve replacement [Z95.2]             Anticoagulation Episode Summary     INR check location:   [facility]    Preferred lab:       Send INR reminders to:    NURSE    Comments:         Anticoagulation Care Providers     Provider Role Specialty Phone number    Mahamed Groves MD [facility] [phone]            See the Encounter Report to view Anticoagulation Flowsheet and Dosing Calendar (Go to Encounters tab in chart review, and find the Anticoagulation Therapy Visit)    Dosage adjustment made based on physician directed care plan.    Mojgan Brasher RN

## 2019-03-26 ENCOUNTER — PRE VISIT (OUTPATIENT)
Dept: UROLOGY | Facility: CLINIC | Age: 79
End: 2019-03-26

## 2019-03-26 DIAGNOSIS — R97.20 ELEVATED PROSTATE SPECIFIC ANTIGEN (PSA): Primary | ICD-10-CM

## 2019-03-26 NOTE — TELEPHONE ENCOUNTER
Reason for visit: Elevated PSA     Relevant information: pt has a pacemaker, low risk 4K    Records/imaging/labs/orders: PSA is ordered and scheduled    Pt called: generic message left    At Rooming: regular

## 2019-04-01 NOTE — PROGRESS NOTES
Urology Clinic Note    CC: Elevated PSA    HPI:    Mr. Mckeon is a very pleasant 78-year-old male who presents with history of elevated PSA.  His most recent PSA on July 18 2018 was 5.3. Today it is 4.7.  It was 4.0 in 2017 and 5.5 in 2016.    He is anticoagulated for a mechanical heart valve, also with pacemaker.    Discussion last time was to consider prostate MRI if possible but was unable to obtain due to above.    He does report some urinary frequency, minimal nocturia, feeling of incomplete emptying which is only slightly bothersome. He would consider trying Flowmax for this.        Exam:    Patient is a 78 year old  male   Vitals: Blood pressure (!) 133/92, pulse 58, weight 93 kg (205 lb).  General Appearance Adult: Alert, no acute distress, oriented  HENT: throat/mouth:normal, good dentition  Neck: No adenopathy,masses or thyromegaly  Lungs: no respiratory distress, or pursed lip breathing  Heart: No obvious jugular venous distension present  Abdomen: soft, nontender, no organomegaly or masses  Lymphatics: No cervical or supraclavicular adenopathy  Musculoskeltal: extremities normal, no peripheral edema  Skin: no suspicious lesions or rashes  Neuro: Alert, oriented, speech and mentation normal  Psych: affect and mood normal  Gait: Normal  : deferred      Labs and Pathology:    See HPI    Assessment/Plan:     78-year-old gentleman with elevated PSA no history of prostate cancer in his family, never biopsied.    - Will risk stratify with exosome/Multiplex testing and return to clinic with results    Seen and discussed with Dr. Saleem who agrees    Stella Pascal MD  PGY 2 Urology    Patient seen and examined with the resident.  Visit time 30 minutes and >50% spent in counseling.  I agree with the resident's note and plan of care.       Мария Saleem MD  Urology Staff

## 2019-04-02 ENCOUNTER — OFFICE VISIT (OUTPATIENT)
Dept: UROLOGY | Facility: CLINIC | Age: 79
End: 2019-04-02
Payer: COMMERCIAL

## 2019-04-02 VITALS
BODY MASS INDEX: 26.32 KG/M2 | WEIGHT: 205 LBS | DIASTOLIC BLOOD PRESSURE: 92 MMHG | HEART RATE: 58 BPM | SYSTOLIC BLOOD PRESSURE: 133 MMHG

## 2019-04-02 DIAGNOSIS — N40.1 BENIGN PROSTATIC HYPERPLASIA WITH URINARY FREQUENCY: Primary | ICD-10-CM

## 2019-04-02 DIAGNOSIS — R97.20 ELEVATED PROSTATE SPECIFIC ANTIGEN (PSA): ICD-10-CM

## 2019-04-02 DIAGNOSIS — R35.0 BENIGN PROSTATIC HYPERPLASIA WITH URINARY FREQUENCY: Primary | ICD-10-CM

## 2019-04-02 LAB — PSA SERPL-MCNC: 4.7 UG/L (ref 0–4)

## 2019-04-02 RX ORDER — TAMSULOSIN HYDROCHLORIDE 0.4 MG/1
0.4 CAPSULE ORAL DAILY
Qty: 30 CAPSULE | Refills: 11 | Status: ON HOLD | OUTPATIENT
Start: 2019-04-02 | End: 2019-04-17

## 2019-04-02 ASSESSMENT — PAIN SCALES - GENERAL: PAINLEVEL: NO PAIN (0)

## 2019-04-02 NOTE — LETTER
4/2/2019       RE: Tomás Cruz  [address] [facility] [id number]     Dear Colleague,    Thank you for referring your patient, Tomás Cruz, to the [facility] UROLOGY AND INST FOR PROSTATE AND UROLOGIC CANCERS at [facility]. Please see a copy of my visit note below.    Urology Clinic Note    CC: Elevated PSA    HPI:    Mr. Mckeon is a very pleasant 78-year-old male who presents with history of elevated PSA.  His most recent PSA on July 18 2018 was 5.3. Today it is 4.7.  It was 4.0 in 2017 and 5.5 in 2016.    He is anticoagulated for a mechanical heart valve, also with pacemaker.    Discussion last time was to consider prostate MRI if possible but was unable to obtain due to above.    He does report some urinary frequency, minimal nocturia, feeling of incomplete emptying which is only slightly bothersome. He would consider trying Flowmax for this.        Exam:    Patient is a 78 year old  male   Vitals: Blood pressure (!) 133/92, pulse 58, weight 93 kg (205 lb).  General Appearance Adult: Alert, no acute distress, oriented  HENT: throat/mouth:normal, good dentition  Neck: No adenopathy,masses or thyromegaly  Lungs: no respiratory distress, or pursed lip breathing  Heart: No obvious jugular venous distension present  Abdomen: soft, nontender, no organomegaly or masses  Lymphatics: No cervical or supraclavicular adenopathy  Musculoskeltal: extremities normal, no peripheral edema  Skin: no suspicious lesions or rashes  Neuro: Alert, oriented, speech and mentation normal  Psych: affect and mood normal  Gait: Normal  : deferred      Labs and Pathology:    See HPI    Assessment/Plan:     78-year-old gentleman with elevated PSA no history of prostate cancer in his family, never biopsied.    - Will risk stratify with exosome/Multiplex testing and return to clinic with results    Seen and discussed with Dr. Saleem who agrees    Stella Pascal MD  PGY 2  Urology    Patient seen and examined with the resident.  Visit time 30 minutes and >50% spent in counseling.  I agree with the resident's note and plan of care.       Мария Saleem MD  Urology Staff

## 2019-04-02 NOTE — PATIENT INSTRUCTIONS
Please follow up with Cat Zarco in six months with a PSA drawn in advance.    It was a pleasure meeting with you today.  Thank you for allowing me and my team the privilege of caring for you today.  YOU are the reason we are here, and I truly hope we provided you with the excellent service you deserve.  Please let us know if there is anything else we can do for you so that we can be sure you are leaving completely satisfied with your care experience.        Ke Alvarado, EMT

## 2019-04-02 NOTE — NURSING NOTE
Return-Elevated PSA  PSA-4.70  He denies any new sx or pain      Chief Complaint   Patient presents with     RECHECK     Elevated PSA       Blood pressure (!) 133/92, pulse 58, weight 93 kg (205 lb). Body mass index is 26.32 kg/m .    Patient Active Problem List   Diagnosis     Impotence of organic origin     Hypersomnia with sleep apnea     Cardiomyopathy, nonischemic     Polyp of colon     Hyperlipidemia LDL goal <100     Advance Care Planning     Syncope     S/P mitral valve replacement     Health Care Home     Long-term (current) use of anticoagulants [Z79.01]     Essential tremor     Paroxysmal atrial fibrillation (H)     History of prosthetic heart valve     Automatic implantable cardioverter-defibrillator in situ     Elevated prostate specific antigen (PSA)     Decreased GFR     Essential hypertension with goal blood pressure less than 140/90     Arthritis of knee, right     Intention tremor     Cerebral infarction (H)       No Known Allergies    Current Outpatient Medications   Medication Sig Dispense Refill     amoxicillin (AMOXIL) 500 MG capsule TAKE 4 CAPSULES (2,000 MG) BY MOUTH ONCE AS NEEDED PRIOR TO DENTAL APPTS 4 capsule 1     lisinopril (PRINIVIL/ZESTRIL) 20 MG tablet Take 1 tablet (20 mg) by mouth daily 90 tablet 3     metoprolol succinate (TOPROL-XL) 50 MG 24 hr tablet Take 1 tablet (50 mg) by mouth daily 90 tablet 3     multivitamin w/minerals (MULTI-VITAMIN) tablet Take 1 tablet by mouth daily       pravastatin (PRAVACHOL) 40 MG tablet Take 1 tablet (40 mg) by mouth daily 90 tablet 3     sildenafil (VIAGRA) 100 MG tablet Take 1 tablet (100 mg) by mouth daily as needed (erectile dysfunction) 30 min to 4 hrs before sex. Do not use with nitroglycerin, terazosin or doxazosin. 12 tablet 11     warfarin (COUMADIN) 2 MG tablet TAKE ONE TABLET (2MG) BY MOUTH DAILY, EXCEPT TWO TABLETS (4MG) ON FRIDAYS 90 tablet 0     warfarin (COUMADIN) 2 MG tablet TAKE ONE TABLET (2MG) BY MOUTH DAILY, EXCEPT TWO TABLETS  (4MG) ON  90 tablet 3       Social History     Tobacco Use     Smoking status: Former Smoker     Last attempt to quit: 1982     Years since quittin.8     Smokeless tobacco: Never Used   Substance Use Topics     Alcohol use: No     Drug use: No       Ke Alvarado, EMT  2019  3:58 PM

## 2019-04-09 LAB — MISCELLANEOUS TEST: NORMAL

## 2019-04-10 LAB — LAB SCANNED RESULT: NORMAL

## 2019-04-13 ENCOUNTER — TRANSFERRED RECORDS (OUTPATIENT)
Dept: HEALTH INFORMATION MANAGEMENT | Facility: CLINIC | Age: 79
End: 2019-04-13

## 2019-04-13 ENCOUNTER — APPOINTMENT (OUTPATIENT)
Dept: GENERAL RADIOLOGY | Facility: CLINIC | Age: 79
DRG: 308 | End: 2019-04-13
Attending: EMERGENCY MEDICINE
Payer: MEDICARE

## 2019-04-13 ENCOUNTER — HOSPITAL ENCOUNTER (INPATIENT)
Facility: CLINIC | Age: 79
LOS: 3 days | Discharge: HOME | DRG: 308 | End: 2019-04-17
Attending: EMERGENCY MEDICINE | Admitting: HOSPITALIST
Payer: MEDICARE

## 2019-04-13 DIAGNOSIS — R07.9 CHEST PAIN, UNSPECIFIED TYPE: ICD-10-CM

## 2019-04-13 DIAGNOSIS — I42.9 CARDIOMYOPATHY, UNSPECIFIED TYPE (H): Primary | ICD-10-CM

## 2019-04-13 DIAGNOSIS — I48.92 ATRIAL FLUTTER WITH RAPID VENTRICULAR RESPONSE (H): ICD-10-CM

## 2019-04-13 DIAGNOSIS — R06.00 DYSPNEA, UNSPECIFIED TYPE: ICD-10-CM

## 2019-04-13 LAB
ANION GAP SERPL CALCULATED.3IONS-SCNC: 5 MMOL/L (ref 3–14)
BASOPHILS # BLD AUTO: 0.1 10E9/L (ref 0–0.2)
BASOPHILS NFR BLD AUTO: 0.8 %
BUN SERPL-MCNC: 32 MG/DL (ref 7–30)
C DIFF TOX B STL QL: NEGATIVE
CALCIUM SERPL-MCNC: 9 MG/DL (ref 8.5–10.1)
CHLORIDE SERPL-SCNC: 110 MMOL/L (ref 94–109)
CO2 SERPL-SCNC: 25 MMOL/L (ref 20–32)
CREAT SERPL-MCNC: 1.35 MG/DL (ref 0.66–1.25)
DIFFERENTIAL METHOD BLD: ABNORMAL
EOSINOPHIL # BLD AUTO: 0.1 10E9/L (ref 0–0.7)
EOSINOPHIL NFR BLD AUTO: 1.2 %
ERYTHROCYTE [DISTWIDTH] IN BLOOD BY AUTOMATED COUNT: 15.8 % (ref 10–15)
GFR SERPL CREATININE-BSD FRML MDRD: 50 ML/MIN/{1.73_M2}
GLUCOSE SERPL-MCNC: 140 MG/DL (ref 70–99)
HCT VFR BLD AUTO: 49.8 % (ref 40–53)
HGB BLD-MCNC: 16.1 G/DL (ref 13.3–17.7)
IMM GRANULOCYTES # BLD: 0.1 10E9/L (ref 0–0.4)
IMM GRANULOCYTES NFR BLD: 0.5 %
INR PPP: 3.41 (ref 0.86–1.14)
LYMPHOCYTES # BLD AUTO: 0.8 10E9/L (ref 0.8–5.3)
LYMPHOCYTES NFR BLD AUTO: 7 %
MCH RBC QN AUTO: 28.5 PG (ref 26.5–33)
MCHC RBC AUTO-ENTMCNC: 32.3 G/DL (ref 31.5–36.5)
MCV RBC AUTO: 88 FL (ref 78–100)
MONOCYTES # BLD AUTO: 1.3 10E9/L (ref 0–1.3)
MONOCYTES NFR BLD AUTO: 12.2 %
NEUTROPHILS # BLD AUTO: 8.4 10E9/L (ref 1.6–8.3)
NEUTROPHILS NFR BLD AUTO: 78.3 %
NRBC # BLD AUTO: 0 10*3/UL
NRBC BLD AUTO-RTO: 0 /100
NT-PROBNP SERPL-MCNC: ABNORMAL PG/ML (ref 0–1800)
PLATELET # BLD AUTO: 412 10E9/L (ref 150–450)
POTASSIUM SERPL-SCNC: 4.6 MMOL/L (ref 3.4–5.3)
RBC # BLD AUTO: 5.65 10E12/L (ref 4.4–5.9)
SODIUM SERPL-SCNC: 140 MMOL/L (ref 133–144)
SPECIMEN SOURCE: NORMAL
TROPONIN I SERPL-MCNC: 0.02 UG/L (ref 0–0.04)
TROPONIN I SERPL-MCNC: <0.015 UG/L (ref 0–0.04)
TSH SERPL DL<=0.005 MIU/L-ACNC: 3.9 MU/L (ref 0.4–4)
WBC # BLD AUTO: 10.7 10E9/L (ref 4–11)

## 2019-04-13 PROCEDURE — 93296 REM INTERROG EVL PM/IDS: CPT

## 2019-04-13 PROCEDURE — 85610 PROTHROMBIN TIME: CPT | Performed by: EMERGENCY MEDICINE

## 2019-04-13 PROCEDURE — G0378 HOSPITAL OBSERVATION PER HR: HCPCS

## 2019-04-13 PROCEDURE — 25000128 H RX IP 250 OP 636: Performed by: EMERGENCY MEDICINE

## 2019-04-13 PROCEDURE — 25000125 ZZHC RX 250: Performed by: EMERGENCY MEDICINE

## 2019-04-13 PROCEDURE — 93005 ELECTROCARDIOGRAM TRACING: CPT | Mod: 76

## 2019-04-13 PROCEDURE — 96361 HYDRATE IV INFUSION ADD-ON: CPT

## 2019-04-13 PROCEDURE — 25000132 ZZH RX MED GY IP 250 OP 250 PS 637: Performed by: INTERNAL MEDICINE

## 2019-04-13 PROCEDURE — 85025 COMPLETE CBC W/AUTO DIFF WBC: CPT | Performed by: EMERGENCY MEDICINE

## 2019-04-13 PROCEDURE — 71045 X-RAY EXAM CHEST 1 VIEW: CPT

## 2019-04-13 PROCEDURE — 84484 ASSAY OF TROPONIN QUANT: CPT | Performed by: INTERNAL MEDICINE

## 2019-04-13 PROCEDURE — 36415 COLL VENOUS BLD VENIPUNCTURE: CPT | Performed by: INTERNAL MEDICINE

## 2019-04-13 PROCEDURE — 96374 THER/PROPH/DIAG INJ IV PUSH: CPT

## 2019-04-13 PROCEDURE — A9270 NON-COVERED ITEM OR SERVICE: HCPCS | Performed by: INTERNAL MEDICINE

## 2019-04-13 PROCEDURE — 80048 BASIC METABOLIC PNL TOTAL CA: CPT | Performed by: EMERGENCY MEDICINE

## 2019-04-13 PROCEDURE — 99220 ZZC INITIAL OBSERVATION CARE,LEVL III: CPT | Performed by: INTERNAL MEDICINE

## 2019-04-13 PROCEDURE — 93005 ELECTROCARDIOGRAM TRACING: CPT

## 2019-04-13 PROCEDURE — 99285 EMERGENCY DEPT VISIT HI MDM: CPT | Mod: 25

## 2019-04-13 PROCEDURE — 84484 ASSAY OF TROPONIN QUANT: CPT | Performed by: EMERGENCY MEDICINE

## 2019-04-13 PROCEDURE — 83880 ASSAY OF NATRIURETIC PEPTIDE: CPT | Performed by: EMERGENCY MEDICINE

## 2019-04-13 PROCEDURE — 84443 ASSAY THYROID STIM HORMONE: CPT | Performed by: EMERGENCY MEDICINE

## 2019-04-13 PROCEDURE — 87493 C DIFF AMPLIFIED PROBE: CPT | Performed by: INTERNAL MEDICINE

## 2019-04-13 RX ORDER — DILTIAZEM HYDROCHLORIDE 5 MG/ML
15 INJECTION INTRAVENOUS ONCE
Status: COMPLETED | OUTPATIENT
Start: 2019-04-13 | End: 2019-04-13

## 2019-04-13 RX ORDER — MULTIPLE VITAMINS W/ MINERALS TAB 9MG-400MCG
1 TAB ORAL DAILY
Status: DISCONTINUED | OUTPATIENT
Start: 2019-04-14 | End: 2019-04-17 | Stop reason: HOSPADM

## 2019-04-13 RX ORDER — PROCHLORPERAZINE 25 MG
12.5 SUPPOSITORY, RECTAL RECTAL EVERY 12 HOURS PRN
Status: DISCONTINUED | OUTPATIENT
Start: 2019-04-13 | End: 2019-04-17 | Stop reason: HOSPADM

## 2019-04-13 RX ORDER — ACETAMINOPHEN 325 MG/1
650 TABLET ORAL EVERY 4 HOURS PRN
Status: DISCONTINUED | OUTPATIENT
Start: 2019-04-13 | End: 2019-04-17 | Stop reason: HOSPADM

## 2019-04-13 RX ORDER — LISINOPRIL 20 MG/1
20 TABLET ORAL DAILY
Status: DISCONTINUED | OUTPATIENT
Start: 2019-04-14 | End: 2019-04-16

## 2019-04-13 RX ORDER — WARFARIN SODIUM 3 MG/1
3 TABLET ORAL WEEKLY
Status: ON HOLD | COMMUNITY
End: 2019-04-17

## 2019-04-13 RX ORDER — PROCHLORPERAZINE MALEATE 5 MG
5 TABLET ORAL EVERY 6 HOURS PRN
Status: DISCONTINUED | OUTPATIENT
Start: 2019-04-13 | End: 2019-04-17 | Stop reason: HOSPADM

## 2019-04-13 RX ORDER — NITROGLYCERIN 0.4 MG/1
0.4 TABLET SUBLINGUAL EVERY 5 MIN PRN
Status: DISCONTINUED | OUTPATIENT
Start: 2019-04-13 | End: 2019-04-15

## 2019-04-13 RX ORDER — WARFARIN SODIUM 2 MG/1
2 TABLET ORAL
Status: ON HOLD | COMMUNITY
End: 2019-04-17

## 2019-04-13 RX ORDER — ACETAMINOPHEN 650 MG/1
650 SUPPOSITORY RECTAL EVERY 4 HOURS PRN
Status: DISCONTINUED | OUTPATIENT
Start: 2019-04-13 | End: 2019-04-17 | Stop reason: HOSPADM

## 2019-04-13 RX ORDER — LIDOCAINE 40 MG/G
CREAM TOPICAL
Status: DISCONTINUED | OUTPATIENT
Start: 2019-04-13 | End: 2019-04-17 | Stop reason: HOSPADM

## 2019-04-13 RX ORDER — TAMSULOSIN HYDROCHLORIDE 0.4 MG/1
0.4 CAPSULE ORAL DAILY
Status: DISCONTINUED | OUTPATIENT
Start: 2019-04-13 | End: 2019-04-17 | Stop reason: HOSPADM

## 2019-04-13 RX ORDER — NALOXONE HYDROCHLORIDE 0.4 MG/ML
.1-.4 INJECTION, SOLUTION INTRAMUSCULAR; INTRAVENOUS; SUBCUTANEOUS
Status: DISCONTINUED | OUTPATIENT
Start: 2019-04-13 | End: 2019-04-17 | Stop reason: HOSPADM

## 2019-04-13 RX ORDER — METOPROLOL SUCCINATE 50 MG/1
75 TABLET, EXTENDED RELEASE ORAL DAILY
Status: ON HOLD | COMMUNITY
End: 2019-04-17

## 2019-04-13 RX ORDER — PRAVASTATIN SODIUM 20 MG
40 TABLET ORAL DAILY
Status: DISCONTINUED | OUTPATIENT
Start: 2019-04-14 | End: 2019-04-17 | Stop reason: HOSPADM

## 2019-04-13 RX ADMIN — DILTIAZEM HYDROCHLORIDE 15 MG: 5 INJECTION INTRAVENOUS at 15:41

## 2019-04-13 RX ADMIN — SODIUM CHLORIDE 500 ML: 9 INJECTION, SOLUTION INTRAVENOUS at 15:41

## 2019-04-13 RX ADMIN — Medication 1 MG: at 22:48

## 2019-04-13 ASSESSMENT — ENCOUNTER SYMPTOMS
CHEST TIGHTNESS: 1
DIZZINESS: 1
BLOOD IN STOOL: 0
NAUSEA: 0
WEAKNESS: 1
PALPITATIONS: 1
SHORTNESS OF BREATH: 1
VOMITING: 0
FATIGUE: 1
FEVER: 0
COUGH: 1
DIARRHEA: 1

## 2019-04-13 ASSESSMENT — MIFFLIN-ST. JEOR: SCORE: 1724.62

## 2019-04-13 NOTE — PHARMACY-ANTICOAGULATION SERVICE
Clinical Pharmacy - Warfarin Dosing Consult     Pharmacy has been consulted to manage this patient s warfarin therapy.  Indication: Mechanical Mitral Valve Replacement  Therapy Goal: INR 2.5-3.5  Warfarin Prior to Admission: Yes  Warfarin PTA Regimen: 3MG ON MONDAY      2MG ROW    INR   Date Value Ref Range Status   04/13/2019 3.41 (H) 0.86 - 1.14 Final     INR Protime   Date Value Ref Range Status   03/11/2019 2.4 (A) 0.86 - 1.14 Final       Recommend warfarin 2MG TODAY (Patient had dose PTA)  Pharmacy will monitor Tomás Shannonsen daily and order warfarin doses to achieve specified goal.      Please contact pharmacy as soon as possible if the warfarin needs to be held for a procedure or if the warfarin goals change.

## 2019-04-13 NOTE — PROGRESS NOTES
ROOM # 208-2    Living Situation (if not independent, order SW consult): Independent with wife  Facility name: N/A  : Ava (spouse)     Activity level at baseline: Independent  Activity level on admit: SBA      Patient registered to observation; given Patient Bill of Rights; given the opportunity to ask questions about observation status and their plan of care.  Patient has been oriented to the observation room, bathroom and call light is in place.    Discussed discharge goals and expectations with patient/family.

## 2019-04-13 NOTE — ED PROVIDER NOTES
"  History     Chief Complaint:  Chest Pain    HPI   Tomás Cruz is a 78 year old male with a history of hypertension, CVA, non-ischemic cardiomyopathy, mitral valve disorder, and atrial fibrillation, status post mitral valve replacement, on Coumadin, defibrillator in situ, who presents with increased fatigue and exertional shortness of breath. The patient presents from clinic where he was sent for further evaluation and assessment. He has a history of hypertension and 1 week ago increased his metoprolol from 50 mg to 75 mg for new onset of atrial fibrillation. He reports that prior to this diagnosis he did not have any known diagnoses for irregular heart rate or rhythm, but 6 years ago after a syncopal episode was hospitalized and had a defibrillator placed. At this time, the patient also underwent a coronary angiogram but this showed clear vessels and he did not require stent placement. He denies any recurrent syncopal episodes or symptoms since, but then 5 days ago he developed increased fatigue and shortness of breath while shoveling his driveway. The patient states that this has been ongoing since onset with persistent exertional shortness of breath. He notes that he has been congested as well and wonders if his symptoms are related to shoveling as he has been doing this almost daily. The patient denies chest pain but has been having some mild right chest discomfort and pressure at rest with associated nonproductive cough. No fevers or nausea. He reports several months of ongoing abdominal discomfort and diarrhea as well and his wife reports one week of \"vertigo symptoms\", although he has not had any black or bloody stools. In addition to these symptoms, the patient was recently noted to have increased leg swelling and here he notes that at times he is able to hear his \"valve clicking rapidly,\" but otherwise denies having the sensation that his heart is racing or skipping beats. He has since been in contact " with his PCP regarding these symptoms, and due to concern presents for evaluation.    Allergies:  No known drug allergies     Medications:    Lisinopril   Metoprolol   Viagra   Pravastatin   Flomax   Coumadin      Past Medical History:    Atrial fibrillation   CVA  Disc disorder of lumbar region   Hypertension   Mitral valve disorder   Hypersomia   Sleep apnea   Non-ischemic cardiomyopathy   Essential tremor     Past Surgical History:    Left knee arthroscopy  Right knee partial arthroscopy   Mitral valve replacement   Cataract repair   Hernia repair   Implant cardioverter defibrillator   Right radical nephrectomy     Family History:    History reviewed. No pertinent family history.      Social History:  The patient was accompanied to the ED by his wife.  Smoking Status: Former  Smokeless Tobacco: Never  Alcohol Use: No   Marital Status:   [2]    Review of Systems   Constitutional: Positive for fatigue. Negative for fever.   HENT: Positive for congestion and postnasal drip.    Respiratory: Positive for cough, chest tightness and shortness of breath.    Cardiovascular: Positive for chest pain (pressure), palpitations and leg swelling.   Gastrointestinal: Positive for diarrhea. Negative for blood in stool, nausea and vomiting.   Neurological: Positive for dizziness and weakness.   All other systems reviewed and are negative.      Physical Exam     Patient Vitals for the past 24 hrs:   BP Temp Temp src Pulse Heart Rate Resp SpO2   04/13/19 1800 (!) 124/102 -- -- 89 77 15 96 %   04/13/19 1700 (!) 125/107 -- -- 80 79 24 92 %   04/13/19 1600 (!) 105/91 -- -- 74 86 21 93 %   04/13/19 1524 (!) 150/108 98.8  F (37.1  C) Oral 130 -- 20 94 %       Physical Exam  Vital signs and nursing notes reviewed.     Constitutional: laying on gurney appears comfortable  HENT: Oropharynx is clear and moist  Eyes: Conjunctivae are normal bilaterally. Pupils equal  Neck: normal range of motion  Cardiovascular: tachycardic rate,  irregular rhythm, normal heart sounds.   Pulmonary/Chest: Effort normal and breath sounds normal. No respiratory distress.   Abdominal: Soft. Bowel sounds are normal. No tenderness to palpation. No rebound or guarding.   Musculoskeletal: No joint swelling.  Mild lower extremity edema  Neurological: Alert and oriented. No focal weakness  Skin: Skin is warm and dry. No rash noted.   Psych: normal affect  Emergency Department Course     ECG (14:58:32):  Rate 104 bpm. LA interval *. QRS duration 100. QT/QTc 348/457. P-R-T axes * 103 26.   Undetermined rhythm.  Atrial fibrillation/tachycardic   Occasional PVCs    Rightward axis.   Interpreted at 1503 by Vinod Messer MD.     ECG (15:54:34):  Rate 86 bpm. LA interval *. QRS duration 152. QT/QTc 462/552. P-R-T axes * 50 246.   Undetermined rhythm.   Left bundle branch block.   Interpreted at 1557 by Vinod Messer MD.     Imaging:  Radiology findings were communicated with the patient who voiced understanding of the findings.    Chest XR, 1 View Portable:  IMPRESSION: Evidence of valve surgery. Films are taken in AP technique  which accentuates heart size, despite this there does appear to be  mild to moderate cardiomegaly. Previous valvular surgery. No change in  implanted cardiac device. Pulmonary vasculature is not distended.  Right lung is clear. It would be difficult to exclude an infiltrate or  atelectasis in the retrocardiac area.     SHRUTHI POTTS MD    Laboratory:  Laboratory findings were communicated with the patient who voiced understanding of the findings.    CBC: WNL (WBC 10.7, HGB 16.1, )  BMP: Chloride 110 (H),  (H), BUN 32 (H), Creatinine 1.35 (H), GFR 50 (L), o/w WNL    BMP: 19,871 (H)  Troponin (Collected 1527): <0.015   INR: 3.41 (H)  TSH with free T4 reflex: 3.90     Interventions:  1541 - NS Bolus 1,000mL IV   1541 - Diltiazem injection 15 mg IV      Emergency Department Course:  Nursing notes and vitals reviewed.    1511: I  performed an exam of the patient as documented above.     1554: Patient rechecked and updated.      1614: I spoke with [employer] regarding patient's presentation, findings, and plan of care.    1637: Patient rechecked and updated.      Findings and plan explained to the Patient who consents to admission.     1727: Discussed the patient with Dr. Ayala, who will admit the patient to a telemetry bed for further monitoring, evaluation, and treatment.     Impression & Plan      Medical Decision Making:  Patient is a 78 year old male who presents with intermittent chest pain, shortness of breath, and fatigue. Patient on arrival had findings of atrial flutter with RVR around 130's consistently. His EKG did not show elevated ST segment elevation or depression and his troponin was negative. His BNP is quite elevated which could suggest heart strain, but I have no recent comparison studies to discern chronicity of this. His chest x-ray does not reveal any obvious pulmonary edema or definable infiltrate. He is chest pain free at this time. I did give him a 15 mg dose of Cardizem IV as this did control his rate but he did not go into a sinus rhythm and is still in atrial flutter in the 70's and 80's. We did have his ICD interrogated and they confirmed that he is not having any ventricular dysrhythmias and these are consistently atrial flutter rhythms that he is experiencing. I felt that due to the multiple symptoms he is experiencing this could be related to the atrial flutter with RVR and the fact that he recently had his beta blocker increased with still poor rate control I felt he should be admitted for further observation and evaluation. I discussed with the patient and he is in agreement with plan. I spoke with Dr. Ayala who has graciously accepted the patient and will admit him to the telemetry floor.      Diagnosis:    ICD-10-CM    1. Chest pain, unspecified type R07.9    2. Dyspnea,  unspecified type R06.00    3. Atrial flutter with rapid ventricular response (H) I48.92        Disposition:  Admitted to cardiac telemetry       Summer Giron  4/13/2019   [facility] EMERGENCY DEPARTMENT  I, Summer Giron, am serving as a scribe at 3:11 PM on 4/13/2019 to document services personally performed by Vinod Messer MD based on my observations and the provider's statements to me.       Vinod Messer MD  04/13/19 0275

## 2019-04-13 NOTE — PHARMACY-ADMISSION MEDICATION HISTORY
Admission medication history interview status for this patient is complete. See [facility] admission navigator for allergy information, prior to admission medications and immunization status.     Medication history interview source(s):Patient  Medication history resources (including written lists, pill bottles, clinic record):None  Primary pharmacy: [place], [place] ([address])    Changes made to PTA medication list:  Added: none  Deleted: none  Changed: metoprolol (increase from 50 mg to 75 mg), warfarin (updated schedule)    Actions taken by pharmacist (provider contacted, etc):None     Additional medication history information: Metoprolol recently increased. Warfarin last doses:    Sat - 2 mg  Fri - 2 mg  Thur - 2 mg  Wed - 2 mg    Medication reconciliation/reorder completed by provider prior to medication history? No    Do you take OTC medications (eg tylenol, ibuprofen, fish oil, eye/ear drops, etc)? N    Prior to Admission medications    Medication Sig Last Dose Taking? Auth Provider   lisinopril (PRINIVIL/ZESTRIL) 20 MG tablet Take 1 tablet (20 mg) by mouth daily 4/13/2019 at 0730 Yes Mahamed Groves MD   metoprolol succinate ER (TOPROL-XL) 50 MG 24 hr tablet Take 75 mg by mouth daily 4/13/2019 at 0730 Yes Reported, Patient   multivitamin w/minerals (MULTI-VITAMIN) tablet Take 1 tablet by mouth daily 4/13/2019 at 0730 Yes Reported, Patient   pravastatin (PRAVACHOL) 40 MG tablet Take 1 tablet (40 mg) by mouth daily 4/13/2019 at 0730 Yes Mahamed Groves MD   warfarin (COUMADIN) 2 MG tablet Take 2 mg by mouth Six times weekly. Take 2 mg on Tue, Wed, Thur, Fri, Sat, Sun and take 3 mg on Mon. 4/13/2019 at 0730 Yes Reported, Patient   warfarin (COUMADIN) 3 MG tablet Take 3 mg by mouth once a week Take 2 mg on Tue, Wed, Thur, Fri, Sat, Sun and take 3 mg on Mon. 4/8/2019 at 0730 Yes Reported, Patient   amoxicillin (AMOXIL) 500 MG capsule TAKE 4 CAPSULES (2,000 MG) BY MOUTH ONCE AS NEEDED PRIOR TO DENTAL APPTS  Unknown  Mahamed Groves MD   sildenafil (VIAGRA) 100 MG tablet Take 1 tablet (100 mg) by mouth daily as needed (erectile dysfunction) 30 min to 4 hrs before sex. Do not use with nitroglycerin, terazosin or doxazosin. Unknown  Mahamed Groves MD   tamsulosin (FLOMAX) 0.4 MG capsule Take 1 capsule (0.4 mg) by mouth daily not started  Мария Saleem MD

## 2019-04-13 NOTE — LETTER
Transition Communication Hand-off for Care Transitions to Next Level of Care Provider    Name: Tomás Cruz  : 1940  MRN #: [id number]  Primary Care Provider: Mahamed Groves  Primary Care MD Name: Mahamed Groves  Primary Clinic: [address] [place] [id number]  Primary Care Clinic Name: [facility]  Reason for Hospitalization:  Atrial flutter with rapid ventricular response (H) [I48.92]  Dyspnea, unspecified type [R06.00]  Chest pain, unspecified type [R07.9]  Admit Date/Time: 2019  3:03 PM  Discharge Date: 19  Payor Source: Payor: COMMERCIAL / Plan: [facility] HEALTH BENEFIT PLAN / Product Type: Indemnity /     Readmission Assessment Measure (GEREMIAS) Risk Score/category: average           Reason for Communication Hand-off Referral: Admission diagnoses: CHF    Discharge Plan: home       Concern for non-adherence with plan of care:   Y/N no  Discharge Needs Assessment: follow up labs      Already enrolled in Tele-monitoring program and name of program:  none known  Follow-up specialty is recommended: Yes    Follow-up plan:    Future Appointments   Date Time Provider Department Center   10/2/2019 10:30 AM Gita Zarco PA [facility]       Any outstanding tests or procedures:              Key Recommendations:  pt admitted with aflutter with elevated Bnp. Pt states he had called his cardiologist from home because of the way he was feeling and had initiated his heart monitor that he has at home, his cardiologist, as the pt puts it wasn't too concerned but had made an appointment for him on . Pt states she doesn't follow a low sodium diet. He does have a scale at home but doesn't weigh himself daily. Pt states leg swelling has never been an issue for him. He manages his own medications uses a weekly pill box. He is independent with mobility and still drives. Low sodium food and drink resources where given to the pt.       Francesca Cristina    AVS/Discharge Summary is the source of  truth; this is a helpful guide for improved communication of patient story

## 2019-04-13 NOTE — H&P
[facility]    History and Physical - Hospitalist Service       Date of Admission:  4/13/2019    Assessment & Plan   Tomás Cruz is a 78 year old male admitted on 4/13/2019. He has a past medical history significant for mitral valve replacement, sleep apnea, stroke, cardiomyopathy, hypertension, and atrial fibrillation.  He presented to emergency room with shortness of breath.  He initially was noted to have what appeared to be atrial flutter with rapid ventricular response.    1.  Supraventricular tachycardia.  Appears to be atrial flutter.  Monitor on telemetry.  Serial troponins.  Cardiology consult.  Check echocardiogram.  Heart rate under much better control with 1 dose of IV diltiazem.  Restart home dose of metoprolol.  Restart warfarin.    2.  Mechanical mitral valve replacement.  Restart warfarin.    3.  Chronic diarrhea.  Check Clostridium difficile toxin.    4.  Chronic kidney disease.  Creatinine 1.35.  Appears to be near baseline.  Avoid nephrotoxins as able.  Start gentle IV fluids with normal saline at 100 cc an hour for 10 hours.    5.  Hypertension.  Restart metoprolol and lisinopril.    6.  Hyperlipidemia.  Restart pravastatin.    7.  Benign prostatic hyperplasia.  Restart tamsulosin.    8.  Shortness of breath.  Suspect related to atrial flutter.  Possible atelectasis or infiltrate on chest x-ray.  No fevers or leukocytosis.  Cough has been nonproductive.  Check pro-calcitonin level.  Does not appear to be significantly fluid overloaded on exam or chest x-ray.    9.  Cardiomyopathy.  Continue metoprolol and lisinopril.  Check echocardiogram.  Does not appear to be significantly fluid overloaded at this time.     Diet: Combination Diet Low Saturated Fat Na <2400mg Diet, No Caffeine Diet    DVT Prophylaxis: Warfarin  Sauer Catheter: not present  Code Status: Full Code      Disposition Plan   Expected discharge: Tomorrow, recommended to prior living arrangement   Entered: Jeferson WARE  DO Lucy 04/13/2019, 6:33 PM         Jeferson Ayala, DO  [facility]    ______________________________________________________________________    Chief Complaint   Shortness of breath.    History is obtained from the patient    History of Present Illness   Tomás Cruz is a 78 year old male who has a past medical history significant for mitral valve replacement, sleep apnea, stroke, cardiomyopathy, hypertension, and atrial fibrillation.  He began feeling short of breath 2 days ago.  Has had a mild occasional dry cough.  Has not noted any fevers or chills.  Has not had any chest pain with the symptoms.  He has been noticing some right-sided chest pressure at times over the past 6 weeks.  Pain is not related to activity.  He does not notice anything else that causes pain.  Pain will just come and go at times.  Nothing seems to change pain when it does happen.  He has been having diarrhea for the past several months.  Has had workup with gastroenterology for diarrhea.  No etiology of diarrhea has been found at this point.  He does not remember having shortness of breath like this previously.  Nothing at home seems to be helping the shortness of breath.  Does seem to have gotten slightly worse since it started 2 days ago.  No other complaints.    Review of Systems    The 10 point Review of Systems is negative other than noted in the HPI     Past Medical History    I have reviewed this patient's medical history and updated it with pertinent information if needed.   Past Medical History:   Diagnosis Date     Atrial fibrillation (H)     Transient around time of MVR.  Had MAZE procedure by report.     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     Hypertension goal BP (blood  pressure) < 140/90 4     Mitral valve disorders(424.0)     s/p mitral valve replacment- ; SBE prophylaxsis and anticoagulated; echo - EF 30-35%     Non-ischemic cardiomyopathy (H)     EF as above.  Follows with [facility].     Polyp of colon      Renal mass     Benign per patient report, Right kidney removed.       Past Surgical History   I have reviewed this patient's surgical history and updated it with pertinent information if needed.  Past Surgical History:   Procedure Laterality Date     ARTHROSCOPY KNEE Left     left knee arthroscopy     ARTHROSCOPY KNEE  2018    right knee partial     AS TOTAL KNEE ARTHROPLASTY Left     Dr. Castro     CARDIAC SURGERY      Mitral valve replacement     CATARACT IOL, RT/LT      Cataracts, bilateral     COLONOSCOPY      [facility]     COLONOSCOPY  2019    Dr. Henderson [facility]     COLONOSCOPY N/A 2019    Procedure: COMBINED COLONOSCOPY, SINGLE OR MULTIPLE BIOPSY/POLYPECTOMY BY BIOPSies by cold forcep;  Surgeon: Ronald Henderson MD;  Location:  GI     HERNIA REPAIR       IMPLANT AUTOMATIC IMPLANTABLE CARDIOVERTER DEFIBRILLATOR  2013     KIDNEY SURGERY      Laparoscopic right radical nephrectomy.- due to complex hemorrhagic cyst       Social History   I have reviewed this patient's social history and updated it with pertinent information if needed.  Social History     Tobacco Use     Smoking status: Former Smoker     Last attempt to quit: 1982     Years since quittin.8     Smokeless tobacco: Never Used   Substance Use Topics     Alcohol use: No     Drug use: No       Family History   I have reviewed this patient's family history and updated it with pertinent information if needed.   Family History   Adopted: Yes   Problem Relation Age of Onset     Unknown/Adopted Mother      Unknown/Adopted Father      No Known Problems Daughter      No Known Problems Daughter      No Known Problems Daughter      No Known  Problems Daughter      Diabetes No family hx of      Coronary Artery Disease No family hx of        Prior to Admission Medications   Prior to Admission Medications   Prescriptions Last Dose Informant Patient Reported? Taking?   amoxicillin (AMOXIL) 500 MG capsule Unknown  No No   Sig: TAKE 4 CAPSULES (2,000 MG) BY MOUTH ONCE AS NEEDED PRIOR TO DENTAL APPTS   lisinopril (PRINIVIL/ZESTRIL) 20 MG tablet 4/13/2019 at 0730  No Yes   Sig: Take 1 tablet (20 mg) by mouth daily   metoprolol succinate ER (TOPROL-XL) 50 MG 24 hr tablet 4/13/2019 at 0730 Self Yes Yes   Sig: Take 75 mg by mouth daily   multivitamin w/minerals (MULTI-VITAMIN) tablet 4/13/2019 at 0730  Yes Yes   Sig: Take 1 tablet by mouth daily   pravastatin (PRAVACHOL) 40 MG tablet 4/13/2019 at 0730  No Yes   Sig: Take 1 tablet (40 mg) by mouth daily   sildenafil (VIAGRA) 100 MG tablet Unknown  No No   Sig: Take 1 tablet (100 mg) by mouth daily as needed (erectile dysfunction) 30 min to 4 hrs before sex. Do not use with nitroglycerin, terazosin or doxazosin.   tamsulosin (FLOMAX) 0.4 MG capsule not started  No No   Sig: Take 1 capsule (0.4 mg) by mouth daily   warfarin (COUMADIN) 2 MG tablet 4/13/2019 at 0730 Self Yes Yes   Sig: Take 2 mg by mouth Six times weekly. Take 2 mg on Tue, Wed, Thur, Fri, Sat, Sun and take 3 mg on Mon.   warfarin (COUMADIN) 3 MG tablet 4/8/2019 at 0730 Self Yes Yes   Sig: Take 3 mg by mouth once a week Take 2 mg on Tue, Wed, Thur, Fri, Sat, Sun and take 3 mg on Mon.      Facility-Administered Medications: None     Allergies   No Known Allergies    Physical Exam   Vital Signs: Temp: 95.9  F (35.5  C) Temp src: Oral BP: (!) 129/103 Pulse: 109 Heart Rate: 77 Resp: 16 SpO2: 96 % O2 Device: None (Room air)    Weight: 205 lbs 0 oz    Gen:  NAD, A&Ox3.  Eyes:  PERRL, sclera anicteric.  OP:  MMM, no lesions.  Neck:  Supple.  CV:  Irregular, positive mechanical click, no murmurs.  Lung:  CTA b/l, normal effort.  Ab:  +BS, soft.  Skin:  Warm,  dry to touch.  No rash.  Ext:  No pitting edema LE b/l.      Data   Data reviewed today: I reviewed all medications, new labs and imaging results over the last 24 hours.  I did review EKGs.  Show probable atrial flutter.    Recent Labs   Lab 04/13/19  1527   WBC 10.7   HGB 16.1   MCV 88      INR 3.41*      POTASSIUM 4.6   CHLORIDE 110*   CO2 25   BUN 32*   CR 1.35*   ANIONGAP 5   BRAXTON 9.0   *   TROPI <0.015

## 2019-04-13 NOTE — ED TRIAGE NOTES
"ABC's intact.  Alert and oriented x4.    Pt c/o 2 day history of increased fatigue and \"chest congestion.\"  Pt presented to urgent care and sent to ED due to afib.    "

## 2019-04-13 NOTE — ED NOTES
[facility]  ED Nurse Handoff Report    Tomás Cruz is a 78 year old male   ED Chief complaint: Chest Pain  . ED Diagnosis:   Final diagnoses:   Chest pain, unspecified type   Dyspnea, unspecified type   Atrial flutter with rapid ventricular response (H)     Allergies: No Known Allergies    Code Status: Full Code  Activity level - Baseline/Home:  Independent. Activity Level - Current:   Stand with Assist. Lift room needed: No. Bariatric: No   Needed: No   Isolation: No. Infection: Not Applicable.     Vital Signs:   Vitals:    04/13/19 1524   BP: (!) 150/108   Pulse: 130   Resp: 20   Temp: 98.8  F (37.1  C)   TempSrc: Oral   SpO2: 94%       Cardiac Rhythm:  ,      Pain level: 0-10 Pain Scale: 1  Patient confused: No. Patient Falls Risk: Yes.   Elimination Status: Has voided   Patient Report - Initial Complaint: Fatigue, chest congestion. Focused Assessment: pt in a-flutter/a-fib with multiple PVCs.  Breath sounds clear.  +1-2 bilateral lower extremity edema.   Tests Performed: See Epic. Abnormal Results:   Labs Ordered and Resulted from Time of ED Arrival Up to the Time of Departure from the ED   CBC WITH PLATELETS DIFFERENTIAL - Abnormal; Notable for the following components:       Result Value    RDW 15.8 (*)     Absolute Neutrophil 8.4 (*)     All other components within normal limits   BASIC METABOLIC PANEL - Abnormal; Notable for the following components:    Chloride 110 (*)     Glucose 140 (*)     Urea Nitrogen 32 (*)     Creatinine 1.35 (*)     GFR Estimate 50 (*)     GFR Estimate If Black 58 (*)     All other components within normal limits   NT PROBNP INPATIENT - Abnormal; Notable for the following components:    N-Terminal Pro BNP Inpatient 19,871 (*)     All other components within normal limits   INR - Abnormal; Notable for the following components:    INR 3.41 (*)     All other components within normal limits   TSH WITH FREE T4 REFLEX   TROPONIN I   IV ACCESS     Chest  XR, 1  view PORTABLE   Final Result   IMPRESSION: Evidence of valve surgery. Films are taken in AP technique   which accentuates heart size, despite this there does appear to be   mild to moderate cardiomegaly. Previous valvular surgery. No change in   implanted cardiac device. Pulmonary vasculature is not distended.   Right lung is clear. It would be difficult to exclude an infiltrate or   atelectasis in the retrocardiac area.       SHRUTHI POTTS MD        .   Treatments provided: fluids, diltiazem  Family Comments: Initially at bedside.  Left the hospital but will be back as soon as possible.  Understanding of likely admission.  OBS brochure/video discussed/provided to patient:  Yes  ED Medications:   Medications   diltiazem (CARDIZEM) injection 15 mg (15 mg Intravenous Given 4/13/19 1541)   0.9% sodium chloride BOLUS (500 mLs Intravenous New Bag 4/13/19 2271)     Drips infusing:  No  For the majority of the shift, the patient's behavior Green. Interventions performed were none.     Severe Sepsis OR Septic Shock Diagnosis Present: No      ED Nurse Name/Phone Number: Pito Sparks,   4:50 PM    RECEIVING UNIT ED HANDOFF REVIEW    Above ED Nurse Handoff Report was reviewed: Yes  Reviewed by: Shana Romo on April 13, 2019 at 6:05 PM

## 2019-04-14 ENCOUNTER — APPOINTMENT (OUTPATIENT)
Dept: CARDIOLOGY | Facility: CLINIC | Age: 79
DRG: 308 | End: 2019-04-14
Attending: INTERNAL MEDICINE
Payer: MEDICARE

## 2019-04-14 LAB
ANION GAP SERPL CALCULATED.3IONS-SCNC: 6 MMOL/L (ref 3–14)
ANION GAP SERPL CALCULATED.3IONS-SCNC: 6 MMOL/L (ref 3–14)
BUN SERPL-MCNC: 30 MG/DL (ref 7–30)
BUN SERPL-MCNC: 33 MG/DL (ref 7–30)
CALCIUM SERPL-MCNC: 8.5 MG/DL (ref 8.5–10.1)
CALCIUM SERPL-MCNC: 9 MG/DL (ref 8.5–10.1)
CHLORIDE SERPL-SCNC: 108 MMOL/L (ref 94–109)
CHLORIDE SERPL-SCNC: 112 MMOL/L (ref 94–109)
CO2 SERPL-SCNC: 23 MMOL/L (ref 20–32)
CO2 SERPL-SCNC: 23 MMOL/L (ref 20–32)
CREAT SERPL-MCNC: 1.22 MG/DL (ref 0.66–1.25)
CREAT SERPL-MCNC: 1.3 MG/DL (ref 0.66–1.25)
ERYTHROCYTE [DISTWIDTH] IN BLOOD BY AUTOMATED COUNT: 15.6 % (ref 10–15)
GFR SERPL CREATININE-BSD FRML MDRD: 52 ML/MIN/{1.73_M2}
GFR SERPL CREATININE-BSD FRML MDRD: 57 ML/MIN/{1.73_M2}
GLUCOSE SERPL-MCNC: 106 MG/DL (ref 70–99)
GLUCOSE SERPL-MCNC: 145 MG/DL (ref 70–99)
HCT VFR BLD AUTO: 46.5 % (ref 40–53)
HGB BLD-MCNC: 15.2 G/DL (ref 13.3–17.7)
INR PPP: 3.39 (ref 0.86–1.14)
INTERPRETATION ECG - MUSE: NORMAL
INTERPRETATION ECG - MUSE: NORMAL
MAGNESIUM SERPL-MCNC: 2 MG/DL (ref 1.6–2.3)
MAGNESIUM SERPL-MCNC: 2.1 MG/DL (ref 1.6–2.3)
MCH RBC QN AUTO: 28.9 PG (ref 26.5–33)
MCHC RBC AUTO-ENTMCNC: 32.7 G/DL (ref 31.5–36.5)
MCV RBC AUTO: 88 FL (ref 78–100)
PLATELET # BLD AUTO: 332 10E9/L (ref 150–450)
POTASSIUM SERPL-SCNC: 3.9 MMOL/L (ref 3.4–5.3)
POTASSIUM SERPL-SCNC: 4.1 MMOL/L (ref 3.4–5.3)
RBC # BLD AUTO: 5.26 10E12/L (ref 4.4–5.9)
SODIUM SERPL-SCNC: 137 MMOL/L (ref 133–144)
SODIUM SERPL-SCNC: 141 MMOL/L (ref 133–144)
TROPONIN I SERPL-MCNC: 0.03 UG/L (ref 0–0.04)
WBC # BLD AUTO: 8.9 10E9/L (ref 4–11)

## 2019-04-14 PROCEDURE — 25000132 ZZH RX MED GY IP 250 OP 250 PS 637: Performed by: INTERNAL MEDICINE

## 2019-04-14 PROCEDURE — 80048 BASIC METABOLIC PNL TOTAL CA: CPT | Performed by: INTERNAL MEDICINE

## 2019-04-14 PROCEDURE — 85027 COMPLETE CBC AUTOMATED: CPT | Performed by: INTERNAL MEDICINE

## 2019-04-14 PROCEDURE — 40000264 ECHOCARDIOGRAM COMPLETE

## 2019-04-14 PROCEDURE — 12000000 ZZH R&B MED SURG/OB

## 2019-04-14 PROCEDURE — 25000128 H RX IP 250 OP 636: Performed by: INTERNAL MEDICINE

## 2019-04-14 PROCEDURE — 25500064 ZZH RX 255 OP 636: Performed by: INTERNAL MEDICINE

## 2019-04-14 PROCEDURE — 25800030 ZZH RX IP 258 OP 636: Performed by: INTERNAL MEDICINE

## 2019-04-14 PROCEDURE — 93306 TTE W/DOPPLER COMPLETE: CPT | Mod: 26 | Performed by: INTERNAL MEDICINE

## 2019-04-14 PROCEDURE — 36415 COLL VENOUS BLD VENIPUNCTURE: CPT | Performed by: INTERNAL MEDICINE

## 2019-04-14 PROCEDURE — A9270 NON-COVERED ITEM OR SERVICE: HCPCS | Performed by: INTERNAL MEDICINE

## 2019-04-14 PROCEDURE — 85610 PROTHROMBIN TIME: CPT | Performed by: INTERNAL MEDICINE

## 2019-04-14 PROCEDURE — G0378 HOSPITAL OBSERVATION PER HR: HCPCS

## 2019-04-14 PROCEDURE — 99233 SBSQ HOSP IP/OBS HIGH 50: CPT | Performed by: PHYSICIAN ASSISTANT

## 2019-04-14 PROCEDURE — 83735 ASSAY OF MAGNESIUM: CPT | Performed by: INTERNAL MEDICINE

## 2019-04-14 PROCEDURE — 99221 1ST HOSP IP/OBS SF/LOW 40: CPT | Performed by: INTERNAL MEDICINE

## 2019-04-14 PROCEDURE — 25000128 H RX IP 250 OP 636: Performed by: PHYSICIAN ASSISTANT

## 2019-04-14 RX ORDER — METOPROLOL SUCCINATE 25 MG/1
25 TABLET, EXTENDED RELEASE ORAL ONCE
Status: DISCONTINUED | OUTPATIENT
Start: 2019-04-14 | End: 2019-04-14

## 2019-04-14 RX ORDER — GUAIFENESIN/DEXTROMETHORPHAN 100-10MG/5
5 SYRUP ORAL EVERY 4 HOURS PRN
Status: DISCONTINUED | OUTPATIENT
Start: 2019-04-14 | End: 2019-04-17 | Stop reason: HOSPADM

## 2019-04-14 RX ORDER — METOPROLOL SUCCINATE 100 MG/1
100 TABLET, EXTENDED RELEASE ORAL ONCE
Status: COMPLETED | OUTPATIENT
Start: 2019-04-14 | End: 2019-04-14

## 2019-04-14 RX ORDER — FUROSEMIDE 10 MG/ML
40 INJECTION INTRAMUSCULAR; INTRAVENOUS ONCE
Status: COMPLETED | OUTPATIENT
Start: 2019-04-14 | End: 2019-04-14

## 2019-04-14 RX ORDER — WARFARIN SODIUM 2 MG/1
2 TABLET ORAL
Status: COMPLETED | OUTPATIENT
Start: 2019-04-14 | End: 2019-04-14

## 2019-04-14 RX ADMIN — GUAIFENESIN AND DEXTROMETHORPHAN 5 ML: 100; 10 SYRUP ORAL at 22:29

## 2019-04-14 RX ADMIN — WARFARIN SODIUM 2 MG: 2 TABLET ORAL at 18:03

## 2019-04-14 RX ADMIN — AMIODARONE HYDROCHLORIDE 150 MG: 1.5 INJECTION, SOLUTION INTRAVENOUS at 16:42

## 2019-04-14 RX ADMIN — METOPROLOL SUCCINATE 100 MG: 100 TABLET, EXTENDED RELEASE ORAL at 16:59

## 2019-04-14 RX ADMIN — LISINOPRIL 20 MG: 20 TABLET ORAL at 09:32

## 2019-04-14 RX ADMIN — AMIODARONE HYDROCHLORIDE 0.5 MG/MIN: 50 INJECTION, SOLUTION INTRAVENOUS at 23:21

## 2019-04-14 RX ADMIN — AMIODARONE HYDROCHLORIDE 1 MG/MIN: 50 INJECTION, SOLUTION INTRAVENOUS at 17:05

## 2019-04-14 RX ADMIN — HUMAN ALBUMIN MICROSPHERES AND PERFLUTREN 5 ML: 10; .22 INJECTION, SOLUTION INTRAVENOUS at 10:19

## 2019-04-14 RX ADMIN — PRAVASTATIN SODIUM 40 MG: 20 TABLET ORAL at 09:33

## 2019-04-14 RX ADMIN — METOPROLOL SUCCINATE 75 MG: 50 TABLET, EXTENDED RELEASE ORAL at 09:32

## 2019-04-14 RX ADMIN — MULTIPLE VITAMINS W/ MINERALS TAB 1 TABLET: TAB at 09:32

## 2019-04-14 RX ADMIN — FUROSEMIDE 40 MG: 10 INJECTION, SOLUTION INTRAVENOUS at 19:54

## 2019-04-14 ASSESSMENT — ACTIVITIES OF DAILY LIVING (ADL)
TOILETING: 0-->INDEPENDENT
COGNITION: 0 - NO COGNITION ISSUES REPORTED
DRESS: 0-->INDEPENDENT
SWALLOWING: 0-->SWALLOWS FOODS/LIQUIDS WITHOUT DIFFICULTY
RETIRED_COMMUNICATION: 0-->UNDERSTANDS/COMMUNICATES WITHOUT DIFFICULTY
RETIRED_EATING: 0-->INDEPENDENT
AMBULATION: 0-->INDEPENDENT
FALL_HISTORY_WITHIN_LAST_SIX_MONTHS: NO
TRANSFERRING: 0-->INDEPENDENT
ADLS_ACUITY_SCORE: 11
BATHING: 0-->INDEPENDENT
ADLS_ACUITY_SCORE: 13

## 2019-04-14 NOTE — PLAN OF CARE
"PRIMARY DIAGNOSIS: CHEST PAIN  OUTPATIENT/OBSERVATION GOALS TO BE MET BEFORE DISCHARGE:  1. Ruled out acute coronary syndrome (negative or stable Troponin):    Troponin I ES   Date Value Ref Range Status   04/13/2019 0.032 0.000 - 0.045 ug/L Final     Comment:     The 99th percentile for upper reference range is 0.045 ug/L.  Troponin values   in the range of 0.045 - 0.120 ug/L may be associated with risks of adverse   clinical events.     2. Pain Status: Pain free.  3. Appropriate provocative testing performed: No  - Stress Test Procedure: Echo  - Interpretation of cardiac rhythm per telemetry tech: AFib RVR, and CVR    4. Cleared by Consultants (if applicable):No  5. Return to near baseline physical activity: No  Discharge Planner Nurse   Safe discharge environment identified: Yes  Barriers to discharge: Yes       Entered by: Geena Cole 04/14/2019 1:43 AM   /75 (BP Location: Left arm)   Pulse 60   Temp 96.3  F (35.7  C) (Oral)   Resp 18   Ht 1.88 m (6' 2\")   Wt 93 kg (205 lb)   SpO2 95%   BMI 26.32 kg/m    Pt denies pain/chest pain, freq cough, dyspnea, right sided chest pressure. Gave prn melatonin to help for sleep. Pt HR tachy w/ movement and ambulating. HR bouncing all over from 80's to 130's+. Tele reported 5 sec V-Tach when pt was ambulating to the bathroom, and one other time, each time they called to report-asymptomatic. Will cont to monitor.   Please review provider order for any additional goals.   Nurse to notify provider when observation goals have been met and patient is ready for discharge.    "

## 2019-04-14 NOTE — CONSULTS
Consult Date:  04/14/2019      CARDIOLOGY CONSULTATION      REQUESTING PHYSICIAN:  Hospitalist Service.      PRIMARY CARE PHYSICIAN:  Dr. Mahamed Groves MD      INDICATION FOR CARDIAC CONSULTATION:  Atrial fibrillation with rapid ventricular response.      It is my pleasure to see your patient, Tomás Cruz.  Mr. Cruz is a patient who, in the past, had a [employer] mitral valve implanted.  He also has a diagnosis of idiopathic nonischemic cardiomyopathy with an ejection fraction in the 25% -30% range.  The patient has an AICD in situ.  It was noted, on interrogation of his defibrillator, that he was having episodes of atrial flutter.  It was decided to increase his beta blocker.  The patient has noticed over the last 2 weeks that he is developing increasing shortness of breath and it has also been noted that he has increasing ankle edema.  The patient was then admitted to hospital because of worsening shortness of breath.  Echocardiography was performed was read this afternoon, and this shows that there has been significant deterioration in his left ventricular systolic function.  His EF is now in the 15-20% range.  The [employer] mitral valve prosthesis is functioning normally, and he has mild to moderate tricuspid regurgitation.  The patient's proBNP is markedly raised at 19,871.  His troponins are relatively flat and in the normal range.  EKG on admission confirmed that the patient was in atrial flutter with rapid ventricular response with ventricular ectopic beats.  The patient was given diltiazem by the Hospitalist Service, which did improve heart rate somewhat; this was given prior to knowing what the ejection fraction was.  His ventricular rate, however, subsequently has rebounded back up to 129 beats per minute.  He has not been complaining of any chest pains or chest pressure.  He has not been complaining of orthopnea or PND.  Mainly, his shortness of breath is exertional.      PAST MEDICAL HISTORY:   1.   Postoperative atrial fibrillation post mitral valve replacement.   2.  Atrial flutter noted recently on interrogation of his AICD.   3.  Cerebrovascular infarction.   4.  Lumbar disc issues.   5.  Hypersomnia with sleep apnea.   6.  Essential hypertension.   7.  Status post mitral valve replacement in 2003.   8.  Nonischemic cardiomyopathy.   9.  Colonic polyp.   10.  Benign renal mass, per patient, but the right kidney was removed.      PAST SURGICAL HISTORY:   1.  Mitral valve replacement in 2003.     2.  Arthroscopy of the left knee.   3.  Arthroscopy of the right knee.    4.  Total knee arthroplasty in 2006.     5.  Bilateral cataract surgeries.   6.  Colonoscopy.   7.  AICD implantation.   8.  Laparoscopic right radical nephrectomy due to complex hemorrhagic cyst in 2008.      MEDICATIONS:   1.  Diltiazem 50 mg bolus once.   2.  Lisinopril 20 mg per day.   3.  Metoprolol succinate 75 mg per day.   4.  Multivitamins 1 tablet per day.   5.  Pravastatin 40 mg per day.   6.  Tamsulosin 0.4 mg per day.   7.  Warfarin.      ALLERGIES:  HE HAS NO KNOWN DRUG ALLERGIES.      REVIEW OF SYSTEMS:   CONSTITUTIONAL:  Negative.   EYES:  Negative.   ENT:  Negative.   CARDIOVASCULAR:  As above.   RESPIRATORY:  As above.   GASTROINTESTINAL:  Nil.   GENITOURINARY:  Nil.   MUSCULOSKELETAL:  Nil.   NEUROLOGIC:  Nil.   PSYCHIATRIC:  Nil.   ENDOCRINE:  Nil.   HEMATOLYMPHATIC:  Nil.   ALLERGY AND IMMUNOLOGY:  Nil.      PHYSICAL EXAMINATION:   GENERAL:  He is a pleasant man who is somewhat with a worried expression on his face.   VITAL SIGNS:  His blood pressure is 135/104.  His pulse is 129 beats per minute, regular with occasional extrasystoles.  On telemetry, his rhythm is atrial flutter with PVCs.  His temperature is 96.1.  Respirations are 16.  Saturations are 96%.   HEENT:  He has normal facial symmetry.  His pupils are equal.  Dentition is good.    NECK:  Jugular venous pulse is at the lower one-third of his neck.  Carotids are  normal with no bruits.   HEART:  Amber beat is impalpable.  He has a median sternotomy scar, and the sternum is stable.  Heart sound 1 is variable.  Heart sound 2 is normal.  No murmurs heard.  He has normal metallic clicking of the mitral valve prosthesis, which is crisp.  He has an AICD generator in the left infraclavicular region.   CHEST:  Reveals bibasilar fine crackles.   ABDOMEN:  Unremarkable apart from mild truncal obesity.  He has no organomegaly.  He has 1+ pretibial and ankle edema.  It is worse on the left than it is on the right.   SKIN:  No obvious skin lesions noted.  He moves all 4 limbs appropriately with no obvious sensory or motor loss.   NEUROLOGIC:  He is fully oriented to time, place and person, with a worried affect.      LABORATORY INVESTIGATIONS:  Sodium is 140, potassium is 4.6, BUN is 32, creatinine is 1.35.  GFR is 50.  A proBNP markedly raised at 19,871.  First troponin less than 0.015, second troponin 0.025, third troponin 0.032.  White cell count 8.9, hemoglobin 15.2, platelet count 332.  INR is 3.39.  INR on admission was 3.41.  INR on 03/11 was 2.4 and on 03/25 was 2.4 and on 03/11 was 2.8 and on 01/08 was 2.4.  In other words, none of his INRs have ever dropped below 2.0.  In discussion with his wife and the patient, the INRs have not dropped below 2.0.      The chest x-ray suggested mild to moderate cardiomegaly.  It suggests a previous valvular surgery and also the implanted cardiac device, which is an AICD.  The pulmonary vasculature.  There did not appear to be distended.  It was felt that it would be difficult to exclude an infiltrate or atelectasis in the retrocardiac region.  A 12-lead electrocardiogram on admission showed atrial flutter with rapid ventricular response and frequent PVCs.  T-wave inversion in V6 and also aVF.      IMPRESSION:   1.  Severely reduced left ventricular systolic function, which most likely is due to atrial flutter with rapid ventricular response.   This represents most likely a rate-related cardiomyopathy in the setting of a previous cardiomyopathy.   2.  Status post mitral valve replacement with a mechanical [employer] valve, which is functioning normally.  No other significant valvular heart disease apart from mild to moderate tricuspid regurgitation.   3.  Congestive heart failure with evidence of peripheral edema and increasing shortness of breath on exertion.   4.  Fully anticoagulated with warfarin, with no drop in INR below 2.0.      PLAN:  I think it will be appropriate to get the patient back into sinus rhythm.  This patient has never had a drop in INR below 2.0.  I think it would be safe to proceed ahead with electrical cardioversion without having to do a transesophageal echocardiogram, since we have numerous INRs, all of which have been above 2.0 jose luis.  I explained the risks of cardioversion, including the risk of stroke and redness on the chest.  The patient wishes to proceed.  I will start the patient on intravenous amiodarone, as we will need to try to keep the patient in sinus rhythm after cardioversion.  The patient does have an appointment in about 2 weeks with Dr. Lemuel Oakes from [facility], and at that stage they can decide whether an EP study is necessary, with possible view towards atrial flutter ablation, or whether they would wish to continue him on the amiodarone medication or some other medication.  We will diurese the patient, also, as he has evidence of volume overload and clearly has symptoms of congestive heart failure.      Many thanks to you for allowing me to be involved in the care of this very nice patient.         ALTA BADILLO MD, [facility]             D: 2019   T: 2019   MT: AARON      Name:     ALFREDA CRABTREE   MRN:      [id number]        Account:       [id number]   :      1940           Consult Date:  2019      Document: [id number]

## 2019-04-14 NOTE — PLAN OF CARE
Pt will be transferring to 3rd floor (cardiac unit) to receive intravenous amiodarone bolus plus infusion, cardioversion planned per Cardiology. Report called via phone to receiving nurse, family notified and updated of plan of care.

## 2019-04-14 NOTE — PLAN OF CARE
PRIMARY DIAGNOSIS: CHEST PAIN  OUTPATIENT/OBSERVATION GOALS TO BE MET BEFORE DISCHARGE:  1. Ruled out acute coronary syndrome (negative or stable Troponin):  Yes  2. Pain Status: Pain free.  3. Appropriate provocative testing performed: Yes  - Stress Test Procedure: None  - Interpretation of cardiac rhythm per telemetry tech: Afib cvr v-paced bbb pvcs    4. Cleared by Consultants (if applicable):No  5. Return to near baseline physical activity: Yes  Discharge Planner Nurse   Safe discharge environment identified: Yes  Barriers to discharge: No       Entered by: Shana Romo 04/14/2019 10:07 AM     Please review provider order for any additional goals.   Nurse to notify provider when observation goals have been met and patient is ready for discharge.  Hypertensive and HR variable up to 150s in bathroom, scheduled lisinopril and metoprolol given. Denies chest pain. Echo in process. Will instruct IS. Cardiology consulted.

## 2019-04-14 NOTE — PROGRESS NOTES
Cardiology consult dictated.  Atrial flutter with rapid ventricular response and symptoms of congestive heart failure in a patient with a known chronic cardiomyopathy with ejection fraction 25-30% range who also has an AICD in situ and who has a mechanical mitral valve prosthesis.  Ejection fraction now is in the 15-20% range and this is almost certainly due to atrial flutter with rapid ventricular response, in other words a rate related cardiomyopathy.  The AICD interrogation at [facility] did show that the patient had converted into atrial flutter.  They did increase the dose of his beta-blocker and make an appointment for the patient in the end of April.  We will proceed ahead with electrical cardioversion tomorrow none of this patient's INRs have been less than 2.0 and has had multiple INRs checked in April March and February and I think it is safe to proceed ahead with cardioversion with a low risk of stroke.  Given that all of his INRs have been therapeutic according to his family have never dropped below 2.0 ELIAS probably is not necessary.  In the meantime we will start the patient on intravenous amiodarone bolus plus infusion.  This should help with ventricular rate.  We can also increase the dose of metoprolol.  I explained this at length to the patient and to the patient's family of which there were many present and all agree with the plan.

## 2019-04-14 NOTE — PLAN OF CARE
PRIMARY DIAGNOSIS: CHEST PAIN    OUTPATIENT/OBSERVATION GOALS TO BE MET BEFORE DISCHARGE:    1. Ruled out acute coronary syndrome (negative or stable Troponin):  Yes    2. Pain Status: Pain free.    3. Appropriate provocative testing performed: Yes    - Stress Test Procedure: None    - Interpretation of cardiac rhythm per telemetry tech: Afib cvr v-paced bbb pvcs     4. Cleared by Consultants (if applicable):No    5. Return to near baseline physical activity: Yes    Discharge Planner Nurse   Safe discharge environment identified: Yes  Barriers to discharge: No       Entered by: Shana Romo 04/14/2019 10:07 AM  Please review provider order for any additional goals.   Nurse to notify provider when observation goals have been met and patient is ready for discharge.    Hypertensive and HR variable up to 150s in bathroom, scheduled lisinopril and metoprolol given. Denies chest pain. Echo pending. IS instructed. Cardiology consulted.

## 2019-04-14 NOTE — PLAN OF CARE
"PRIMARY DIAGNOSIS: CHEST PAIN  OUTPATIENT/OBSERVATION GOALS TO BE MET BEFORE DISCHARGE:  1. Ruled out acute coronary syndrome (negative or stable Troponin):            Troponin I ES   Date Value Ref Range Status   04/13/2019 0.032 0.000 - 0.045 ug/L Final       Comment:       The 99th percentile for upper reference range is 0.045 ug/L.  Troponin values   in the range of 0.045 - 0.120 ug/L may be associated with risks of adverse   clinical events.      2. Pain Status: Pain free.  3. Appropriate provocative testing performed: No  - Stress Test Procedure: Echo  - Interpretation of cardiac rhythm per telemetry tech: AFib RVR, and CVR     4. Cleared by Consultants (if applicable):No  5. Return to near baseline physical activity: No  Discharge Planner Nurse   Safe discharge environment identified: Yes  Barriers to discharge: Yes       Entered by: Geena Cole 04/14/2019 5:43 AM  /82 (BP Location: Left arm)   Pulse 65   Temp 96.4  F (35.8  C) (Oral)   Resp 18   Ht 1.88 m (6' 2\")   Wt 93 kg (205 lb)   SpO2 95%   BMI 26.32 kg/m    Pt denies pain/chest pain, freq cough, dyspnea, right sided chest pressure. Gave prn melatonin to help for sleep. Pt HR tachy w/ movement and ambulating. HR bouncing all over from 80's to 130's+   Please review provider order for any additional goals.   Nurse to notify provider when observation goals have been met and patient is ready for discharge.               "

## 2019-04-14 NOTE — PROGRESS NOTES
"[facility]    Medicine Progress Note - Hospitalist Service       Date of Admission:  4/13/2019  Assessment & Plan    Mr. Tomás Cruz is a 78 year old male with a PMH of postoperative paroxysmal atrial fibrillation s/p mitral valve replacement (2003) anticoagulated on warfarin, recently noted atrial flutter on interrogation of AICD, idiopathic nonischemic cardiomyopathy (prior EF 25-30%), cerebral infarction, DIANA, HTN, s/p laparoscopic R radical nephrectomy due to complex hemorrhagic cyst in 2008, colonic polyp who presented with increased SOB over the past several days.    1. Atrial flutter with RVR in setting of atrial fibrillation  Previous dysrhythmia of paroxysmal atrial fibrillation postoperatively s/p mitral valve replacement in 2003. Recent AICD interrogation showed multiple episodes of atrial flutter. EKG in ER demonstrated atrial flutter with rapid ventricular response and ventricular ectopic beats. Given 1 dose IV diltiazem on admission which initially improved his heart rate but then it came back up to the  range. Troponins WNL.  -IV amiodarone started per cardiology  -Plan for electrical cardioversion tomorrow without need for prior ELIAS per cardiology (numerous recent INRs have all been above 2.0 jose luis)  -Continue warfarin per pharmacy dosing  -Keep outpatient follow up with cardiologist (Dr. Lemuel Oakes) in 2 weeks through [facility] to determine whether EP study necessary    2. CHF with severely reduced left ventricular systolic function (EF 15-20%) in setting of known cardiomyopathhy  Elevated proBNP 19871 with evidence of peripheral ankle edema and bibasilar crackles on exam. He reports orthopnea and KELLEY, no PND. Also reports occasional \"chest heaviness\" along the right anterior chest wall that does not correlate with his intermittent SOB. Weight on admission 205 lbs, which is relatively stable from cardiology appointment in 9/2018. AICD in place and recent interrogation " confirmed episodes of atrial flutter with RVR. Echo today demonstrated drop in EF from 25-30% to 15-20%; [employer] mitral valve prosthesis functioning normally with mild-moderate TR. Drop in LVEF likely represents rate-related cardiomyopathy (atrial flutter with RVR) in the setting of a previously known cardiomyopathy.   -Strict I&Os  -Daily weights  -40 mg IV Lasix tonight, monitor diuretic response overnight  -Continue PTA lisinopril 20 mg daily and metoprolol succinate 75 mg daily    3. HTN  -Continue PTA lisinopril 20 mg daily and metoprolol succinate 75 mg daily     4. DIANA  Noncompliant with CPAP    5. S/p right nephrectomy 2008 for large mass (benign)  Cr today 1.22.  -Monitor BMP, particularly in setting of diuresis    6. Remote history of medial right temporal lobe infarct  -Continue warfarin per pharmacy dosing  -Continue PTA pravastatin 40 mg daily     Diet: Combination Diet Low Saturated Fat Na <2400mg Diet, No Caffeine Diet    DVT Prophylaxis: Warfarin  Sauer Catheter: not present  Code Status: Full Code      Disposition Plan   Expected discharge: 2 - 3 days, recommended to prior living arrangement once adequately diuresed and rate/rhythm control acheived.  Entered: Alejandra Emerson PA-C 04/14/2019, 3:31 PM       The patient's care was discussed with the Patient and Patient's Family.    Alejandra Emerson PA-C  Hospitalist Service  [facility]    ______________________________________________________________________    Interval History   Patient reports intermittent SOB at rest with KELLEY for the past several weeks but particularly worse the past couple of days. Recent AICD interrogation had revealed atrial flutter episodes, and he was going to follow up with his cardiologist at the end of the month, until his symptoms worsened today. He has noticed lower extremity edema the past couple of days. No recent fever, nausea, vomiting, abdominal pain, diarrhea, or dysuria.    Data reviewed today: I  reviewed all medications, new labs and imaging results over the last 24 hours. I personally reviewed:  Results for orders placed or performed during the hospital encounter of 19 (from the past 24 hour(s))   Troponin I   Result Value Ref Range    Troponin I ES 0.024 0.000 - 0.045 ug/L   Clostridium difficile toxin B PCR   Result Value Ref Range    Specimen Description Feces     C Diff Toxin B PCR Negative NEG^Negative   Troponin I   Result Value Ref Range    Troponin I ES 0.032 0.000 - 0.045 ug/L   Basic metabolic panel   Result Value Ref Range    Sodium 141 133 - 144 mmol/L    Potassium 4.1 3.4 - 5.3 mmol/L    Chloride 112 (H) 94 - 109 mmol/L    Carbon Dioxide 23 20 - 32 mmol/L    Anion Gap 6 3 - 14 mmol/L    Glucose 106 (H) 70 - 99 mg/dL    Urea Nitrogen 30 7 - 30 mg/dL    Creatinine 1.22 0.66 - 1.25 mg/dL    GFR Estimate 57 (L) >60 mL/min/[1.73_m2]    GFR Estimate If Black 66 >60 mL/min/[1.73_m2]    Calcium 8.5 8.5 - 10.1 mg/dL   CBC with platelets   Result Value Ref Range    WBC 8.9 4.0 - 11.0 10e9/L    RBC Count 5.26 4.4 - 5.9 10e12/L    Hemoglobin 15.2 13.3 - 17.7 g/dL    Hematocrit 46.5 40.0 - 53.0 %    MCV 88 78 - 100 fl    MCH 28.9 26.5 - 33.0 pg    MCHC 32.7 31.5 - 36.5 g/dL    RDW 15.6 (H) 10.0 - 15.0 %    Platelet Count 332 150 - 450 10e9/L   INR   Result Value Ref Range    INR 3.39 (H) 0.86 - 1.14   Magnesium   Result Value Ref Range    Magnesium 2.0 1.6 - 2.3 mg/dL   Echocardiogram Complete    Narrative    [id number]  [id number]  [id number]  [id number]^AYO^SUSAN^JOJO           [facility]  Echocardiography Laboratory  [address] [place] [address]        Name: ALFREDA CRABTREE  MRN: [id number]  : 1940  Study Date: 2019 09:34 AM  Age: 78 yrs  Gender: Male  Patient Location: [facility]  Reason For Study: Afib  Ordering Physician: SUSAN SAMAYOA  Referring Physician: RANDAL KUMAR  Performed By: Carlos Keita RDCS     BSA: 2.2 m2  Height: 74 in  Weight: 205 lb  HR:  128  BP: 124/102 mmHg  _____________________________________________________________________________  __        Procedure  Complete Portable Echo Adult. Contrast Optison.  _____________________________________________________________________________  __        Interpretation Summary     The visual ejection fraction is estimated at 15-20%.  Left ventricular systolic function is severely reduced.  Moderately decreased right ventricular systolic function  There is a bi-leaflet ([employer]) mechanical prosthesis.  There is mild to moderate (1-2+) tricuspid regurgitation.  The ascending aorta is Mildly dilated.  Compared to the echo of 2013 there has been further reduction in left  ventricular systonction and the rhythm is now rapid atrial fibrillation.  _____________________________________________________________________________  __        Left Ventricle  The left ventricle is moderately dilated. There is borderline concentric left  ventricular hypertrophy. The visual ejection fraction is estimated at 15-20%.  Left ventricular systolic function is severely reduced. Diastolic function not  assessed due to atrial fibrillation. There is severe global hypokinesia of the  left ventricle.     Right Ventricle  The right ventricle is normal size. There is a catheter/pacemaker lead seen in  the right ventricle. Moderately decreased right ventricular systolic function.     Atria  The left atrium is severely dilated. The right atrium is severely dilated.  There is no color Doppler evidence of an atrial shunt.     Mitral Valve  The mean mitral valve gradient is 4.3 mmHg. There is a bi-leaflet ([employer])  mechanical prosthesis. This degree of valvular regurgitation is within normal  limits. Normal prosthetic mitral valve gradients.        Tricuspid Valve  There is mild to moderate (1-2+) tricuspid regurgitation. The right  ventricular systolic pressure is approximated at 29.8 mmHg plus the right  atrial pressure. 2 jets of tricuspid  regurgitation noted.     Aortic Valve  The aortic valve is trileaflet. There is trivial trileaflet aortic sclerosis.  There is trace aortic regurgitation. No hemodynamically significant valvular  aortic stenosis.     Pulmonic Valve  There is mild (1+) pulmonic valvular regurgitation.     Vessels  Mild aortic root dilatation. The ascending aorta is Mildly dilated. The  inferior vena cava is not dilated.     Pericardium  Trivial pericardial effusion. Small left pleural effusion.        Rhythm  The rhythm was rapid atrial fibrillation.  _____________________________________________________________________________  __  MMode/2D Measurements & Calculations  IVSd: 1.2 cm     LVIDd: 6.5 cm  LVIDs: 5.8 cm  LVPWd: 1.0 cm  FS: 10.1 %  LV mass(C)d: 322.2 grams  LV mass(C)dI: 146.7 grams/m2  Ao root diam: 4.0 cm  LA dimension: 5.1 cm  asc Aorta Diam: 3.8 cm  LA/Ao: 1.3  LVOT diam: 2.3 cm  LVOT area: 4.2 cm2  LA Volume (BP): 176.0 ml  LA Volume Index (BP): 80.0 ml/m2  RWT: 0.31           Doppler Measurements & Calculations  MV max P.4 mmHg  MV mean P.3 mmHg  MV V2 VTI: 16.2 cm  MVA(VTI): 2.5 cm2  MV P1/2t max abe: 132.8 cm/sec  MV P1/2t: 40.1 msec  MVA(P1/2t): 5.5 cm2  MV dec slope: 969.2 cm/sec2  Ao V2 max: 99.4 cm/sec  Ao max P.0 mmHg  Ao V2 mean: 75.4 cm/sec  Ao mean P.6 mmHg  Ao V2 VTI: 17.3 cm  ABDIEL(I,D): 2.4 cm2  ABDIEL(V,D): 3.0 cm2  LV V1 max P.2 mmHg  LV V1 max: 72.8 cm/sec  LV V1 VTI: 9.9 cm  SV(LVOT): 41.0 ml  SI(LVOT): 18.7 ml/m2  PA acc time: 0.09 sec  TR max abe: 272.9 cm/sec  TR max P.8 mmHg  AV Abe Ratio (DI): 0.73  ABDIEL Index (cm2/m2): 1.1              _____________________________________________________________________________  __        Report approved by: Ena Varghese 2019 01:26 PM          Physical Exam   Vital Signs: Temp: 96.1  F (35.6  C) Temp src: Oral BP: (!) 135/104 Pulse: 129 Heart Rate: 77 Resp: 16 SpO2: 96 % O2 Device: None (Room air)    Weight: 205 lbs 0  oz  GENERAL:  Comfortable.  PSYCH: pleasant, oriented, No acute distress.  HEENT:  Atraumatic, normocephalic. PERRLA. Normal conjunctiva, normal hearing, and oropharynx is normal.  NECK:  Supple, no neck vein distention, adenopathy or bruits, normal thyroid.  HEART:  Normal S1, S2 with no murmur, no pericardial rub, gallops or S3 or S4.  LUNGS:  Bibasilar crackles on auscultation, fairly good respiratory effort without evidence of respiratory distress. No wheezing or rhonchi.  GI:  Soft, no hepatosplenomegaly, normal bowel sounds. Non-tender, non distended.   EXTREMITIES:  2+ pitting ankle edema. +2 pulses bilateral and equal.  SKIN:  Dry to touch, No rash, wound or ulcerations.  NEUROLOGIC:  CN 2-12 intact, BL 5/5 symmetric upper and lower extremity strength, sensation is intact with no focal deficits.     Data   Results for orders placed or performed during the hospital encounter of 04/13/19   Chest  XR, 1 view PORTABLE    Narrative    CHEST PORTABLE ONE VIEW     4/13/2019 3:45 PM     HISTORY: Cough and dyspnea.    COMPARISON: Chest x-ray from 5/14/2013.      Impression    IMPRESSION: Evidence of valve surgery. Films are taken in AP technique  which accentuates heart size, despite this there does appear to be  mild to moderate cardiomegaly. Previous valvular surgery. No change in  implanted cardiac device. Pulmonary vasculature is not distended.  Right lung is clear. It would be difficult to exclude an infiltrate or  atelectasis in the retrocardiac area.     SHRUTHI POTTS MD   CBC + differential   Result Value Ref Range    WBC 10.7 4.0 - 11.0 10e9/L    RBC Count 5.65 4.4 - 5.9 10e12/L    Hemoglobin 16.1 13.3 - 17.7 g/dL    Hematocrit 49.8 40.0 - 53.0 %    MCV 88 78 - 100 fl    MCH 28.5 26.5 - 33.0 pg    MCHC 32.3 31.5 - 36.5 g/dL    RDW 15.8 (H) 10.0 - 15.0 %    Platelet Count 412 150 - 450 10e9/L    Diff Method Automated Method     % Neutrophils 78.3 %    % Lymphocytes 7.0 %    % Monocytes 12.2 %    % Eosinophils  1.2 %    % Basophils 0.8 %    % Immature Granulocytes 0.5 %    Nucleated RBCs 0 0 /100    Absolute Neutrophil 8.4 (H) 1.6 - 8.3 10e9/L    Absolute Lymphocytes 0.8 0.8 - 5.3 10e9/L    Absolute Monocytes 1.3 0.0 - 1.3 10e9/L    Absolute Eosinophils 0.1 0.0 - 0.7 10e9/L    Absolute Basophils 0.1 0.0 - 0.2 10e9/L    Abs Immature Granulocytes 0.1 0 - 0.4 10e9/L    Absolute Nucleated RBC 0.0    Basic metabolic panel (BMP)   Result Value Ref Range    Sodium 140 133 - 144 mmol/L    Potassium 4.6 3.4 - 5.3 mmol/L    Chloride 110 (H) 94 - 109 mmol/L    Carbon Dioxide 25 20 - 32 mmol/L    Anion Gap 5 3 - 14 mmol/L    Glucose 140 (H) 70 - 99 mg/dL    Urea Nitrogen 32 (H) 7 - 30 mg/dL    Creatinine 1.35 (H) 0.66 - 1.25 mg/dL    GFR Estimate 50 (L) >60 mL/min/[1.73_m2]    GFR Estimate If Black 58 (L) >60 mL/min/[1.73_m2]    Calcium 9.0 8.5 - 10.1 mg/dL   BNP   Result Value Ref Range    N-Terminal Pro BNP Inpatient 19,871 (H) 0 - 1,800 pg/mL   TSH with free T4 reflex   Result Value Ref Range    TSH 3.90 0.40 - 4.00 mU/L   Troponin I   Result Value Ref Range    Troponin I ES <0.015 0.000 - 0.045 ug/L   INR   Result Value Ref Range    INR 3.41 (H) 0.86 - 1.14   Troponin I   Result Value Ref Range    Troponin I ES 0.024 0.000 - 0.045 ug/L   Troponin I   Result Value Ref Range    Troponin I ES 0.032 0.000 - 0.045 ug/L   Basic metabolic panel   Result Value Ref Range    Sodium 141 133 - 144 mmol/L    Potassium 4.1 3.4 - 5.3 mmol/L    Chloride 112 (H) 94 - 109 mmol/L    Carbon Dioxide 23 20 - 32 mmol/L    Anion Gap 6 3 - 14 mmol/L    Glucose 106 (H) 70 - 99 mg/dL    Urea Nitrogen 30 7 - 30 mg/dL    Creatinine 1.22 0.66 - 1.25 mg/dL    GFR Estimate 57 (L) >60 mL/min/[1.73_m2]    GFR Estimate If Black 66 >60 mL/min/[1.73_m2]    Calcium 8.5 8.5 - 10.1 mg/dL   CBC with platelets   Result Value Ref Range    WBC 8.9 4.0 - 11.0 10e9/L    RBC Count 5.26 4.4 - 5.9 10e12/L    Hemoglobin 15.2 13.3 - 17.7 g/dL    Hematocrit 46.5 40.0 - 53.0 %     MCV 88 78 - 100 fl    MCH 28.9 26.5 - 33.0 pg    MCHC 32.7 31.5 - 36.5 g/dL    RDW 15.6 (H) 10.0 - 15.0 %    Platelet Count 332 150 - 450 10e9/L   INR   Result Value Ref Range    INR 3.39 (H) 0.86 - 1.14   Magnesium   Result Value Ref Range    Magnesium 2.0 1.6 - 2.3 mg/dL   EKG 12 lead   Result Value Ref Range    Interpretation ECG Click View Image link to view waveform and result    EKG 12 lead   Result Value Ref Range    Interpretation ECG Click View Image link to view waveform and result    Echocardiogram Complete    Narrative    [id number]  [id number]  [id number]  [id number]^YAO^SUSAN^JOJO           [facility]  Echocardiography Laboratory  [address] [place] [address]        Name: ALFREDA CRABTREE  MRN: [id number]  : 1940  Study Date: 2019 09:34 AM  Age: 78 yrs  Gender: Male  Patient Location: [facility]  Reason For Study: Afib  Ordering Physician: SUSAN SAMAYOA  Referring Physician: RANDAL KUMAR  Performed By: Carlos Keita RDCS     BSA: 2.2 m2  Height: 74 in  Weight: 205 lb  HR: 128  BP: 124/102 mmHg  _____________________________________________________________________________  __        Procedure  Complete Portable Echo Adult. Contrast Optison.  _____________________________________________________________________________  __        Interpretation Summary     The visual ejection fraction is estimated at 15-20%.  Left ventricular systolic function is severely reduced.  Moderately decreased right ventricular systolic function  There is a bi-leaflet ([employer]) mechanical prosthesis.  There is mild to moderate (1-2+) tricuspid regurgitation.  The ascending aorta is Mildly dilated.  Compared to the echo of 2013 there has been further reduction in left  ventricular systonction and the rhythm is now rapid atrial fibrillation.  _____________________________________________________________________________  __        Left Ventricle  The left ventricle is moderately dilated.  There is borderline concentric left  ventricular hypertrophy. The visual ejection fraction is estimated at 15-20%.  Left ventricular systolic function is severely reduced. Diastolic function not  assessed due to atrial fibrillation. There is severe global hypokinesia of the  left ventricle.     Right Ventricle  The right ventricle is normal size. There is a catheter/pacemaker lead seen in  the right ventricle. Moderately decreased right ventricular systolic function.     Atria  The left atrium is severely dilated. The right atrium is severely dilated.  There is no color Doppler evidence of an atrial shunt.     Mitral Valve  The mean mitral valve gradient is 4.3 mmHg. There is a bi-leaflet ([employer])  mechanical prosthesis. This degree of valvular regurgitation is within normal  limits. Normal prosthetic mitral valve gradients.        Tricuspid Valve  There is mild to moderate (1-2+) tricuspid regurgitation. The right  ventricular systolic pressure is approximated at 29.8 mmHg plus the right  atrial pressure. 2 jets of tricuspid regurgitation noted.     Aortic Valve  The aortic valve is trileaflet. There is trivial trileaflet aortic sclerosis.  There is trace aortic regurgitation. No hemodynamically significant valvular  aortic stenosis.     Pulmonic Valve  There is mild (1+) pulmonic valvular regurgitation.     Vessels  Mild aortic root dilatation. The ascending aorta is Mildly dilated. The  inferior vena cava is not dilated.     Pericardium  Trivial pericardial effusion. Small left pleural effusion.        Rhythm  The rhythm was rapid atrial fibrillation.  _____________________________________________________________________________  __  MMode/2D Measurements & Calculations  IVSd: 1.2 cm     LVIDd: 6.5 cm  LVIDs: 5.8 cm  LVPWd: 1.0 cm  FS: 10.1 %  LV mass(C)d: 322.2 grams  LV mass(C)dI: 146.7 grams/m2  Ao root diam: 4.0 cm  LA dimension: 5.1 cm  asc Aorta Diam: 3.8 cm  LA/Ao: 1.3  LVOT diam: 2.3 cm  LVOT area: 4.2  cm2  LA Volume (BP): 176.0 ml  LA Volume Index (BP): 80.0 ml/m2  RWT: 0.31           Doppler Measurements & Calculations  MV max P.4 mmHg  MV mean P.3 mmHg  MV V2 VTI: 16.2 cm  MVA(VTI): 2.5 cm2  MV P1/2t max abe: 132.8 cm/sec  MV P1/2t: 40.1 msec  MVA(P1/2t): 5.5 cm2  MV dec slope: 969.2 cm/sec2  Ao V2 max: 99.4 cm/sec  Ao max P.0 mmHg  Ao V2 mean: 75.4 cm/sec  Ao mean P.6 mmHg  Ao V2 VTI: 17.3 cm  ABDIEL(I,D): 2.4 cm2  ABDIEL(V,D): 3.0 cm2  LV V1 max P.2 mmHg  LV V1 max: 72.8 cm/sec  LV V1 VTI: 9.9 cm  SV(LVOT): 41.0 ml  SI(LVOT): 18.7 ml/m2  PA acc time: 0.09 sec  TR max abe: 272.9 cm/sec  TR max P.8 mmHg  AV Abe Ratio (DI): 0.73  ABDIEL Index (cm2/m2): 1.1              _____________________________________________________________________________  __        Report approved by: Ena Varghese 2019 01:26 PM      Clostridium difficile toxin B PCR   Result Value Ref Range    Specimen Description Feces     C Diff Toxin B PCR Negative NEG^Negative

## 2019-04-15 ENCOUNTER — ANESTHESIA EVENT (OUTPATIENT)
Dept: SURGERY | Facility: CLINIC | Age: 79
DRG: 308 | End: 2019-04-15
Payer: MEDICARE

## 2019-04-15 ENCOUNTER — ANESTHESIA (OUTPATIENT)
Dept: SURGERY | Facility: CLINIC | Age: 79
DRG: 308 | End: 2019-04-15
Payer: MEDICARE

## 2019-04-15 ENCOUNTER — APPOINTMENT (OUTPATIENT)
Dept: CARDIOLOGY | Facility: CLINIC | Age: 79
DRG: 308 | End: 2019-04-15
Attending: INTERNAL MEDICINE
Payer: MEDICARE

## 2019-04-15 LAB
INR PPP: 3.31 (ref 0.86–1.14)
MAGNESIUM SERPL-MCNC: 2 MG/DL (ref 1.6–2.3)
POTASSIUM SERPL-SCNC: 4.1 MMOL/L (ref 3.4–5.3)

## 2019-04-15 PROCEDURE — 85610 PROTHROMBIN TIME: CPT | Performed by: INTERNAL MEDICINE

## 2019-04-15 PROCEDURE — 25000128 H RX IP 250 OP 636: Performed by: INTERNAL MEDICINE

## 2019-04-15 PROCEDURE — 25000125 ZZHC RX 250: Performed by: NURSE ANESTHETIST, CERTIFIED REGISTERED

## 2019-04-15 PROCEDURE — 25000132 ZZH RX MED GY IP 250 OP 250 PS 637: Performed by: INTERNAL MEDICINE

## 2019-04-15 PROCEDURE — 25800030 ZZH RX IP 258 OP 636: Performed by: INTERNAL MEDICINE

## 2019-04-15 PROCEDURE — 37000008 ZZH ANESTHESIA TECHNICAL FEE, 1ST 30 MIN

## 2019-04-15 PROCEDURE — 84132 ASSAY OF SERUM POTASSIUM: CPT | Performed by: INTERNAL MEDICINE

## 2019-04-15 PROCEDURE — 12000000 ZZH R&B MED SURG/OB

## 2019-04-15 PROCEDURE — 40000275 ZZH STATISTIC RCP TIME EA 10 MIN

## 2019-04-15 PROCEDURE — 5A2204Z RESTORATION OF CARDIAC RHYTHM, SINGLE: ICD-10-PCS | Performed by: INTERNAL MEDICINE

## 2019-04-15 PROCEDURE — 93010 ELECTROCARDIOGRAM REPORT: CPT | Mod: 59 | Performed by: INTERNAL MEDICINE

## 2019-04-15 PROCEDURE — 83735 ASSAY OF MAGNESIUM: CPT | Performed by: INTERNAL MEDICINE

## 2019-04-15 PROCEDURE — A9270 NON-COVERED ITEM OR SERVICE: HCPCS | Performed by: INTERNAL MEDICINE

## 2019-04-15 PROCEDURE — 99233 SBSQ HOSP IP/OBS HIGH 50: CPT | Mod: 25 | Performed by: INTERNAL MEDICINE

## 2019-04-15 PROCEDURE — 92960 CARDIOVERSION ELECTRIC EXT: CPT

## 2019-04-15 PROCEDURE — 92960 CARDIOVERSION ELECTRIC EXT: CPT | Performed by: INTERNAL MEDICINE

## 2019-04-15 PROCEDURE — 36415 COLL VENOUS BLD VENIPUNCTURE: CPT | Performed by: INTERNAL MEDICINE

## 2019-04-15 PROCEDURE — 99232 SBSQ HOSP IP/OBS MODERATE 35: CPT | Performed by: INTERNAL MEDICINE

## 2019-04-15 PROCEDURE — 93005 ELECTROCARDIOGRAM TRACING: CPT

## 2019-04-15 PROCEDURE — 25000132 ZZH RX MED GY IP 250 OP 250 PS 637: Performed by: PHYSICIAN ASSISTANT

## 2019-04-15 PROCEDURE — A9270 NON-COVERED ITEM OR SERVICE: HCPCS | Performed by: PHYSICIAN ASSISTANT

## 2019-04-15 RX ORDER — FUROSEMIDE 10 MG/ML
40 INJECTION INTRAMUSCULAR; INTRAVENOUS EVERY 12 HOURS
Status: DISCONTINUED | OUTPATIENT
Start: 2019-04-15 | End: 2019-04-16

## 2019-04-15 RX ORDER — NITROGLYCERIN 0.4 MG/1
0.4 TABLET SUBLINGUAL EVERY 5 MIN PRN
Status: DISCONTINUED | OUTPATIENT
Start: 2019-04-15 | End: 2019-04-17 | Stop reason: HOSPADM

## 2019-04-15 RX ORDER — WARFARIN SODIUM 3 MG/1
3 TABLET ORAL
Status: COMPLETED | OUTPATIENT
Start: 2019-04-15 | End: 2019-04-15

## 2019-04-15 RX ORDER — LIDOCAINE 40 MG/G
CREAM TOPICAL
Status: DISCONTINUED | OUTPATIENT
Start: 2019-04-15 | End: 2019-04-15

## 2019-04-15 RX ORDER — ETOMIDATE 2 MG/ML
INJECTION INTRAVENOUS PRN
Status: DISCONTINUED | OUTPATIENT
Start: 2019-04-15 | End: 2019-04-15

## 2019-04-15 RX ORDER — METOPROLOL SUCCINATE 100 MG/1
100 TABLET, EXTENDED RELEASE ORAL DAILY
Status: DISCONTINUED | OUTPATIENT
Start: 2019-04-16 | End: 2019-04-17 | Stop reason: HOSPADM

## 2019-04-15 RX ORDER — SPIRONOLACTONE 25 MG/1
25 TABLET ORAL DAILY
Status: DISCONTINUED | OUTPATIENT
Start: 2019-04-15 | End: 2019-04-17 | Stop reason: HOSPADM

## 2019-04-15 RX ORDER — LORAZEPAM 2 MG/ML
.5-1 INJECTION INTRAMUSCULAR EVERY 4 HOURS PRN
Status: DISCONTINUED | OUTPATIENT
Start: 2019-04-15 | End: 2019-04-17 | Stop reason: HOSPADM

## 2019-04-15 RX ORDER — AMIODARONE HYDROCHLORIDE 200 MG/1
400 TABLET ORAL DAILY
Status: DISCONTINUED | OUTPATIENT
Start: 2019-04-15 | End: 2019-04-16

## 2019-04-15 RX ADMIN — ETOMIDATE 10 MG: 2 INJECTION INTRAVENOUS at 13:10

## 2019-04-15 RX ADMIN — PRAVASTATIN SODIUM 40 MG: 20 TABLET ORAL at 08:33

## 2019-04-15 RX ADMIN — SPIRONOLACTONE 25 MG: 25 TABLET, FILM COATED ORAL at 10:59

## 2019-04-15 RX ADMIN — WARFARIN SODIUM 3 MG: 3 TABLET ORAL at 17:38

## 2019-04-15 RX ADMIN — AMIODARONE HYDROCHLORIDE 0.5 MG/MIN: 50 INJECTION, SOLUTION INTRAVENOUS at 08:30

## 2019-04-15 RX ADMIN — Medication 1 MG: at 21:20

## 2019-04-15 RX ADMIN — LORAZEPAM 0.5 MG: 2 INJECTION INTRAMUSCULAR; INTRAVENOUS at 08:36

## 2019-04-15 RX ADMIN — LISINOPRIL 20 MG: 20 TABLET ORAL at 08:35

## 2019-04-15 RX ADMIN — FUROSEMIDE 40 MG: 10 INJECTION, SOLUTION INTRAVENOUS at 10:59

## 2019-04-15 RX ADMIN — METOPROLOL SUCCINATE 75 MG: 50 TABLET, EXTENDED RELEASE ORAL at 08:34

## 2019-04-15 RX ADMIN — LORAZEPAM 0.5 MG: 2 INJECTION INTRAMUSCULAR; INTRAVENOUS at 01:51

## 2019-04-15 RX ADMIN — AMIODARONE HYDROCHLORIDE 400 MG: 200 TABLET ORAL at 14:21

## 2019-04-15 ASSESSMENT — ACTIVITIES OF DAILY LIVING (ADL)
ADLS_ACUITY_SCORE: 13
ADLS_ACUITY_SCORE: 14
ADLS_ACUITY_SCORE: 16
ADLS_ACUITY_SCORE: 13
ADLS_ACUITY_SCORE: 16
ADLS_ACUITY_SCORE: 16

## 2019-04-15 ASSESSMENT — MIFFLIN-ST. JEOR: SCORE: 1706.93

## 2019-04-15 NOTE — PROCEDURES
Successful cardioversion using a single  J biphasic shock restoring normal sinus rhythm.  Patient tolerated procedure well.

## 2019-04-15 NOTE — ANESTHESIA CARE TRANSFER NOTE
Patient: Tomás Cruz    Procedure(s):  ANESTHESIA CARDIOVERSION    Diagnosis: unknown  Diagnosis Additional Information: No value filed.    Anesthesia Type:   MAC     Note:  Airway :Nasal Cannula  Destination: Cardiopulmonary.  Comments: Adequate spontaneous respirations, VSS, RN at bedside.       Vitals: (Last set prior to Anesthesia Care Transfer)    CRNA VITALS  4/15/2019 1306 - 4/15/2019 1336      4/15/2019             NIBP:  147/84    Pulse:  71    EKG:  Sinus rhythm                Electronically Signed By: ERASTO Barcenas CRNA  April 15, 2019  1:36 PM

## 2019-04-15 NOTE — PROGRESS NOTES
[facility]    Hospitalist Progress Note      Assessment & Plan   Tomás Cruz is a 78 year old male who was admitted on 4/13/2019. Has past medical hx of  of postoperative paroxysmal atrial fibrillation s/p mitral valve replacement (2003) anticoagulated on warfarin, recently noted atrial flutter on interrogation of AICD, idiopathic nonischemic cardiomyopathy (prior EF 25-30%), cerebral infarction, DIANA, HTN, s/p laparoscopic R radical nephrectomy due to complex hemorrhagic cyst in 2008, colonic polyp who presented with increased SOB over the past several days.     Atrial flutter with RVR  Previous dysrhythmia of paroxysmal atrial fibrillation postoperatively s/p mitral valve replacement in 2003. Recent AICD interrogation showed multiple episodes of atrial flutter. Initiated on Amiodarone drip on 3/14 with better rate control.  - plan for cardioversion today  - on IV amiodarone currently, transition to PO per cardiology  - Metoprolol Xl increased to 100mg daily  -Continue warfarin per pharmacy dosing  -Keep outpatient follow up with cardiologist (Dr. Lemuel Oakes) in 2 weeks through [facility] to determine whether EP study necessary     Acute on chronic systolic CHF   Elevated proBNP 19871. AICD in place and recent interrogation confirmed episodes of atrial flutter with RVR. Echo this admission demonstrated drop in EF from 25-30% to 15-20%; [employer] mitral valve prosthesis functioning normally with mild-moderate TR. Drop in LVEF likely represents rate-related cardiomyopathy (atrial flutter with RVR) in the setting of a previously known cardiomyopathy.   - Lasix increased to 40mg IV BID and Spironolactone 25mg daily started  -continue with Lisinopril  -Strict I&Os and daily weights     HTN  -Continue PTA lisinopril 20 mg daily and metoprolol succinate dose adjustment as above     DIANA  Noncompliant with CPAP     S/p right nephrectomy 2008 for large mass (benign)  Cr 1.3 on 4/14.   - will recheck BMP  in AM given increase in diuretic     Remote history of medial right temporal lobe infarct  -Continue warfarin per pharmacy dosing  -Continue PTA pravastatin 40 mg daily      Diet: Combination Diet Low Saturated Fat Na <2400mg Diet  DVT Prophylaxis: Warfarin  Sauer Catheter: not present  Code Status: Full Code     Dispo: anticipate discharge in 2-3 days pending cardioversion and adequate diuresis.       Kitty Nixon MD  Text Page  (7am to 6pm)    Interval History   He is very anxious and states shortness of breath is what gives him anxiety.   Denies chest pain. He is planned for car    -Data reviewed today: I reviewed all new labs and imaging results over the last 24 hours. I personally reviewed no images or EKG's today.    Physical Exam   Temp: 97.7  F (36.5  C) Temp src: Axillary BP: (!) 119/91 Pulse: 90 Heart Rate: 70 Resp: 18 SpO2: 98 % O2 Device: None (Room air) Oxygen Delivery: 2 LPM  Vitals:    04/13/19 1821 04/15/19 0631   Weight: 93 kg (205 lb) 91.2 kg (201 lb 1.6 oz)     Vital Signs with Ranges  Temp:  [96.1  F (35.6  C)-98.2  F (36.8  C)] 97.7  F (36.5  C)  Pulse:  [] 90  Heart Rate:  [] 70  Resp:  [16-20] 18  BP: (108-139)/() 119/91  SpO2:  [95 %-100 %] 98 %  I/O last 3 completed shifts:  In: 710 [P.O.:460; I.V.:250]  Out: -     Constitutional: Alert, awake, appears anxious  Respiratory: Clear to auscultation bilaterally, no wheezing  Cardiovascular: irregularly irregular, 1+ LE edema  GI: soft and non-tender  Skin/Integumen: warm and dry      Medications     amiodarone 0.5 mg/min (04/15/19 0833)     Warfarin Therapy Reminder         furosemide  40 mg Intravenous Q12H     lisinopril  20 mg Oral Daily     metoprolol succinate ER  75 mg Oral Daily     multivitamin w/minerals  1 tablet Oral Daily     pravastatin  40 mg Oral Daily     sodium chloride (PF)  3 mL Intracatheter Q8H     spironolactone  25 mg Oral Daily     tamsulosin  0.4 mg Oral Daily     warfarin  3 mg Oral ONCE at  18:00       Data   Recent Labs   Lab 04/15/19  0857 04/15/19  0605 04/14/19  2305 04/14/19  0715 04/13/19  2339 04/13/19  1901 04/13/19  1527   WBC  --   --   --  8.9  --   --  10.7   HGB  --   --   --  15.2  --   --  16.1   MCV  --   --   --  88  --   --  88   PLT  --   --   --  332  --   --  412   INR  --  3.31*  --  3.39*  --   --  3.41*   NA  --   --  137 141  --   --  140   POTASSIUM 4.1  --  3.9 4.1  --   --  4.6   CHLORIDE  --   --  108 112*  --   --  110*   CO2  --   --  23 23  --   --  25   BUN  --   --  33* 30  --   --  32*   CR  --   --  1.30* 1.22  --   --  1.35*   ANIONGAP  --   --  6 6  --   --  5   BRAXTON  --   --  9.0 8.5  --   --  9.0   GLC  --   --  145* 106*  --   --  140*   TROPI  --   --   --   --  0.032 0.024 <0.015       No results found for this or any previous visit (from the past 24 hour(s)).

## 2019-04-15 NOTE — PROVIDER NOTIFICATION
MD notified regarding patient off amiodarone drip. Can we discontinue IMC orders?    MD called back: verbal telephone order received to discontinue IMC orders.

## 2019-04-15 NOTE — PROGRESS NOTES
[facility] [place]  Cardiology Progress Note    Ronald Radford MD   04/15/2019          Assessment and Plan:    Mr. Cruz is a patient who, in the past, had a [employer] William mitral valve implanted(2003).  He also has a diagnosis of idiopathic nonischemic cardiomyopathy with an ejection fraction in the 25% -30% range.  The patient has an AICD in situ.  It was noted, on interrogation of his defibrillator, that he was having episodes of atrial flutter.  It was decided to increase his beta blocker.  The patient has noticed over the last 2 weeks that he is developing increasing shortness of breath and it has also been noted that he has increasing ankle edema.  The patient was then admitted to hospital because of worsening shortness of breath.  Echocardiography was performed, and this shows that there has been significant deterioration in his left ventricular systolic function.  His EF is now in the 15-20% range.  The [employer] William mitral valve prosthesis is functioning normally, and he has mild to moderate tricuspid regurgitation.  The patient's proBNP is markedly raised at 19,871.  His troponins are relatively flat and in the normal range.  EKG on admission confirmed that the patient was in atrial flutter with rapid ventricular response with ventricular ectopic beats.       Atrial flutter.  Rate is now much better controlled with combination of metoprolol 75 and IV amiodarone.  I will plan on proceeding with cardioversion to normal sinus rhythm.  I will increase metoprolol to 100 mg daily.  I will change amiodarone to oral.  I discussed risks, benefits and alternatives with the patient and his wife.  They appear to understand desire to proceed.    Congestive heart failure.  Patient is on received one IV dose of Lasix.  I will dose 40 mg twice daily to get him out of heart failure.  I will start spironolactone given his inherently low ejection fraction secondary to his cardiomyopathy.  Long-term patient may also  "benefit from changing lisinopril to Entresto.    Cardiomyopathy.  Most likely secondary to his history of mitral valve problems now exacerbated by his uncontrolled atrial flutter.  Last evaluation of patient's coronary anatomy was a nuclear scan in 2015 that appear to demonstrate no ischemia.  I do not think he needs further evaluation for underlying coronary disease at this time.  Troponins have been negative    Acute on chronic systolic congestive heart failure.  N-terminal proBNP is 19,871.  Again we will get normal sinus rhythm restored.  I will diuresis.  I's and O's are not accurate as patient has been urinating in the toilet.  Weights are down 1.8 kg from admission.  Patient may also benefit from switching metoprolol to carvedilol.    Patient is also quite anxious.  Hopefully improving congestive heart failure will help with his anxiety.    Prosthetic mitral valve appears to be functioning appropriately.  INR range should be between 2.5 and 3.5.    Acute on chronic renal failure stage III.  I suspect diuresis, rapid atrial flutter are contributing.                                      Interval History:   Patient still remains short of breath and anxious.              Medications:       amiodarone 0.5 mg/min (04/15/19 0833)     Warfarin Therapy Reminder         furosemide  40 mg Intravenous Q12H     lisinopril  20 mg Oral Daily     metoprolol succinate ER  75 mg Oral Daily     multivitamin w/minerals  1 tablet Oral Daily     pravastatin  40 mg Oral Daily     sodium chloride (PF)  3 mL Intracatheter Q8H     sodium chloride (PF)  3 mL Intracatheter Q8H     spironolactone  25 mg Oral Daily     tamsulosin  0.4 mg Oral Daily                   Physical Exam:   Blood pressure 129/88, pulse 90, temperature 97.7  F (36.5  C), temperature source Axillary, resp. rate 18, height 1.88 m (6' 2\"), weight 91.2 kg (201 lb 1.6 oz), SpO2 99 %.  Vitals:    04/13/19 1821 04/15/19 0631   Weight: 93 kg (205 lb) 91.2 kg (201 lb 1.6 " oz)     Vital Signs with Ranges  Temp:  [96.1  F (35.6  C)-98.2  F (36.8  C)] 97.7  F (36.5  C)  Pulse:  [] 90  Heart Rate:  [] 72  Resp:  [16-20] 18  BP: (108-139)/() 129/88  SpO2:  [95 %-100 %] 99 %  I/O's Last 24 hours  I/O last 3 completed shifts:  In: 710 [P.O.:460; I.V.:250]  Out: -        General:  Patient is anxious.  Awake, alert, oriented x3.  Neck: Jugular venous distention to 8-9 cm, no carotid bruits.  Lungs:  Clear to auscultation bilaterally.  Cardiac: Irregular irregular rhythm, no murmurs, rub, or gallops.  Abdomen:  Soft, nontender.  Extremities: 1+ bilateral edema.  2+ pulses.  Skin:  Warm, dry.  Neurologic:  No focal deficits.         Data:        Recent Labs   Lab Test 04/14/19  2305 04/14/19  0715 04/13/19  1527 12/05/18  1233    141 140 139   POTASSIUM 3.9 4.1 4.6 4.7   CHLORIDE 108 112* 110* 106   CO2 23 23 25 27   BUN 33* 30 32* 24   CR 1.30* 1.22 1.35* 1.30*   ANIONGAP 6 6 5 6   BRAXTON 9.0 8.5 9.0 9.5   * 106* 140* 108*   ALBUMIN  --   --   --  4.1   PROTTOTAL  --   --   --  7.4   GFRESTIMATED 52* 57* 50* 53*   GFRESTBLACK 61 66 58* 65   NTBNPI  --   --  19,871*  --      Recent Labs   Lab Test 04/14/19  0715 04/13/19  1527 12/05/18  1233   HGB 15.2 16.1 17.4     Recent Labs   Lab Test 04/13/19  1527   TSH 3.90     Recent Labs   Lab Test 04/13/19  2339 04/13/19  1901 04/13/19  1527  05/10/13  1140   TROPI 0.032 0.024 <0.015   < >  --    TROPONIN  --   --   --   --  0.00    < > = values in this interval not displayed.     No lab results found.    Invalid input(s): [id number]  Recent Labs   Lab Test 11/15/18  0947  08/07/15  0807   CHOL 130   < > 143   HDL 32*   < > 30*   LDL 71   < > 88   TRIG 136   < > 123   CHOLHDLRATIO  --   --  4.8    < > = values in this interval not displayed.       Rhythm strips now demonstrate atrial flutter with controlled ventricular response. I will check a 12-lead EKG      > 30 minutes spent

## 2019-04-15 NOTE — PLAN OF CARE
VSS tele Aflutter/afib V-paced this morning. On Amiodarone gtt. On Metoprolol. Elevated BNP on admission. EF 15%. Cardiology following. Added IV Lasix Q12 and PO Aldactone added. Increased metoprolol. Pt had Cardioversion done this afternoon, tele now AV paces V-paced A-paced with PVCs. Pt lethargic after Cardioversion, VSS, discontinued Amio gtt, started PO Amiodarone.

## 2019-04-15 NOTE — PLAN OF CARE
"A&O. BP elevated, on 2L NC intermittently for comfort, other VSS. Tele Afib CVR/V Paced with BBB, PVCs. C/o SOB, LS diminished. 1+/2+ BLE edema. Weight down 4lbs this AM. C/o anxiety about cardioversion this AM, pt stated he was worried he couldn't sleep because if he fell asleep, he would not wake up. Assured patient that we are monitoring him closely and MD notified for PRN ativan. PRN IV ativan ordered and given, pt states his anxiety is gone this AM and the ativan was effective. NPO at midnight for cardioversion this AM. Up with SBA. Cardiology following.     BP (!) 138/100 (BP Location: Right arm)   Pulse 90   Temp 97.8  F (36.6  C) (Oral)   Resp 20   Ht 1.88 m (6' 2\")   Wt 91.2 kg (201 lb 1.6 oz)   SpO2 98%   BMI 25.82 kg/m       Heart Failure Care Pathway  GOALS TO BE MET BEFORE DISCHARGE:    1. Decrease congestion and/or edema with diuretic therapy to achieve near      optimal volume status.            Dyspnea improved:  No, please explain: C/o SOB             Edema improved:     No, please explain: 1+/2+ BLE         Net I/O and Weights since admission:          03/16 0700 - 04/15 0659  In: 710 [P.O.:460; I.V.:250]  Out: -   Net: 710            Vitals:    04/13/19 1821 04/15/19 0631   Weight: 93 kg (205 lb) 91.2 kg (201 lb 1.6 oz)         2.  O2 sats > 92% on RA or at prior home O2 therapy level.          Current oxygenation status:       SpO2: 98 %         O2 Device: None (Room air),  Oxygen Delivery: 2 LPM         Able to wean O2 this shift to keep sats > 92%:  No, please explain: using 2L NC intermittently for comfort        Does patient use Home O2? No    3.  Tolerates ambulation and mobility near baseline: Yes        How many times did the patient ambulate with nursing staff this shift? 3    Please review the Heart Failure Care Pathway for additional HF goal outcomes.    Augusta Tesfaye RN  4/15/2019      "

## 2019-04-15 NOTE — ANESTHESIA PREPROCEDURE EVALUATION
Anesthesia Pre-Procedure Evaluation    Patient: Tomás Cruz   MRN: [id number] : 1940          Preoperative Diagnosis: unknown    Procedure(s):  ANESTHESIA CARDIOVERSION    Past Medical History:   Diagnosis Date     Atrial fibrillation (H)     Transient around time of MVR.  Had MAZE procedure by report.     CVA (cerebral infarction)      Disc disorder of lumbar region 10/05, ,     moderate to severe foraminal stenosis - rt      HYPERSOMNI W SLEEP APNEA     Patient reports he was evaluated with a sleep study and told he does not have significant sleep apnea.     Hypertension goal BP (blood pressure) < 140/90      Mitral valve disorders(424.0)     s/p mitral valve replacment- ; SBE prophylaxsis and anticoagulated; echo - EF 30-35%     Non-ischemic cardiomyopathy (H)     EF as above.  Follows with [facility].     Polyp of colon      Renal mass     Benign per patient report, Right kidney removed.     Past Surgical History:   Procedure Laterality Date     ARTHROSCOPY KNEE Left     left knee arthroscopy     ARTHROSCOPY KNEE  2018    right knee partial     AS TOTAL KNEE ARTHROPLASTY Left     Dr. Castro     CARDIAC SURGERY      Mitral valve replacement     CATARACT IOL, RT/LT      Cataracts, bilateral     COLONOSCOPY      [facility]     COLONOSCOPY  2019    Dr. Henderson [facility]     COLONOSCOPY N/A 2019    Procedure: COMBINED COLONOSCOPY, SINGLE OR MULTIPLE BIOPSY/POLYPECTOMY BY BIOPSies by cold forcep;  Surgeon: Ronald Henderson MD;  Location:  GI     HERNIA REPAIR       IMPLANT AUTOMATIC IMPLANTABLE CARDIOVERTER DEFIBRILLATOR  2013     KIDNEY SURGERY      Laparoscopic right radical nephrectomy.- due to complex hemorrhagic cyst                      Lab Results   Component Value Date    WBC 8.9 2019    HGB 15.2 2019    HCT 46.5 2019     2019    CRP <2.9 2018    SED 3 2018      "04/14/2019    POTASSIUM 4.1 04/15/2019    CHLORIDE 108 04/14/2019    CO2 23 04/14/2019    BUN 33 (H) 04/14/2019    CR 1.30 (H) 04/14/2019     (H) 04/14/2019    BRAXTON 9.0 04/14/2019    MAG 2.0 04/15/2019    ALBUMIN 4.1 12/05/2018    PROTTOTAL 7.4 12/05/2018    ALT 19 12/05/2018    AST 23 12/05/2018    ALKPHOS 82 12/05/2018    BILITOTAL 0.9 12/05/2018    PTT 34 10/10/2008    INR 3.31 (H) 04/15/2019    TSH 3.90 04/13/2019       Preop Vitals  BP Readings from Last 3 Encounters:   04/15/19 (!) 132/98   04/02/19 (!) 133/92   02/21/19 119/80    Pulse Readings from Last 3 Encounters:   04/14/19 90   04/02/19 58   02/21/19 69      Resp Readings from Last 3 Encounters:   04/15/19 18   02/21/19 14   05/15/13 18    SpO2 Readings from Last 3 Encounters:   04/15/19 98%   02/21/19 96%   01/16/19 98%      Temp Readings from Last 1 Encounters:   04/15/19 97.7  F (36.5  C) (Axillary)    Ht Readings from Last 1 Encounters:   04/13/19 1.88 m (6' 2\")      Wt Readings from Last 1 Encounters:   04/15/19 91.2 kg (201 lb 1.6 oz)    Estimated body mass index is 25.82 kg/m  as calculated from the following:    Height as of this encounter: 1.88 m (6' 2\").    Weight as of this encounter: 91.2 kg (201 lb 1.6 oz).       Anesthesia Plan      History & Physical Review      ASA Status:  3 .        Plan for MAC          Postoperative Care      Consents                 ERASTO Barcenas CRNA                    .  "

## 2019-04-16 DIAGNOSIS — Z95.2 S/P MITRAL VALVE REPLACEMENT: ICD-10-CM

## 2019-04-16 LAB
ANION GAP SERPL CALCULATED.3IONS-SCNC: 5 MMOL/L (ref 3–14)
BUN SERPL-MCNC: 39 MG/DL (ref 7–30)
CALCIUM SERPL-MCNC: 9.1 MG/DL (ref 8.5–10.1)
CHLORIDE SERPL-SCNC: 104 MMOL/L (ref 94–109)
CO2 SERPL-SCNC: 30 MMOL/L (ref 20–32)
CREAT SERPL-MCNC: 1.63 MG/DL (ref 0.66–1.25)
GFR SERPL CREATININE-BSD FRML MDRD: 40 ML/MIN/{1.73_M2}
GLUCOSE SERPL-MCNC: 105 MG/DL (ref 70–99)
INR PPP: 4.27 (ref 0.86–1.14)
INTERPRETATION ECG - MUSE: NORMAL
POTASSIUM SERPL-SCNC: 4.2 MMOL/L (ref 3.4–5.3)
SODIUM SERPL-SCNC: 139 MMOL/L (ref 133–144)

## 2019-04-16 PROCEDURE — 25000132 ZZH RX MED GY IP 250 OP 250 PS 637: Performed by: INTERNAL MEDICINE

## 2019-04-16 PROCEDURE — 80048 BASIC METABOLIC PNL TOTAL CA: CPT | Performed by: INTERNAL MEDICINE

## 2019-04-16 PROCEDURE — A9270 NON-COVERED ITEM OR SERVICE: HCPCS | Performed by: PHYSICIAN ASSISTANT

## 2019-04-16 PROCEDURE — 12000000 ZZH R&B MED SURG/OB

## 2019-04-16 PROCEDURE — 99233 SBSQ HOSP IP/OBS HIGH 50: CPT | Performed by: INTERNAL MEDICINE

## 2019-04-16 PROCEDURE — A9270 NON-COVERED ITEM OR SERVICE: HCPCS | Performed by: INTERNAL MEDICINE

## 2019-04-16 PROCEDURE — 25000132 ZZH RX MED GY IP 250 OP 250 PS 637: Performed by: PHYSICIAN ASSISTANT

## 2019-04-16 PROCEDURE — 85610 PROTHROMBIN TIME: CPT | Performed by: INTERNAL MEDICINE

## 2019-04-16 PROCEDURE — 99232 SBSQ HOSP IP/OBS MODERATE 35: CPT | Performed by: STUDENT IN AN ORGANIZED HEALTH CARE EDUCATION/TRAINING PROGRAM

## 2019-04-16 PROCEDURE — 36415 COLL VENOUS BLD VENIPUNCTURE: CPT | Performed by: INTERNAL MEDICINE

## 2019-04-16 PROCEDURE — 99207 ZZC CDG-MDM COMPONENT: MEETS LOW - DOWN CODED: CPT | Performed by: STUDENT IN AN ORGANIZED HEALTH CARE EDUCATION/TRAINING PROGRAM

## 2019-04-16 PROCEDURE — 25000128 H RX IP 250 OP 636: Performed by: INTERNAL MEDICINE

## 2019-04-16 RX ORDER — AMIODARONE HYDROCHLORIDE 200 MG/1
200 TABLET ORAL DAILY
Status: DISCONTINUED | OUTPATIENT
Start: 2019-04-17 | End: 2019-04-17 | Stop reason: HOSPADM

## 2019-04-16 RX ORDER — FUROSEMIDE 20 MG
20 TABLET ORAL DAILY
Status: DISCONTINUED | OUTPATIENT
Start: 2019-04-17 | End: 2019-04-17 | Stop reason: HOSPADM

## 2019-04-16 RX ADMIN — FUROSEMIDE 40 MG: 10 INJECTION, SOLUTION INTRAVENOUS at 09:14

## 2019-04-16 RX ADMIN — AMIODARONE HYDROCHLORIDE 400 MG: 200 TABLET ORAL at 09:16

## 2019-04-16 RX ADMIN — METOPROLOL SUCCINATE 100 MG: 100 TABLET, EXTENDED RELEASE ORAL at 09:15

## 2019-04-16 RX ADMIN — MULTIPLE VITAMINS W/ MINERALS TAB 1 TABLET: TAB at 09:16

## 2019-04-16 RX ADMIN — LISINOPRIL 20 MG: 20 TABLET ORAL at 09:14

## 2019-04-16 RX ADMIN — SPIRONOLACTONE 25 MG: 25 TABLET, FILM COATED ORAL at 09:15

## 2019-04-16 RX ADMIN — PRAVASTATIN SODIUM 40 MG: 20 TABLET ORAL at 09:15

## 2019-04-16 RX ADMIN — Medication 1 MG: at 22:59

## 2019-04-16 ASSESSMENT — ACTIVITIES OF DAILY LIVING (ADL)
ADLS_ACUITY_SCORE: 15
ADLS_ACUITY_SCORE: 13
ADLS_ACUITY_SCORE: 15
ADLS_ACUITY_SCORE: 16
ADLS_ACUITY_SCORE: 15
ADLS_ACUITY_SCORE: 15

## 2019-04-16 ASSESSMENT — MIFFLIN-ST. JEOR: SCORE: 1689.24

## 2019-04-16 NOTE — CONSULTS
Care Transition Initial Assessment - RN        Met with: Patient.  DATA   Active Problems:    Atrial flutter (H)       Cognitive Status: awake, alert and oriented.  Primary Care Clinic Name: [facility]  Primary Care MD Name: Mahamed Andrewshrer  Contact information and PCP information verified: Yes  Lives With: spouse      Quality of Family Relationships: supportive, involved      Who is your support system?: Wife   Support Assessment: Adequate family and caregiver support   Insurance concerns: No Insurance issues identified  ASSESSMENT  Patient currently receives the following services:  none        Identified issues/concerns regarding health management: pt admitted with aflutter with elevated Bnp. Pt states he had called his cardiologist from home because of the way he was feeling and had initiated his heart monitor that he has at home, his cardiologist, as the pt puts it wasn't too concerned but had made an appointment for him on 4/30. Pt states she doesn't follow a low sodium diet. He does have a scale at home but doesn't weigh himself daily. Pt states leg swelling has never been an issue for him. He manages his own medications uses a weekly pill box. He is independent with mobility and still drives. Low sodium food and drink resources where given to the pt.     PLAN  Patient anticipates discharging to home .        Patient anticipates needs for home equipment: No  Plan/Disposition: Home   Appointments: Cardiology appointment scheduled for 4/30 with Dr. Joel Oakes at [facility].      Care  (CTS) will continue to follow as needed.    Francesca Cristina RN, BSN CTS  Care Coordinator  [phone]

## 2019-04-16 NOTE — PROGRESS NOTES
[facility]  Cardiology Progress Note    Ronald Radford MD   04/16/2019          Assessment and Plan:    Mr. Cruz is a patient who, in the past, had a [employer] mitral valve implanted(2003).  He also has a diagnosis of idiopathic nonischemic cardiomyopathy with an ejection fraction in the 25% -30% range.  The patient has an AICD in situ.  It was noted, on interrogation of his defibrillator, that he was having episodes of atrial flutter.  It was decided to increase his beta blocker.  The patient has noticed over the last 2 weeks that he is developing increasing shortness of breath and it has also been noted that he has increasing ankle edema.  The patient was then admitted to hospital because of worsening shortness of breath.  Echocardiography was performed, and this shows that there has been significant deterioration in his left ventricular systolic function.  His EF is now in the 15-20% range.  The [employer] mitral valve prosthesis is functioning normally, and he has mild to moderate tricuspid regurgitation.  The patient's proBNP is markedly raised at 19,871.  His troponins are relatively flat and in the normal range.  EKG on admission confirmed that the patient was in atrial flutter with rapid ventricular response with ventricular ectopic beats.       1. Atrial flutter.  Remains in normal sinus rhythm after successful cardioversion yesterday.  Continue metoprolol at 100 mg daily.  Continue amiodarone at 200 mg daily.  I recommend follow-up with the [place] cardiologist for a flutter ablation and discontinuation of amiodarone.    2. Acute on chronic systolic congestive heart failure.  N-terminal proBNP iwas 19,871.  Patient is diuresed 3.6 kg.  He is now in normal sinus rhythm which should help with his congestive heart failure.  This is acute on chronic systolic and diastolic congestive heart failure.  I will switch to oral Lasix 20 mg daily.  I have also started spironolactone 25 mg  daily.  Long-term patient may also benefit from changing lisinopril to Entresto. Patient may also benefit from switching metoprolol to carvedilol.      3. Cardiomyopathy.  Most likely secondary to his history of mitral valve problems now exacerbated by his uncontrolled atrial flutter.  Last evaluation of patient's coronary anatomy was a nuclear scan in 2015 that appear to demonstrate no ischemia.  I do not think he needs further evaluation for underlying coronary disease at this time.  Troponins have been negative.  I suspect this will get better with restoration of normal sinus rhythm.  He should have a follow-up echocardiogram in 4-6 weeks.    4.  Anxiety.  Much improved with restoration of normal sinus rhythm and improvement in congestive heart failure.    5. Prosthetic mitral valve appears to be functioning appropriately.  INR range should be between 2.5 and 3.5.    6.  INR is climbing most likely secondary to amiodarone warfarin interaction.  Need to adjust the warfarin dose.    7. Acute on chronic renal failure stage III.  I suspect diuresis, rapid atrial flutter are contributing.  Creatinine now up to 1.63    8.  Disposition.  Patient is anxious for discharge.  I recommended ambulating in the halls.  Patient will need close follow-up with his [place] cardiology group.                                Interval History:   Patient much improved              Medications:       Warfarin Therapy Reminder         [START ON 4/17/2019] amiodarone  200 mg Oral Daily     furosemide  20 mg Oral Daily     lisinopril  20 mg Oral Daily     metoprolol succinate ER  100 mg Oral Daily     multivitamin w/minerals  1 tablet Oral Daily     pravastatin  40 mg Oral Daily     sodium chloride (PF)  3 mL Intracatheter Q8H     spironolactone  25 mg Oral Daily     tamsulosin  0.4 mg Oral Daily                   Physical Exam:   Blood pressure 122/71, pulse 63, temperature 98  F (36.7  C), temperature source Oral, resp. rate 16, height  "1.88 m (6' 2\"), weight 89.4 kg (197 lb 3.2 oz), SpO2 97 %.  Vitals:    04/13/19 1821 04/15/19 0631 04/16/19 0650   Weight: 93 kg (205 lb) 91.2 kg (201 lb 1.6 oz) 89.4 kg (197 lb 3.2 oz)     Vital Signs with Ranges  Temp:  [97.5  F (36.4  C)-98  F (36.7  C)] 98  F (36.7  C)  Pulse:  [62-90] 63  Heart Rate:  [53-89] 89  Resp:  [12-18] 16  BP: (114-135)/(68-99) 122/71  SpO2:  [94 %-100 %] 97 %  I/O's Last 24 hours  I/O last 3 completed shifts:  In: 3 [I.V.:3]  Out: 900 [Urine:900]       General:  Patient is more comfortable today.  Awake, alert, oriented x3.  Neck: Jugular venous distention to 8-9 cm, no carotid bruits.  Lungs:  Clear to auscultation bilaterally.  Cardiac: Predominantly regular rhythm with occasional early beat, no murmurs, rub, or gallops.  Abdomen:  Soft, nontender.  Extremities: Trace bilateral edema.  2+ pulses.  Skin:  Warm, dry.  Neurologic:  No focal deficits.         Data:        Recent Labs   Lab Test 04/16/19  0716 04/15/19  0857 04/14/19  2305 04/14/19  0715 04/13/19  1527 12/05/18  1233     --  137 141 140 139   POTASSIUM 4.2 4.1 3.9 4.1 4.6 4.7   CHLORIDE 104  --  108 112* 110* 106   CO2 30  --  23 23 25 27   BUN 39*  --  33* 30 32* 24   CR 1.63*  --  1.30* 1.22 1.35* 1.30*   ANIONGAP 5  --  6 6 5 6   BRAXTON 9.1  --  9.0 8.5 9.0 9.5   *  --  145* 106* 140* 108*   ALBUMIN  --   --   --   --   --  4.1   PROTTOTAL  --   --   --   --   --  7.4   GFRESTIMATED 40*  --  52* 57* 50* 53*   GFRESTBLACK 46*  --  61 66 58* 65   NTBNPI  --   --   --   --  19,871*  --      Recent Labs   Lab Test 04/14/19  0715 04/13/19  1527 12/05/18  1233   HGB 15.2 16.1 17.4     Recent Labs   Lab Test 04/13/19  1527   TSH 3.90     Recent Labs   Lab Test 04/13/19  2339 04/13/19  1901 04/13/19  1527  05/10/13  1140   TROPI 0.032 0.024 <0.015   < >  --    TROPONIN  --   --   --   --  0.00    < > = values in this interval not displayed.     No lab results found.    Invalid input(s): [id number]  Recent Labs "   Lab Test 11/15/18  0947  08/07/15  0807   CHOL 130   < > 143   HDL 32*   < > 30*   LDL 71   < > 88   TRIG 136   < > 123   CHOLHDLRATIO  --   --  4.8    < > = values in this interval not displayed.       Rhythm strips now demonstrate normal sinus rhythm occasional PVC      > 30 minutes spent

## 2019-04-16 NOTE — PLAN OF CARE
VSS Hr 60s-80s, [id number] inverted T's PVC's/PAC's. On metoprolol and amiodarone. IV Lasix changed to PO lasix,on aldactone. Cr 1.63, Lisinopril discontinued. Will continue to monitor. Wt decreasing. Up independently.

## 2019-04-16 NOTE — DISCHARGE INSTRUCTIONS
Please bring your hospital discharge instructions and any new medications with you to your appointment on 4/30 with Dr. Joel Oakes of [facility].       Make sure to have your BMP and INR checked within 2 days per your conversation with Dr. Coleman (it was 4.83 today 4/17).    Please check BP every morning and evening with goal to be 120/80 or per cardiologist recommendation.

## 2019-04-16 NOTE — PROGRESS NOTES
[facility]    Hospitalist Progress Note    Date of Service: 04/16/2019    Assessment & Plan   Tomás Cruz is a 78 year old male who was admitted on 4/13/2019. Has past medical hx of  of postoperative paroxysmal atrial fibrillation s/p mitral valve replacement (2003) anticoagulated on warfarin, recently noted atrial flutter on interrogation of AICD, idiopathic nonischemic cardiomyopathy (prior EF 25-30%), cerebral infarction, DIANA, HTN, s/p laparoscopic R radical nephrectomy due to complex hemorrhagic cyst in 2008, colonic polyp who presented with increased SOB.     Atrial flutter with RVR  Previous dysrhythmia of paroxysmal atrial fibrillation postoperatively s/p mitral valve replacement in 2003. Recent AICD interrogation showed multiple episodes of atrial flutter. Initiated on Amiodarone drip on 3/14 with better rate control. S/p Cardioversion (external) 04/15, now in SR.  Plan:  - amiodarone PO per cardiology  - Metoprolol Xl 100mg daily  - Continue warfarin per pharmacy dosing  - Keep outpatient follow up with cardiologist (Dr. Lemuel Oakes) in 2 weeks through [facility] to determine whether EP study necessary     Acute on chronic systolic CHF   Elevated proBNP 19871. AICD in place and recent interrogation confirmed episodes of atrial flutter with RVR. Echo this admission demonstrated drop in EF from 25-30% to 15-20%; [employer] mitral valve prosthesis functioning normally with mild-moderate TR. Drop in LVEF likely represents rate-related cardiomyopathy (atrial flutter with RVR) in the setting of a previously known cardiomyopathy.   - Lasix PO starting 04/17. Hold today due to YASMANY  - hold Lisinopril until Cr improves  - Strict I&Os and daily weights     HTN  -Continue metoprolol succinate dose adjustment as above  - hold ACE-I due to YASMANY     DIANA  Noncompliant with CPAP     S/p right nephrectomy 2008 for large mass (benign)  Acute kidney injury  Cr 1.3 on 4/14 -> 1.63 on 04/16  Plan:  - Recheck  BMP in AM  - Avoid nephrotoxins  - May need nephrology eval if Cr worsens     Remote history of medial right temporal lobe infarct  -Continue warfarin per pharmacy dosing  -Continue PTA pravastatin 40 mg daily      Diet: Combination Diet Low Saturated Fat Na <2400mg Diet  DVT Prophylaxis: Warfarin  Sauer Catheter: not present  Code Status: Full Code     Dispo: anticipate discharge tomorrow if Cr improves.      Patient, family, interdisciplinary team involved in care and agrees with plan.  Total time - Greater than 35 min. More than 50% of time spent in direct patient care, care coordination, patient/caregiver counseling, discussion with patient on acute kidney injury and reason discharge not appropriate today, and formalizing plan of care.      Ambrose Coleman MD  Text Page  (7am to 6pm)    Interval History     No CP today, no SOB  No palpitations  No nausea/vomiting  No new complaints    -Data reviewed today: I reviewed all new labs and imaging results over the last 24 hours. I personally reviewed no images or EKG's today.    Physical Exam   Temp: 97.7  F (36.5  C) Temp src: Oral BP: (!) 126/97 Pulse: 63 Heart Rate: 73 Resp: 16 SpO2: 97 % O2 Device: None (Room air)    Vitals:    04/13/19 1821 04/15/19 0631 04/16/19 0650   Weight: 93 kg (205 lb) 91.2 kg (201 lb 1.6 oz) 89.4 kg (197 lb 3.2 oz)     Vital Signs with Ranges  Temp:  [97.5  F (36.4  C)-98  F (36.7  C)] 97.7  F (36.5  C)  Pulse:  [62-90] 63  Heart Rate:  [53-89] 73  Resp:  [12-18] 16  BP: (114-135)/(68-99) 126/97  SpO2:  [94 %-100 %] 97 %  I/O last 3 completed shifts:  In: 3 [I.V.:3]  Out: 900 [Urine:900]    Constitutional: Alert, awake, appears anxious  Respiratory: Clear to auscultation bilaterally, no wheezing  Cardiovascular: irregularly irregular, 1+ LE edema  GI: soft and non-tender  Skin/Integumen: warm and dry      Medications     Warfarin Therapy Reminder         [START ON 4/17/2019] amiodarone  200 mg Oral Daily     [START ON 4/17/2019] furosemide   20 mg Oral Daily     lisinopril  20 mg Oral Daily     metoprolol succinate ER  100 mg Oral Daily     multivitamin w/minerals  1 tablet Oral Daily     pravastatin  40 mg Oral Daily     sodium chloride (PF)  3 mL Intracatheter Q8H     spironolactone  25 mg Oral Daily     tamsulosin  0.4 mg Oral Daily     warfarin-No DOSE today  1 each Does not apply no dose today (warfarin)       Data   Recent Labs   Lab 04/16/19  0716 04/15/19  0857 04/15/19  0605 04/14/19  2305 04/14/19  0715 04/13/19  2339 04/13/19  1901 04/13/19  1527   WBC  --   --   --   --  8.9  --   --  10.7   HGB  --   --   --   --  15.2  --   --  16.1   MCV  --   --   --   --  88  --   --  88   PLT  --   --   --   --  332  --   --  412   INR 4.27*  --  3.31*  --  3.39*  --   --  3.41*     --   --  137 141  --   --  140   POTASSIUM 4.2 4.1  --  3.9 4.1  --   --  4.6   CHLORIDE 104  --   --  108 112*  --   --  110*   CO2 30  --   --  23 23  --   --  25   BUN 39*  --   --  33* 30  --   --  32*   CR 1.63*  --   --  1.30* 1.22  --   --  1.35*   ANIONGAP 5  --   --  6 6  --   --  5   BRAXTON 9.1  --   --  9.0 8.5  --   --  9.0   *  --   --  145* 106*  --   --  140*   TROPI  --   --   --   --   --  0.032 0.024 <0.015       No results found for this or any previous visit (from the past 24 hour(s)).

## 2019-04-16 NOTE — PLAN OF CARE
A&O x 4, stand by assist, low fat & low sodium diet, tele 100% AV paced w/prolonged QT & ST elevation, lasix, plan is to discharge 2-3 days, will continue with POC.

## 2019-04-16 NOTE — TELEPHONE ENCOUNTER
"Requested Prescriptions   Pending Prescriptions Disp Refills     warfarin (COUMADIN) 2 MG tablet [Pharmacy Med Name: WARFARIN SODIUM 2 MG TABLET]      Last Written Prescription Date:  na  Last Fill Quantity: na,  # refills: na   Last office visit: 1/16/2019 with prescribing provider:  Mahamed Groves MD       Future Office Visit:       90 tablet 0     Sig: TAKE ONE TABLET (2MG) BY MOUTH DAILY, EXCEPT TWO TABLETS (4MG) ON FRIDAYS       Vitamin K Antagonists Failed - 4/16/2019 10:03 AM        Failed - INR is within goal in the past 6 weeks     Confirm INR is within goal in the past 6 weeks.     Recent Labs   Lab Test 04/16/19  0716   INR 4.27*                       Passed - Recent (12 mo) or future (30 days) visit within the authorizing provider's specialty     Patient had office visit in the last 12 months or has a visit in the next 30 days with authorizing provider or within the authorizing provider's specialty.  See \"Patient Info\" tab in inbasket, or \"Choose Columns\" in Meds & Orders section of the refill encounter.              Passed - Medication is active on med list        Passed - Patient is 18 years of age or older        "

## 2019-04-16 NOTE — PLAN OF CARE
VSS. Tele - SR with occasional A/V pace, 1AVB and PVCs. Denies pain. IMC orders discontinued. Continuing IV lasix - patient refused bedtime lasix as he just wanted to get a good nights sleep without having to go to the bathroom all the time. PRN melatonin given for sleep. Up SBA. Plan: discharge 2-3 days back to prior living arrangements.

## 2019-04-17 VITALS
RESPIRATION RATE: 18 BRPM | DIASTOLIC BLOOD PRESSURE: 70 MMHG | OXYGEN SATURATION: 97 % | SYSTOLIC BLOOD PRESSURE: 113 MMHG | HEIGHT: 74 IN | TEMPERATURE: 97.8 F | WEIGHT: 195.8 LBS | BODY MASS INDEX: 25.13 KG/M2 | HEART RATE: 69 BPM

## 2019-04-17 LAB
ANION GAP SERPL CALCULATED.3IONS-SCNC: 6 MMOL/L (ref 3–14)
BUN SERPL-MCNC: 42 MG/DL (ref 7–30)
CALCIUM SERPL-MCNC: 8.7 MG/DL (ref 8.5–10.1)
CHLORIDE SERPL-SCNC: 105 MMOL/L (ref 94–109)
CO2 SERPL-SCNC: 28 MMOL/L (ref 20–32)
CREAT SERPL-MCNC: 1.63 MG/DL (ref 0.66–1.25)
GFR SERPL CREATININE-BSD FRML MDRD: 40 ML/MIN/{1.73_M2}
GLUCOSE SERPL-MCNC: 95 MG/DL (ref 70–99)
INR PPP: 4.83 (ref 0.86–1.14)
POTASSIUM SERPL-SCNC: 3.7 MMOL/L (ref 3.4–5.3)
SODIUM SERPL-SCNC: 139 MMOL/L (ref 133–144)

## 2019-04-17 PROCEDURE — 25000132 ZZH RX MED GY IP 250 OP 250 PS 637: Performed by: INTERNAL MEDICINE

## 2019-04-17 PROCEDURE — 85610 PROTHROMBIN TIME: CPT | Performed by: INTERNAL MEDICINE

## 2019-04-17 PROCEDURE — 99232 SBSQ HOSP IP/OBS MODERATE 35: CPT | Performed by: INTERNAL MEDICINE

## 2019-04-17 PROCEDURE — 99239 HOSP IP/OBS DSCHRG MGMT >30: CPT | Performed by: STUDENT IN AN ORGANIZED HEALTH CARE EDUCATION/TRAINING PROGRAM

## 2019-04-17 PROCEDURE — 36415 COLL VENOUS BLD VENIPUNCTURE: CPT | Performed by: INTERNAL MEDICINE

## 2019-04-17 PROCEDURE — A9270 NON-COVERED ITEM OR SERVICE: HCPCS | Performed by: INTERNAL MEDICINE

## 2019-04-17 PROCEDURE — 80048 BASIC METABOLIC PNL TOTAL CA: CPT | Performed by: INTERNAL MEDICINE

## 2019-04-17 RX ORDER — METOPROLOL SUCCINATE 100 MG/1
100 TABLET, EXTENDED RELEASE ORAL DAILY
Qty: 30 TABLET | Refills: 0 | Status: SHIPPED | OUTPATIENT
Start: 2019-04-18 | End: 2019-04-19

## 2019-04-17 RX ORDER — FUROSEMIDE 20 MG
20 TABLET ORAL DAILY
Qty: 30 TABLET | Refills: 0 | Status: SHIPPED | OUTPATIENT
Start: 2019-04-18 | End: 2019-04-19

## 2019-04-17 RX ORDER — WARFARIN SODIUM 2 MG/1
TABLET ORAL
Qty: 90 TABLET | Refills: 0 | Status: SHIPPED | OUTPATIENT
Start: 2019-04-17 | End: 2019-04-17

## 2019-04-17 RX ORDER — AMIODARONE HYDROCHLORIDE 200 MG/1
200 TABLET ORAL DAILY
Qty: 30 TABLET | Refills: 0 | Status: SHIPPED | OUTPATIENT
Start: 2019-04-18 | End: 2019-05-14

## 2019-04-17 RX ORDER — SPIRONOLACTONE 25 MG/1
25 TABLET ORAL DAILY
Qty: 30 TABLET | Refills: 0 | Status: SHIPPED | OUTPATIENT
Start: 2019-04-18 | End: 2019-04-19

## 2019-04-17 RX ADMIN — AMIODARONE HYDROCHLORIDE 200 MG: 200 TABLET ORAL at 08:05

## 2019-04-17 RX ADMIN — SPIRONOLACTONE 25 MG: 25 TABLET, FILM COATED ORAL at 08:04

## 2019-04-17 RX ADMIN — FUROSEMIDE 20 MG: 20 TABLET ORAL at 08:05

## 2019-04-17 RX ADMIN — METOPROLOL SUCCINATE 100 MG: 100 TABLET, EXTENDED RELEASE ORAL at 08:03

## 2019-04-17 RX ADMIN — PRAVASTATIN SODIUM 40 MG: 20 TABLET ORAL at 08:05

## 2019-04-17 RX ADMIN — MULTIPLE VITAMINS W/ MINERALS TAB 1 TABLET: TAB at 08:04

## 2019-04-17 ASSESSMENT — ACTIVITIES OF DAILY LIVING (ADL)
ADLS_ACUITY_SCORE: 13

## 2019-04-17 ASSESSMENT — MIFFLIN-ST. JEOR: SCORE: 1682.89

## 2019-04-17 NOTE — PROGRESS NOTES
[facility]  Cardiology Progress Note    Ronald Radford MD   04/17/2019          Assessment and Plan:    Mr. Cruz is a patient who, in the past, had a [employer] mitral valve implanted(2003).  He also has a diagnosis of idiopathic nonischemic cardiomyopathy with an ejection fraction in the 25% -30% range.  The patient has an AICD in situ.  It was noted, on interrogation of his defibrillator, that he was having episodes of atrial flutter.  It was decided to increase his beta blocker.  The patient has noticed over the last 2 weeks that he is developing increasing shortness of breath and it has also been noted that he has increasing ankle edema.  The patient was then admitted to hospital because of worsening shortness of breath.  Echocardiography was performed, and this shows that there has been significant deterioration in his left ventricular systolic function.  His EF is now in the 15-20% range.  The [employer] mitral valve prosthesis is functioning normally, and he has mild to moderate tricuspid regurgitation.  The patient's proBNP is markedly raised at 19,871.  His troponins are relatively flat and in the normal range.  EKG on admission confirmed that the patient was in atrial flutter with rapid ventricular response with ventricular ectopic beats.       1. Atrial flutter.  Remains in normal sinus rhythm after successful cardioversion Monday.  Continue metoprolol at 100 mg daily.  Continue amiodarone at 200 mg daily.  I recommend follow-up with the [place] cardiologist for consideration of a flutter ablation and discontinuation of amiodarone.    2. Acute on chronic systolic and diastolic congestive heart failure.  N-terminal proBNP iwas 19,871.  Patient has diuresed 4.2 kg.  He is now in normal sinus rhythm which should help with his congestive heart failure.    I have switched to oral Lasix 20 mg daily.  I have also started spironolactone 25 mg daily.  Long-term patient may also benefit  from changing lisinopril to Entresto. Patient may also benefit from switching metoprolol to carvedilol.  I will leave this to his primary cardiologist    3. Cardiomyopathy.  Most likely secondary to his history of mitral valve problems now exacerbated by his uncontrolled atrial flutter.  Last evaluation of patient's coronary anatomy was a nuclear scan in 2015 that appear to demonstrate no ischemia.  I do not think he needs further evaluation for underlying coronary disease at this time.  Troponins have been negative.  I suspect this will get better with restoration of normal sinus rhythm.  He should have a follow-up echocardiogram in 4-6 weeks.    4.  Anxiety.  Patient is very anxious.  Possibly somewhat depressed.  He perseverates over issues.  He worries that were not letting him go home and is fearful he will die in the hospital.  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    5. Prosthetic mitral valve appears to be functioning appropriately.  INR range should be between 2.5 and 3.5.    6.  INR is climbing most likely secondary to amiodarone warfarin interaction.  Need to adjust the warfarin dose.  Today's value 4.8    7. Acute on chronic renal failure stage III.  I suspect diuresis, rapid atrial flutter are contributing.  Creatinine now stable at  1.63.     8.  Disposition.  Patient is anxious for discharge.  I recommended ambulating in the halls.  Patient will need close follow-up with his [place] cardiology group.  I will sign off at this time.                               Interval History:   Patient much improved but very anxious and possibly depressed.              Medications:       Warfarin Therapy Reminder         amiodarone  200 mg Oral Daily     furosemide  20 mg Oral Daily     metoprolol succinate ER  100 mg Oral Daily     multivitamin w/minerals  1 tablet Oral Daily     pravastatin  40 mg Oral Daily     sodium chloride (PF)  3 mL  "Intracatheter Q8H     spironolactone  25 mg Oral Daily     tamsulosin  0.4 mg Oral Daily                   Physical Exam:   Blood pressure 143/88, pulse 69, temperature 97.7  F (36.5  C), temperature source Oral, resp. rate 18, height 1.88 m (6' 2\"), weight 88.8 kg (195 lb 12.8 oz), SpO2 99 %.  Vitals:    04/15/19 0631 04/16/19 0650 04/17/19 0615   Weight: 91.2 kg (201 lb 1.6 oz) 89.4 kg (197 lb 3.2 oz) 88.8 kg (195 lb 12.8 oz)     Vital Signs with Ranges  Temp:  [97.7  F (36.5  C)-98.5  F (36.9  C)] 97.7  F (36.5  C)  Pulse:  [69-73] 69  Heart Rate:  [64-73] 64  Resp:  [14-18] 18  BP: (113-143)/(79-97) 143/88  SpO2:  [96 %-99 %] 99 %  I/O's Last 24 hours  I/O last 3 completed shifts:  In: 250 [P.O.:240; I.V.:10]  Out: 100 [Urine:100]       General:  Patient is more comfortable today.  Awake, alert, oriented x3.  Neck: Jugular venous distention to 8-9 cm, no carotid bruits.  Lungs:  Clear to auscultation bilaterally.  Cardiac: Predominantly regular rhythm with occasional early beat, no murmurs, rub, or gallops.  Abdomen:  Soft, nontender.  Extremities: Trace bilateral edema.  2+ pulses.  Skin:  Warm, dry.  Neurologic:  No focal deficits.         Data:        Recent Labs   Lab Test 04/17/19  0620 04/16/19  0716 04/15/19  0857 04/14/19  2305  04/13/19  1527 12/05/18  1233    139  --  137   < > 140 139   POTASSIUM 3.7 4.2 4.1 3.9   < > 4.6 4.7   CHLORIDE 105 104  --  108   < > 110* 106   CO2 28 30  --  23   < > 25 27   BUN 42* 39*  --  33*   < > 32* 24   CR 1.63* 1.63*  --  1.30*   < > 1.35* 1.30*   ANIONGAP 6 5  --  6   < > 5 6   BRAXTON 8.7 9.1  --  9.0   < > 9.0 9.5   GLC 95 105*  --  145*   < > 140* 108*   ALBUMIN  --   --   --   --   --   --  4.1   PROTTOTAL  --   --   --   --   --   --  7.4   GFRESTIMATED 40* 40*  --  52*   < > 50* 53*   GFRESTBLACK 46* 46*  --  61   < > 58* 65   NTBNPI  --   --   --   --   --  19,871*  --     < > = values in this interval not displayed.     Recent Labs   Lab Test " 04/14/19  0715 04/13/19  1527 12/05/18  1233   HGB 15.2 16.1 17.4     Recent Labs   Lab Test 04/13/19  1527   TSH 3.90     Recent Labs   Lab Test 04/13/19  2339 04/13/19  1901 04/13/19  1527  05/10/13  1140   TROPI 0.032 0.024 <0.015   < >  --    TROPONIN  --   --   --   --  0.00    < > = values in this interval not displayed.     No lab results found.    Invalid input(s): [id number]  Recent Labs   Lab Test 11/15/18  0947  08/07/15  0807   CHOL 130   < > 143   HDL 32*   < > 30*   LDL 71   < > 88   TRIG 136   < > 123   CHOLHDLRATIO  --   --  4.8    < > = values in this interval not displayed.       Rhythm strips now demonstrate normal sinus rhythm occasional PVC      > 30 minutes spent

## 2019-04-17 NOTE — PLAN OF CARE
Heart Failure Care Pathway  GOALS TO BE MET BEFORE DISCHARGE:    1. Decrease congestion and/or edema with diuretic therapy to achieve near      optimal volume status.            Dyspnea improved:  Yes            Edema improved:     Yes        Net I/O and Weights since admission:          03/18 1500 - 04/17 1459  In: 1203 [P.O.:940; I.V.:263]  Out: 1000 [Urine:1000]  Net: 203            Vitals:    04/13/19 1821 04/15/19 0631 04/16/19 0650 04/17/19 0615   Weight: 93 kg (205 lb) 91.2 kg (201 lb 1.6 oz) 89.4 kg (197 lb 3.2 oz) 88.8 kg (195 lb 12.8 oz)         2.  O2 sats > 92% on RA or at prior home O2 therapy level.          Current oxygenation status:       SpO2: 99 %         O2 Device: None (Room air),            Able to wean O2 this shift to keep sats > 92%:  Yes       Does patient use Home O2? No    3.  Tolerates ambulation and mobility near baseline: Yes        How many times did the patient ambulate with nursing staff this shift? 1    Please review the Heart Failure Care Pathway for additional HF goal outcomes.    Nahomy Birmingham RN  4/17/2019        Vss, no co pain/cp/sob.   Tele SR 1AVB PVC occasional AV and Vpaced, MVR and murmur detected.  Walking in halls, up IND, trace LLE edema noted, using IS with encouragement, can be anxious at times.  CR 1.63 (unchanged from yesterday), K+ 3.7, INR 4.83 (up from yesterday at 4.27, warfarin was held). Possible dc later today.  Continue poc and monitoring.

## 2019-04-17 NOTE — PLAN OF CARE
A&O x 4, independent, low fat low sodium diet, no caffeine, no complaints of CP or SOB, tele AV paced occ., V paced at times, SB w/prolonged QT & BBB, HR 57, plan is to discharge today if creatinine level goes down, will continue with POC.

## 2019-04-17 NOTE — PROGRESS NOTES
Transition Communication Hand-off for Care Transitions to Next Level of Care Provider    Name: Tomás Cruz  : 1940  MRN #: [id number]  Primary Care Provider: Mahamed Groves  Primary Care MD Name: Mahamed Groves  Primary Clinic: [address] [place] [id number]  Primary Care Clinic Name: [facility]  Reason for Hospitalization:  Atrial flutter with rapid ventricular response (H) [I48.92]  Dyspnea, unspecified type [R06.00]  Chest pain, unspecified type [R07.9]  Admit Date/Time: 2019  3:03 PM  Discharge Date: 19  Payor Source: Payor: COMMERCIAL / Plan: [facility] HEALTH BENEFIT PLAN / Product Type: Indemnity /     Readmission Assessment Measure (GEREMIAS) Risk Score/category: average           Reason for Communication Hand-off Referral: Admission diagnoses: CHF    Discharge Plan: home       Concern for non-adherence with plan of care:   Y/N no  Discharge Needs Assessment: follow up labs      Already enrolled in Tele-monitoring program and name of program:  none known  Follow-up specialty is recommended: Yes    Follow-up plan:    Future Appointments   Date Time Provider Department Center   10/2/2019 10:30 AM Gita Zarco PA [facility]       Any outstanding tests or procedures:              Key Recommendations:  pt admitted with aflutter with elevated Bnp. Pt states he had called his cardiologist from home because of the way he was feeling and had initiated his heart monitor that he has at home, his cardiologist, as the pt puts it wasn't too concerned but had made an appointment for him on . Pt states she doesn't follow a low sodium diet. He does have a scale at home but doesn't weigh himself daily. Pt states leg swelling has never been an issue for him. He manages his own medications uses a weekly pill box. He is independent with mobility and still drives. Low sodium food and drink resources where given to the pt.       Francesca Cristina    AVS/Discharge Summary is the source of  truth; this is a helpful guide for improved communication of patient story

## 2019-04-17 NOTE — DISCHARGE SUMMARY
[facility]  Hospitalist Discharge Summary       Date of Admission:  4/13/2019  Date of Discharge:  4/17/2019  Discharging Provider: Ambrose Coleman MD      Discharge Diagnoses     Atrial flutter with RVR  Acute on chronic systolic CHF   HTN  Acute kidney injury    Follow-ups Needed After Discharge   Follow-up Appointments     Follow-up and recommended labs and tests       Follow up with primary care provider, Mahamed Groves, within 7 days for   hospital follow- up.  The following labs/tests are recommended: INR and   BMP within in days.           Unresulted Labs Ordered in the Past 30 Days of this Admission     No orders found from 2/12/2019 to 4/14/2019.        Discharge Disposition   Discharged to home  Condition at discharge: Stable    Hospital Course      Tomás Cruz is a 78 year old male who was admitted on 4/13/2019. Has past medical hx of  of postoperative paroxysmal atrial fibrillation s/p mitral valve replacement (2003) anticoagulated on warfarin, recently noted atrial flutter on interrogation of AICD, idiopathic nonischemic cardiomyopathy (prior EF 25-30%), cerebral infarction, DIANA, HTN, s/p laparoscopic R radical nephrectomy due to complex hemorrhagic cyst in 2008, colonic polyp who presented with increased SOB.     Atrial flutter with RVR  Previous dysrhythmia of paroxysmal atrial fibrillation postoperatively s/p mitral valve replacement in 2003. Recent AICD interrogation showed multiple episodes of atrial flutter. Initiated on Amiodarone drip on 3/14 with better rate control. Cardiology was consulted, and s/p Cardioversion (external) 04/15, now in SR.  Plan:  - amiodarone 200 mg PO at discharge per cardiology  - Metoprolol Xl 100mg daily  - Keep outpatient follow up with cardiologist (Dr. Lemuel Oakes) in 2 weeks through [facility] to determine whether EP study necessary  - Warfarin held at discharge due to elevated INR (likely 2/2 to amiodarone), follow up INR in 2 days. No active  bleeding at discharge. Did not reverse with vitamin K given prosthetic valve in place and no active bleeding.     Acute on chronic systolic CHF   Elevated proBNP 19871. AICD in place and recent interrogation confirmed episodes of atrial flutter with RVR. Echo this admission demonstrated drop in EF from 25-30% to 15-20%; [facility] mitral valve prosthesis functioning normally with mild-moderate TR. Drop in LVEF likely represents rate-related cardiomyopathy (atrial flutter with RVR) in the setting of a previously known cardiomyopathy.   - Lasix PO 20 mg daily     HTN  - Continue metoprolol succinate dose adjustment as above  - hold ACE-I at discharge due to elevated Cr, can resume if follow Cr is appropriate     DIANA  Noncompliant with CPAP     S/p right nephrectomy 2008 for large mass (benign)  Acute kidney injury  Cr 1.3 on 4/14 -> 1.63 on 04/16 and 04/17.   Plan:  - Follow Cr as outpatient in 2 days     Remote history of medial right temporal lobe infarct  -Hold warfarin due to elevated INR  -Continue PTA pravastatin 40 mg daily     Consultations This Hospital Stay   CARDIOLOGY IP CONSULT  PHARMACY TO DOSE WARFARIN  CARE COORDINATOR IP CONSULT  PHARMACY DISCHARGE EDUCATION BY PHARMACIST    Code Status   Full Code    Time Spent on this Encounter   I, Ambrose Coleman, personally saw the patient today and spent greater than 30 minutes discharging this patient.       Ambrose Coleman MD  [facility]  ______________________________________________________________________    Physical Exam   Vital Signs: Temp: 97.7  F (36.5  C) Temp src: Oral BP: 143/88 Pulse: 69 Heart Rate: 64 Resp: 18 SpO2: 99 % O2 Device: None (Room air)    Weight: 195 lbs 12.8 oz    Constitutional: Alert, awake, appears anxious  Respiratory:     Clear to auscultation bilaterally, no wheezing  Cardiovascular: irregularly irregular, 1+ LE edema  GI: soft and non-tender  Skin/Integumen: warm and dry  Neuro: A/Ox3. Moving all extremities.    Primary Care  Physician   Mahamed Groves    Discharge Orders      Reason for your hospital stay    You had increasing shortness of breath and increasing ankle edema. Was then admitted to hospital because of worsening shortness of breath.  Echocardiography was performed, and this shows t deterioration of your ventricular systolic function (heart failure) and you needed fluid to be removed and cardiology evaluation     Follow-up and recommended labs and tests     Follow up with primary care provider, Mahamed Groves, within 7 days for hospital follow- up.  The following labs/tests are recommended: INR and BMP within in days.     Activity    Your activity upon discharge: activity as tolerated     Monitor and record    weight every day     Diet    Follow this diet upon discharge: Orders Placed This Encounter      Combination Diet Low Saturated Fat Na <2400mg Diet, No Caffeine Diet       Significant Results and Procedures   Most Recent 3 CBC's:  Recent Labs   Lab Test 04/14/19  0715 04/13/19  1527 12/05/18  1233   WBC 8.9 10.7 8.6   HGB 15.2 16.1 17.4   MCV 88 88 88    412 331     Most Recent 3 BMP's:  Recent Labs   Lab Test 04/17/19  0620 04/16/19  0716 04/15/19  0857 04/14/19  2305    139  --  137   POTASSIUM 3.7 4.2 4.1 3.9   CHLORIDE 105 104  --  108   CO2 28 30  --  23   BUN 42* 39*  --  33*   CR 1.63* 1.63*  --  1.30*   ANIONGAP 6 5  --  6   BRAXTON 8.7 9.1  --  9.0   GLC 95 105*  --  145*     Most Recent 2 LFT's:  Recent Labs   Lab Test 12/05/18  1233 04/05/18  1242   AST 23 19   ALT 19 18   ALKPHOS 82 71   BILITOTAL 0.9 0.7     Most Recent 3 INR's:  Recent Labs   Lab Test 04/17/19  0620 04/16/19  0716 04/15/19  0605   INR 4.83* 4.27* 3.31*     Most Recent 3 Troponin's:  Recent Labs   Lab Test 04/13/19  2339 04/13/19  1901 04/13/19  1527  05/10/13  1140   TROPI 0.032 0.024 <0.015   < >  --    TROPONIN  --   --   --   --  0.00    < > = values in this interval not displayed.     Most Recent 3 BNP's:  Recent Labs   Lab  Test 04/13/19  1527   NTBNPI 19,871*   ,   Results for orders placed or performed during the hospital encounter of 04/13/19   Chest  XR, 1 view PORTABLE    Narrative    CHEST PORTABLE ONE VIEW     4/13/2019 3:45 PM     HISTORY: Cough and dyspnea.    COMPARISON: Chest x-ray from 5/14/2013.      Impression    IMPRESSION: Evidence of valve surgery. Films are taken in AP technique  which accentuates heart size, despite this there does appear to be  mild to moderate cardiomegaly. Previous valvular surgery. No change in  implanted cardiac device. Pulmonary vasculature is not distended.  Right lung is clear. It would be difficult to exclude an infiltrate or  atelectasis in the retrocardiac area.     SHRUTHI POTTS MD       Discharge Medications   Current Discharge Medication List      START taking these medications    Details   amiodarone (PACERONE/CODARONE) 200 MG tablet Take 1 tablet (200 mg) by mouth daily  Qty: 30 tablet, Refills: 0    Associated Diagnoses: Cardiomyopathy, unspecified type (H)      furosemide (LASIX) 20 MG tablet Take 1 tablet (20 mg) by mouth daily  Qty: 30 tablet, Refills: 0    Associated Diagnoses: Cardiomyopathy, unspecified type (H)      spironolactone (ALDACTONE) 25 MG tablet Take 1 tablet (25 mg) by mouth daily  Qty: 30 tablet, Refills: 0    Associated Diagnoses: Cardiomyopathy, unspecified type (H)         CONTINUE these medications which have CHANGED    Details   metoprolol succinate ER (TOPROL-XL) 100 MG 24 hr tablet Take 1 tablet (100 mg) by mouth daily  Qty: 30 tablet, Refills: 0    Associated Diagnoses: Cardiomyopathy, unspecified type (H)         CONTINUE these medications which have NOT CHANGED    Details   multivitamin w/minerals (MULTI-VITAMIN) tablet Take 1 tablet by mouth daily      pravastatin (PRAVACHOL) 40 MG tablet Take 1 tablet (40 mg) by mouth daily  Qty: 90 tablet, Refills: 3    Associated Diagnoses: Hyperlipidemia LDL goal <100      amoxicillin (AMOXIL) 500 MG capsule TAKE 4  CAPSULES (2,000 MG) BY MOUTH ONCE AS NEEDED PRIOR TO DENTAL APPTS  Qty: 4 capsule, Refills: 1    Associated Diagnoses: S/P mitral valve replacement      sildenafil (VIAGRA) 100 MG tablet Take 1 tablet (100 mg) by mouth daily as needed (erectile dysfunction) 30 min to 4 hrs before sex. Do not use with nitroglycerin, terazosin or doxazosin.  Qty: 12 tablet, Refills: 11    Associated Diagnoses: Erectile dysfunction, unspecified erectile dysfunction type         STOP taking these medications       lisinopril (PRINIVIL/ZESTRIL) 20 MG tablet Comments:   Reason for Stopping:         tamsulosin (FLOMAX) 0.4 MG capsule Comments:   Reason for Stopping:         warfarin (COUMADIN) 2 MG tablet Comments:   Reason for Stopping:         warfarin (COUMADIN) 3 MG tablet Comments:   Reason for Stopping:             Allergies   No Known Allergies

## 2019-04-17 NOTE — PROGRESS NOTES
Pt is in stable condition, vss, no co pain/cp/sob.  Pt dc home today.  All dc education reviewed with pt/wife in regards to: diet, activity, safety, s/s to report, medications and rx, follow up appointments/care, etc.  Questions were answered and pt/wife verbalized understanding.  All belongings were sent with pt, dc via wc by staff to main entrance, private transport.

## 2019-04-17 NOTE — PLAN OF CARE
Diagnosis: Aflutter RVR  Rechecking Cr in am.     Vitals: VSS.   Orientation: A&O x 4.   Respiratory: No SOB/KELLEY. On RA, sating appropriately.   Cards/Tele: Paced AV or V rotating (HR 60's) 1st degree AV block with PAC's   GI/: WDL  Skin: Edema noted to lower extremities, scattered bruising throughout.   Labs: Cr 1.63. INR 4.25, no warfarin dosed today.   Pain: No c/o pain this shift.     Diet: Low fat, Low Na  Activity: Independent       Plan for discharge: Tomorrow pending result of Cr redraw.     Will continue to monitor.

## 2019-04-17 NOTE — PHARMACY-ANTICOAGULATION SERVICE
Clinical Pharmacy- Warfarin Discharge Note      Warfarin PTA Regimen: 3MG ON MONDAY      2MG ROW      Anticoagulation Dose History     Recent Dosing and Labs Latest Ref Rng & Units 2/25/2019 3/11/2019 4/13/2019 4/14/2019 4/15/2019 4/16/2019 4/17/2019    Warfarin 2 mg - - - - 2 mg - - -    Warfarin 3 mg - - - - - 3 mg - -    INR 0.86 - 1.14 - - 3.41(H) 3.39(H) 3.31(H) 4.27(H) 4.83(H)    INR 0.86 - 1.14 2.4(A) 2.4(A) - - - - -          Patient admitted with warfarin as PTA med- INR goal 2.5-3.5.  Warfarin continued on admission.  Amiodarone started during admission and INR has increased to over 4 past two days.   Agree with plan for discharge to hold warfarin and recheck INR in 2 days      The patient should have an INR checked in 2 days.

## 2019-04-18 ENCOUNTER — PATIENT OUTREACH (OUTPATIENT)
Dept: CARE COORDINATION | Facility: CLINIC | Age: 79
End: 2019-04-18

## 2019-04-18 ENCOUNTER — TELEPHONE (OUTPATIENT)
Dept: FAMILY MEDICINE | Facility: CLINIC | Age: 79
End: 2019-04-18

## 2019-04-18 LAB — INTERPRETATION ECG - MUSE: NORMAL

## 2019-04-18 ASSESSMENT — ACTIVITIES OF DAILY LIVING (ADL): DEPENDENT_IADLS:: INDEPENDENT

## 2019-04-18 NOTE — TELEPHONE ENCOUNTER
ED / Discharge Outreach Protocol    Patient Contact    Attempt # 1    Was call answered?  No.  Left message on voicemail with information to call me back.    ANEL SaldanaN, RN  [employer] [facility]riage     Circumcision, per parent request

## 2019-04-18 NOTE — PATIENT INSTRUCTIONS
Patient Education     Low-Salt Choices  Eating salt (sodium) can make your body retain too much water. Excess water makes your heart work harder. Canned, packaged, and frozen foods are easy to prepare. But they are often high in sodium. Here are some ideas for low-salt foods you can easily make yourself.    For breakfast    Fruit or 100% fruit juice    Whole-wheat bread or an English muffin. Look for sodium content on Nutrition Facts labels.    Low-fat milk or yogurt    Unsalted eggs    Shredded wheat    Corn tortillas    Unsalted steamed rice    Regular (not instant) hot cereal, made without salt  Stay away from:    Sausage, leonard, and ham    Flour tortillas    Packaged muffins, pancakes, and biscuits    Instant hot cereals    Cottage cheese  For lunch and dinner    Fresh fish, chicken, turkey, or meat baked, broiled, or roasted without salt    Dry beans, cooked without salt    Tofu, stir-fried without salt    Unsalted fresh fruit and vegetables, or frozen or canned fruit and vegetables with no added salt  Stay away from:    Lunch or deli meat that is cured or smoked    Cheese    Tomato juice and ketchup    Canned vegetables, soups, and fish not labeled as no-salt-added or reduced sodium    Packaged gravies and sauces    Olives, pickles, and relish    Bottled salad dressings  For snacks and desserts    Yogurt    Unsalted, air-popped popcorn    Unsalted nuts or seeds  Stay away from:    Pies and cakes    Packaged dessert mixes    Pizza    Canned and packaged puddings    Pretzels, chips, crackers, and nuts unless the label says unsalted  Date Last Reviewed: 6/1/2017 2000-2018 [employer]. [address] [place] [address]. All rights reserved. This information is not intended as a substitute for professional medical care. Always follow your healthcare professional's instructions.           Patient Education     Eating Heart-Healthy Foods  Eating has a big impact on your heart health. In  fact, eating healthier can improve several of your heart risks at once. For instance, it helps you manage weight, cholesterol, and blood pressure. Here are ideas to help you make heart-healthy changes without giving up all the foods and flavors you love.  Getting started    Talk with your healthcare provider about eating plans, such as the DASH or Mediterranean diet. You may also be referred to a dietitian.    Change a few things at a time. Give yourself time to get used to a few eating changes before adding more.    Work to create a tasty, healthy eating plan that you can stick to for the rest of your life.    Goals for healthy eating  Below are some tips to improve your eating habits:    Limit saturated fats and trans fats. Saturated fats raise your levels of cholesterol, so keep these fats to a minimum. They are found in foods such as fatty meats, whole milk, cheese, and palm and coconut oils. Avoid trans fats because they lower good cholesterol as well as raise bad cholesterol. Trans fats are most often found in processed foods.    Reduce sodium (salt) intake. Eating too much salt may increase your blood pressure. Limit your sodium intake to 2,300 milligrams (mg) per day (the amount in 1 teaspoon of salt), or less if your healthcare provider recommends it. Dining out less often and eating fewer processed foods are two great ways to decrease the amount of salt you consume.    Managing calories. A calorie is a unit of energy. Your body burns calories for fuel, but if you eat more calories than your body burns, the extras are stored as fat. Your healthcare provider can help you create a diet plan to manage your calories. This will likely include eating healthier foods as well as exercising regularly. To help you track your progress, keep a diary to record what you eat and how often you exercise.  Choose the right foods  Aim to make these foods staples of your diet. If you have diabetes, you may have different  recommendations than what is listed here:    Fruits and vegetables provide plenty of nutrients without a lot of calories. At meals, fill half your plate with these foods. Split the other half of your plate between whole grains and lean protein.    Whole grains are high in fiber and rich in vitamins and nutrients. Good choices include whole-wheat bread, pasta, and brown rice.    Lean proteins give you nutrition with less fat. Good choices include fish, skinless chicken, and beans.    Low-fat or nonfat dairy provides nutrients without a lot of fat. Try low-fat or nonfat milk, cheese, or yogurt.    Healthy fats can be good for you in small amounts. These are unsaturated fats, such as olive oil, nuts, and fish. Try to have at least 2 servings per week of fatty fish, such as salmon, sardines, mackerel, rainbow trout, and albacore tuna. These contain omega-3 fatty acids, which are good for your heart. Flaxseed is another source of a heart-healthy fat.  More on heart-healthy eating  Read food labels  Healthy eating starts at the grocery store. Be sure to pay attention to food labels on packaged foods. Look for products that are high in fiber and protein, and low in saturated fat, cholesterol, and sodium. Avoid products that contain trans fat. And pay close attention to serving size. For instance, if you plan to eat two servings, double all the numbers on the label.  Prepare food right  A key part of healthy cooking is cutting down on added fat and salt. Look on the internet for lower-fat, lower-sodium recipes. Also, try these tips:    Remove fat from meat and skin from poultry before cooking.    Skim fat from the surface of soups and sauces.    Broil, boil, bake, steam, grill, and microwave food without added fats.    Choose ingredients that spice up your food without adding calories, fat, or sodium. Try these items: horseradish, hot sauce, lemon, mustard, nonfat salad dressings, and vinegar. For salt-free herbs and  spices, try basil, cilantro, cinnamon, pepper, and rosemary.  Date Last Reviewed: 10/1/2017    3646-9729 [employer] [address] [place] [address], [place] [address]. All rights reserved. This information is not intended as a substitute for professional medical care. Always follow your healthcare professional's instructions.

## 2019-04-18 NOTE — LETTER
[facility] CARE COORDINATION  [address] [address]    April 18, 2019    Tomás Cruz  [address] [facility] [address]      Dear Tomás,    I am a clinic care coordinator who works with Mahamed Groves MD at [facility]. I wanted to thank you for spending the time to talk with me.  I wanted to introduce myself and provide you with my contact information so that you can call me with questions or concerns about your health care. Below is a description of clinic care coordination and how I can further assist you.     The clinic care coordinator is a registered nurse and/or  who understand the health care system. The goal of clinic care coordination is to help you manage your health and improve access to the [facility] system in the most efficient manner. The registered nurse can assist you in meeting your health care goals by providing education, coordinating services, and strengthening the communication among your providers. The  can assist you with financial, behavioral, psychosocial, chemical dependency, counseling, and/or psychiatric resources.    Please feel free to contact me at [phone], with any questions or concerns. We at [facility] are focused on providing you with the highest-quality healthcare experience possible and that all starts with you.     Sincerely,     Faiza Connor    Enclosed: I have enclosed a copy of the Complex Care Plan. This has helpful information and goals that we have talked about. Please keep this in an easy to access place to use as needed.

## 2019-04-18 NOTE — TELEPHONE ENCOUNTER
"Patient returning call    ED/Discharge Protocol    \"Hi, my name is Laura Wlofe, a registered nurse, and I am calling on behalf of Dr. Groves's office at [facility].  I am calling to follow up and see how things are going for you after your recent visit.\"    \"I see that you were in the (ER/UC/IP) on [facility] for inpatient hospital stay on 4/17 for chest pain, cardiomyopathy.    How are you doing now that you are home?\" Doing very good.   DENIES: CP, SOB, Difficulty Breathing, palpitations    Is patient experiencing symptoms that may require a hospital visit?  No    Discharge Instructions    \"Let's review your discharge instructions.  What is/are the follow-up recommendations?  Pt. Response: Follow-up with PCP, labs    \"Were you instructed to make a follow-up appointment?\"  Pt. Response: Yes.  Has appointment been made?   No.  \"Can I help you schedule that appointment?\" 4/19/19      \"When you see the provider, I would recommend that you bring your discharge instructions with you.    Medications    \"How many new medications are you on since your hospitalization/ED visit?\"    2 or more - Epic MTM referral needed  \"How many of your current medicines changed (dose, timing, name, etc.) while you were in the hospital/ED visit?\"   0-1  \"Do you have questions about your medications?\"   No  \"Were you newly diagnosed with heart failure, COPD, diabetes or did you have a heart attack?\"   No  For patients on insulin: \"Did you start on insulin in the hospital or did you have your insulin dose changed?\"   No    Medication reconciliation completed? Yes    Was MTM referral placed (*Make sure to put transitions as reason for referral)?   No    Call Summary    \"Do you have any questions or concerns about your condition or care plan at the moment?\"    No  Triage nurse advice given: Advised patient that if new or worsening symptoms appear (reviewed new & worsening symptoms) to call the clinic or be seen in the the " "ER  Patient stated an understanding and agreed with plan.    Patient was in ER 3 in the past year (assess appropriateness of ER visits.)      \"If you have questions or things don't continue to improve, we encourage you contact us through the main clinic number,  [phone].  Even if the clinic is not open, triage nurses are available 24/7 to help you.     We would like you to know that our clinic has extended hours (provide information).  We also have urgent care (provide details on closest location and hours/contact info)\"      \"Thank you for your time and take care!\"        Laura Wolfe RN  [facility]    "

## 2019-04-18 NOTE — TELEPHONE ENCOUNTER
Patient discharged from [facility] for inpatient hospital stay on 4/17 for chest pain, cardiomyopathy.    Please contact patient to follow up; no appointment scheduled at this time.    ER / IP:  0/1    Care Coordination:  closed      Tali Vyas

## 2019-04-18 NOTE — LETTER
[facility]  Complex Care Plan  About Me:    Patient Name:  Tomás Crabtree    YOB: 1940  Age:         78 year old   [facility] MRN:    [id number] Telephone Information:  Home Phone [phone]   Mobile [phone]       Address:  [facility] [facility] [address] Email address:  [email or web address]      Emergency Contact(s)    Name Relationship Lgl Grd Work Phone Home Phone Mobile Phone   1. TYE CRABTREE Spouse No  [phone]   2. LILLIAN QUINTANILLA Daughter No  [phone]            Primary language:  English     needed? No   [facility] [employer]:  [phone] op. 1  Other communication barriers:    Preferred Method of Communication:  Mail  Current living arrangement: I live in a private home with spouse  Mobility Status/ Medical Equipment: Independent    Health Maintenance  Health Maintenance Reviewed:   Health Maintenance Topics with due status: Overdue       Topic Date Due    ZOSTER IMMUNIZATION 08/12/1991    PHQ-2 01/01/2019     My Access Plan  Medical Emergency 911   Primary Clinic Line [facility] - [phone]   24 Hour Appointment Line [phone] or  [phone]) (toll-free)   24 Hour Nurse Line [phone] (toll-free)   Preferred Urgent Care     Preferred Hospital [facility]  [phone]   Preferred Pharmacy [facility] - NON-EPRESCRIBE     Behavioral Health Crisis Line The National Suicide Prevention Lifeline at 1-471.592.2458 or 911             My Care Team Members  Patient Care Team       Relationship Specialty Notifications Start End    Mahamed Groves MD PCP - General   2/3/05     Phone: [phone] Fax: [phone] [address] [facility] [address]    Mahamed Groves MD Assigned PCP   11/16/14     Phone: [phone] Fax: [phone] [address] [facility] [address]    Sammie Saleem MD MD  Cardiology  9/4/18     Phone: [phone] Fax: [phone]         [address] [facility] [address]    Мария Saleem MD MD Urology  9/5/18     Phone: [phone] Fax: [phone]         [address] [facility] [address]    Lindsey Benítez, RN Registered Nurse Oncology  9/5/18     Phone: [phone] Pager: [phone]        Faiza Connor, RN Lead Care Coordinator   4/18/19     Phone: [phone]                 My Care Plans  Self Management and Treatment Plan  Goals and (Comments)  Goals        General    Improve chronic symptoms (pt-stated)     Notes - Note created  4/18/2019 12:05 PM by Faiza Connor RN    Goal Statement: I would like to better manage my CHF and remain out of the hospital.   Measure of Success: Patient will use Heart Failure action plan to monitor symptoms; symptoms will remain in green range. Patient will weigh himself daily and record weights. Patient will follow a low salt diet.   Supportive Steps to Achieve: Continued CCRN support. Patient will take medications as prescribed. Follow up with providers as recommended. Patient will call RNCC with questions, concerns, support needs. RNCC will be available as needed. RNCC to mail patient CHF action plan, weight log, and education in addition to introduction letter, complex care plan and medication list.   Barriers: Likes the taste of salt.   Strengths: Engaged in care coordination, feels he is in pretty good shape, motivated to get into an exercise regimen.   Date to Achieve By: 07/2019  Patient expressed understanding of goal: Yes                 Action Plans on File:                       Advance Care Plans/Directives Type:   Type Advanced Care Plans/Directives: Advanced Directive - On File    My Medical and Care Information  Problem List   Patient Active Problem List   Diagnosis     Impotence of organic origin     Hypersomnia with sleep apnea     Cardiomyopathy, nonischemic     Polyp of colon      Hyperlipidemia LDL goal <100     Advance Care Planning     Syncope     S/P mitral valve replacement     Health Care Home     Long-term (current) use of anticoagulants [Z79.01]     Essential tremor     Paroxysmal atrial fibrillation (H)     History of prosthetic heart valve     Automatic implantable cardioverter-defibrillator in situ     Elevated prostate specific antigen (PSA)     Decreased GFR     Essential hypertension with goal blood pressure less than 140/90     Arthritis of knee, right     Intention tremor     Cerebral infarction (H)     Atrial flutter (H)      Current Medications and Allergies:  See printed Medication Report.    Care Coordination Start Date: 4/18/2019   Frequency of Care Coordination: 2 weeks   Form Last Updated: 04/18/2019

## 2019-04-18 NOTE — PROGRESS NOTES
"Clinic Care Coordination Contact  OUTREACH    Referral Information:  Referral Source: IP Handoff    Primary Diagnosis: CHF    Chief Complaint   Patient presents with     Clinic Care Coordination - Post Hospital     CHF        Universal Utilization: Patient was hospitalized at [facility] from 04/13 - 04/17 with a diagnosis of CHF.   Utilization    Last refreshed: 4/18/2019 11:42 AM:  Hospital Admissions 1           Last refreshed: 4/18/2019 11:42 AM:  ED Visits 0           Last refreshed: 4/18/2019 11:42 AM:  No Show Count (past year) 1              Current as of: 4/18/2019 11:42 AM          \  Clinical Concerns:  Current Medical Concerns:  Patient reports he is starting to weigh himself daily. Weighed himself this morning with his clothing on, and forgot what the reading was. Does not recall having heart failure action plan. Will send weight log and action plan to patient via mail. Starting to follow low sodium diet, however expresses this to be difficult because he likes the taste of salt and states food is \"tasteless\" without it. Has a treadmill and would like to get started into a regular exercise routine. Feels he is in pretty good shape, and doesn't drink alcohol or smoke. 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   Education Provided to patient: CC role, Heart Failure.       Health Maintenance Reviewed: Not assessed  Clinical Pathway: None - Patient had limited time and declined to complete CHF assessment, deferred to next outreach.     Medication Management:  Patient has no concerns with medications.     Functional Status:  Dependent ADLs:: Independent  Dependent IADLs:: Independent  Bed or wheelchair confined:: No  Mobility Status: Independent  Fallen 2 or more times in the past year?: No  Any fall with injury in the past year?: No    Living " Situation:  Current living arrangement:: I live in a private home with spouse  Type of residence:: Private home - stairs    Diet/Exercise/Sleep:  Diet:: No added salt  Inadequate nutrition (GOAL):: No  Food Insecurity: No  Tube Feeding: No  Exercise:: Yes  Inadequate activity/exercise (GOAL):: No    Transportation:  Transportation concerns (GOAL):: No  Transportation means:: Regular car     Psychosocial:  Informal Support system:: Spouse     Financial/Insurance:   Financial/Insurance concerns (GOAL):: No     Resources and Interventions:  Current Resources:   Advance Care Plan/Directive  Advanced Care Plans/Directives on file:: Yes  Type Advanced Care Plans/Directives: Advanced Directive - On File  Advanced Care Plan/Directive Status: Not Applicable     Goals:      Goals        Patient Stated      Improve chronic symptoms (pt-stated)      Goal Statement: I would like to better manage my CHF and remain out of the hospital.   Measure of Success: Patient will use Heart Failure action plan to monitor symptoms; symptoms will remain in green range. Patient will weigh himself daily and record weights. Patient will follow a low salt diet.   Supportive Steps to Achieve: Continued CCRN support. Patient will take medications as prescribed. Follow up with providers as recommended. Patient will call RNCC with questions, concerns, support needs. RNCC will be available as needed. RNCC to mail patient CHF action plan, weight log, and education in addition to introduction letter, complex care plan and medication list.   Barriers: Likes the taste of salt.   Strengths: Engaged in care coordination, feels he is in pretty good shape, motivated to get into an exercise regimen.   Date to Achieve By: 07/2019  Patient expressed understanding of goal: Yes              Patient/Caregiver understanding: Patient verbalized understanding and denies any additional questions or concerns at this time. RNCC engaged in [employer] communications during  encounter.     Outreach Frequency: 2 weeks  Future Appointments              Tomorrow Mahamed Groves MD [facility] [place], [place]    In 5 months Gita Zarco PA [facility] and [facility], [facility]          Plan: RNCC to mail introduction letter, complex care plan, medication list, weight log, heart failure action plan and education to patient. Patient will review materials prior to next outreach. Patient was provided with writers contact information and encouraged to call with questions, concerns, support needs. RNCC will remain available as needed. RNCC will follow up with patient again in about two weeks.     Faiza Connor RN Care Coordinator  [facility] [place] [place]  Email: [email or web address]  Phone: [phone]

## 2019-04-19 ENCOUNTER — OFFICE VISIT (OUTPATIENT)
Dept: FAMILY MEDICINE | Facility: CLINIC | Age: 79
End: 2019-04-19
Payer: COMMERCIAL

## 2019-04-19 ENCOUNTER — ANTICOAGULATION THERAPY VISIT (OUTPATIENT)
Dept: NURSING | Facility: CLINIC | Age: 79
End: 2019-04-19
Payer: COMMERCIAL

## 2019-04-19 VITALS
WEIGHT: 202 LBS | SYSTOLIC BLOOD PRESSURE: 130 MMHG | RESPIRATION RATE: 14 BRPM | BODY MASS INDEX: 25.94 KG/M2 | OXYGEN SATURATION: 97 % | DIASTOLIC BLOOD PRESSURE: 80 MMHG | TEMPERATURE: 98 F | HEART RATE: 62 BPM

## 2019-04-19 DIAGNOSIS — N17.9 ACUTE RENAL FAILURE, UNSPECIFIED ACUTE RENAL FAILURE TYPE (H): ICD-10-CM

## 2019-04-19 DIAGNOSIS — I10 ESSENTIAL HYPERTENSION WITH GOAL BLOOD PRESSURE LESS THAN 140/90: ICD-10-CM

## 2019-04-19 DIAGNOSIS — I48.0 PAROXYSMAL ATRIAL FIBRILLATION (H): ICD-10-CM

## 2019-04-19 DIAGNOSIS — I42.9 CARDIOMYOPATHY, UNSPECIFIED TYPE (H): ICD-10-CM

## 2019-04-19 DIAGNOSIS — I48.4 ATYPICAL ATRIAL FLUTTER (H): Primary | ICD-10-CM

## 2019-04-19 DIAGNOSIS — Z95.2 S/P MITRAL VALVE REPLACEMENT: ICD-10-CM

## 2019-04-19 LAB
ANION GAP SERPL CALCULATED.3IONS-SCNC: 6 MMOL/L (ref 3–14)
BUN SERPL-MCNC: 36 MG/DL (ref 7–30)
CALCIUM SERPL-MCNC: 9.4 MG/DL (ref 8.5–10.1)
CHLORIDE SERPL-SCNC: 108 MMOL/L (ref 94–109)
CO2 SERPL-SCNC: 26 MMOL/L (ref 20–32)
CREAT SERPL-MCNC: 1.52 MG/DL (ref 0.66–1.25)
GFR SERPL CREATININE-BSD FRML MDRD: 43 ML/MIN/{1.73_M2}
GLUCOSE SERPL-MCNC: 87 MG/DL (ref 70–99)
INR POINT OF CARE: 3 (ref 0.86–1.14)
POTASSIUM SERPL-SCNC: 4.6 MMOL/L (ref 3.4–5.3)
SODIUM SERPL-SCNC: 140 MMOL/L (ref 133–144)

## 2019-04-19 PROCEDURE — 85610 PROTHROMBIN TIME: CPT | Mod: QW

## 2019-04-19 PROCEDURE — 99214 OFFICE O/P EST MOD 30 MIN: CPT | Performed by: FAMILY MEDICINE

## 2019-04-19 PROCEDURE — 36416 COLLJ CAPILLARY BLOOD SPEC: CPT

## 2019-04-19 PROCEDURE — 80048 BASIC METABOLIC PNL TOTAL CA: CPT | Performed by: FAMILY MEDICINE

## 2019-04-19 RX ORDER — METOPROLOL SUCCINATE 100 MG/1
100 TABLET, EXTENDED RELEASE ORAL DAILY
Qty: 90 TABLET | Refills: 1 | Status: SHIPPED | OUTPATIENT
Start: 2019-04-19 | End: 2019-08-15

## 2019-04-19 RX ORDER — FUROSEMIDE 20 MG
20 TABLET ORAL DAILY
Qty: 90 TABLET | Refills: 1 | Status: SHIPPED | OUTPATIENT
Start: 2019-04-19 | End: 2019-08-15

## 2019-04-19 RX ORDER — SPIRONOLACTONE 25 MG/1
25 TABLET ORAL DAILY
Qty: 90 TABLET | Refills: 1 | Status: SHIPPED | OUTPATIENT
Start: 2019-04-19 | End: 2019-08-15

## 2019-04-19 NOTE — PROGRESS NOTES
"  SUBJECTIVE:                                                      Tomás Cruz is a 78 year old male who presents to clinic today for the following health issues:      Hospital Follow-up Visit:  Hospital/Nursing Home/IP Rehab Facility: [facility]  Date of Admission: 04/13/2019  Date of Discharge: 04/17/2019  Reason(s) for Admission:     Atrial flutter with RVR  Acute on chronic systolic CHF   HTN  Acute kidney injury         Problems taking medications regularly:  None       Medication changes since discharge: Yes       Problems adhering to non-medication therapy:  None    Summary of hospitalization:  [facility] discharge summary reviewed  Diagnostic Tests/Treatments reviewed.  Follow up needed: none  Other Healthcare Providers Involved in Patient s Care:         None  Update since discharge: improved.     Post Discharge Medication Reconciliation: discharge medications reconciled, continue medications without change.  Plan of care communicated with patient     Coding guidelines for this visit:  Type of Medical   Decision Making Face-to-Face Visit       within 7 Days of discharge Face-to-Face Visit        within 14 days of discharge   Moderate Complexity [id number]   High Complexity 89530 [id number]     - Pt is still experiencing a \"chesty\" cough, but otherwise improved from his hospital visit.            Problem list and histories reviewed & adjusted, as indicated.  Additional history: as documented    ROS:  Constitutional, HEENT, cardiovascular, pulmonary, GI, , musculoskeletal, neuro, skin, endocrine and psych systems are negative, except as otherwise noted.      This document serves as a record of the services and decisions personally performed and made by Mahamed Groves MD. It was created on his behalf by Alejandra Khalil, a trained medical scribe. The creation of this document is based the provider's statements to the medical scribe.  Scribe Alejandra Khalil 10:58 AM, April 19, 2019    OBJECTIVE:  "                                                   /80   Pulse 62   Temp 98  F (36.7  C) (Tympanic)   Resp 14   Wt 91.6 kg (202 lb)   SpO2 97%   BMI 25.94 kg/m   Body mass index is 25.94 kg/m .   GENERAL: healthy, alert, well nourished, well hydrated, no distress  HENT: ear canals- normal; TMs- normal; Nose- normal; Mouth- no ulcers, no lesions  NECK: no tenderness, no adenopathy, no asymmetry, no masses, no stiffness; thyroid- normal to palpation  RESP: lungs clear to auscultation - no rales, no rhonchi, no wheezes  CV: regular rates & rhythm, normal S1 S2, no S3 or S4 & no murmur, no click or rub -  ABDOMEN: soft, no tenderness, no  hepatosplenomegaly, no masses, normal bowel sounds  MS: Trace bilateral peripheral edema to distal ankle; Otherwise normal extremities- no gross deformities noted, no edema  SKIN: no suspicious lesions, no rashes  PSYCH: Alert and oriented times 3; speech- coherent , normal rate and volume; able to articulate logical thoughts, able to abstract reason, no tangential thoughts, no hallucinations or delusions, affect- normal    Diagnostic Test Results  Results for orders placed or performed in visit on 04/19/19 (from the past 24 hour(s))   INR point of care   Result Value Ref Range    INR Protime 3.0 (A) 0.86 - 1.14       ASSESSMENT/PLAN:                                                      Tomás was seen today for hospital f/u.    Diagnoses and all orders for this visit:    Atypical atrial flutter (H) - Labs pending; Will follow-up with cardiology 04/30  -     Basic metabolic panel  (Ca, Cl, CO2, Creat, Gluc, K, Na, BUN)    Cardiomyopathy, unspecified type (H) - Stable, continue medications & monitor peripheral edema for worsening; Will follow-up with cardiology 04/30  -     spironolactone (ALDACTONE) 25 MG tablet; Take 1 tablet (25 mg) by mouth daily  -     metoprolol succinate ER (TOPROL-XL) 100 MG 24 hr tablet; Take 1 tablet (100 mg) by mouth daily  -     furosemide (LASIX)  "20 MG tablet; Take 1 tablet (20 mg) by mouth daily    Essential hypertension with goal blood pressure less than 140/90 - Labs pending; Controlled, continue medication  -     Basic metabolic panel; Future    Paroxysmal atrial fibrillation (H) - Stable, will follow-up with cardiology 04/30    Acute renal failure, unspecified acute renal failure type (H) - Labs pending  -     Basic metabolic panel; Future        Risks, benefits and alternatives of treatments discussed. Plan agreed on.      Followup: No follow-ups on file.    See patient instructions.       BMI:   Estimated body mass index is 25.94 kg/m  as calculated from the following:    Height as of 4/13/19: 1.88 m (6' 2\").    Weight as of this encounter: 91.6 kg (202 lb).           The information in this document, created by the medical scribe for me, accurately reflects the services I personally performed and the decisions made by me. I have reviewed and approved this document for accuracy prior to leaving the patient care area.  10:58 AM, 04/19/19        Marc Groves MD   Pager: [phone]    "

## 2019-04-19 NOTE — PROGRESS NOTES
ANTICOAGULATION FOLLOW-UP CLINIC VISIT    Patient Name:  Tomás Cruz  Date:  4/19/2019  Contact Type:  Face to Face    SUBJECTIVE:     Patient Findings     Positives:   Hospital admission (RECENT hospital stay, SOB)           OBJECTIVE    INR Protime   Date Value Ref Range Status   04/19/2019 3.0 (A) 0.86 - 1.14 Final       ASSESSMENT / PLAN  INR assessment THER    Recheck INR In: 2 WEEKS    INR Location Clinic      Anticoagulation Summary  As of 4/19/2019    INR goal:   2.5-3.5   TTR:   89.5 % (3.1 y)   INR used for dosing:   3.0 (4/19/2019)   Warfarin maintenance plan:   3 mg (2 mg x 1.5) every Mon; 2 mg (2 mg x 1) all other days   Full warfarin instructions:   3 mg every Mon; 2 mg all other days   Weekly warfarin total:   15 mg   Plan last modified:   Mojgan Brasher RN (4/19/2019)   Next INR check:   5/3/2019   Target end date:       Indications    Long-term (current) use of anticoagulants [Z79.01] [Z79.01]  S/P mitral valve replacement [Z95.2]             Anticoagulation Episode Summary     INR check location:   Anticoagulation Clinic    Preferred lab:       Send INR reminders to:    NURSE    Comments:         Anticoagulation Care Providers     Provider Role Specialty Phone number    Mahamed Groves MD [facility] [phone]            See the Encounter Report to view Anticoagulation Flowsheet and Dosing Calendar (Go to Encounters tab in chart review, and find the Anticoagulation Therapy Visit)    Dosage adjustment made based on physician directed care plan.    Mojgan Brasehr RN

## 2019-04-22 NOTE — RESULT ENCOUNTER NOTE
Dear Tomás,    Here is a summary of your recent test results:  -Kidney function (GFR) is decreased but stable.  ADVISE: rechecking this in 6 months  -Sodium is normal.  -Potassium is normal.  -Calcium is normal.  -Glucose (diabetic screening test) is normal.    For additional lab test information, labtestsonline.org is an excellent reference.           Thank you very much for trusting me and [facility].     Healthy regards,  Marc Groves MD

## 2019-04-30 ENCOUNTER — TRANSFERRED RECORDS (OUTPATIENT)
Dept: HEALTH INFORMATION MANAGEMENT | Facility: CLINIC | Age: 79
End: 2019-04-30

## 2019-05-01 ENCOUNTER — PATIENT OUTREACH (OUTPATIENT)
Dept: CARE COORDINATION | Facility: CLINIC | Age: 79
End: 2019-05-01

## 2019-05-01 NOTE — PROGRESS NOTES
Clinic Care Coordination Contact  [facility]/Voicemail    Clinical Data: Care Coordinator Outreach. RNCC calling to follow up with patient regarding CHF management. If received and reviewed information sent from previous outreach, compliance with daily weights, medications, low salt intake, complete CHF assessment and other concerns.   Outreach attempted x 1.  Left message on voicemail with call back information and requested return call.  Plan: Care Coordinator mailed out care coordination introduction letter on 04/18. Care Coordinator will try to reach patient again in 3-5 business days.    Faiza Connor RN Care Coordinator  [facility] [place]  Email: [email or web address]  Phone: [phone]

## 2019-05-06 ENCOUNTER — OFFICE VISIT (OUTPATIENT)
Dept: FAMILY MEDICINE | Facility: CLINIC | Age: 79
End: 2019-05-06
Payer: COMMERCIAL

## 2019-05-06 VITALS
WEIGHT: 198 LBS | HEART RATE: 61 BPM | TEMPERATURE: 98.6 F | HEIGHT: 74 IN | SYSTOLIC BLOOD PRESSURE: 124 MMHG | DIASTOLIC BLOOD PRESSURE: 72 MMHG | OXYGEN SATURATION: 96 % | BODY MASS INDEX: 25.41 KG/M2

## 2019-05-06 DIAGNOSIS — R91.8 PULMONARY NODULES: Primary | ICD-10-CM

## 2019-05-06 DIAGNOSIS — I48.0 PAROXYSMAL ATRIAL FIBRILLATION (H): ICD-10-CM

## 2019-05-06 DIAGNOSIS — G45.4 TRANSIENT GLOBAL AMNESIA: ICD-10-CM

## 2019-05-06 PROCEDURE — 99495 TRANSJ CARE MGMT MOD F2F 14D: CPT | Performed by: PHYSICIAN ASSISTANT

## 2019-05-06 ASSESSMENT — MIFFLIN-ST. JEOR: SCORE: 1692.87

## 2019-05-06 NOTE — PROGRESS NOTES
SUBJECTIVE:                                                    Tomás Cruz is a 78 year old male who presents to clinic today for the following health issues:          Hospital Follow-up Visit:    Hospital/Nursing Home/IP Rehab Facility: [facility]  Date of Admission: 04/30/2019  Date of Discharge: 05/01/2019  Reason(s) for Admission: Encephalopathy (Primary Dx);   Confusion;   Lung mass            Problems taking medications regularly:  None       Medication changes since discharge: None       Problems adhering to non-medication therapy:  None    Summary of hospitalization:  [facility] discharge summary reviewed.    Diagnostic Tests/Treatments reviewed.  Follow up needed: Lung Nodule Clinic Followup advised  Other Healthcare Providers Involved in Patient s Care:         None  Update since discharge: Back to baseline     Post Discharge Medication Reconciliation: discharge medications reconciled, continue medications without change.  Plan of care communicated with patient     Coding guidelines for this visit:  Type of Medical   Decision Making Face-to-Face Visit       within 7 Days of discharge Face-to-Face Visit        within 14 days of discharge   Moderate Complexity 02898 [id number]   High Complexity 53282 05488            Pertinent studies: Ct/CTA of head and neck:  CT BRAIN WITHOUT CONTRAST:   1. No finding for intracranial hemorrhage or mass or convincing finding for  acute infarct.     2. Small chronic infarcts are seen within the right basal ganglia and thalamus.  Mild presumed sequela chronic microvascular ischemic change and at least mild  generalized cerebral and cerebellar volume loss.     CTA [place] OF JOINER:   1. Dominant left vertebral artery with small caliber right vertebral artery.  Short segment moderate to high-grade focal stenosis is seen within the right  vertebral artery. Moderate atherosclerotic plaque proximal intradural left  vertebral artery with mild  "associated luminal narrowing.     2. Both posterior communicating arteries are widely patent. There is a small  caliber right P1 segment with occlusion of its distal aspect. More distal  posterior cerebral arteries are patent bilaterally.      3. Moderate atherosclerotic plaque both carotid siphons without significant  stenosis on either side.     CTA OF THE NECK:  1. No significant stenosis either cervical carotid or vertebral system.      2. 1.9 cm groundglass opacity right upper lobe nonspecific but concerning for  carcinoma. Further assessment with dedicated CT chest is suggested    CT chest     FINDINGS:   LUNGS AND PLEURA: Indeterminant 2.2 x 1.2 cm groundglass nodule apical segment   right upper lobe. This corresponds to the nodule seen on prior CT neck. No other   lung nodules.       MEDIASTINUM: Negative. No lymphadenopathy.       LIMITED UPPER ABDOMEN: Postop change in the stomach. Partially imaged   benign-appearing cyst left kidney.       MUSCULOSKELETAL: Old right posterior lower rib fractures.       CONCLUSION:   1.  Indeterminant 2.2 x 1.2 cm groundglass nodule right upper lobe. This   corresponds to findings seen on CT neck. New since 2004. This remains   technically indeterminant. A low-grade adenocarcinoma could have this   appearance. Recommend follow-up CT in 6 months.       2.  No other significant findings.       Patient reports that he has been back to baseline and does not have any symptoms or concerns today.      Problem list and histories reviewed & adjusted, as indicated.  Additional history: as documented      ROS:  Constitutional, HEENT, cardiovascular, pulmonary, GI, , musculoskeletal, neuro, skin, endocrine and psych systems are negative, except as otherwise noted.    OBJECTIVE:                                                    /72 (BP Location: Left arm, Patient Position: Chair, Cuff Size: Adult Regular)   Pulse 61   Temp 98.6  F (37  C) (Oral)   Ht 1.88 m (6' 2\")   Wt " 89.8 kg (198 lb)   SpO2 96%   BMI 25.42 kg/m    Body mass index is 25.42 kg/m .  GENERAL: healthy, alert and no distress  EYES: Eyes grossly normal to inspection, PERRL and conjunctivae and sclerae normal  RESP: lungs clear to auscultation - no rales, rhonchi or wheezes  CV: regular rate and rhythm, normal S1 S2, no S3 or S4, no murmur, click or rub, no peripheral edema and peripheral pulses strong  MS: no gross musculoskeletal defects noted, no edema  NEURO: Normal strength and tone, mentation intact and speech normal  NEURO: Normal strength and tone, sensory exam grossly normal, mentation intact, speech normal and cranial nerves 2-12 intact  PSYCH: mentation appears normal, affect normal/bright    Diagnostic Test Results:  none      ASSESSMENT/PLAN:                                                      Tomás was seen today for hospital f/u.    Diagnoses and all orders for this visit:    Pulmonary nodules  -     Lung Nodule Program Referral Location: [facility] - [phone]    Transient global amnesia    Paroxysmal atrial fibrillation (H)      Patient is back to baseline and has not had any concerns regarding his memory since being discharged.  He has an appointment to see cardiology in the next week and will keep that appointment.  Also, he has been advised today to followup with the pulmonary nodule clinic due to CT findings in the hospital.  Referral has been placed and patient will make appointment.      Followup: Return in about 1 week (around 5/13/2019) for Specialty followup, please be seen sooner if needed.    -- I have discussed the patient's diagnosis, and my plan of treatment with the patient and/or family. Patient is aware to followup if symptoms do not improve.  Patient has been advised to be seen sooner or seek more immediate care if symptoms change or worsen.  Patient agrees with and understands the plan today.     See Patient Instructions        Belen Pearce  BERKLEY    [facility]

## 2019-05-06 NOTE — PATIENT INSTRUCTIONS
Please be sure to followup with the cardiologist as scheduled.      Also, please be sure you are set up for followup at the lung nodule clinic as they have found changed lung nodules on the scans in the hospital.

## 2019-05-07 NOTE — PROGRESS NOTES
Clinic Care Coordination Contact  OUTREACH    Chief Complaint   Patient presents with     Clinic Care Coordination - Follow-up     CHF      Universal Utilization:   Utilization    Last refreshed: 2019  5:19 PM:  Hospital Admissions 1           Last refreshed: 2019  5:19 PM:  ED Visits 0           Last refreshed: 2019  5:19 PM:  No Show Count (past year) 1              Current as of: 2019  5:19 PM            Clinical Concerns:  Current Medical Concerns:  CHF. Patient reports he is doing good. Excellent physically. Lot of things going on right now. People working on basement from water damage, new  coming, etc.  Awaiting a call back from the cardiologist to schedule an appointment. Patient reports weight in the clinic yesterday on their scale was 190, however weighing himself on home scale and this reading has been consistently steady. Monitoring BP. Last readings: 133/83; 139/81; 132/79; 138/74. Scheduled appointment at the lung nodule clinic for . Requested to end conversation to take incoming awaited call.   Education Provided to patient: CC role. Low salt diet, Ms Dash. Nutrition labels.      Health Maintenance Reviewed:    Clinical Pathway: Clinic Care Coordination CHF Assessment    CHF:  Home scale available:  Yes  Home scale weight this mornin.4 pounds.   Heart Failure Zones sheet on refrigerator or available: Yes  Any increased SOB since hospital discharge:  No  Any increased edema since hospital discharge:  No  What number to call for YELLOW zones: [phone]    Symptom Review:   Heart Failure Symptoms  Shortness of breath:: No  Wheezing or noisy breathing?: No  Cough: Yes  Is your cough:: Loose  Increased sputum: No  Fever: No  Chest pain: : No  Heartbeat: Regular  Dizzy or Lightheaded: No  Checking weight daily? : Yes  Weight?: Unchanged  Today's Weight?: 90 kg (198 lb 6.4 oz)  Weight increase more than 2 lbs in 24 hours?: No  Weight Increase more than 5 lbs in 1 week?  : No  Does the patient have understanding of Diuretic self-management?: Yes  Diet:: No added salt  Appetite:: Normal  Bloating:: None  Urination:: Normal  Fatigue: No  Weakness (Heaviness in limbs):: No  What Heart Failure zone are you currently in?: Green  Overall your CHF symptoms are (GOAL):: Stable    Medication Management:  Patient expresses no concerns with medications.     Functional Status:   Patient independent with ADLS/IADLS.     Living Situation:   Patient lives in a private home with spouse.     Diet/Exercise/Sleep:   Patient is active. Trying to watch what he eats and reports is eating a lot less salt. Up during the night to use the bathroom otherwise no concerns.     Transportation:   Driving himself. Feeling more confident with driving.     Financial/Insurance:    No concerns.      Goals:   Goals        General    Improve chronic symptoms (pt-stated)     Notes - Note created  4/18/2019 12:05 PM by Faiza Connor RN    Goal Statement: I would like to better manage my CHF and remain out of the hospital.   Measure of Success: Patient will use Heart Failure action plan to monitor symptoms; symptoms will remain in green range. Patient will weigh himself daily and record weights. Patient will follow a low salt diet.   Supportive Steps to Achieve: Continued CCRN support. Patient will take medications as prescribed. Follow up with providers as recommended. Patient will call RNCC with questions, concerns, support needs. RNCC will be available as needed. RNCC to mail patient CHF action plan, weight log, and education in addition to introduction letter, complex care plan and medication list.   Barriers: Likes the taste of salt.   Strengths: Engaged in care coordination, feels he is in pretty good shape, motivated to get into an exercise regimen.   Date to Achieve By: 07/2019  Patient expressed understanding of goal: Yes              As of today's date 5/7/2019 goal is met at 0 - 25%.   Goal Status:  Showing  progress. Patient reports a lot less intake of salt. Compliant with BP and weight monitoring daily.     Patient/Caregiver understanding: Patient verbalized understanding and denies any additional questions or concerns at this time. RNCC engaged in AIDET communications during encounter.      Future Appointments              In 3 weeks Tash Nelson MD [facility], [facility]    In 4 months Gita Zarco PA [facility]rology and [facility]rolog[facility], [facility]          Plan: Patient will continue monitoring symptoms. Patient was encouraged to call writer with questions, concerns, support needs. RNCC will be available as needed. RNCC will follow up with patient again in about a month.     Faiza Connor RN Care Coordinator  [facility] [place]  Email: [email or web address]  Phone: [phone]

## 2019-05-07 NOTE — TELEPHONE ENCOUNTER
ONCOLOGY INTAKE: Records Information      APPT INFORMATION: 06/03 Begnayuliana   Referring provider:  Belen Pearce PA-C  Referring provider s clinic:  [facility] for visit/diagnosis:  Pulmonary nodules [R91.8    RECORDS INFORMATION:  Were the records received with the referral (via [employer])? Yes    Has patient been seen for any external appt for this diagnosis? no    If yes, where? na    Has patient had any imaging or procedures outside of [place] for this condition? no      If Yes, where? na    ADDITIONAL INFORMATION:  Pulmonary nodules [R91.8]: Caller Intake: Ref by:Belen Pearce PA-C:-[facility] Records In Epic  Ct 05/01 [facility] No Bx

## 2019-05-08 ENCOUNTER — TELEPHONE (OUTPATIENT)
Dept: FAMILY MEDICINE | Facility: CLINIC | Age: 79
End: 2019-05-08

## 2019-05-08 DIAGNOSIS — I63.9 CEREBRAL INFARCTION, UNSPECIFIED MECHANISM (H): Primary | ICD-10-CM

## 2019-05-08 DIAGNOSIS — R41.0 CONFUSION: ICD-10-CM

## 2019-05-08 NOTE — TELEPHONE ENCOUNTER
"KIRSTEN Corea with [facility], calling    Patient saw Dr. Modesto Oakes last week - stroke code was called and patient was brought to ED.   Stated the ED chalked it up to \"global amnesia.\"   Dr. Modesto Oakes is hoping a neurology consult. Requesting PCP to place referral.     RN stated that patient has a lot of memory issues - even when speaking with the patient. She has had to repeat plan of care to patient multiple times. That provider is requesting patient's spouse be with to all future appts.     Ph. [phone]    Routing to PCP for further review/recommendations/orders.  Referral Pended      Laura Wolfe RN  [place] [facility]  "

## 2019-05-10 NOTE — TELEPHONE ENCOUNTER
Called [phone] -     Advised of notes below and referral information  Patient stated an understanding and agreed with plan.    Laura Wolfe RN  [facility]

## 2019-05-13 ENCOUNTER — TELEPHONE (OUTPATIENT)
Dept: FAMILY MEDICINE | Facility: CLINIC | Age: 79
End: 2019-05-13

## 2019-05-13 NOTE — TELEPHONE ENCOUNTER
RECORDS STATUS - ALL OTHER DIAGNOSIS      RECORDS RECEIVED FROM: [facility]   DATE RECEIVED: 06/03/19   NOTES STATUS DETAILS   OFFICE NOTE from referring provider [facility] 05/06/19 Dr. Pearce   OFFICE NOTE from medical oncologist n/a    DISCHARGE SUMMARY from hospital [facility] 04/30/19   DISCHARGE REPORT from the ER [facility] 04/13/19   OPERATIVE REPORT     MEDICATION LIST Complete CE/Epic   CLINICAL TRIAL TREATMENTS TO DATE n/a    LABS     PATHOLOGY REPORTS n/a    ANYTHING RELATED TO DIAGNOSIS Complete /Epic   GENONOMIC TESTING     TYPE: n/a    IMAGING (NEED IMAGES & REPORT)     CT SCANS Requested [facility] Chest 05/01/19  [facility] Head/Neck 04/30/19   MRI n/a    MAMMO n/a    ULTRASOUND n/a    XR Requested [facility] Chest 04/30/19  PACS 04/13/19, etc.

## 2019-05-13 NOTE — TELEPHONE ENCOUNTER
Reason for Call:  Other prescription    Detailed comments: Pt would like to talk to a nurse about his rx that is is on,he is almost out and doesn't know if he should continue . These are the ones from the hospital    Phone Number Patient can be reached at: Home number on file [phone] (home)    Best Time:     Can we leave a detailed message on this number? NO    Call taken on 5/13/2019 at 8:42 AM by Naila Gonzales

## 2019-05-13 NOTE — TELEPHONE ENCOUNTER
Pending img from [facility] Film room said they would push CT Chest, CT Head/Neck, and XR Lung today.

## 2019-05-13 NOTE — TELEPHONE ENCOUNTER
Furosemide, Spironolactone,amiodarone. Pt wants to know if they should continue all of these medications? Pt did note that they are urinating a lot from the Lasix.    Pt uses [facility]    Routing to PCP for further review/recommendations/orders.  Naila Brasher RN  [facility]

## 2019-05-14 NOTE — TELEPHONE ENCOUNTER
If his symptoms are okay and his weight is stable then it is reasonable to continue the meds he was discharged on.

## 2019-05-14 NOTE — TELEPHONE ENCOUNTER
Patient notified by phone of RL recommendation below.  He verbalized understanding and agreed with plan.    ANEL Ham, RN, N  [facility]) [phone]

## 2019-06-03 ENCOUNTER — PRE VISIT (OUTPATIENT)
Dept: ONCOLOGY | Facility: CLINIC | Age: 79
End: 2019-06-03

## 2019-06-03 ENCOUNTER — HOSPITAL ENCOUNTER (OUTPATIENT)
Facility: CLINIC | Age: 79
Setting detail: SPECIMEN
End: 2019-06-03
Attending: INTERNAL MEDICINE
Payer: MEDICARE

## 2019-06-03 ENCOUNTER — ONCOLOGY VISIT (OUTPATIENT)
Dept: ONCOLOGY | Facility: CLINIC | Age: 79
End: 2019-06-03
Attending: PHYSICIAN ASSISTANT
Payer: COMMERCIAL

## 2019-06-03 VITALS
BODY MASS INDEX: 25.28 KG/M2 | DIASTOLIC BLOOD PRESSURE: 78 MMHG | OXYGEN SATURATION: 97 % | WEIGHT: 197 LBS | SYSTOLIC BLOOD PRESSURE: 139 MMHG | RESPIRATION RATE: 16 BRPM | TEMPERATURE: 97.4 F | HEIGHT: 74 IN | HEART RATE: 61 BPM

## 2019-06-03 DIAGNOSIS — R91.8 PULMONARY NODULES: Primary | ICD-10-CM

## 2019-06-03 PROCEDURE — G0463 HOSPITAL OUTPT CLINIC VISIT: HCPCS

## 2019-06-03 ASSESSMENT — PAIN SCALES - GENERAL: PAINLEVEL: NO PAIN (0)

## 2019-06-03 ASSESSMENT — MIFFLIN-ST. JEOR: SCORE: 1688.34

## 2019-06-03 NOTE — NURSING NOTE
"Oncology Rooming Note    Jessi 3, 2019 10:25 AM   Tomás Cruz is a 78 year old male who presents for:    Chief Complaint   Patient presents with     Lung Nodule     New Patient consult     Initial Vitals: /78   Pulse 61   Temp 97.4  F (36.3  C) (Tympanic)   Resp 16   Ht 1.88 m (6' 2\")   Wt 89.4 kg (197 lb)   SpO2 97%   BMI 25.29 kg/m   Estimated body mass index is 25.29 kg/m  as calculated from the following:    Height as of this encounter: 1.88 m (6' 2\").    Weight as of this encounter: 89.4 kg (197 lb). Body surface area is 2.16 meters squared.  No Pain (0) Comment: Data Unavailable   No LMP for male patient.  Allergies reviewed: Yes  Medications reviewed: Yes    Medications: Medication refills not needed today.  Pharmacy name entered into EPIC:    [facility] - [facility] - NON-EPRESCRIBE  [facility]/PHARMACY #9100 [place], [place] - [address] [facility] [address] [facility] [address]  [facility] [address] [place] [facility] [address] [facility] [address]  [facility] [address] [place] [address]    Clinical concerns: New Patient consult       Nahoym Givens CMA              "

## 2019-06-03 NOTE — PATIENT INSTRUCTIONS
I recommend a follow up CT later this summer.     Call to schedule the CT at [facility]:  Phone: [phone]  Toll-Free: [phone]

## 2019-06-03 NOTE — LETTER
6/3/2019         RE: Tomás Cruz  [address] [address]        Dear Colleague,    Thank you for referring your patient, Tomás Cruz, to the [facility]. Please see a copy of my visit note below.    [facility] Nodule Clinic Initial Visit    Reason for Visit  Tomás Cruz is a 78 year old male who is referred by Belen Pearce PA-C for lung nodule  Pulmonary HPI    - No new respiratory symptoms or complaints.    - recently hospitalized at [place], incidental lung nodule seen. He had an acute change in mental state while having PPM/ICD interrogated, sent to hospital for evaluation. He has had for years right chest pressure radiating to the arm with exertion, happens every few months, resolves in a few minutes.     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  The patient was seen and examined by Tash Nelson MD   Current Outpatient Medications   Medication     amiodarone (PACERONE/CODARONE) 200 MG tablet     amoxicillin (AMOXIL) 500 MG capsule     furosemide (LASIX) 20 MG tablet     metoprolol succinate ER (TOPROL-XL) 100 MG 24 hr tablet     multivitamin w/minerals (MULTI-VITAMIN) tablet     pravastatin (PRAVACHOL) 40 MG tablet     sildenafil (VIAGRA) 100 MG tablet     spironolactone (ALDACTONE) 25 MG tablet     No current facility-administered medications for this visit.      No Known Allergies  Social History     Socioeconomic History     Marital  status:      Spouse name: Ava     Number of children: 4     Years of education: Not on file     Highest education level: Not on file   Occupational History     Occupation: retired postman     Employer: [employer]     Employer: RETIRED   Social Needs     Financial resource strain: Not on file     Food insecurity:     Worry: Not on file     Inability: Not on file     Transportation needs:     Medical: Not on file     Non-medical: Not on file   Tobacco Use     Smoking status: Former Smoker     Last attempt to quit: 1982     Years since quittin.0     Smokeless tobacco: Never Used   Substance and Sexual Activity     Alcohol use: No     Drug use: No     Sexual activity: Yes     Partners: Female   Lifestyle     Physical activity:     Days per week: Not on file     Minutes per session: Not on file     Stress: Not on file   Relationships     Social connections:     Talks on phone: Not on file     Gets together: Not on file     Attends Nondenominational service: Not on file     Active member of club or organization: Not on file     Attends meetings of clubs or organizations: Not on file     Relationship status: Not on file     Intimate partner violence:     Fear of current or ex partner: Not on file     Emotionally abused: Not on file     Physically abused: Not on file     Forced sexual activity: Not on file   Other Topics Concern      Service Not Asked     Blood Transfusions Not Asked     Caffeine Concern No     Occupational Exposure Not Asked     Hobby Hazards Not Asked     Sleep Concern Not Asked     Stress Concern Not Asked     Weight Concern Not Asked     Special Diet Not Asked     Back Care Not Asked     Exercise No     Comment: rec 30minutes; 4-5 x/wk; hard with knee pain     Bike Helmet Not Asked     Seat Belt Yes     Self-Exams Not Asked     Parent/sibling w/ CABG, MI or angioplasty before 65F 55M? No   Social History Narrative     Not on file     Past Medical History:   Diagnosis  Date     Atrial fibrillation (H)     Transient around time of MVR.  Had MAZE procedure by report.     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     Hypertension goal BP (blood pressure) < 140/90 4/05     Mitral valve disorders(424.0)     s/p mitral valve replacment- 2003; SBE prophylaxsis and anticoagulated; echo 2/13- EF 30-35%     Non-ischemic cardiomyopathy (H)     EF as above.  Follow[facility].     Polyp of colon      Renal mass     Benign per patient report, Right kidney removed.     Past Surgical History:   Procedure Laterality Date     ANESTHESIA CARDIOVERSION N/A 4/15/2019    Procedure: ANESTHESIA CARDIOVERSION;  Surgeon: GENERIC ANESTHESIA PROVIDER;  Location: [facility] OR     ARTHROSCOPY KNEE Left 2001    left knee arthroscopy     ARTHROSCOPY KNEE  04/2018    right knee partial     AS TOTAL KNEE ARTHROPLASTY Left 2006    Dr. Castro     CARDIAC SURGERY  2003    Mitral valve replacement     CATARACT IOL, RT/LT  2014    Cataracts, bilateral     COLONOSCOPY  2016    [place] [facility]     COLONOSCOPY  02/21/2019    Dr. Henderson [facility]     COLONOSCOPY N/A 2/21/2019    Procedure: COMBINED COLONOSCOPY, SINGLE OR MULTIPLE BIOPSY/POLYPECTOMY BY BIOPSies by cold forcep;  Surgeon: Ronald Henderson MD;  Location:  GI     HERNIA REPAIR       IMPLANT AUTOMATIC IMPLANTABLE CARDIOVERTER DEFIBRILLATOR  5/2013     KIDNEY SURGERY  2008    Laparoscopic right radical nephrectomy.- due to complex hemorrhagic cyst     Family History   Adopted: Yes   Problem Relation Age of Onset     Unknown/Adopted Mother      Unknown/Adopted Father      No Known Problems Daughter      No Known Problems Daughter      No Known Problems Daughter      No Known Problems Daughter      Diabetes No family hx of       "Coronary Artery Disease No family hx of        Exam:   /78   Pulse 61   Temp 97.4  F (36.3  C) (Tympanic)   Resp 16   Ht 1.88 m (6' 2\")   Wt 89.4 kg (197 lb)   SpO2 97%   BMI 25.29 kg/m     GENERAL APPEARANCE: Well developed, well nourished, alert, and in no apparent distress.  EYES: PERRL, EOMI  HENT: Nasal mucosa with no edema and no hyperemia. No nasal polyps.  EARS: Canals clear, TMs normal  MOUTH: Oral mucosa is moist, without any lesions, no tonsillar enlargement, no oropharyngeal exudate.  NECK: supple, no masses, no thyromegaly.  LYMPHATICS: No significant axillary, cervical, or supraclavicular nodes.  RESP: normal percussion, good air flow throughout.  No crackles. No rhonchi. No wheezes.  CV: Normal S1, S2, regular rhythm, normal rate. No murmur.  No rub. No gallop. No LE edema.   ABDOMEN:  Bowel sounds normal, soft, nontender, no HSM or masses.   MS: extremities normal. No clubbing. No cyanosis.  SKIN: no rash on limited exam  NEURO: Mentation intact, speech normal, normal strength and tone, normal gait and stance  PSYCH: mentation appears normal. and affect normal/bright  Results:  - My interpretation of the images relevant for this visit includes: CT chest ([facility]) showing a new RUL ground glass opacity 2.2 x 1.4 cm not seen on previous CT in 2004.    - My interpretation of the PFT's relevant for this visit includes: None     Culprit Nodule(s):   1: Right upper lobe nodule and is 17 mm in size/severity and ground glass in morphology/quality. First seen by chest CT on 5/1/19. First observed on this date .      Assessment and plan: Shiva is a 77 yo male with incidental lung nodule, atrial fibrillation, mechanical mitral valve.   Lung nodule - seen on chest CT, done while evaluating a recent episode of transient global amnesia. Today we discussed the diagnostic possibilities: infection, transient fluid, low-grade adenocarcinoma in-situ.   recommend follow up CT in  3 -6 months per Hasmukh " 2017   They will schedule CT at the end of the summer. Depending on results, will determine follow up. If resolved, no follow up needed. If persistent, will discuss options for biopsy/resection vs surveillance. At this time, given the explanation of natural course of adenocarcinoma in situ, he is not inclined to invasive procedure.       Again, thank you for allowing me to participate in the care of your patient.        Sincerely,        Tash Nelson MD

## 2019-06-12 ENCOUNTER — PATIENT OUTREACH (OUTPATIENT)
Dept: CARE COORDINATION | Facility: CLINIC | Age: 79
End: 2019-06-12

## 2019-06-12 NOTE — PROGRESS NOTES
Clinic Care Coordination Contact  OUTREACH    Primary Diagnosis: CHF    Chief Complaint   Patient presents with     Clinic Care Coordination - Follow-up     CHF      Universal Utilization:   Clinic Utilization  Difficulty keeping appointments:: No  Compliance Concerns: No  No-Show Concerns: No  No PCP office visit in Past Year: No  Utilization    Last refreshed: 6/6/2019  6:44 PM:  Hospital Admissions 1           Last refreshed: 6/6/2019  6:44 PM:  ED Visits 0           Last refreshed: 6/6/2019  6:44 PM:  No Show Count (past year) 1              Current as of: 6/6/2019  6:44 PM            Clinical Concerns:  Current Medical Concerns:    Patient Active Problem List   Diagnosis     Impotence of organic origin     Hypersomnia with sleep apnea     Cardiomyopathy, nonischemic     Polyp of colon     Hyperlipidemia LDL goal <100     Advance Care Planning     Syncope     S/P mitral valve replacement     Health Care Home     Long-term (current) use of anticoagulants [Z79.01]     Essential tremor     Paroxysmal atrial fibrillation (H)     History of prosthetic heart valve     Automatic implantable cardioverter-defibrillator in situ     Elevated prostate specific antigen (PSA)     Decreased GFR     Essential hypertension with goal blood pressure less than 140/90     Arthritis of knee, right     Intention tremor     Cerebral infarction (H)     Atrial flutter (H)   Reports weights are stable between 198 and 200 pounds. BPs 132/79; 121/69; 129/74; 138/83. Monitors daily. Denies any swelling or shortness of breath. Instructed him to follow up with cardiology.   Education Provided to patient: Medications, signs/symptoms of CHF exacerbation.      Health Maintenance Reviewed: Not assessed  Clinical Pathway: None    Medication Management:  Medications reviewed with patient. Patient bothered he takes 7 pills every morning and wants to cut some out, particularly the lasix. Writer provided patient education purpose of medications and  importance. Routed to Primary Care Provider medication Warfarin patient reports taking 2 mg daily and not on medication list.     Diet/Exercise/Sleep:  Diet:: No added salt - Patient admits he adds a little bit of salt to add some flavor, has tried but doesn't like Mrs Gracia. Doesn't add as much as he used too.   Inadequate nutrition (GOAL):: No  Food Insecurity: No  Tube Feeding: No  Exercise:: Yes - patient believes key to staying healthy is to be active.   Inadequate activity/exercise (GOAL):: No  Significant changes in sleep pattern (GOAL): No. Wakes up in the middle of the night to use the bathroom. Occasionally will take Tylenol PM.     Transportation:  Transportation concerns (GOAL):: No  Transportation means:: Regular car  Financial/Insurance:   Financial/Insurance concerns (GOAL):: No     Goals:   Goals        General    Improve chronic symptoms (pt-stated)     Notes - Note created  4/18/2019 12:05 PM by Faiza Connor RN    Goal Statement: I would like to better manage my CHF and remain out of the hospital.   Measure of Success: Patient will use Heart Failure action plan to monitor symptoms; symptoms will remain in green range. Patient will weigh himself daily and record weights. Patient will follow a low salt diet.   Supportive Steps to Achieve: Continued CCRN support. Patient will take medications as prescribed. Follow up with providers as recommended. Patient will call RNCC with questions, concerns, support needs. RNCC will be available as needed. RNCC to mail patient CHF action plan, weight log, and education in addition to introduction letter, complex care plan and medication list.   Barriers: Likes the taste of salt.   Strengths: Engaged in care coordination, feels he is in pretty good shape, motivated to get into an exercise regimen.   Date to Achieve By: 07/2019  Patient expressed understanding of goal: Yes              Patient/Caregiver understanding: Patient verbalized understanding and denies  any additional questions or concerns at this time. RNCC engaged in AIDET communications during encounter.     Outreach Frequency: monthly  Future Appointments              In 3 months Gita Zarco PA [facility] and [facility] for Prostate and Urologic Cancers, [facility]          Plan: Patient was encouraged to call writer with questions, concerns, support needs. RNCC will be available as needed. RNCC will follow up with patient again next month to determine any outstanding care coordination needs.     Faiza Connor RN Care Coordinator  [facility] [place]  Email: [email or web address]  Phone: [phone]

## 2019-07-09 ENCOUNTER — TELEPHONE (OUTPATIENT)
Dept: FAMILY MEDICINE | Facility: CLINIC | Age: 79
End: 2019-07-09

## 2019-07-09 ENCOUNTER — ANTICOAGULATION THERAPY VISIT (OUTPATIENT)
Dept: NURSING | Facility: CLINIC | Age: 79
End: 2019-07-09
Payer: COMMERCIAL

## 2019-07-09 DIAGNOSIS — Z79.01 LONG TERM CURRENT USE OF ANTICOAGULANT THERAPY: ICD-10-CM

## 2019-07-09 DIAGNOSIS — Z95.2 S/P MITRAL VALVE REPLACEMENT: ICD-10-CM

## 2019-07-09 LAB
INR POINT OF CARE: 6.8 (ref 0.86–1.14)
INR PPP: 5.37 (ref 0.86–1.14)

## 2019-07-09 PROCEDURE — 85610 PROTHROMBIN TIME: CPT | Mod: QW

## 2019-07-09 PROCEDURE — 36416 COLLJ CAPILLARY BLOOD SPEC: CPT

## 2019-07-09 PROCEDURE — 85610 PROTHROMBIN TIME: CPT | Performed by: FAMILY MEDICINE

## 2019-07-09 PROCEDURE — 99207 ZZC NO CHARGE NURSE ONLY: CPT

## 2019-07-09 NOTE — TELEPHONE ENCOUNTER
See INR encounter from today.      Rose Marie Madden, BS, RN, N  [facility]) [phone]

## 2019-07-09 NOTE — TELEPHONE ENCOUNTER
Reason for Call:  Other     Detailed comments: Dereje is calling saying he knows why his INR was in the 6 range today. He wants to speak with the INR nurse.    Phone Number Patient can be reached at: Cell number on file:    Telephone Information:   Mobile [phone]     Best Time: Anytime    Can we leave a detailed message on this number? YES    Call taken on 7/9/2019 at 2:38 PM by Vanessa Ibarra

## 2019-07-09 NOTE — PROGRESS NOTES
ANTICOAGULATION FOLLOW-UP CLINIC VISIT    Patient Name:  Tomás Cruz  Date:  2019  Contact Type:  Face to Face    SUBJECTIVE:  Patient Findings     Positives:   Extra doses (2 weeks ago he took extra doses since he missed 3 days in a row), Other complaints (Fatigue)        Clinical Outcomes     Negatives:   Major bleeding event, Thromboembolic event, Anticoagulation-related hospital admission, Anticoagulation-related ED visit, Anticoagulation-related fatality           OBJECTIVE    INR Protime   Date Value Ref Range Status   2019 6.8 (A) 0.86 - 1.14 Final       ASSESSMENT / PLAN  INR assessment SUPRA    Recheck INR In: 2 DAYS    INR Location Clinic    Vit K given? None      Anticoagulation Summary  As of 2019    INR goal:   2.5-3.5   TTR:   84.4 % (3.3 y)   INR used for dosin.8! (2019)   Warfarin maintenance plan:   3 mg (2 mg x 1.5) every Mon; 2 mg (2 mg x 1) all other days   Full warfarin instructions:   7/10: Hold; : Hold; Otherwise 3 mg every Mon; 2 mg all other days   Weekly warfarin total:   15 mg   Plan last modified:   Mojgan Brasher, RN (2019)   Next INR check:   2019   Target end date:       Indications    Long-term (current) use of anticoagulants [Z79.01] [Z79.01]  S/P mitral valve replacement [Z95.2]             Anticoagulation Episode Summary     INR check location:   Anticoagulation Clinic    Preferred lab:       Send INR reminders to:    NURSE    Comments:         Anticoagulation Care Providers     Provider Role Specialty Phone number    Mahamed Groves MD [facility] [phone]            See the Encounter Report to view Anticoagulation Flowsheet and Dosing Calendar (Go to Encounters tab in chart review, and find the Anticoagulation Therapy Visit)    Dosage adjustment made based on physician directed care plan.    Per Vitamin K Protocol patient advised to hold tomorrow and Wednesday and Thursday and recheck INR on 2019.  Patient  sent to lab to verify INR with venous draw.    Patient has already taken Warfarin for today.    I educated him on higher risk of bleeding at INR at 6.8. I advised him to be careful with sharp objects and if he should fall he needs to go to ER to be evaluated.  I advised him if he experiences bleeding that lasts longer than 20 minutes he needs to proceed directly to ER.        Routing to PCP to review as this is a critical lab value.    Rose Marie Madden RN

## 2019-07-09 NOTE — PROGRESS NOTES
INR   Date Value Ref Range Status   07/09/2019 5.37 (HH) 0.86 - 1.14 Final     Comment:     CALLED MU AT [facility] AT [address]          Venous INR came back. See above.    No change in plan at this time.      Rose Marie Madden, BS, RN, N  [facility] Triage  ) [phone]

## 2019-07-12 ENCOUNTER — ANTICOAGULATION THERAPY VISIT (OUTPATIENT)
Dept: NURSING | Facility: CLINIC | Age: 79
End: 2019-07-12
Payer: COMMERCIAL

## 2019-07-12 DIAGNOSIS — Z95.2 S/P MITRAL VALVE REPLACEMENT: ICD-10-CM

## 2019-07-12 DIAGNOSIS — Z79.01 LONG TERM CURRENT USE OF ANTICOAGULANT THERAPY: ICD-10-CM

## 2019-07-12 LAB — INR POINT OF CARE: 4.1 (ref 0.86–1.14)

## 2019-07-12 PROCEDURE — 85610 PROTHROMBIN TIME: CPT | Mod: QW

## 2019-07-12 PROCEDURE — 36416 COLLJ CAPILLARY BLOOD SPEC: CPT

## 2019-07-12 NOTE — PROGRESS NOTES
ANTICOAGULATION FOLLOW-UP CLINIC VISIT    Patient Name:  Tomás Cruz  Date:  7/12/2019  Contact Type:  Face to Face    SUBJECTIVE:  Patient Findings     Positives:   Extra doses (Took extra doses 2 weeks ago)        Clinical Outcomes     Negatives:   Major bleeding event, Thromboembolic event, Anticoagulation-related hospital admission, Anticoagulation-related ED visit, Anticoagulation-related fatality           OBJECTIVE    INR   Date Value Ref Range Status   07/09/2019 5.37 (HH) 0.86 - 1.14 Final     Comment:     CALLED MU AT [facility] AT [address]       ASSESSMENT / PLAN  INR assessment SUPRA    Recheck INR In: 5 DAYS    INR Location Clinic      Anticoagulation Summary  As of 7/12/2019    INR goal:   2.5-3.5   TTR:   84.4 % (3.3 y)   INR used for dosing:      Warfarin maintenance plan:   3 mg (2 mg x 1.5) every Mon; 2 mg (2 mg x 1) all other days   Full warfarin instructions:   7/12: Hold; Otherwise 3 mg every Mon; 2 mg all other days   Weekly warfarin total:   15 mg   Plan last modified:   Mojgan Brasher RN (4/19/2019)   Next INR check:   7/17/2019   Target end date:       Indications    Long-term (current) use of anticoagulants [Z79.01] [Z79.01]  S/P mitral valve replacement [Z95.2]             Anticoagulation Episode Summary     INR check location:   Anticoagulation Clinic    Preferred lab:       Send INR reminders to:    NURSE    Comments:         Anticoagulation Care Providers     Provider Role Specialty Phone number    Mahamed Groves MD [facility] [phone]            See the Encounter Report to view Anticoagulation Flowsheet and Dosing Calendar (Go to Encounters tab in chart review, and find the Anticoagulation Therapy Visit)      Dosage adjustment made based on physician directed care plan.    Rose Marie Madden RN

## 2019-07-17 ENCOUNTER — ANTICOAGULATION THERAPY VISIT (OUTPATIENT)
Dept: NURSING | Facility: CLINIC | Age: 79
End: 2019-07-17
Payer: COMMERCIAL

## 2019-07-17 DIAGNOSIS — Z95.2 S/P MITRAL VALVE REPLACEMENT: ICD-10-CM

## 2019-07-17 DIAGNOSIS — Z79.01 LONG TERM CURRENT USE OF ANTICOAGULANT THERAPY: ICD-10-CM

## 2019-07-17 LAB — INR POINT OF CARE: 3.7 (ref 0.86–1.14)

## 2019-07-17 PROCEDURE — 36416 COLLJ CAPILLARY BLOOD SPEC: CPT

## 2019-07-17 PROCEDURE — 99207 ZZC NO CHARGE NURSE ONLY: CPT

## 2019-07-17 PROCEDURE — 85610 PROTHROMBIN TIME: CPT | Mod: QW

## 2019-07-17 NOTE — PROGRESS NOTES
ANTICOAGULATION FOLLOW-UP CLINIC VISIT    Patient Name:  Tomás Cruz  Date:  7/17/2019  Contact Type:  Face to Face    SUBJECTIVE:  Patient Findings         Clinical Outcomes     Negatives:   Major bleeding event, Thromboembolic event, Anticoagulation-related hospital admission, Anticoagulation-related ED visit, Anticoagulation-related fatality           OBJECTIVE    INR Protime   Date Value Ref Range Status   07/17/2019 3.7 (A) 0.86 - 1.14 Final       ASSESSMENT / PLAN  No question data found.  Anticoagulation Summary  As of 7/17/2019    INR goal:   2.5-3.5   TTR:   83.8 % (3.3 y)   INR used for dosing:   3.7! (7/17/2019)   Warfarin maintenance plan:   3 mg (2 mg x 1.5) every Mon; 2 mg (2 mg x 1) all other days   Full warfarin instructions:   3 mg every Mon; 2 mg all other days   Weekly warfarin total:   15 mg   No change documented:   Ebony Rivas RN   Plan last modified:   Mojgan Brasher RN (4/19/2019)   Next INR check:   7/31/2019   Target end date:       Indications    Long-term (current) use of anticoagulants [Z79.01] [Z79.01]  S/P mitral valve replacement [Z95.2]             Anticoagulation Episode Summary     INR check location:   Anticoagulation Clinic    Preferred lab:       Send INR reminders to:    NURSE    Comments:         Anticoagulation Care Providers     Provider Role Specialty Phone number    Mahamed Groves MD [facility] [phone]            See the Encounter Report to view Anticoagulation Flowsheet and Dosing Calendar (Go to Encounters tab in chart review, and find the Anticoagulation Therapy Visit)    INR: 3.7  Moving closer to therapeutic range after recent bouncing around due to making up missed doses in 2 days.  Recommended return to maintenance dose and recheck in 2 weeks.  Patient declined scheduling a return appointment; will call to set up.    Reviewed s/s of clotting and bleeding; will f/u prn any concerns. Patient voiced understanding and agreed  to plan of care.   Ebony Palmer RN July 17, 2019 1:52 PM

## 2019-07-20 DIAGNOSIS — Z95.2 S/P MITRAL VALVE REPLACEMENT: ICD-10-CM

## 2019-07-22 RX ORDER — WARFARIN SODIUM 2 MG/1
TABLET ORAL
Qty: 100 TABLET | Refills: 0 | Status: SHIPPED | OUTPATIENT
Start: 2019-07-22 | End: 2019-08-15

## 2019-07-22 NOTE — TELEPHONE ENCOUNTER
"Requested Prescriptions   Pending Prescriptions Disp Refills     warfarin (COUMADIN) 2 MG tablet [Pharmacy Med Name: WARFARIN SODIUM 2 MG TABLET] 90 tablet 0     Sig: TAKE ONE TABLET (2MG) BY MOUTH DAILY, EXCEPT TWO TABLETS (4MG) ON FRIDAYS       Last Written Prescription Date:  ??? Not on med list.   Last Fill Quantity: ???,  # refills: ???   Last office visit: 5/6/2019 with prescribing provider:     Future Office Visit:          Vitamin K Antagonists Failed - 7/20/2019 12:15 PM        Failed - INR is within goal in the past 6 weeks     Confirm INR is within goal in the past 6 weeks.     Recent Labs   Lab Test 07/17/19   INR 3.7*                       Failed - Medication is active on med list        Passed - Recent (12 mo) or future (30 days) visit within the authorizing provider's specialty     Patient had office visit in the last 12 months or has a visit in the next 30 days with authorizing provider or within the authorizing provider's specialty.  See \"Patient Info\" tab in inbasket, or \"Choose Columns\" in Meds & Orders section of the refill encounter.              Passed - Patient is 18 years of age or older        "

## 2019-07-22 NOTE — TELEPHONE ENCOUNTER
Prescription approved per [facility] Refill Protocol.      Rose Marie Madden, BS, RN, N  [facility]) [phone]

## 2019-07-23 ENCOUNTER — PATIENT OUTREACH (OUTPATIENT)
Dept: CARE COORDINATION | Facility: CLINIC | Age: 79
End: 2019-07-23

## 2019-07-23 NOTE — PROGRESS NOTES
"Clinic Care Coordination Contact  OUTREACH    Chief Complaint   Patient presents with     Clinic Care Coordination - Follow-up     CHF        Universal Utilization:   Utilization    Last refreshed: 7/22/2019 11:05 PM:  Hospital Admissions 1           Last refreshed: 7/22/2019 11:05 PM:  ED Visits 0           Last refreshed: 7/22/2019 11:05 PM:  No Show Count (past year) 1              Current as of: 7/22/2019 11:05 PM            Clinical Concerns:  Current Medical Concerns:  CHF. Assisted patient to schedule Primary Care Provider follow up appointment for 08/15. Patient reports he has been busy having work done on his house. Denies any swelling in legs or shortness of breath. Likes salt on his food.   Current Behavioral Concerns: Patient reports he stopped monitoring weights and BP about a week ago and stopped taking Lasix a couple of days ago \"needs a break from that\". FYI routed to provider.   Education Provided to patient: Purpose and action of recommendation of no salt, lasix medication, heart failure education, action plan. Reports explanation is motivating and impactful and plans to resume medication and monitoring.      Health Maintenance Reviewed: There are no preventive care reminders to display for this patient.     Clinical Pathway: Clinic Care Coordination CHF Assessment    CHF:  Home scale available:  Yes  Home scale weight this morning: Hasn't monitored in the past week.   Heart Failure Zones sheet on refrigerator or available: No  Any increased SOB since hospital discharge:  No  Any increased edema since hospital discharge:  No  What number to call for YELLOW zones: [phone]     Medication Management:  Reported stopping lasix a couple of days ago. FYI sent to Primary Care Provider.     Diet/Exercise/Sleep:   Adds salt to diet, tried 3 different kinds of Ms Dash and didn't like any of those. Provided education on no-salt. Reports sleep is fine with apnea machine. Stays active.      Goals:   Goals  "       General    1. Improve chronic symptoms (pt-stated)     Notes - Note edited  7/23/2019 11:50 AM by Faiza Connor RN    Goal Statement: I would like to better manage my CHF and remain out of the hospital.   Measure of Success: Patient will use Heart Failure action plan to monitor symptoms; symptoms will remain in green range. Patient will weigh himself daily and record weights. Patient will follow a low salt diet.   Supportive Steps to Achieve: Continued CCRN support. Patient will take medications as prescribed. Follow up with providers as recommended. Patient will call RNCC with questions, concerns, support needs. RNCC will be available as needed. RNCC to mail patient CHF action plan, weight log, and education in addition to introduction letter, complex care plan and medication list.   Barriers: Likes the taste of salt.   Strengths: Engaged in care coordination, feels he is in pretty good shape, motivated to get into an exercise regimen.   Date to Achieve By: 09/2019  Patient expressed understanding of goal: Yes    As of today's date 7/23/2019 goal is met at 0 - 25%.   Goal Status:  Ongoing; patient stopped monitoring weights, blood pressures about a week ago, stopped taking lasix a couple of days ago. Likes to add salt to food. Does not have CHF action plan and declines to be sent additional one.                 Patient/Caregiver understanding: Patient verbalized understanding and denies any additional questions or concerns at this time. RNCC engaged in [employer] communications during encounter.     Future Appointments              In 3 weeks [address], [facility]    In 3 weeks Mahamed Groves MD [facility]    In 2 months Gita Zarco PA [facility] and [facility], [facility]          Plan: Warm hand off given to Clinic primary CCRN Faye Akbar. Will follow up with patient again in about a month to provide ongoing heart failure  education/assessment; ensure he is monitoring his weights, BPs, assess salt intake, medication compliance.    Faiza Connor RN Care Coordinator  [facility]  Email: [email or web address]  Phone: [phone]

## 2019-07-30 ENCOUNTER — ANTICOAGULATION THERAPY VISIT (OUTPATIENT)
Dept: NURSING | Facility: CLINIC | Age: 79
End: 2019-07-30
Payer: COMMERCIAL

## 2019-07-30 DIAGNOSIS — Z95.2 S/P MITRAL VALVE REPLACEMENT: ICD-10-CM

## 2019-07-30 DIAGNOSIS — Z79.01 LONG TERM CURRENT USE OF ANTICOAGULANT THERAPY: ICD-10-CM

## 2019-07-30 LAB — INR POINT OF CARE: 4.1 (ref 0.86–1.14)

## 2019-07-30 PROCEDURE — 36416 COLLJ CAPILLARY BLOOD SPEC: CPT

## 2019-07-30 PROCEDURE — 85610 PROTHROMBIN TIME: CPT | Mod: QW

## 2019-07-30 NOTE — PROGRESS NOTES
ANTICOAGULATION FOLLOW-UP CLINIC VISIT    Patient Name:  Tomás Cruz  Date:  2019  Contact Type:  Face to Face    SUBJECTIVE:  Patient Findings     Positives:   Missed doses (Patient has only been taking 2 mg everyday.), Extra doses (He missed his 2019 dose so he took 4 mg on )        Clinical Outcomes     Negatives:   Major bleeding event, Thromboembolic event, Anticoagulation-related hospital admission, Anticoagulation-related ED visit, Anticoagulation-related fatality           OBJECTIVE    INR Protime   Date Value Ref Range Status   2019 4.1 (A) 0.86 - 1.14 Final       ASSESSMENT / PLAN  INR assessment SUPRA    Recheck INR In: 10 DAYS    INR Location Clinic      Anticoagulation Summary  As of 2019    INR goal:   2.5-3.5   TTR:   83.0 % (3.4 y)   INR used for dosin.1! (2019)   Warfarin maintenance plan:   3 mg (2 mg x 1.5) every Mon; 2 mg (2 mg x 1) all other days   Full warfarin instructions:   : 1 mg; : 2 mg; Otherwise 3 mg every Mon; 2 mg all other days   Weekly warfarin total:   15 mg   Plan last modified:   Mojgan Brasher RN (2019)   Next INR check:   2019   Target end date:       Indications    Long-term (current) use of anticoagulants [Z79.01] [Z79.01]  S/P mitral valve replacement [Z95.2]             Anticoagulation Episode Summary     INR check location:   Anticoagulation Clinic    Preferred lab:       Send INR reminders to:    NURSE    Comments:         Anticoagulation Care Providers     Provider Role Specialty Phone number    Mahamed Groves MD [facility] [phone]            See the Encounter Report to view Anticoagulation Flowsheet and Dosing Calendar (Go to Encounters tab in chart review, and find the Anticoagulation Therapy Visit)    Dosage adjustment made based on physician directed care plan.    Patient notes he has only been taking 2 mg on M  Rose Marie Madden RN

## 2019-08-08 ENCOUNTER — TELEPHONE (OUTPATIENT)
Dept: FAMILY MEDICINE | Facility: CLINIC | Age: 79
End: 2019-08-08

## 2019-08-08 ENCOUNTER — ANTICOAGULATION THERAPY VISIT (OUTPATIENT)
Dept: NURSING | Facility: CLINIC | Age: 79
End: 2019-08-08
Payer: COMMERCIAL

## 2019-08-08 DIAGNOSIS — Z95.2 S/P MITRAL VALVE REPLACEMENT: ICD-10-CM

## 2019-08-08 DIAGNOSIS — Z79.01 LONG TERM CURRENT USE OF ANTICOAGULANT THERAPY: ICD-10-CM

## 2019-08-08 LAB — INR POINT OF CARE: 4.8 (ref 0.86–1.14)

## 2019-08-08 PROCEDURE — 85610 PROTHROMBIN TIME: CPT | Mod: QW

## 2019-08-08 PROCEDURE — 36416 COLLJ CAPILLARY BLOOD SPEC: CPT

## 2019-08-08 NOTE — TELEPHONE ENCOUNTER
See INR encounter for today.      Rose Marie Madden, BS, RN, PHN  [facility]) [phone]

## 2019-08-08 NOTE — PROGRESS NOTES
Attempt # 1    Left non-detailed VM for patient to call back and speak with any triage nurse.    ANEL Ham, RN, PHN  [place] [facility]

## 2019-08-08 NOTE — PROGRESS NOTES
ANTICOAGULATION FOLLOW-UP CLINIC VISIT    Patient Name:  Tomás Cruz  Date:  2019  Contact Type:  Face to Face    SUBJECTIVE:  Patient Findings     Positives:   Bruising (Has bruising below right eye. Fell on  on his way to baseball game. Was seen [place] [employer] by [facility] and bruising is improving.)    Comments:   .        Clinical Outcomes     Negatives:   Major bleeding event, Thromboembolic event, Anticoagulation-related hospital admission, Anticoagulation-related ED visit, Anticoagulation-related fatality    Comments:   .           OBJECTIVE    INR Protime   Date Value Ref Range Status   2019 4.8 (A) 0.86 - 1.14 Final       ASSESSMENT / PLAN  INR assessment SUPRA    Recheck INR In: 1 WEEK    INR Location Clinic      Anticoagulation Summary  As of 2019    INR goal:   2.5-3.5   TTR:   82.4 % (3.4 y)   INR used for dosin.8! (2019)   Warfarin maintenance plan:   2 mg (2 mg x 1) every day   Full warfarin instructions:   : Hold; Otherwise 2 mg every day   Weekly warfarin total:   14 mg   Plan last modified:   Rose Marie Madden RN (2019)   Next INR check:   8/15/2019   Target end date:       Indications    Long-term (current) use of anticoagulants [Z79.01] [Z79.01]  S/P mitral valve replacement [Z95.2]             Anticoagulation Episode Summary     INR check location:   Anticoagulation Clinic    Preferred lab:       Send INR reminders to:    NURSE    Comments:         Anticoagulation Care Providers     Provider Role Specialty Phone number    Mahamed Groves MD [facility] [phone]            See the Encounter Report to view Anticoagulation Flowsheet and Dosing Calendar (Go to Encounters tab in chart review, and find the Anticoagulation Therapy Visit)    Dosage adjustment made based on physician directed care plan.    Rose Marie Madden, RN

## 2019-08-08 NOTE — PROGRESS NOTES
Patient notified by phone of plan.  He verbalized understanding and agreed with plan.      Rose Marie Madden, ANEL, RN, PHN  [facility]) [phone]

## 2019-08-08 NOTE — TELEPHONE ENCOUNTER
Reason for Call:  Other call back    Detailed comments: Pt returning call f/ triage    Phone Number Patient can be reached at: Home number on file [phone] (home)    Best Time: anytime    Can we leave a detailed message on this number? No     Call taken on 8/8/2019 at 2:08 PM by Alessandra Santoro

## 2019-08-14 ENCOUNTER — HOSPITAL ENCOUNTER (OUTPATIENT)
Dept: CT IMAGING | Facility: CLINIC | Age: 79
Discharge: HOME | End: 2019-08-14
Attending: INTERNAL MEDICINE | Admitting: INTERNAL MEDICINE
Payer: COMMERCIAL

## 2019-08-14 DIAGNOSIS — R91.8 PULMONARY NODULES: ICD-10-CM

## 2019-08-14 PROCEDURE — 71250 CT THORAX DX C-: CPT

## 2019-08-15 ENCOUNTER — OFFICE VISIT (OUTPATIENT)
Dept: FAMILY MEDICINE | Facility: CLINIC | Age: 79
End: 2019-08-15
Payer: COMMERCIAL

## 2019-08-15 ENCOUNTER — ANTICOAGULATION THERAPY VISIT (OUTPATIENT)
Dept: NURSING | Facility: CLINIC | Age: 79
End: 2019-08-15
Payer: COMMERCIAL

## 2019-08-15 VITALS
SYSTOLIC BLOOD PRESSURE: 130 MMHG | HEART RATE: 61 BPM | DIASTOLIC BLOOD PRESSURE: 78 MMHG | TEMPERATURE: 97.9 F | BODY MASS INDEX: 26.05 KG/M2 | WEIGHT: 203 LBS | OXYGEN SATURATION: 97 % | HEIGHT: 74 IN

## 2019-08-15 DIAGNOSIS — R91.1 LUNG NODULE: Primary | ICD-10-CM

## 2019-08-15 DIAGNOSIS — Z95.2 S/P MITRAL VALVE REPLACEMENT: ICD-10-CM

## 2019-08-15 DIAGNOSIS — R94.4 DECREASED GFR: ICD-10-CM

## 2019-08-15 DIAGNOSIS — N28.9 RENAL INSUFFICIENCY: ICD-10-CM

## 2019-08-15 DIAGNOSIS — Z79.01 LONG TERM CURRENT USE OF ANTICOAGULANT THERAPY: ICD-10-CM

## 2019-08-15 DIAGNOSIS — I42.9 CARDIOMYOPATHY, UNSPECIFIED TYPE (H): ICD-10-CM

## 2019-08-15 LAB
INR POINT OF CARE: 5.2 (ref 0.86–1.14)
INR PPP: 4.21 (ref 0.86–1.14)

## 2019-08-15 PROCEDURE — 80048 BASIC METABOLIC PNL TOTAL CA: CPT | Performed by: FAMILY MEDICINE

## 2019-08-15 PROCEDURE — 36416 COLLJ CAPILLARY BLOOD SPEC: CPT

## 2019-08-15 PROCEDURE — 99214 OFFICE O/P EST MOD 30 MIN: CPT | Performed by: FAMILY MEDICINE

## 2019-08-15 PROCEDURE — 85610 PROTHROMBIN TIME: CPT | Performed by: FAMILY MEDICINE

## 2019-08-15 PROCEDURE — 85610 PROTHROMBIN TIME: CPT | Mod: QW

## 2019-08-15 RX ORDER — FUROSEMIDE 20 MG
20 TABLET ORAL DAILY
Qty: 90 TABLET | Refills: 1 | Status: SHIPPED | OUTPATIENT
Start: 2019-08-15 | End: 2019-12-20

## 2019-08-15 RX ORDER — METOPROLOL SUCCINATE 100 MG/1
100 TABLET, EXTENDED RELEASE ORAL DAILY
Qty: 90 TABLET | Refills: 1 | Status: SHIPPED | OUTPATIENT
Start: 2019-08-15 | End: 2019-12-20

## 2019-08-15 RX ORDER — SPIRONOLACTONE 25 MG/1
25 TABLET ORAL DAILY
Qty: 90 TABLET | Refills: 1 | Status: SHIPPED | OUTPATIENT
Start: 2019-08-15 | End: 2019-12-20

## 2019-08-15 RX ORDER — WARFARIN SODIUM 2 MG/1
TABLET ORAL
Qty: 100 TABLET | Refills: 3 | Status: SHIPPED | OUTPATIENT
Start: 2019-08-15 | End: 2019-12-20

## 2019-08-15 ASSESSMENT — MIFFLIN-ST. JEOR: SCORE: 1710.55

## 2019-08-15 NOTE — PROGRESS NOTES
Venous INR came back at 4.21.    I huddled with Kajal Alvarado, Pharm D and she advised the below.      I would keep 2mg Sunday, and if he's on the higher end of normal I would decrease to 1mg Monday & Friday.  If he's on the low end normal , I would decrease, but 1mg Wed & 1mg Saturday if doing weekly checks.        Patient advised via phone to still hold Warfarin on 8/16 and 8/17 and take 2 mg on 8/18 and recheck on 8/19.  He verbalized understanding and agreed with plan.      Rose Marie Madden, BS, RN, PHN  [facility]) [phone]

## 2019-08-15 NOTE — PROGRESS NOTES
"Subjective     Tomás Cruz is a 79 year old male who presents to clinic today for the following health issues:    HPI   CHF Follow Up  Diagnostics: Chest CT 08/14/2019  Dereje presents to clinic for a follow up of his CHF and would like to review his CT scan. He reports having intermittent episodes of right sided chest pain which also radiates to his right arm. He denies symptoms of coughing with blood. He follows with cardiology.     Reviewed and updated as needed this visit by provider:  Tobacco  Allergies  Meds  Problems  Med Hx  Surg Hx  Fam Hx       Review of Systems   Constitutional, HEENT, cardiovascular, pulmonary, GI, , musculoskeletal, neuro, skin, endocrine and psych systems are negative, except as otherwise noted.  This document serves as a record of the services and decisions personally performed and made by Mahamed Groves MD. It was created on his behalf by Pollo Lynn, a trained medical scribe. The creation of this document is based the provider's statements to the medical scribe.  Scribe Pollo Lynn 10:10 AM, August 15, 2019      Objective   /78   Pulse 61   Temp 97.9  F (36.6  C) (Oral)   Ht 1.88 m (6' 2\")   Wt 92.1 kg (203 lb)   SpO2 97%   BMI 26.06 kg/m   Body mass index is 26.06 kg/m .  Physical Exam   GENERAL: healthy, alert, well nourished, well hydrated, no distress  HENT: ear canals- normal; TMs- normal; Nose- normal; Mouth- no ulcers, no lesions  NECK: no tenderness, no adenopathy, no asymmetry, no masses, no stiffness; thyroid- normal to palpation  RESP: lungs clear to auscultation - no rales, no rhonchi, no wheezes  CV: regular rates and rhythm, normal S1 S2, no S3 or S4 and no murmur, no click or rub -  ABDOMEN: soft, no tenderness, no  hepatosplenomegaly, no masses, normal bowel sounds  MS: extremities- no gross deformities noted, trace pre-tibial edema  SKIN: no suspicious lesions, no rashes    Labs reviewed and discussed -- CT was normal and nodules are unchanged since 3 " "months ago.        Assessment & Plan   Tomás was seen today for recheck.    Diagnoses and all orders for this visit:    Lung nodule - CT showed unchanged size since his previous result in May (2019)    Renal insufficiency - Stable, rechecking:   -     Basic metabolic panel  (Ca, Cl, CO2, Creat, Gluc, K, Na, BUN)    Decreased GFR - Stable, rechecking:   -     Basic metabolic panel  (Ca, Cl, CO2, Creat, Gluc, K, Na, BUN)    Cardiomyopathy, unspecified type (H) - Stable, continue:   -     furosemide (LASIX) 20 MG tablet; Take 1 tablet (20 mg) by mouth daily  -     metoprolol succinate ER (TOPROL-XL) 100 MG 24 hr tablet; Take 1 tablet (100 mg) by mouth daily  -     spironolactone (ALDACTONE) 25 MG tablet; Take 1 tablet (25 mg) by mouth daily    S/P mitral valve replacement - Rate controlled, continue medication:  -     INR  -     warfarin (COUMADIN) 2 MG tablet; 3 mg (1.5 tablets) on Monday and 1 tablet all other days of the week or as directed by INR nurse         BMI:   Estimated body mass index is 25.29 kg/m  as calculated from the following:    Height as of 6/3/19: 1.88 m (6' 2\").    Weight as of 6/3/19: 89.4 kg (197 lb).   Weight management plan: Discussed healthy diet and exercise guidelines    See Patient Instructions    Return in about 6 months (around 2/15/2020).     The information in this document, created by the medical scribe for me, accurately reflects the services I personally performed and the decisions made by me. I have reviewed and approved this document for accuracy prior to leaving the patient care area.  10:42 AM, 08/15/19        Marc Groves MD   Pager - [phone]  [facility] [place]    "

## 2019-08-15 NOTE — PROGRESS NOTES
ANTICOAGULATION FOLLOW-UP CLINIC VISIT    Patient Name:  Tomás Cruz  Date:  8/15/2019  Contact Type:  Face to Face and telephone    SUBJECTIVE:  Patient Findings     Positives:   Extra doses (He may have missed a dose over the weekend and doubled up the next day. He is unsure of which day he m issed.)    Comments:             Clinical Outcomes     Negatives:   Major bleeding event, Thromboembolic event, Anticoagulation-related hospital admission, Anticoagulation-related ED visit, Anticoagulation-related fatality    Comments:                OBJECTIVE    INR Protime   Date Value Ref Range Status   08/15/2019 5.2 (A) 0.86 - 1.14 Final       ASSESSMENT / PLAN  INR assessment SUPRA    Recheck INR In: 4 DAYS    INR Location Clinic      Anticoagulation Summary  As of 8/15/2019    INR goal:   2.5-3.5   TTR:   81.9 % (3.4 y)   INR used for dosin.2! (8/15/2019)   Warfarin maintenance plan:   2 mg (2 mg x 1) every day   Full warfarin instructions:   : Hold; : Hold; Otherwise 2 mg every day   Weekly warfarin total:   14 mg   Plan last modified:   Rose Marie Madden RN (2019)   Next INR check:   2019   Target end date:       Indications    Long-term (current) use of anticoagulants [Z79.01] [Z79.01]  S/P mitral valve replacement [Z95.2]             Anticoagulation Episode Summary     INR check location:   Anticoagulation Clinic    Preferred lab:       Send INR reminders to:    NURSE    Comments:         Anticoagulation Care Providers     Provider Role Specialty Phone number    Mahamed Grvoes MD [facility] [phone]            See the Encounter Report to view Anticoagulation Flowsheet and Dosing Calendar (Go to Encounters tab in chart review, and find the Anticoagulation Therapy Visit)    Dosage adjustment made based on physician directed care plan.    Will get venous INR to confirm point of care INR.  Will wait for venous INR to make decision on dosing.  Discussed with  Kajal Alvarado, Pharm D.  She will collaborate with me on decision making for this patient's dosing today.    Rose Marie Madden RN

## 2019-08-16 LAB
ANION GAP SERPL CALCULATED.3IONS-SCNC: 10 MMOL/L (ref 3–14)
BUN SERPL-MCNC: 39 MG/DL (ref 7–30)
CALCIUM SERPL-MCNC: 9.2 MG/DL (ref 8.5–10.1)
CHLORIDE SERPL-SCNC: 106 MMOL/L (ref 94–109)
CO2 SERPL-SCNC: 23 MMOL/L (ref 20–32)
CREAT SERPL-MCNC: 1.61 MG/DL (ref 0.66–1.25)
GFR SERPL CREATININE-BSD FRML MDRD: 40 ML/MIN/{1.73_M2}
GLUCOSE SERPL-MCNC: 154 MG/DL (ref 70–99)
POTASSIUM SERPL-SCNC: 5 MMOL/L (ref 3.4–5.3)
SODIUM SERPL-SCNC: 139 MMOL/L (ref 133–144)

## 2019-08-16 NOTE — RESULT ENCOUNTER NOTE
Dear Dereje,    Here is a summary of your recent test results:  -Kidney function (GFR) is decreased, but stable.  ADVISE: rechecking this in 3 months  -Sodium is normal.  -Potassium is normal.  -Calcium is normal.  -Glucose is mildly elevated but you were nonfasting and this is okay..    For additional lab test information, labtestsonline.org is an excellent reference.           Thank you very much for trusting me and [facility].     Healthy regards,  Marc Groves MD

## 2019-08-19 ENCOUNTER — ANTICOAGULATION THERAPY VISIT (OUTPATIENT)
Dept: NURSING | Facility: CLINIC | Age: 79
End: 2019-08-19
Payer: COMMERCIAL

## 2019-08-19 DIAGNOSIS — Z95.2 S/P MITRAL VALVE REPLACEMENT: ICD-10-CM

## 2019-08-19 DIAGNOSIS — R91.8 PULMONARY NODULES: Primary | ICD-10-CM

## 2019-08-19 DIAGNOSIS — Z79.01 LONG TERM CURRENT USE OF ANTICOAGULANT THERAPY: ICD-10-CM

## 2019-08-19 LAB — INR POINT OF CARE: 3.7 (ref 0.86–1.14)

## 2019-08-19 PROCEDURE — 36416 COLLJ CAPILLARY BLOOD SPEC: CPT

## 2019-08-19 PROCEDURE — 85610 PROTHROMBIN TIME: CPT | Mod: QW

## 2019-08-19 NOTE — PROGRESS NOTES
ANTICOAGULATION FOLLOW-UP CLINIC VISIT    Patient Name:  Tomás Cruz  Date:  8/19/2019  Contact Type:  Face to Face    SUBJECTIVE:  Patient Findings     Comments:   Patient denies any identifiable changes that caused the supratherapeutic INR.           Clinical Outcomes     Negatives:   Major bleeding event, Thromboembolic event, Anticoagulation-related hospital admission, Anticoagulation-related ED visit, Anticoagulation-related fatality    Comments:   Patient denies any identifiable changes that caused the supratherapeutic INR.              OBJECTIVE    INR Protime   Date Value Ref Range Status   08/19/2019 3.7 (A) 0.86 - 1.14 Final       ASSESSMENT / PLAN  No question data found.  Anticoagulation Summary  As of 8/19/2019    INR goal:   2.5-3.5   TTR:   81.6 % (3.4 y)   INR used for dosing:   3.7! (8/19/2019)   Warfarin maintenance plan:   2 mg (2 mg x 1) every Mon, Wed, Fri; 1 mg (2 mg x 0.5) all other days   Full warfarin instructions:   8/19: 1 mg; Otherwise 2 mg every Mon, Wed, Fri; 1 mg all other days   Weekly warfarin total:   10 mg   Plan last modified:   Myra Holguin RN (8/19/2019)   Next INR check:   8/27/2019   Target end date:       Indications    Long-term (current) use of anticoagulants [Z79.01] [Z79.01]  S/P mitral valve replacement [Z95.2]             Anticoagulation Episode Summary     INR check location:   Anticoagulation Clinic    Preferred lab:       Send INR reminders to:   RV NURSE    Comments:         Anticoagulation Care Providers     Provider Role Specialty Phone number    Mahamed Groves MD [facility] [phone]            See the Encounter Report to view Anticoagulation Flowsheet and Dosing Calendar (Go to Encounters tab in chart review, and find the Anticoagulation Therapy Visit)    Dosage adjustment made based on physician directed care plan.    Myra Holguin RN

## 2019-08-26 ENCOUNTER — PATIENT OUTREACH (OUTPATIENT)
Dept: CARE COORDINATION | Facility: CLINIC | Age: 79
End: 2019-08-26

## 2019-08-26 NOTE — PROGRESS NOTES
Community Health Worker called the patient for Clinic Care Coordination outreach follow up on goals and action steps.  Spoke to Dereje    Discussed the following  Better manage CHF and remain out of the hospital    Dereje states that he's been monitoring his blood pressure and weight every morning.    Dereje Reports  Sometimes he'll feel dizzy and weak, but will lie down when he starts feeling like this and it goes away.  Nothing else of concern at this time.  Discussed having Faye Akbar CC RN, check in on him to see if there's anything she can help him with.  But he didn't seem to think it was too much of a concern.    ______________________  Next Outreach: 09/24/19  Planned Outreach Frequency: Monthly  Preferred Phone Number: [phone]    Last Assessment Date: 04/18/19  Care Plan Completion Date: 04/18/19  ______________________  Goals       1. Improve chronic symptoms (pt-stated)      Goal Statement: I would like to better manage my CHF and remain out of the hospital.   Measure of Success: Patient will use Heart Failure action plan to monitor symptoms; symptoms will remain in green range. Patient will weigh himself daily and record weights. Patient will follow a low salt diet.   Supportive Steps to Achieve: Continued CCRN support. Patient will take medications as prescribed. Follow up with providers as recommended. Patient will call RNCC with questions, concerns, support needs. RNCC will be available as needed. RNCC to mail patient CHF action plan, weight log, and education in addition to introduction letter, complex care plan and medication list.   Barriers: Likes the taste of salt.   Strengths: Engaged in care coordination, feels he is in pretty good shape, motivated to get into an exercise regimen.   Date to Achieve By: 09/2019  Patient expressed understanding of goal: Yes    As of today's date 8/26/2019 goal is met at 26 - 50%.   Goal Status:  Ongoing  Patient has been monitoring blood pressure and weight  every morning.

## 2019-08-27 ENCOUNTER — ANTICOAGULATION THERAPY VISIT (OUTPATIENT)
Dept: NURSING | Facility: CLINIC | Age: 79
End: 2019-08-27
Payer: COMMERCIAL

## 2019-08-27 DIAGNOSIS — Z79.01 LONG TERM CURRENT USE OF ANTICOAGULANT THERAPY: ICD-10-CM

## 2019-08-27 DIAGNOSIS — Z95.2 S/P MITRAL VALVE REPLACEMENT: ICD-10-CM

## 2019-08-27 LAB — INR POINT OF CARE: 4.4 (ref 0.86–1.14)

## 2019-08-27 PROCEDURE — 85610 PROTHROMBIN TIME: CPT | Mod: QW

## 2019-08-27 PROCEDURE — 36416 COLLJ CAPILLARY BLOOD SPEC: CPT

## 2019-08-27 NOTE — PROGRESS NOTES
ANTICOAGULATION FOLLOW-UP CLINIC VISIT    Patient Name:  Tomás Cruz  Date:  2019  Contact Type:  Face to Face    SUBJECTIVE:  Patient Findings     Comments:   The patient was assessed for diet, medication, and activity level changes, missed or extra doses, bruising or bleeding, with no problem findings.      He said breathing is good and weight is stable.         Clinical Outcomes     Negatives:   Major bleeding event, Thromboembolic event, Anticoagulation-related hospital admission, Anticoagulation-related ED visit, Anticoagulation-related fatality    Comments:   The patient was assessed for diet, medication, and activity level changes, missed or extra doses, bruising or bleeding, with no problem findings.      He said breathing is good and weight is stable.            OBJECTIVE    INR Protime   Date Value Ref Range Status   2019 4.4 (A) 0.86 - 1.14 Final       ASSESSMENT / PLAN  INR assessment SUPRA    Recheck INR In: 10 DAYS    INR Location Clinic      Anticoagulation Summary  As of 2019    INR goal:   2.5-3.5   TTR:   81.1 % (3.5 y)   INR used for dosin.4! (2019)   Warfarin maintenance plan:   2 mg (2 mg x 1) every Mon, Wed, Fri; 1 mg (2 mg x 0.5) all other days   Full warfarin instructions:   : Hold; Otherwise 2 mg every Mon, Wed, Fri; 1 mg all other days   Weekly warfarin total:   10 mg   Plan last modified:   Myra Holguin RN (2019)   Next INR check:   9/3/2019   Target end date:       Indications    Long-term (current) use of anticoagulants [Z79.01] [Z79.01]  S/P mitral valve replacement [Z95.2]             Anticoagulation Episode Summary     INR check location:   Anticoagulation Clinic    Preferred lab:       Send INR reminders to:   LAURA NURSE    Comments:         Anticoagulation Care Providers     Provider Role Specialty Phone number    Mahamed Groves MD [facility] [phone]            See the Encounter Report to view Anticoagulation  Flowsheet and Dosing Calendar (Go to Encounters tab in chart review, and find the Anticoagulation Therapy Visit)    Dosage adjustment made based on physician directed care plan.    Rose Marie Madden RN

## 2019-09-04 ENCOUNTER — ANTICOAGULATION THERAPY VISIT (OUTPATIENT)
Dept: NURSING | Facility: CLINIC | Age: 79
End: 2019-09-04
Payer: COMMERCIAL

## 2019-09-04 DIAGNOSIS — Z79.01 LONG TERM CURRENT USE OF ANTICOAGULANT THERAPY: ICD-10-CM

## 2019-09-04 DIAGNOSIS — Z95.2 S/P MITRAL VALVE REPLACEMENT: ICD-10-CM

## 2019-09-04 LAB — INR POINT OF CARE: 4 (ref 0.86–1.14)

## 2019-09-04 PROCEDURE — 85610 PROTHROMBIN TIME: CPT | Mod: QW

## 2019-09-04 PROCEDURE — 36416 COLLJ CAPILLARY BLOOD SPEC: CPT

## 2019-09-04 NOTE — PROGRESS NOTES
ANTICOAGULATION FOLLOW-UP CLINIC VISIT    Patient Name:  Tomás Cruz  Date:  2019  Contact Type:  Face to Face    SUBJECTIVE:  Patient Findings         Clinical Outcomes     Negatives:   Major bleeding event, Thromboembolic event, Anticoagulation-related hospital admission, Anticoagulation-related ED visit, Anticoagulation-related fatality           OBJECTIVE    INR Protime   Date Value Ref Range Status   2019 4.0 (A) 0.86 - 1.14 Final       ASSESSMENT / PLAN  No question data found.  Anticoagulation Summary  As of 2019    INR goal:   2.5-3.5   TTR:   80.6 % (3.5 y)   INR used for dosin.0! (2019)   Warfarin maintenance plan:   2 mg (2 mg x 1) every Mon, Wed, Fri; 1 mg (2 mg x 0.5) all other days   Full warfarin instructions:   : Hold; : 1 mg; Otherwise 2 mg every Mon, Wed, Fri; 1 mg all other days   Weekly warfarin total:   10 mg   Plan last modified:   Myra Holguin RN (2019)   Next INR check:   2019   Target end date:       Indications    Long-term (current) use of anticoagulants [Z79.01] [Z79.01]  S/P mitral valve replacement [Z95.2]             Anticoagulation Episode Summary     INR check location:   Anticoagulation Clinic    Preferred lab:       Send INR reminders to:    NURSE    Comments:         Anticoagulation Care Providers     Provider Role Specialty Phone number    Mahamed Groves MD [facility] [phone]            See the Encounter Report to view Anticoagulation Flowsheet and Dosing Calendar (Go to Encounters tab in chart review, and find the Anticoagulation Therapy Visit)    Dosage adjustment made based on physician directed care plan.    Myra Holguin RN

## 2019-09-11 ENCOUNTER — TELEPHONE (OUTPATIENT)
Dept: UROLOGY | Facility: CLINIC | Age: 79
End: 2019-09-11

## 2019-09-11 ENCOUNTER — ANTICOAGULATION THERAPY VISIT (OUTPATIENT)
Dept: NURSING | Facility: CLINIC | Age: 79
End: 2019-09-11
Payer: COMMERCIAL

## 2019-09-11 DIAGNOSIS — Z79.01 LONG TERM CURRENT USE OF ANTICOAGULANT THERAPY: ICD-10-CM

## 2019-09-11 DIAGNOSIS — R97.20 ELEVATED PROSTATE SPECIFIC ANTIGEN (PSA): Primary | ICD-10-CM

## 2019-09-11 DIAGNOSIS — Z95.2 S/P MITRAL VALVE REPLACEMENT: ICD-10-CM

## 2019-09-11 LAB — INR POINT OF CARE: 2.7 (ref 0.86–1.14)

## 2019-09-11 PROCEDURE — 85610 PROTHROMBIN TIME: CPT | Mod: QW

## 2019-09-11 PROCEDURE — 36416 COLLJ CAPILLARY BLOOD SPEC: CPT

## 2019-09-11 NOTE — PROGRESS NOTES
ANTICOAGULATION FOLLOW-UP CLINIC VISIT    Patient Name:  Tomás Cruz  Date:  2019  Contact Type:  Face to Face    SUBJECTIVE:  Patient Findings         Clinical Outcomes     Negatives:   Major bleeding event, Thromboembolic event, Anticoagulation-related hospital admission, Anticoagulation-related ED visit, Anticoagulation-related fatality           OBJECTIVE    INR Protime   Date Value Ref Range Status   2019 2.7 (A) 0.86 - 1.14 Final       ASSESSMENT / PLAN  No question data found.  Anticoagulation Summary  As of 2019    INR goal:   2.5-3.5   TTR:   80.5 % (3.5 y)   INR used for dosin.7 (2019)   Warfarin maintenance plan:   2 mg (2 mg x 1) every Mon; 1 mg (2 mg x 0.5) all other days   Full warfarin instructions:   : 1 mg; : 1 mg; Otherwise 2 mg every Mon; 1 mg all other days   Weekly warfarin total:   8 mg   Plan last modified:   Myra Holguin RN (2019)   Next INR check:   2019   Target end date:       Indications    Long-term (current) use of anticoagulants [Z79.01] [Z79.01]  S/P mitral valve replacement [Z95.2]             Anticoagulation Episode Summary     INR check location:   Anticoagulation Clinic    Preferred lab:       Send INR reminders to:    NURSE    Comments:         Anticoagulation Care Providers     Provider Role Specialty Phone number    Mahamed Groves MD [facility] [phone]            See the Encounter Report to view Anticoagulation Flowsheet and Dosing Calendar (Go to Encounters tab in chart review, and find the Anticoagulation Therapy Visit)    Dosage adjustment made based on physician directed care plan.    Myra Holguin RN

## 2019-09-11 NOTE — TELEPHONE ENCOUNTER
[facility]    Phone Message    May a detailed message be left on voicemail: yes    Reason for Call: Other: pt would like his PSA order sent to the [facility] clinic so he can have this done 3 days before his appt with Carolee     Action Taken: Message routed to:  [facility] ([facility] Urology

## 2019-09-18 ENCOUNTER — ANTICOAGULATION THERAPY VISIT (OUTPATIENT)
Dept: NURSING | Facility: CLINIC | Age: 79
End: 2019-09-18
Payer: COMMERCIAL

## 2019-09-18 DIAGNOSIS — Z79.01 LONG TERM CURRENT USE OF ANTICOAGULANT THERAPY: ICD-10-CM

## 2019-09-18 DIAGNOSIS — Z95.2 S/P MITRAL VALVE REPLACEMENT: ICD-10-CM

## 2019-09-18 LAB — INR POINT OF CARE: 2.2 (ref 0.86–1.14)

## 2019-09-18 PROCEDURE — 85610 PROTHROMBIN TIME: CPT | Mod: QW

## 2019-09-18 PROCEDURE — 99207 ZZC NO CHARGE NURSE ONLY: CPT

## 2019-09-18 PROCEDURE — 36416 COLLJ CAPILLARY BLOOD SPEC: CPT

## 2019-09-18 NOTE — PROGRESS NOTES
ANTICOAGULATION FOLLOW-UP CLINIC VISIT    Patient Name:  Tomás Cruz  Date:  2019  Contact Type:  Face to Face    SUBJECTIVE:  Patient Findings     Comments:   Patient denies any identifiable changes that caused the subtherapeutic INR.           Clinical Outcomes     Negatives:   Major bleeding event, Thromboembolic event, Anticoagulation-related hospital admission, Anticoagulation-related ED visit, Anticoagulation-related fatality    Comments:   Patient denies any identifiable changes that caused the subtherapeutic INR.              OBJECTIVE    INR Protime   Date Value Ref Range Status   2019 2.2 (A) 0.86 - 1.14 Final       ASSESSMENT / PLAN  No question data found.  Anticoagulation Summary  As of 2019    INR goal:   2.5-3.5   TTR:   80.3 % (3.5 y)   INR used for dosin.2! (2019)   Warfarin maintenance plan:   2 mg (2 mg x 1) every Mon, Fri; 1 mg (2 mg x 0.5) all other days   Full warfarin instructions:   2 mg every Mon, Fri; 1 mg all other days   Weekly warfarin total:   9 mg   Plan last modified:   Myra Holguin RN (2019)   Next INR check:   10/2/2019   Target end date:       Indications    Long-term (current) use of anticoagulants [Z79.01] [Z79.01]  S/P mitral valve replacement [Z95.2]             Anticoagulation Episode Summary     INR check location:   Anticoagulation Clinic    Preferred lab:       Send INR reminders to:    NURSE    Comments:         Anticoagulation Care Providers     Provider Role Specialty Phone number    Mahamed Groves MD [facility] [phone]            See the Encounter Report to view Anticoagulation Flowsheet and Dosing Calendar (Go to Encounters tab in chart review, and find the Anticoagulation Therapy Visit)    Dosage adjustment made based on physician directed care plan.    Myra Holguin RN

## 2019-09-24 ENCOUNTER — PATIENT OUTREACH (OUTPATIENT)
Dept: CARE COORDINATION | Facility: CLINIC | Age: 79
End: 2019-09-24

## 2019-09-24 NOTE — PROGRESS NOTES
Community Health Worker called the patient for Clinic Care Coordination outreach follow up on goals and action steps.  Spoke to Dereje    Discussed the following  Better manage CHF and remain out of hospital    Continues to monitor blood pressures and weight every morning    Has been trying to cut his salt intake.    Dereje states that he did try some salt substitutes that were recommended to him, but he didn't really like them.    Patient reports  He's still getting random episodes of vertigo.  I reminded him, that he did bring this up last time we spoke, about a month ago.  I asked him if he did contact his PCP about this.  Patient doesn't feel like it's a big enough issue to talk to PCP about, he's brought this up to Dr. Groves in the past, and was suggested to lay his head sideways.  Suggested to Dereje, that if he does feel like it's becoming more frequent or more of a problem to let us know, or Dr. Groves.  Patient acknowledged understanding and agreed.    ______________________  Next Outreach: 10/24/19  Planned Outreach Frequency: Monthly  Preferred Phone Number: [phone]    Last Assessment Date: 04/18/19  Care Plan Completion Date: 04/18/19  ______________________  Goals       1. Improve chronic symptoms (pt-stated)      Goal Statement: I would like to better manage my CHF and remain out of the hospital.   Measure of Success: Patient will use Heart Failure action plan to monitor symptoms; symptoms will remain in green range. Patient will weigh himself daily and record weights. Patient will follow a low salt diet.   Supportive Steps to Achieve: Continued CCRN support. Patient will take medications as prescribed. Follow up with providers as recommended. Patient will call RNCC with questions, concerns, support needs. RNCC will be available as needed. RNCC to mail patient CHF action plan, weight log, and education in addition to introduction letter, complex care plan and medication list.   Barriers: Likes the  taste of salt.   Strengths: Engaged in care coordination, feels he is in pretty good shape, motivated to get into an exercise regimen.   Date to Achieve By: 09/2019  Patient expressed understanding of goal: Yes    As of today's date 9/24/2019 goal is met at 26 - 50%.   Goal Status:  Ongoing  Continues to monitor blood pressure and weight daily.  Has been trying to cut back on salt intake.

## 2019-09-27 PROBLEM — I50.22 CHRONIC SYSTOLIC CONGESTIVE HEART FAILURE (H): Status: ACTIVE | Noted: 2019-04-30

## 2019-09-27 PROBLEM — E83.52 HYPERCALCEMIA: Status: ACTIVE | Noted: 2019-04-30

## 2019-09-27 PROBLEM — G62.9 NEUROPATHY: Status: ACTIVE | Noted: 2018-04-10

## 2019-09-27 PROBLEM — R91.8 LUNG MASS: Status: ACTIVE | Noted: 2019-04-30

## 2019-09-27 PROBLEM — R41.3 AMNESIA: Status: ACTIVE | Noted: 2019-04-30

## 2019-10-01 ENCOUNTER — HEALTH MAINTENANCE LETTER (OUTPATIENT)
Age: 79
End: 2019-10-01

## 2019-10-01 ENCOUNTER — PRE VISIT (OUTPATIENT)
Dept: UROLOGY | Facility: CLINIC | Age: 79
End: 2019-10-01

## 2019-10-01 NOTE — TELEPHONE ENCOUNTER
Chief Complaint : PSA check     Records/Orders: PSA appointment is on 10/7     Pt Contacted: no    At Rooming: normal

## 2019-10-07 ENCOUNTER — OFFICE VISIT (OUTPATIENT)
Dept: FAMILY MEDICINE | Facility: CLINIC | Age: 79
End: 2019-10-07
Payer: COMMERCIAL

## 2019-10-07 ENCOUNTER — ANTICOAGULATION THERAPY VISIT (OUTPATIENT)
Dept: NURSING | Facility: CLINIC | Age: 79
End: 2019-10-07
Payer: COMMERCIAL

## 2019-10-07 VITALS
TEMPERATURE: 97.4 F | HEART RATE: 74 BPM | SYSTOLIC BLOOD PRESSURE: 126 MMHG | BODY MASS INDEX: 26.05 KG/M2 | WEIGHT: 203 LBS | DIASTOLIC BLOOD PRESSURE: 75 MMHG | OXYGEN SATURATION: 96 % | HEIGHT: 74 IN | RESPIRATION RATE: 12 BRPM

## 2019-10-07 VITALS
HEART RATE: 74 BPM | DIASTOLIC BLOOD PRESSURE: 75 MMHG | TEMPERATURE: 97.4 F | OXYGEN SATURATION: 96 % | SYSTOLIC BLOOD PRESSURE: 126 MMHG

## 2019-10-07 DIAGNOSIS — Z95.810 AUTOMATIC IMPLANTABLE CARDIOVERTER-DEFIBRILLATOR IN SITU: ICD-10-CM

## 2019-10-07 DIAGNOSIS — R42 DIZZINESS: Primary | ICD-10-CM

## 2019-10-07 DIAGNOSIS — Z79.01 LONG TERM CURRENT USE OF ANTICOAGULANT THERAPY: ICD-10-CM

## 2019-10-07 DIAGNOSIS — Z95.2 S/P MITRAL VALVE REPLACEMENT: ICD-10-CM

## 2019-10-07 DIAGNOSIS — N18.30 CKD (CHRONIC KIDNEY DISEASE) STAGE 3, GFR 30-59 ML/MIN (H): ICD-10-CM

## 2019-10-07 DIAGNOSIS — R97.20 ELEVATED PROSTATE SPECIFIC ANTIGEN (PSA): ICD-10-CM

## 2019-10-07 DIAGNOSIS — R42 LIGHTHEADEDNESS: ICD-10-CM

## 2019-10-07 LAB
ERYTHROCYTE [DISTWIDTH] IN BLOOD BY AUTOMATED COUNT: 16.4 % (ref 10–15)
HCT VFR BLD AUTO: 53.8 % (ref 40–53)
HGB BLD-MCNC: 17.7 G/DL (ref 13.3–17.7)
INR POINT OF CARE: 1.8 (ref 0.86–1.14)
MCH RBC QN AUTO: 29.7 PG (ref 26.5–33)
MCHC RBC AUTO-ENTMCNC: 32.9 G/DL (ref 31.5–36.5)
MCV RBC AUTO: 90 FL (ref 78–100)
PLATELET # BLD AUTO: 476 10E9/L (ref 150–450)
PSA SERPL-MCNC: 4.49 UG/L (ref 0–4)
RBC # BLD AUTO: 5.95 10E12/L (ref 4.4–5.9)
WBC # BLD AUTO: 12.9 10E9/L (ref 4–11)

## 2019-10-07 PROCEDURE — 80048 BASIC METABOLIC PNL TOTAL CA: CPT | Performed by: FAMILY MEDICINE

## 2019-10-07 PROCEDURE — 85610 PROTHROMBIN TIME: CPT | Mod: QW

## 2019-10-07 PROCEDURE — 93000 ELECTROCARDIOGRAM COMPLETE: CPT | Performed by: FAMILY MEDICINE

## 2019-10-07 PROCEDURE — 90662 IIV NO PRSV INCREASED AG IM: CPT | Performed by: FAMILY MEDICINE

## 2019-10-07 PROCEDURE — G0008 ADMIN INFLUENZA VIRUS VAC: HCPCS | Performed by: FAMILY MEDICINE

## 2019-10-07 PROCEDURE — 99214 OFFICE O/P EST MOD 30 MIN: CPT | Mod: 25 | Performed by: FAMILY MEDICINE

## 2019-10-07 PROCEDURE — 36416 COLLJ CAPILLARY BLOOD SPEC: CPT

## 2019-10-07 PROCEDURE — 84153 ASSAY OF PSA TOTAL: CPT | Performed by: PHYSICIAN ASSISTANT

## 2019-10-07 PROCEDURE — 85027 COMPLETE CBC AUTOMATED: CPT | Performed by: FAMILY MEDICINE

## 2019-10-07 ASSESSMENT — MIFFLIN-ST. JEOR: SCORE: 1710.55

## 2019-10-07 NOTE — PROGRESS NOTES
Subjective     Tomás Cruz is a 79 year old male who presents to clinic today for the following health issues:    Patient in clinic for INR - upon getting patient, patient seemed off balance and wobbly in the lobby. Stated he was dizzy and lightheaded. Patient was placed on wheelchair and brought to room. He feels fine now. It happens most days. If he has at episode at home he usually goes and lays down for a little while and it seems to pass. He sometimes gets dizzy when he stands up from sitting.      Dizziness    Onset: Couple years ago, getting worse     Description:   Do you feel faint:  YES  Does it feel like the surroundings (bed, room) are moving: no   Unsteady/off balance: YES  Have you passed out or fallen: Patient stated he did not know. ~1 month ago at the [facility], patient was walking along light rail and he recalls a short curb he stopped off of. Apparently he lost his balance, fell and hit R forehead, hit glasses, had a good cut on the forehead apparently. Stated there were other people that were walking by that helped him up - refused ED assessment at that time.     Intensity: moderate    Progression of Symptoms:  worsening and intermittent    Accompanying Signs & Symptoms:  Heart palpitations: no - has a ICD Was at the cardiologist this month  Nausea, vomiting: YES  Weakness in arms or legs: YES- is supposed to see neurology soon due to pain that starts in neck and radiates into L arm, also feels R sided chest pressure    History:   Head trauma/concussion hx: YES- hit head 1 week ago refilling bird feeders. Was standing on bird feeders, stepped down and fell backwards. Landed on back, head snapped back onto ground.      DENIES: CP, SOB, Difficulty Breathing, HA, Palpitations, losing consciousness     Patient noted that he may have hit his head on the mailbox sometime when his spouse was in [place] - thinks he may have braced himself with his hands though.       Right Arm Pain  He gets pain that starts in  his neck and radiates down right arm.     HPI    Patient Active Problem List   Diagnosis     Impotence of organic origin     Hypersomnia with sleep apnea     Cardiomyopathy, nonischemic     Polyp of colon     Hyperlipidemia LDL goal <100     Advance Care Planning     Syncope     S/P mitral valve replacement     Health Care Home     Long-term (current) use of anticoagulants [Z79.01]     Essential tremor     Paroxysmal atrial fibrillation (H)     History of prosthetic heart valve     Automatic implantable cardioverter-defibrillator in situ     Elevated prostate specific antigen (PSA)     Decreased GFR     Essential hypertension with goal blood pressure less than 140/90     Arthritis of knee, right     Intention tremor     Cerebral infarction (H)     Atrial flutter (H)     Chronic systolic congestive heart failure (H)     Hypercalcemia     Lung mass     Neuropathy     Amnesia     CKD (chronic kidney disease) stage 3, GFR 30-59 ml/min (H)     Past Surgical History:   Procedure Laterality Date     ANESTHESIA CARDIOVERSION N/A 4/15/2019    Procedure: ANESTHESIA CARDIOVERSION;  Surgeon: GENERIC ANESTHESIA PROVIDER;  Location: [facility] OR     ARTHROSCOPY KNEE Left 2001    left knee arthroscopy     ARTHROSCOPY KNEE  04/2018    right knee partial     AS TOTAL KNEE ARTHROPLASTY Left 2006    Dr. Castro     CARDIAC SURGERY  2003    Mitral valve replacement     CATARACT IOL, RT/LT  2014    Cataracts, bilateral     COLONOSCOPY  2016    [facility]     COLONOSCOPY N/A 2/21/2019    Procedure: COMBINED COLONOSCOPY, SINGLE OR MULTIPLE BIOPSY/POLYPECTOMY BY BIOPSies by cold forcep;  Surgeon: Ronald Henderson MD;  Location:  GI     HERNIA REPAIR  2005     IMPLANT AUTOMATIC IMPLANTABLE CARDIOVERTER DEFIBRILLATOR  5/2013     KIDNEY SURGERY  2008    Laparoscopic right radical nephrectomy.- due to complex hemorrhagic cyst       Social History     Tobacco Use     Smoking status: Former Smoker     Packs/day: 1.00     Years: 20.00      "Pack years: 20.00     Last attempt to quit: 1982     Years since quittin.3     Smokeless tobacco: Never Used   Substance Use Topics     Alcohol use: No     Family History   Adopted: Yes   Problem Relation Age of Onset     Unknown/Adopted Mother      Unknown/Adopted Father      No Known Problems Daughter      No Known Problems Daughter      No Known Problems Daughter      No Known Problems Daughter      Diabetes No family hx of      Coronary Artery Disease No family hx of          Reviewed and updated as needed this visit by Provider  Tobacco  Allergies  Meds  Problems  Med Hx  Surg Hx  Fam Hx       Review of Systems   ROS COMP: Constitutional, HEENT, cardiovascular, pulmonary, GI, , musculoskeletal, neuro, skin, endocrine and psych systems are negative, except as otherwise noted.    This document serves as a record of the services and decisions personally performed and made by Mahamed Groves MD. It was created on his behalf by Ignacio Mix, a trained medical scribe. The creation of this document is based the provider's statements to the medical scribe.  Scribe Ignacio Mix 4:34 PM, 2019    Objective    /75 (BP Location: Right arm, Patient Position: Sitting, Cuff Size: Adult Regular)   Pulse 74   Temp 97.4  F (36.3  C) (Oral)   Resp 12   Ht 1.88 m (6' 2\")   Wt 92.1 kg (203 lb)   SpO2 96%   BMI 26.06 kg/m    Body mass index is 26.06 kg/m .  Physical Exam   GENERAL: healthy, alert and no distress  EYES: Eyes grossly normal to inspection, PERRL and conjunctivae and sclerae normal  HENT: ear canals and TM's normal, nose and mouth without ulcers or lesions  NECK: no adenopathy, no asymmetry, masses, or scars and thyroid normal to palpation  RESP: lungs clear to auscultation - no rales, rhonchi or wheezes  CV: occasional skipped beat , otherwise, regular rate and rhythm, normal S1 S2, no S3 or S4, no murmur, click or rub, no peripheral edema and peripheral pulses strong  ABDOMEN: " "soft, nontender, no hepatosplenomegaly, no masses and bowel sounds normal  MS: no gross musculoskeletal defects noted, no edema  SKIN: no suspicious lesions or rashes  NEURO: Normal strength and tone, mentation intact and speech normal  PSYCH: mentation appears normal, affect normal/bright  LYMPH: no cervical, supraclavicular, axillary, or inguinal adenopathy    Diagnostic Test Results:  Labs reviewed in Epic      Assessment & Plan     Tomás was seen today for recheck.    Diagnoses and all orders for this visit:    Dizziness - Patient has a history of falls and feeling dizziness when standing.   -     EKG 12-lead complete w/read - Clinics    Lightheadedness - he has acute episodes that he handles by laying down in bed.   -     CBC with platelets  -     Basic metabolic panel  (Ca, Cl, CO2, Creat, Gluc, K, Na, BUN)        CKD (chronic kidney disease) stage 3, GFR 30-59 ml/min (H)  -     Basic metabolic panel  (Ca, Cl, CO2, Creat, Gluc, K, Na, BUN)    Encounter for Immunization  -     VACCINE ADMINISTRATION, INITIAL  -     HC FLU VACCINE, INCREASED ANTIGEN, PRESV FREE    BMI:   Estimated body mass index is 26.06 kg/m  as calculated from the following:    Height as of this encounter: 1.88 m (6' 2\").    Weight as of this encounter: 92.1 kg (203 lb).     No follow-ups on file.    The information in this document, created by the medical scribe for me, accurately reflects the services I personally performed and the decisions made by me. I have reviewed and approved this document for accuracy prior to leaving the patient care area.  4:46 PM, 10/07/19    Mahamed Groves MD  [facility] [place]    "

## 2019-10-07 NOTE — PROGRESS NOTES
Patient in clinic for INR - upon getting patient, patient seemed off balance and wobbly.  Stated he was dizzy. Patient was placed on wheelchair and brought to room.     Dizziness  Onset: Couple years ago, getting worse - has been assessed by Dr. Groves    Description:   Do you feel faint:  YES  Does it feel like the surroundings (bed, room) are moving: no   Unsteady/off balance: YES  Have you passed out or fallen: Patient stated he did not know. ~1 month ago at the [facility], patient was walking along light rail and he recalls a short curb he stopped off of. Apparently he lost his balance, fell and hit R forehead, hit glasses, had a good cut on the forehead apparently. Stated there were other people that were walking by that helped him up - refused ED assessment at that time.     Intensity: moderate    Progression of Symptoms:  worsening and intermittent    Accompanying Signs & Symptoms:  Heart palpitations: no - has a pacemaker. Was at the cardiologist this month  Nausea, vomiting: YES  Weakness in arms or legs: YES- is supposed to see neurology soon due to pain that starts in neck and radiates into L arm, also feels R sided chest pressure    History:   Head trauma/concussion hx: YES- hit head 1 week ago refilling bird feeders. Was standing on bird feeders, stepped down and fell backwards. Landed on back, head snapped back onto ground.     DENIES: CP, SOB, Difficulty Breathing, HA, Palpitations, losing consciousness    Patient stated he does not remember seeing the cardiologist in 05/2019 - stated at the OV, the cardiologist said patient was not making sense, slurring words, thought patient was having a stroke so he was brought to [facility].     OV SCHEDULED WITH MD KIM for today, 10/07/2019 @ 340pm        ANTICOAGULATION FOLLOW-UP CLINIC VISIT    Patient Name:  Tomás Cruz  Date:  10/7/2019  Contact Type:  Face to Face    SUBJECTIVE:  Patient Findings     Positives:   Other complaints    Comments:   Noted 2  falls within this last month - see notes. And ov today         Clinical Outcomes     Comments:   Noted 2 falls within this last month - see notes. And ov today            OBJECTIVE    INR Protime   Date Value Ref Range Status   10/07/2019 1.8 (A) 0.86 - 1.14 Final       ASSESSMENT / PLAN  No question data found.  Anticoagulation Summary  As of 10/7/2019    INR goal:   2.5-3.5   TTR:   79.1 % (3.6 y)   INR used for dosin.8! (10/7/2019)   Warfarin maintenance plan:   2 mg (2 mg x 1) every Mon, Fri; 1 mg (2 mg x 0.5) all other days   Full warfarin instructions:   10/7: 4 mg; Otherwise 2 mg every Mon, Fri; 1 mg all other days   Weekly warfarin total:   9 mg   Plan last modified:   Myra Holguin RN (2019)   Next INR check:   10/14/2019   Target end date:       Indications    Long-term (current) use of anticoagulants [Z79.01] [Z79.01]  S/P mitral valve replacement [Z95.2]             Anticoagulation Episode Summary     INR check location:   Anticoagulation Clinic    Preferred lab:       Send INR reminders to:    NURSE    Comments:         Anticoagulation Care Providers     Provider Role Specialty Phone number    Mahamed Groves MD [facility] [phone]            See the Encounter Report to view Anticoagulation Flowsheet and Dosing Calendar (Go to Encounters tab in chart review, and find the Anticoagulation Therapy Visit)    Dosage adjustment made based on physician directed care plan.    Myra Holguin RN

## 2019-10-08 ENCOUNTER — ANCILLARY PROCEDURE (OUTPATIENT)
Dept: ULTRASOUND IMAGING | Facility: CLINIC | Age: 79
End: 2019-10-08
Attending: PHYSICIAN ASSISTANT
Payer: COMMERCIAL

## 2019-10-08 ENCOUNTER — OFFICE VISIT (OUTPATIENT)
Dept: UROLOGY | Facility: CLINIC | Age: 79
End: 2019-10-08
Payer: COMMERCIAL

## 2019-10-08 VITALS
WEIGHT: 195 LBS | HEART RATE: 64 BPM | DIASTOLIC BLOOD PRESSURE: 78 MMHG | SYSTOLIC BLOOD PRESSURE: 129 MMHG | BODY MASS INDEX: 25.03 KG/M2 | HEIGHT: 74 IN

## 2019-10-08 DIAGNOSIS — R10.9 LEFT FLANK PAIN: ICD-10-CM

## 2019-10-08 DIAGNOSIS — R97.20 ELEVATED PROSTATE SPECIFIC ANTIGEN (PSA): ICD-10-CM

## 2019-10-08 DIAGNOSIS — N13.8 BPH WITH OBSTRUCTION/LOWER URINARY TRACT SYMPTOMS: ICD-10-CM

## 2019-10-08 DIAGNOSIS — N40.1 BPH WITH OBSTRUCTION/LOWER URINARY TRACT SYMPTOMS: ICD-10-CM

## 2019-10-08 DIAGNOSIS — R35.1 NOCTURIA: Primary | ICD-10-CM

## 2019-10-08 LAB
ALBUMIN UR-MCNC: NEGATIVE MG/DL
ANION GAP SERPL CALCULATED.3IONS-SCNC: 10 MMOL/L (ref 3–14)
APPEARANCE UR: CLEAR
BILIRUB UR QL STRIP: NEGATIVE
BUN SERPL-MCNC: 44 MG/DL (ref 7–30)
CALCIUM SERPL-MCNC: 9.7 MG/DL (ref 8.5–10.1)
CHLORIDE SERPL-SCNC: 103 MMOL/L (ref 94–109)
CO2 SERPL-SCNC: 25 MMOL/L (ref 20–32)
COLOR UR AUTO: YELLOW
CREAT SERPL-MCNC: 1.88 MG/DL (ref 0.66–1.25)
GFR SERPL CREATININE-BSD FRML MDRD: 33 ML/MIN/{1.73_M2}
GLUCOSE SERPL-MCNC: 134 MG/DL (ref 70–99)
GLUCOSE UR STRIP-MCNC: NEGATIVE MG/DL
HGB UR QL STRIP: NEGATIVE
HYALINE CASTS #/AREA URNS LPF: 7 /LPF (ref 0–2)
KETONES UR STRIP-MCNC: NEGATIVE MG/DL
LEUKOCYTE ESTERASE UR QL STRIP: NEGATIVE
MUCOUS THREADS #/AREA URNS LPF: PRESENT /LPF
NITRATE UR QL: NEGATIVE
PH UR STRIP: 5 PH (ref 5–7)
POTASSIUM SERPL-SCNC: 4.7 MMOL/L (ref 3.4–5.3)
RBC #/AREA URNS AUTO: 1 /HPF (ref 0–2)
SODIUM SERPL-SCNC: 138 MMOL/L (ref 133–144)
SOURCE: ABNORMAL
SP GR UR STRIP: 1.01 (ref 1–1.03)
SQUAMOUS #/AREA URNS AUTO: <1 /HPF (ref 0–1)
UROBILINOGEN UR STRIP-MCNC: 0 MG/DL (ref 0–2)
WBC #/AREA URNS AUTO: 1 /HPF (ref 0–5)

## 2019-10-08 RX ORDER — DUTASTERIDE 0.5 MG/1
0.5 CAPSULE, LIQUID FILLED ORAL DAILY
Qty: 60 CAPSULE | Refills: 5 | Status: SHIPPED | OUTPATIENT
Start: 2019-10-08 | End: 2019-12-20

## 2019-10-08 ASSESSMENT — PAIN SCALES - GENERAL: PAINLEVEL: NO PAIN (0)

## 2019-10-08 ASSESSMENT — MIFFLIN-ST. JEOR: SCORE: 1674.26

## 2019-10-08 NOTE — RESULT ENCOUNTER NOTE
Dear Dereje,    Here is a summary of your recent test results:  -Normal red blood cell (hgb) levels, moderately elevated white blood cell count and normal platelet levels. ADVISE: rechecking your blood counts in ~ 2-3 weeks   -Kidney function (GFR) is decreased and slight more than previosuly  -Sodium is normal.  -Potassium is normal.  -Calcium is normal.  -Glucose is mildly elevated but you were nonfasting and this is okay..    For additional lab test information, labtestsonline.org is an excellent reference.           Thank you very much for trusting me and [facility].     Healthy regards,  Marc Groves MD

## 2019-10-08 NOTE — PATIENT INSTRUCTIONS
- Urinalysis  -You are emptying your bladder fine  - Start Avodart (to shrink prostate, lessen risk of prostate cancer, help with voiding symptoms).  This will not cause dizziness.   - Renal ultrasound to evaluate left back pain  - Return in 3 months to see me to reassess urinary symptoms.   - Return in 6 months with a PSA first.  Please schedule with Marcela Diop or Weight.     [facility]

## 2019-10-08 NOTE — PROGRESS NOTES
"HPI: Mr. oTmás Cruz is a 79 year old year old male presenting today for evaluation of chief complaint(s): RECHECK (PSA check )  Mr. Mckeon is a very pleasant 78-year-old male with PMH significant for right nephrectomy (2008, path showing benign cyst), mechanical heart valve on warfarin, and he also has a pacemaker.  He has previously seen Dr. Saleem for elevated PSA (see below).  He has been unable to obtain MRI d/t pacemaker.     At the last visit with Dr. Saleem on 4/2/19 an ExoDx test was sent which has returned with an IntelliScore of 20.8 corresponding with ABOVE the cutoff of high-grade prostate cancer indicating that there is a higher risk of high grade prostate cancer.  Dr. Saleem also prescribed flomax at the last visit but the patient never tried it.  Has never had a biopsy.  Is adopted so doesn't know family history.     - Does get dizzy at baseline    Voiding symptoms:  - Nocturia x 1-2.  + urgency and occasional leak but no pads.   - Hesitancy and weak stream.  Feels he isn't emptying   - No dysuria or hematuria  - IPSS today:  23 (5,5,2,4,3,2,2) \"mixed\"    - Hospitalization in May was told he didn't empty his bladder  - Currently having pain in left flank and is worried about solitary left kidney.  Pain is worse when he is sitting watching TV  - Cr 1.6 in 9/2019 which is up from 6 months ago when it was 1.2.     REVIEW OF DIAGNOSTICS:  10/7/19 - PSA 4.49ng/dL  4/2/19 - 4.70ng/dL  7/19/18 - 5.3ng/dL  6/6/17 - 4.0ng/dL  11/16/16 - 4.8ng/dL    Current Outpatient Medications   Medication Sig Dispense Refill     amiodarone (PACERONE/CODARONE) 200 MG tablet Take 1 tablet (200 mg) by mouth daily 90 tablet 1     amoxicillin (AMOXIL) 500 MG capsule TAKE 4 CAPSULES (2,000 MG) BY MOUTH ONCE AS NEEDED PRIOR TO DENTAL APPTS 4 capsule 1     furosemide (LASIX) 20 MG tablet Take 1 tablet (20 mg) by mouth daily 90 tablet 1     metoprolol succinate ER (TOPROL-XL) 100 MG 24 hr tablet Take 1 tablet (100 mg) by " "mouth daily 90 tablet 1     multivitamin w/minerals (MULTI-VITAMIN) tablet Take 1 tablet by mouth daily       pravastatin (PRAVACHOL) 40 MG tablet Take 1 tablet (40 mg) by mouth daily 90 tablet 3     sildenafil (VIAGRA) 100 MG tablet Take 1 tablet (100 mg) by mouth daily as needed (erectile dysfunction) 30 min to 4 hrs before sex. Do not use with nitroglycerin, terazosin or doxazosin. 12 tablet 11     spironolactone (ALDACTONE) 25 MG tablet Take 1 tablet (25 mg) by mouth daily 90 tablet 1     warfarin (COUMADIN) 2 MG tablet 3 mg (1.5 tablets) on Monday and 1 tablet all other days of the week or as directed by INR nurse 100 tablet 3       ALLERGIES: Patient has no known allergies.      REVIEW OF SYSTEMS:  As above in HPI    GENERAL PHYSICAL EXAM:   Vitals: /78   Pulse 64   Ht 1.88 m (6' 2\")   Wt 88.5 kg (195 lb)   BMI 25.04 kg/m    Body mass index is 25.04 kg/m .    GENERAL: Well groomed, well developed, well nourished male in NAD.  GI: Soft, NT, ND, no palpable masses.   No CVAT bilaterally (although points to left CVAT as site of pain). No bony spinal tenderness  MS: Full ROM in extremities, gait normal, normal muscle tone  SKIN: Warm to touch, dry.  No visible rashes or lesions on examined areas.  NEURO: Alert and oriented x 3.  PSYCH: Normal mood and affect, pleasant and agreeable during interview and exam.    GARCÍA:      Normal rectal tone, small firm amount of stool in the rectal vault.      Large sized prostate, without tenderness or asymmetry. + granularity of apex left of midline.     PVR: Residual urine by ultrasound was 60 ml.      RADIOLOGY: The following tests were reviewed: None recent relevant    LABS: The last test results for Mr. Tomás Cruz were reviewed:  PSA -   Lab Results   Component Value Date    PSA 4.49 10/07/2019    PSA 4.70 04/02/2019    PSA 5.30 07/19/2018    PSA 4.00 06/06/2017    PSA 4.80 11/16/2016    PSA 5.55 10/25/2016    PSA 3.82 10/12/2015    PSA 3.23 05/28/2013    PSA " 2.43 02/03/2012    PSA 2.13 04/06/2010     BMP -   Recent Labs   Lab Test 08/15/19  1046 04/19/19  1252 04/17/19  0620  04/15/19  0857  04/14/19  1914 04/14/19  0715    140 139   < >  --    < >  --  141   POTASSIUM 5.0 4.6 3.7   < > 4.1   < >  --  4.1   CHLORIDE 106 108 105   < >  --    < >  --  112*   CO2 23 26 28   < >  --    < >  --  23   BUN 39* 36* 42*   < >  --    < >  --  30   CR 1.61* 1.52* 1.63*   < >  --    < >  --  1.22   * 87 95   < >  --    < >  --  106*   BRAXTON 9.2 9.4 8.7   < >  --    < >  --  8.5   MAG  --   --   --   --  2.0  --  2.1 2.0    < > = values in this interval not displayed.       CBC -   Recent Labs   Lab Test 10/07/19  1658 04/14/19  0715 04/13/19  1527   WBC 12.9* 8.9 10.7   HGB 17.7 15.2 16.1   * 332 412       ASSESSMENT:   1) solitary left kidney   2) elevated PSA - stable, but ExoDX shows above average risk for prostate cancer  3) LUTS - worsening    PLAN:   - UA sent  - PVR 60cc  - Doesnt want to start Flomax.  There are cardiologic concerns. Has dizziness at baseline.   - RX: Avodart - wont cause dizziness.  Shouldn't interact with cardiac meds but patient will discuss with cardiologist first.  Discussed it will take 3-6 months to notice a urologic benefit  - Renal ultrasound.   - Return in 3 months to see me to reassess urinary symptoms.   - Return in 6 months with a PSA first.  Please schedule with Marcela Diop or Weight.    Cat Zarco PA-C  Department of Urologic Surgery

## 2019-10-08 NOTE — Clinical Note
"10/8/2019       RE: Tomás Cruz  [address] [address]     Dear Colleague,    Thank you for referring your patient, Tomás Cruz, to the [facility] UROLOGY AND INST FOR PROSTATE AND UROLOGIC CANCERS at [facility]. Please see a copy of my visit note below.    HPI: Mr. Tomás Cruz is a 79 year old year old male presenting today for evaluation of chief complaint(s): RECHECK (PSA check )  Mr. Mckeon is a very pleasant 78-year-old male with PMH significant for right nephrectomy (2008, path showing benign cyst), mechanical heart valve on warfarin, and he also has a pacemaker.  He has previously seen Dr. Saleem for elevated PSA (see below).  He has been unable to obtain MRI d/t pacemaker.     At the last visit with Dr. Saleem on 4/2/19 an ExoDx test was sent which has returned with an IntelliScore of 20.8 corresponding with ABOVE the cutoff of high-grade prostate cancer indicating that there is a higher risk of high grade prostate cancer.  Dr. Saleem also prescribed flomax at the last visit but the patient never tried it.  Has never had a biopsy.  Is adopted so doesn't know family history.     - Does get dizzy at baseline    Voiding symptoms:  - Nocturia x 1-2.  + urgency and occasional leak but no pads.   - Hesitancy and weak stream.  Feels he isn't emptying   - No dysuria or hematuria  - IPSS today:  23 (5,5,2,4,3,2,2) \"mixed\"    - Hospitalization in May was told he didn't empty his bladder  - Currently having pain in left flank and is worried about solitary left kidney.  Pain is worse when he is sitting watching TV  - Cr 1.6 in 9/2019 which is up from 6 months ago when it was 1.2.     REVIEW OF DIAGNOSTICS:  10/7/19 - PSA 4.49ng/dL  4/2/19 - 4.70ng/dL  7/19/18 - 5.3ng/dL  6/6/17 - 4.0ng/dL  11/16/16 - 4.8ng/dL    Current Outpatient Medications   Medication Sig Dispense Refill     amiodarone (PACERONE/CODARONE) 200 MG tablet Take 1 tablet (200 mg) " "by mouth daily 90 tablet 1     amoxicillin (AMOXIL) 500 MG capsule TAKE 4 CAPSULES (2,000 MG) BY MOUTH ONCE AS NEEDED PRIOR TO DENTAL APPTS 4 capsule 1     furosemide (LASIX) 20 MG tablet Take 1 tablet (20 mg) by mouth daily 90 tablet 1     metoprolol succinate ER (TOPROL-XL) 100 MG 24 hr tablet Take 1 tablet (100 mg) by mouth daily 90 tablet 1     multivitamin w/minerals (MULTI-VITAMIN) tablet Take 1 tablet by mouth daily       pravastatin (PRAVACHOL) 40 MG tablet Take 1 tablet (40 mg) by mouth daily 90 tablet 3     sildenafil (VIAGRA) 100 MG tablet Take 1 tablet (100 mg) by mouth daily as needed (erectile dysfunction) 30 min to 4 hrs before sex. Do not use with nitroglycerin, terazosin or doxazosin. 12 tablet 11     spironolactone (ALDACTONE) 25 MG tablet Take 1 tablet (25 mg) by mouth daily 90 tablet 1     warfarin (COUMADIN) 2 MG tablet 3 mg (1.5 tablets) on Monday and 1 tablet all other days of the week or as directed by INR nurse 100 tablet 3       ALLERGIES: Patient has no known allergies.      REVIEW OF SYSTEMS:  As above in HPI    GENERAL PHYSICAL EXAM:   Vitals: /78   Pulse 64   Ht 1.88 m (6' 2\")   Wt 88.5 kg (195 lb)   BMI 25.04 kg/m     Body mass index is 25.04 kg/m .    GENERAL: Well groomed, well developed, well nourished male in NAD.  GI: Soft, NT, ND, no palpable masses.   No CVAT bilaterally (although points to left CVAT as site of pain). No bony spinal tenderness  MS: Full ROM in extremities, gait normal, normal muscle tone  SKIN: Warm to touch, dry.  No visible rashes or lesions on examined areas.  NEURO: Alert and oriented x 3.  PSYCH: Normal mood and affect, pleasant and agreeable during interview and exam.    GARCÍA:      Normal rectal tone, small firm amount of stool in the rectal vault.      Large sized prostate, without tenderness or asymmetry. + granularity of apex left of midline.     PVR: Residual urine by ultrasound was 60 ml.      RADIOLOGY: The following tests were reviewed: " None recent relevant    LABS: The last test results for Mr. Tomás Cruz were reviewed:  PSA -   Lab Results   Component Value Date    PSA 4.49 10/07/2019    PSA 4.70 04/02/2019    PSA 5.30 07/19/2018    PSA 4.00 06/06/2017    PSA 4.80 11/16/2016    PSA 5.55 10/25/2016    PSA 3.82 10/12/2015    PSA 3.23 05/28/2013    PSA 2.43 02/03/2012    PSA 2.13 04/06/2010     BMP -   Recent Labs   Lab Test 08/15/19  1046 04/19/19  1252 04/17/19  0620  04/15/19  0857  04/14/19  1914 04/14/19  0715    140 139   < >  --    < >  --  141   POTASSIUM 5.0 4.6 3.7   < > 4.1   < >  --  4.1   CHLORIDE 106 108 105   < >  --    < >  --  112*   CO2 23 26 28   < >  --    < >  --  23   BUN 39* 36* 42*   < >  --    < >  --  30   CR 1.61* 1.52* 1.63*   < >  --    < >  --  1.22   * 87 95   < >  --    < >  --  106*   BRAXTON 9.2 9.4 8.7   < >  --    < >  --  8.5   MAG  --   --   --   --  2.0  --  2.1 2.0    < > = values in this interval not displayed.       CBC -   Recent Labs   Lab Test 10/07/19  1658 04/14/19  0715 04/13/19  1527   WBC 12.9* 8.9 10.7   HGB 17.7 15.2 16.1   * 332 412       ASSESSMENT:   1) solitary left kidney   2) elevated PSA - stable, but ExoDX shows above average risk for prostate cancer  3) LUTS - worsening    PLAN:   - UA sent  - PVR 60cc  - Doesnt want to start Flomax.  There are cardiologic concerns. Has dizziness at baseline.   - RX: Avodart - wont cause dizziness.  Shouldn't interact with cardiac meds but patient will discuss with cardiologist first.  Discussed it will take 3-6 months to notice a urologic benefit  - Renal ultrasound.   - Return in 3 months to see me to reassess urinary symptoms.   - Return in 6 months with a PSA first.  Please schedule with Marcela Diop or Weight.    Cat Zarco PA-C  Department of Urologic Surgery      Again, thank you for allowing me to participate in the care of your patient.      Sincerely,    JOSE Perdomo

## 2019-10-10 ENCOUNTER — TRANSFERRED RECORDS (OUTPATIENT)
Dept: HEALTH INFORMATION MANAGEMENT | Facility: CLINIC | Age: 79
End: 2019-10-10

## 2019-10-10 ENCOUNTER — TELEPHONE (OUTPATIENT)
Dept: FAMILY MEDICINE | Facility: CLINIC | Age: 79
End: 2019-10-10

## 2019-10-10 NOTE — TELEPHONE ENCOUNTER
Received claim form for ticket refund as pt missed his flight due to illness. Needs to be filled out and faxed to [phone] make copy and put original at  for pt to . Rehana Pfeiffer CMA

## 2019-10-11 ENCOUNTER — TRANSFERRED RECORDS (OUTPATIENT)
Dept: HEALTH INFORMATION MANAGEMENT | Facility: CLINIC | Age: 79
End: 2019-10-11

## 2019-10-14 ENCOUNTER — HOSPITAL ENCOUNTER (OUTPATIENT)
Dept: CT IMAGING | Facility: CLINIC | Age: 79
Discharge: HOME | End: 2019-10-14
Attending: PSYCHIATRY & NEUROLOGY | Admitting: PSYCHIATRY & NEUROLOGY
Payer: COMMERCIAL

## 2019-10-14 ENCOUNTER — ANTICOAGULATION THERAPY VISIT (OUTPATIENT)
Dept: NURSING | Facility: CLINIC | Age: 79
End: 2019-10-14
Payer: COMMERCIAL

## 2019-10-14 DIAGNOSIS — Z95.2 S/P MITRAL VALVE REPLACEMENT: ICD-10-CM

## 2019-10-14 DIAGNOSIS — M54.2 NECK PAIN: ICD-10-CM

## 2019-10-14 DIAGNOSIS — R20.2 NUMBNESS AND TINGLING: ICD-10-CM

## 2019-10-14 DIAGNOSIS — R20.0 NUMBNESS AND TINGLING: ICD-10-CM

## 2019-10-14 DIAGNOSIS — Z79.01 LONG TERM CURRENT USE OF ANTICOAGULANT THERAPY: ICD-10-CM

## 2019-10-14 LAB — INR POINT OF CARE: 2.7 (ref 0.86–1.14)

## 2019-10-14 PROCEDURE — 85610 PROTHROMBIN TIME: CPT | Mod: QW

## 2019-10-14 PROCEDURE — 72125 CT NECK SPINE W/O DYE: CPT

## 2019-10-14 PROCEDURE — 36416 COLLJ CAPILLARY BLOOD SPEC: CPT

## 2019-10-14 NOTE — PROGRESS NOTES
ANTICOAGULATION FOLLOW-UP CLINIC VISIT    Patient Name:  Tomás Cruz  Date:  10/14/2019  Contact Type:  Face to Face    SUBJECTIVE:  Patient Findings     Comments:   The patient was assessed for diet, medication, and activity level changes, missed or extra doses, bruising or bleeding, with no problem findings.          Clinical Outcomes     Negatives:   Major bleeding event, Thromboembolic event, Anticoagulation-related hospital admission, Anticoagulation-related ED visit, Anticoagulation-related fatality           OBJECTIVE    INR Protime   Date Value Ref Range Status   10/14/2019 2.7 (A) 0.86 - 1.14 Final       ASSESSMENT / PLAN  No question data found.  Anticoagulation Summary  As of 10/14/2019    INR goal:   2.5-3.5   TTR:   78.8 % (3.6 y)   INR used for dosin.7 (10/14/2019)   Warfarin maintenance plan:   2 mg (2 mg x 1) every Mon, Fri; 1 mg (2 mg x 0.5) all other days   Full warfarin instructions:   2 mg every Mon, Fri; 1 mg all other days   Weekly warfarin total:   9 mg   No change documented:   Myra Holguin RN   Plan last modified:   Myra Holguin RN (2019)   Next INR check:   10/28/2019   Target end date:       Indications    Long-term (current) use of anticoagulants [Z79.01] [Z79.01]  S/P mitral valve replacement [Z95.2]             Anticoagulation Episode Summary     INR check location:   Anticoagulation Clinic    Preferred lab:       Send INR reminders to:   LAURA NURSE    Comments:         Anticoagulation Care Providers     Provider Role Specialty Phone number    Mahamed Groves MD [facility] [phone]            See the Encounter Report to view Anticoagulation Flowsheet and Dosing Calendar (Go to Encounters tab in chart review, and find the Anticoagulation Therapy Visit)    Dosage adjustment made based on physician directed care plan.    Myra Holguin RN

## 2019-10-14 NOTE — TELEPHONE ENCOUNTER
Forms completed and faxed. Made a copy please notify pt to  original at  [phone] Rehana Pfeiffer CMA      Put in [facility] to call pt. Rehana Pfeiffer CMA

## 2019-10-19 DIAGNOSIS — Z95.2 S/P MITRAL VALVE REPLACEMENT: ICD-10-CM

## 2019-10-19 NOTE — TELEPHONE ENCOUNTER
"Requested Prescriptions   Pending Prescriptions Disp Refills     warfarin ANTICOAGULANT (COUMADIN) 2 MG tablet [Pharmacy Med Name: WARFARIN SODIUM 2 MG TABLET] 100 tablet 0     Sig: TAKE 1 AND 1/2 TABLETS (3 MG) ON MONDAY & 1 TABLET BY MOUTH ON ALL OTHER DAYS OF THE WK PER INR RN       Last Written Prescription Date:  8/15/2019  Last Fill Quantity: 100,  # refills: 3   Last office visit: 10/7/2019 with prescribing provider:     Future Office Visit:          Vitamin K Antagonists Failed - 10/19/2019  9:06 AM        Failed - INR is within goal in the past 6 weeks     Confirm INR is within goal in the past 6 weeks.     Recent Labs   Lab Test 10/14/19   INR 2.7*                       Failed - Medication is active on med list        Passed - Recent (12 mo) or future (30 days) visit within the authorizing provider's specialty     Patient has had an office visit with the authorizing provider or a provider within the authorizing providers department within the previous 12 mos or has a future within next 30 days. See \"Patient Info\" tab in inbasket, or \"Choose Columns\" in Meds & Orders section of the refill encounter.              Passed - Patient is 18 years of age or older        "

## 2019-10-21 RX ORDER — WARFARIN SODIUM 2 MG/1
TABLET ORAL
Qty: 100 TABLET | Refills: 0 | Status: SHIPPED | OUTPATIENT
Start: 2019-10-21 | End: 2020-02-25

## 2019-10-28 ENCOUNTER — ANTICOAGULATION THERAPY VISIT (OUTPATIENT)
Dept: NURSING | Facility: CLINIC | Age: 79
End: 2019-10-28
Payer: COMMERCIAL

## 2019-10-28 DIAGNOSIS — Z95.2 S/P MITRAL VALVE REPLACEMENT: ICD-10-CM

## 2019-10-28 DIAGNOSIS — Z79.01 LONG TERM CURRENT USE OF ANTICOAGULANT THERAPY: ICD-10-CM

## 2019-10-28 LAB — INR POINT OF CARE: 1.9 (ref 0.86–1.14)

## 2019-10-28 PROCEDURE — 85610 PROTHROMBIN TIME: CPT | Mod: QW

## 2019-10-28 PROCEDURE — 36416 COLLJ CAPILLARY BLOOD SPEC: CPT

## 2019-10-28 NOTE — PROGRESS NOTES
ANTICOAGULATION FOLLOW-UP CLINIC VISIT    Patient Name:  Tomás Cruz  Date:  10/28/2019  Contact Type:  Face to Face    SUBJECTIVE:  Patient Findings     Positives:   Change in diet/appetite (Increased Vitamin K intake)        Clinical Outcomes     Negatives:   Major bleeding event, Thromboembolic event, Anticoagulation-related hospital admission, Anticoagulation-related ED visit, Anticoagulation-related fatality           OBJECTIVE    INR Protime   Date Value Ref Range Status   10/28/2019 1.9 (A) 0.86 - 1.14 Final       ASSESSMENT / PLAN  INR assessment SUB    Recheck INR In: 2 WEEKS    INR Location Clinic      Anticoagulation Summary  As of 10/28/2019    INR goal:   2.5-3.5   TTR:   78.2 % (3.6 y)   INR used for dosin.9! (10/28/2019)   Warfarin maintenance plan:   2 mg (2 mg x 1) every Mon, Fri; 1 mg (2 mg x 0.5) all other days   Full warfarin instructions:   10/28: 4 mg; Otherwise 2 mg every Mon, Fri; 1 mg all other days   Weekly warfarin total:   9 mg   Plan last modified:   Myra Holguin RN (2019)   Next INR check:   2019   Target end date:       Indications    Long-term (current) use of anticoagulants [Z79.01] [Z79.01]  S/P mitral valve replacement [Z95.2]             Anticoagulation Episode Summary     INR check location:   Anticoagulation Clinic    Preferred lab:       Send INR reminders to:    NURSE    Comments:         Anticoagulation Care Providers     Provider Role Specialty Phone number    Mahamed Groves MD [facility] [phone]            See the Encounter Report to view Anticoagulation Flowsheet and Dosing Calendar (Go to Encounters tab in chart review, and find the Anticoagulation Therapy Visit)    Dosage adjustment made based on physician directed care plan.    Rose Marie Madden RN

## 2019-10-30 ENCOUNTER — PATIENT OUTREACH (OUTPATIENT)
Dept: CARE COORDINATION | Facility: CLINIC | Age: 79
End: 2019-10-30

## 2019-10-30 NOTE — PROGRESS NOTES
Community Health Worker called the patient for Clinic Care Coordination outreach follow up on goals and action steps.  Spoke to Dereje    Discussed the following  Better manage CHF and remain out of hospital    Continues to monitor blood pressures and weight every morning    Somewhat cutting salt intake    Patient reports  He's still continuing to get random episodes of dizziness.  Per chart review, he came in to see Dr. Groves and started getting dizzy.  Dereje states that he went to see a neurologist regarding his dizziness.  Dereje states that Neurologist did ECT on him and waiting on results.  Has been seeing a therapist for balance.    ______________________  Next Outreach: 11/26/19  Planned Outreach Frequency: Monthly  Preferred Phone Number: [phone]    Last Assessment Date: 04/18/19  Care Plan Completion Date: 04/18/19  ______________________  Goals       1. Improve chronic symptoms (pt-stated)      Goal Statement: I would like to better manage my CHF and remain out of the hospital.   Measure of Success: Patient will use Heart Failure action plan to monitor symptoms; symptoms will remain in green range. Patient will weigh himself daily and record weights. Patient will follow a low salt diet.   Supportive Steps to Achieve: Continued CCRN support. Patient will take medications as prescribed. Follow up with providers as recommended. Patient will call RN[facility] with questions, concerns, support needs. RN[facility] will be available as needed. RN[facility] to mail patient CHF action plan, weight log, and education in addition to introduction letter, complex care plan and medication list.   Barriers: Likes the taste of salt.   Strengths: Engaged in care coordination, feels he is in pretty good shape, motivated to get into an exercise regimen.   Date to Achieve By: 09/2019  Patient expressed understanding of goal: Yes    As of today's date 10/30/2019 goal is met at 26 - 50%.   Goal Status:  Ongoing  Continues to monitor blood pressure  and weight daily.  Somewhat cutting back on salt intake.    As of today's date 9/24/2019 goal is met at 26 - 50%.   Goal Status:  Ongoing  Continues to monitor blood pressure and weight daily.  Has been trying to cut back on salt intake.

## 2019-10-31 DIAGNOSIS — Z95.2 S/P MITRAL VALVE REPLACEMENT: ICD-10-CM

## 2019-11-01 RX ORDER — AMOXICILLIN 500 MG/1
2000 CAPSULE ORAL
Qty: 4 CAPSULE | Refills: 1 | Status: SHIPPED | OUTPATIENT
Start: 2019-11-01 | End: 2019-12-20

## 2019-11-01 NOTE — TELEPHONE ENCOUNTER
Routing refill request to provider for review/approval because:  Drug not on the [facility] refill protocol     Myra Holguin RN, BSN  [facility]

## 2019-11-01 NOTE — TELEPHONE ENCOUNTER
"Requested Prescriptions   Pending Prescriptions Disp Refills     amoxicillin (AMOXIL) 500 MG capsule [Pharmacy Med Name: AMOXICILLIN 500 MG CAPSULE]        Last Written Prescription Date:  3.4.19  Last Fill Quantity: 4 capsule,  # refills: 1   Last office visit: 10/7/2019 with prescribing provider:  Mahamed Groves MD           Future Office Visit:       4 capsule 1     Sig: TAKE 4 CAPSULES (2,000 MG) BY MOUTH ONCE AS NEEDED PRIOR TO DENTAL APPTS       Oral Acne/Rosacea Medications Protocol Failed - 10/31/2019  7:34 PM        Failed - Confirmation of diagnosis is required     Please confirm diagnosis is acne or rosacea.     If NOT acne or rosacea; refer request to provider for further evaluation.    If diagnosis IS acne or rosacea, OK to refill BASED ON PREVIOUS REFILL CLINICAL NOTE RECOMMENDATION.          Passed - Patient is 12 years of age or older        Passed - Recent (12 mo) or future (30 days) visit within the authorizing provider's specialty     Patient has had an office visit with the authorizing provider or a provider within the authorizing providers department within the previous 12 mos or has a future within next 30 days. See \"Patient Info\" tab in inbasket, or \"Choose Columns\" in Meds & Orders section of the refill encounter.              Passed - Medication is active on med list        "

## 2019-11-11 ENCOUNTER — ANTICOAGULATION THERAPY VISIT (OUTPATIENT)
Dept: NURSING | Facility: CLINIC | Age: 79
End: 2019-11-11
Payer: COMMERCIAL

## 2019-11-11 DIAGNOSIS — Z95.2 S/P MITRAL VALVE REPLACEMENT: ICD-10-CM

## 2019-11-11 DIAGNOSIS — Z79.01 LONG TERM CURRENT USE OF ANTICOAGULANT THERAPY: ICD-10-CM

## 2019-11-11 LAB — INR POINT OF CARE: 2 (ref 0.86–1.14)

## 2019-11-11 PROCEDURE — 36416 COLLJ CAPILLARY BLOOD SPEC: CPT

## 2019-11-11 PROCEDURE — 85610 PROTHROMBIN TIME: CPT | Mod: QW

## 2019-11-11 NOTE — PROGRESS NOTES
ANTICOAGULATION FOLLOW-UP CLINIC VISIT    Patient Name:  Tomás Cruz  Date:  2019  Contact Type:  Face to Face    SUBJECTIVE:  Patient Findings     Positives:   Missed doses             OBJECTIVE    INR Protime   Date Value Ref Range Status   2019 2.0 (A) 0.86 - 1.14 Final       ASSESSMENT / PLAN  No question data found.  Anticoagulation Summary  As of 2019    INR goal:   2.5-3.5   TTR:   77.4 % (3.7 y)   INR used for dosin.0! (2019)   Warfarin maintenance plan:   2 mg (2 mg x 1) every Mon, Fri; 1 mg (2 mg x 0.5) all other days   Full warfarin instructions:   : 4 mg; : 2 mg; Otherwise 2 mg every Mon, Fri; 1 mg all other days   Weekly warfarin total:   9 mg   Plan last modified:   Myra Holguin RN (2019)   Next INR check:   2019   Target end date:       Indications    Long-term (current) use of anticoagulants [Z79.01] [Z79.01]  S/P mitral valve replacement [Z95.2]             Anticoagulation Episode Summary     INR check location:   Anticoagulation Clinic    Preferred lab:       Send INR reminders to:    NURSE    Comments:         Anticoagulation Care Providers     Provider Role Specialty Phone number    Mahamed Groves MD [facility] [phone]            See the Encounter Report to view Anticoagulation Flowsheet and Dosing Calendar (Go to Encounters tab in chart review, and find the Anticoagulation Therapy Visit)    Dosage adjustment made based on physician directed care plan.    Myra Holguin RN

## 2019-11-13 DIAGNOSIS — E78.5 HYPERLIPIDEMIA LDL GOAL <100: ICD-10-CM

## 2019-11-13 RX ORDER — PRAVASTATIN SODIUM 40 MG
TABLET ORAL
Qty: 30 TABLET | Refills: 0 | Status: SHIPPED | OUTPATIENT
Start: 2019-11-13 | End: 2019-12-20

## 2019-11-13 NOTE — TELEPHONE ENCOUNTER
"Requested Prescriptions   Pending Prescriptions Disp Refills     pravastatin (PRAVACHOL) 40 MG tablet [Pharmacy Med Name: PRAVASTATIN SODIUM 40 MG TAB]        Last Written Prescription Date:  11.15.18  Last Fill Quantity: 90 tablet,  # refills: 3   Last office visit: 10/7/2019 with prescribing provider:  Mahamed Groves MD             Future Office Visit:       90 tablet 3     Sig: TAKE 1 TABLET BY MOUTH EVERY DAY       Statins Protocol Passed - 11/13/2019  2:00 AM        Passed - LDL on file in past 12 months     Recent Labs   Lab Test 11/15/18  0947   LDL 71             Passed - No abnormal creatine kinase in past 12 months     No lab results found.             Passed - Recent (12 mo) or future (30 days) visit within the authorizing provider's specialty     Patient has had an office visit with the authorizing provider or a provider within the authorizing providers department within the previous 12 mos or has a future within next 30 days. See \"Patient Info\" tab in inbasket, or \"Choose Columns\" in Meds & Orders section of the refill encounter.              Passed - Medication is active on med list        Passed - Patient is age 18 or older        "

## 2019-11-13 NOTE — TELEPHONE ENCOUNTER
Due for an Office visit for further refills, only fill for 30 days     Myra Holguin RN, BSN  [facility]

## 2019-11-18 ENCOUNTER — ANTICOAGULATION THERAPY VISIT (OUTPATIENT)
Dept: NURSING | Facility: CLINIC | Age: 79
End: 2019-11-18
Payer: COMMERCIAL

## 2019-11-18 DIAGNOSIS — Z95.2 S/P MITRAL VALVE REPLACEMENT: ICD-10-CM

## 2019-11-18 DIAGNOSIS — Z79.01 LONG TERM CURRENT USE OF ANTICOAGULANT THERAPY: ICD-10-CM

## 2019-11-18 LAB — INR POINT OF CARE: 2.1 (ref 0.86–1.14)

## 2019-11-18 PROCEDURE — 85610 PROTHROMBIN TIME: CPT | Mod: QW

## 2019-11-18 PROCEDURE — 36416 COLLJ CAPILLARY BLOOD SPEC: CPT

## 2019-11-18 NOTE — PROGRESS NOTES
ANTICOAGULATION FOLLOW-UP CLINIC VISIT    Patient Name:  Tomás Cruz  Date:  2019  Contact Type:  Face to Face    SUBJECTIVE:  Patient Findings     Positives:   Missed doses    Comments:   ON FRIDAY        Clinical Outcomes     Comments:   ON FRIDAY           OBJECTIVE    INR Protime   Date Value Ref Range Status   2019 2.1 (A) 0.86 - 1.14 Final       ASSESSMENT / PLAN  No question data found.  Anticoagulation Summary  As of 2019    INR goal:   2.5-3.5   TTR:   77.0 % (3.7 y)   INR used for dosin.1! (2019)   Warfarin maintenance plan:   2 mg (2 mg x 1) every Mon, Fri; 1 mg (2 mg x 0.5) all other days   Full warfarin instructions:   : 2 mg; : 2 mg; : 2 mg; Otherwise 2 mg every Mon, Fri; 1 mg all other days   Weekly warfarin total:   9 mg   Plan last modified:   Myra Holguin RN (2019)   Next INR check:   2019   Target end date:       Indications    Long-term (current) use of anticoagulants [Z79.01] [Z79.01]  S/P mitral valve replacement [Z95.2]             Anticoagulation Episode Summary     INR check location:   Anticoagulation Clinic    Preferred lab:       Send INR reminders to:   [facility]    Comments:         Anticoagulation Care Providers     Provider Role Specialty Phone number    Mahamed Groves MD [facility] [phone]            See the Encounter Report to view Anticoagulation Flowsheet and Dosing Calendar (Go to Encounters tab in chart review, and find the Anticoagulation Therapy Visit)    Dosage adjustment made based on physician directed care plan.    Myra Holguin RN

## 2019-11-26 ENCOUNTER — PATIENT OUTREACH (OUTPATIENT)
Dept: CARE COORDINATION | Facility: CLINIC | Age: 79
End: 2019-11-26

## 2019-11-26 ENCOUNTER — ANTICOAGULATION THERAPY VISIT (OUTPATIENT)
Dept: NURSING | Facility: CLINIC | Age: 79
End: 2019-11-26
Payer: COMMERCIAL

## 2019-11-26 DIAGNOSIS — Z95.2 S/P MITRAL VALVE REPLACEMENT: ICD-10-CM

## 2019-11-26 DIAGNOSIS — Z79.01 LONG TERM CURRENT USE OF ANTICOAGULANT THERAPY: ICD-10-CM

## 2019-11-26 LAB — INR POINT OF CARE: 2.4 (ref 0.86–1.14)

## 2019-11-26 PROCEDURE — 85610 PROTHROMBIN TIME: CPT | Mod: QW

## 2019-11-26 PROCEDURE — 36416 COLLJ CAPILLARY BLOOD SPEC: CPT

## 2019-11-26 NOTE — TELEPHONE ENCOUNTER
Community Health Worker called the patient for Clinic Care Coordination outreach follow up on goals and action steps.  Spoke to Dereje    Discussed the following  Better manage CHF and remain out of hospital    Still continuing to monitor blood pressures and weight every morning    Struggles to cut salt intake, however has been watching what he eats.    Patient reports  He received a [employer] message that he was due for his annual physical and shingles shot.  Assisted patient with scheduling annual follow up with Dr Groves on 12/20/19.    Tried to bring up to patient if he would still like us to follow up on this goal.  Patient wasn't sure.  I asked if he would like to follow up with MITCHELL Woodruff RN, to see if there was any other Care Coordination needs.  However, patient didn't seem to sure.    Plan  I will message MITCHELL Woodruff RN, to assess CC needs.    ______________________  Next Outreach: 12/26/19  Planned Outreach Frequency: Monthly  Preferred Phone Number: [phone]    Last Assessment Date: 04/18/19  Care Plan Completion Date: 04/18/19  ______________________  Goals       1. Improve chronic symptoms (pt-stated)      Goal Statement: I would like to better manage my CHF and remain out of the hospital.   Measure of Success: Patient will use Heart Failure action plan to monitor symptoms; symptoms will remain in green range. Patient will weigh himself daily and record weights. Patient will follow a low salt diet.   Supportive Steps to Achieve: Continued CCRN support. Patient will take medications as prescribed. Follow up with providers as recommended. Patient will call RNCC with questions, concerns, support needs. RNCC will be available as needed. RNCC to mail patient CHF action plan, weight log, and education in addition to introduction letter, complex care plan and medication list.   Barriers: Likes the taste of salt.   Strengths: Engaged in care coordination, feels he is in pretty good shape, motivated to  get into an exercise regimen.   Date to Achieve By: 09/2019  Patient expressed understanding of goal: Yes    As of today's date 11/26/2019 goal is met at 26 - 50%.   Goal Status:  Ongoing    As of today's date 10/30/2019 goal is met at 26 - 50%.   Goal Status:  Ongoing  Continues to monitor blood pressure and weight daily.  Somewhat cutting back on salt intake.    As of today's date 9/24/2019 goal is met at 26 - 50%.   Goal Status:  Ongoing  Continues to monitor blood pressure and weight daily.  Has been trying to cut back on salt intake.

## 2019-11-26 NOTE — PROGRESS NOTES
ANTICOAGULATION FOLLOW-UP CLINIC VISIT    Patient Name:  Tomás Cruz  Date:  2019  Contact Type:  Face to Face    SUBJECTIVE:  Patient Findings     Positives:   Missed doses (Missed 2019 dose but double up on 2019)        Clinical Outcomes     Negatives:   Major bleeding event, Thromboembolic event, Anticoagulation-related hospital admission, Anticoagulation-related ED visit, Anticoagulation-related fatality           OBJECTIVE    INR Protime   Date Value Ref Range Status   2019 2.4 (A) 0.86 - 1.14 Final       ASSESSMENT / PLAN  INR assessment SUB    Recheck INR In: 2 WEEKS    INR Location Clinic      Anticoagulation Summary  As of 2019    INR goal:   2.5-3.5   TTR:   33.0 % (1 y)   INR used for dosin.4! (2019)   Warfarin maintenance plan:   2 mg (2 mg x 1) every Mon, Fri; 1 mg (2 mg x 0.5) all other days   Full warfarin instructions:   : 2 mg; Otherwise 2 mg every Mon, Fri; 1 mg all other days   Weekly warfarin total:   9 mg   Plan last modified:   Myra Holguin RN (2019)   Next INR check:   12/10/2019   Priority:   Maintenance   Target end date:       Indications    Long-term (current) use of anticoagulants [Z79.01] [Z79.01]  S/P mitral valve replacement [Z95.2]             Anticoagulation Episode Summary     INR check location:   Anticoagulation Clinic    Preferred lab:       Send INR reminders to:   [facility]    Comments:         Anticoagulation Care Providers     Provider Role Specialty Phone number    Mahamed Groves MD [facility] [phone]            See the Encounter Report to view Anticoagulation Flowsheet and Dosing Calendar (Go to Encounters tab in chart review, and find the Anticoagulation Therapy Visit)    Dosage adjustment made based on physician directed care plan.    Rose Marie Madden RN

## 2019-11-27 DIAGNOSIS — E78.5 HYPERLIPIDEMIA LDL GOAL <100: ICD-10-CM

## 2019-11-27 RX ORDER — PRAVASTATIN SODIUM 40 MG
TABLET ORAL
Qty: 30 TABLET | Refills: 0 | Status: SHIPPED | OUTPATIENT
Start: 2019-11-27 | End: 2019-12-20

## 2019-11-27 NOTE — TELEPHONE ENCOUNTER
Next 5 appointments (look out 90 days)    Dec 20, 2019 10:40 AM CST  [facility] [address] [place] [id number]  [phone]   Feb 17, 2020 12:00 PM CST  Return Visit with Tash Nelson MD  [facility]  [id number] [place] [address]  [address] [address]  [phone]        Medication is being filled for 1 time refill only due to:  Patient needs to be seen because due for fasting physical.     Laura Wolfe RN  [facility]

## 2019-11-27 NOTE — TELEPHONE ENCOUNTER
"Requested Prescriptions   Pending Prescriptions Disp Refills     pravastatin (PRAVACHOL) 40 MG tablet [Pharmacy Med Name: PRAVASTATIN SODIUM 40 MG TAB]        Last Written Prescription Date:  11.13.19  Last Fill Quantity: 30 tablet,  # refills: 0   Last office visit: 10/7/2019 with prescribing provider:  Mahamed Groves MD           Future Office Visit:   Next 5 appointments (look out 90 days)    Dec 20, 2019 10:40 AM CST  [facility] [address] [place] [address]  [phone]   Feb 17, 2020 12:00 PM CST  Return Visit with Tash Nelson MD  [facility] ([facility]  [address]  [facility] [address]  [facility] [address]  [phone]            90 tablet 3     Sig: TAKE 1 TABLET BY MOUTH EVERY DAY       Statins Protocol Failed - 11/27/2019  4:06 PM        Failed - LDL on file in past 12 months     Recent Labs   Lab Test 11/15/18  0947   LDL 71             Passed - No abnormal creatine kinase in past 12 months     No lab results found.             Passed - Recent (12 mo) or future (30 days) visit within the authorizing provider's specialty     Patient has had an office visit with the authorizing provider or a provider within the authorizing providers department within the previous 12 mos or has a future within next 30 days. See \"Patient Info\" tab in inbasket, or \"Choose Columns\" in Meds & Orders section of the refill encounter.              Passed - Medication is active on med list        Passed - Patient is age 18 or older        "

## 2019-12-05 ENCOUNTER — PATIENT OUTREACH (OUTPATIENT)
Dept: CARE COORDINATION | Facility: CLINIC | Age: 79
End: 2019-12-05

## 2019-12-05 NOTE — PROGRESS NOTES
Clinic Care Coordination Contact  [facility]/Voicemail       Clinical Data: Care Coordinator Outreach  Chart reviewed.  Goal has been reached.  No ED/IP admissions in over 6 months.  Patient is following up with providers as recommended.    Outreach attempted x 1.  Left message on patient's voicemail with call back information and requested return call.  Plan: patient will be moved to maintenance status.  Care Coordinator will try to reach patient again in 2 months.    Faye Akbar RN  Care Coordination  Phone:  [phone]  Email: [email or web address]  [facility] [place] [facility]

## 2019-12-06 ENCOUNTER — PATIENT OUTREACH (OUTPATIENT)
Dept: CARE COORDINATION | Facility: CLINIC | Age: 79
End: 2019-12-06

## 2019-12-06 NOTE — LETTER
December 6, 2019      Tomás Cruz  [address] [facility] [address] [facility] [address]      My Clinic Care Coordination Wellness Plan  This Maintenance Wellness Plan provides private information in regard to the work I have done with my Care Team at my Primary Care Clinic.  This document provides insight on the goals I have worked hard to achieve.  My Care Team congratulates me on my journey to become well.  With the assistance of the Clinic Care Coordination Team and my Primary Care Provider, I have succeeded in improving areas of my health that I identified as barriers to becoming well.  I will continue to seek wellness and use the skills I have obtained to further my journey.  My Community Health Worker will follow up with me in 2 months.  In the meantime, if I should have any questions or concerns I will contact my Community Health Worker.    [address] [address]    My Preferred Method of Contact:  Phone [phone]    My Primary/Preferred Language:  English    Emergency Contact: Extended Emergency Contact Information  Primary Emergency Contact: Ava Cruz  Address: [address] [place] [address] [facility]  Home Phone: [phone]  Mobile Phone: [phone]  Relation: Spouse  Secondary Emergency Contact: Leticia Hines  Address: [address] [place] [address] [facility] Phone: [phone]  Relation: Daughter      PCP:  Mahamed Groves    Specialists:    Patient Care Team       Relationship Specialty Notifications Start End    Mahamed Groves MD PCP - General   2/3/05     Phone: [phone] Fax: [phone]         [address] [place] [address]    Mahamed Groves MD Assigned PCP   11/16/14     Phone: [phone] Fax: [phone]         [address] [place] [address]    Sammie Saleem MD MD Cardiology  9/4/18     Phone: [phone] Fax: [phone]          [address] [facility] [address] [facility] [address]    Мария Saleem MD MD Urology  9/5/18     Phone: [phone] Fax: [phone]         [address] [facility] [address] [facility] [address]    Lindsey Benítez, RN Registered Nurse Oncology  9/5/18     Phone: [phone] Pager: [phone]        Faye Akbar, RN Lead Care Coordinator Primary Care - CC  7/23/19     Phone: [phone]         Bert Astudillo Community Health Worker Primary Care - [facility]  7/30/19     [place]; Ph: [phone], Fax: [phone]        Advanced Directive/Living Will: On file    Clinical Emergency Plan    All [facility] clinic patients have access to a Nurse 24 hours a day, 7 days a week.  If you have questions or want advice from a Nurse, please know [facility] is here for you.  You can call your clinic and they will connect you or you can call Care Connection at [phone] ([phone]).  [facility] also has Walk In Care clinics in multiple locations.  Call the number listed above for more information about our Walk In Care clinics or visit the [facility] website at [email or web address].

## 2019-12-06 NOTE — PROGRESS NOTES
CHW delegation from MITCHELL Woodruff RN on 12/05/19:  We can transition this patient to maintenance status.    12/06/19: Maintenance Wellness Plan has been mailed out to patient.  Delegation completed  ______________________  Next Outreach: 02/06/20   Planned Outreach Frequency: Monthly  Preferred Phone Number: [phone]    Last Assessment Date: 04/18/19  Care Plan Completion Date: 04/18/19  ______________________

## 2019-12-18 ENCOUNTER — ANTICOAGULATION THERAPY VISIT (OUTPATIENT)
Dept: NURSING | Facility: CLINIC | Age: 79
End: 2019-12-18
Payer: COMMERCIAL

## 2019-12-18 DIAGNOSIS — Z79.01 LONG TERM CURRENT USE OF ANTICOAGULANT THERAPY: ICD-10-CM

## 2019-12-18 DIAGNOSIS — Z95.2 S/P MITRAL VALVE REPLACEMENT: ICD-10-CM

## 2019-12-18 LAB — INR POINT OF CARE: 4.2 (ref 0.86–1.14)

## 2019-12-18 PROCEDURE — 36416 COLLJ CAPILLARY BLOOD SPEC: CPT

## 2019-12-18 PROCEDURE — 85610 PROTHROMBIN TIME: CPT | Mod: QW

## 2019-12-18 NOTE — PROGRESS NOTES
ANTICOAGULATION FOLLOW-UP CLINIC VISIT    Patient Name:  Tomás Cruz  Date:  2019  Contact Type:  Face to Face    SUBJECTIVE:  Patient Findings     Comments:   Patient denies any identifiable changes that caused the supra therapeutic INR.           Clinical Outcomes     Negatives:   Major bleeding event, Thromboembolic event, Anticoagulation-related hospital admission, Anticoagulation-related ED visit, Anticoagulation-related fatality    Comments:   Patient denies any identifiable changes that caused the supra therapeutic INR.              OBJECTIVE    INR Protime   Date Value Ref Range Status   2019 4.2 (A) 0.86 - 1.14 Final       ASSESSMENT / PLAN  No question data found.  Anticoagulation Summary  As of 2019    INR goal:   2.5-3.5   TTR:   30.3 % (1 y)   INR used for dosin.2! (2019)   Warfarin maintenance plan:   2 mg (2 mg x 1) every Mon, Fri; 1 mg (2 mg x 0.5) all other days   Full warfarin instructions:   : Hold; Otherwise 2 mg every Mon, Fri; 1 mg all other days   Weekly warfarin total:   9 mg   Plan last modified:   Myra Holguin RN (2019)   Next INR check:   2019   Priority:   Maintenance   Target end date:       Indications    Long-term (current) use of anticoagulants [Z79.01] [Z79.01]  S/P mitral valve replacement [Z95.2]             Anticoagulation Episode Summary     INR check location:   Anticoagulation Clinic    Preferred lab:       Send INR reminders to:   [facility]    Comments:         Anticoagulation Care Providers     Provider Role Specialty Phone number    Mahamed Groves MD [facility] [phone]            See the Encounter Report to view Anticoagulation Flowsheet and Dosing Calendar (Go to Encounters tab in chart review, and find the Anticoagulation Therapy Visit)    Dosage adjustment made based on physician directed care plan.    Myra Holguin RN

## 2019-12-19 NOTE — PROGRESS NOTES
"SUBJECTIVE:   Tomás Cruz is a 79 year old male who presents for Preventive Visit.  Are you in the first 12 months of your Medicare coverage?  No    Healthy Habits:    In general, how would you rate your overall health?  Very good    Frequency of exercise:  None    Do you usually eat at least 4 servings of fruit and vegetables a day, include whole grains    & fiber and avoid regularly eating high fat or \"junk\" foods?  No    Taking medications regularly:  Yes    Barriers to taking medications:  None    Medication side effects:  None    Ability to successfully perform activities of daily living:  No assistance needed    Home Safety:  No safety concerns identified    Hearing Impairment:  No hearing concerns    In the past 6 months, have you been bothered by leaking of urine?  No    In general, how would you rate your overall mental or emotional health?  Very good      PHQ-2 Total Score:    INR  Misses doses of coumadin. His INR was going high - above 4.     Chest Tightness  Right side into right arm. He has episodes about every 2-3 weeks. Used to last 10 seconds but recently lasts 3 minutes. He lays down and it goes away.     Do you feel safe in your environment? Yes    Have you ever done Advance Care Planning? (For example, a Health Directive, POLST, or a discussion with a medical provider or your loved ones about your wishes): Yes, advance care planning is on file.    Fall risk  Fallen 2 or more times in the past year?: No  Any fall with injury in the past year?: No    Cognitive Screening   1) Repeat 3 items (Leader, Season, Table)    2) Clock draw: NORMAL  3) 3 item recall: Recalls 3 objects  Results: 3 items recalled: COGNITIVE IMPAIRMENT LESS LIKELY    Mini-CogTM Copyright [employer]RON [employer]. Licensed by the author for use in [employer]; reprinted with permission ([email or web address]). All rights reserved.      Do you have sleep apnea, excessive snoring or daytime drowsiness?: no    Reviewed and updated as needed " this visit by clinical staff  Tobacco  Allergies  Meds  Problems  Med Hx  Surg Hx  Fam Hx  Soc Hx          Reviewed and updated as needed this visit by Provider  Tobacco  Allergies  Meds  Problems  Med Hx  Surg Hx  Fam Hx        Social History     Tobacco Use     Smoking status: Former Smoker     Packs/day: 1.00     Years: 20.00     Pack years: 20.00     Last attempt to quit: 1982     Years since quittin.5     Smokeless tobacco: Never Used   Substance Use Topics     Alcohol use: No     If you drink alcohol do you typically have >3 drinks per day or >7 drinks per week? No    Alcohol Use 2019   Prescreen: >3 drinks/day or >7 drinks/week? No     Hyperlipidemia Follow-Up    Are you regularly taking any medication or supplement to lower your cholesterol?   Yes- Pravastatin    Are you having muscle aches or other side effects that you think could be caused by your cholesterol lowering medication?  No    Hypertension Follow-up    Do you check your blood pressure regularly outside of the clinic? Yes     Are you following a low salt diet? little    Are your blood pressures ever more than 140 on the top number (systolic) OR more   than 90 on the bottom number (diastolic), for example 140/90? No  Takes blood pressure daily.     Current providers sharing in care for this patient include:   Patient Care Team:  Mahamed [place] MD as PCP - General  Mahamed [place] MD as Assigned PCP  Sammie Saleem MD as MD (Cardiology)  Мария Saleem MD as MD (Urology)  Lindsey Benítez, RN as Registered Nurse (Oncology)  Faye Akbar, RN as Lead Care Coordinator (Primary Care - CC)  Bert Astudillo as Community Health Worker (Primary Care - CC)    The following health maintenance items are reviewed in Epic and correct as of today:  Health Maintenance   Topic Date Due     ZOSTER IMMUNIZATION (1 of 2) 1991     MEDICARE ANNUAL WELLNESS VISIT  11/15/2019     MICROALBUMIN  11/15/2019     ALT  2019  "    LIPID  12/20/2020 (Originally 11/15/2019)     BMP  04/07/2020     FALL RISK ASSESSMENT  04/18/2020     PSA  10/07/2020     CBC  10/07/2020     HF ACTION PLAN  11/09/2020     COLONOSCOPY  02/21/2024     ADVANCE CARE PLANNING  12/19/2024     DTAP/TDAP/TD IMMUNIZATION (2 - Td) 10/12/2025     PHQ-2  Completed     INFLUENZA VACCINE  Completed     PNEUMOCOCCAL IMMUNIZATION 65+ LOW/MEDIUM RISK  Completed     IPV IMMUNIZATION  Aged Out     MENINGITIS IMMUNIZATION  Aged Out       ROS:  Constitutional, HEENT, cardiovascular, pulmonary, GI, , musculoskeletal, neuro, skin, endocrine and psych systems are negative, except as otherwise noted.  This document serves as a record of the services and decisions personally performed and made by Mahamed Groves MD. It was created on his behalf by Ignacio Mix, a trained medical scribe. The creation of this document is based the provider's statements to the medical scribe.  Scribe Ignacio Mix 11:29 AM, December 20, 2019    OBJECTIVE:   /62   Pulse 73   Temp 98.5  F (36.9  C) (Oral)   Ht 1.88 m (6' 2\")   Wt 95.3 kg (210 lb)   SpO2 98%   BMI 26.96 kg/m   Estimated body mass index is 26.96 kg/m  as calculated from the following:    Height as of this encounter: 1.88 m (6' 2\").    Weight as of this encounter: 95.3 kg (210 lb).  EXAM:   GENERAL: healthy, alert and no distress  EYES: Eyes grossly normal to inspection, PERRL and conjunctivae and sclerae normal  HENT: ear canals and TM's normal, nose and mouth without ulcers or lesions  NECK: no adenopathy, no asymmetry, masses, or scars and thyroid normal to palpation  RESP: lungs clear to auscultation - no rales, rhonchi or wheezes  BREAST: normal without masses, tenderness or nipple discharge and no palpable axillary masses or adenopathy  CV: regular rate and rhythm, normal S1 S2, no S3 or S4, no murmur, click or rub, no peripheral edema and peripheral pulses strong  ABDOMEN: soft, nontender, no hepatosplenomegaly, no masses " and bowel sounds normal  - male: testicles- normal, no atrophy, no masses;  no inguinal hernias  RECTAL- deferred  MS: no gross musculoskeletal defects noted, no edema  SKIN: raised skin papules on chest and back, papule on the left side of the nose, otherwise, no suspicious lesions or rashes  BACK: no CVA tenderness, no paralumbar tenderness  NEURO: Normal strength and tone, mentation intact and speech normal  PSYCH: mentation appears normal, affect normal/bright  LYMPH: no cervical, supraclavicular, axillary, or inguinal adenopathy    Recent Labs   Lab Test 11/15/18  0947 11/09/17  0949  08/07/15  0807 06/24/14  0839   CHOL 130 146   < > 143 148   HDL 32* 38*   < > 30* 29*   LDL 71 78   < > 88 95   TRIG 136 150*   < > 123 120   CHOLHDLRATIO  --   --   --  4.8 5.2*    < > = values in this interval not displayed.     GFR Estimate   Date Value Ref Range Status   10/07/2019 33 (L) >60 mL/min/[1.73_m2] Final     Comment:     Non  GFR Calc  Starting 12/18/2018, serum creatinine based estimated GFR (eGFR) will be   calculated using the Chronic Kidney Disease Epidemiology Collaboration   (CKD-EPI) equation.       Results for orders placed or performed in visit on 12/20/19   INR point of care     Status: Abnormal   Result Value Ref Range    INR Protime 4.6 (A) 0.86 - 1.14     ASSESSMENT / PLAN:   Tomás was seen today for physical.    Diagnoses and all orders for this visit:    Encounter for Medicare annual wellness exam    Cardiomyopathy, unspecified type (H) - controlled - continue medication.  -     furosemide (LASIX) 20 MG tablet; Take 1 tablet (20 mg) by mouth daily  -     metoprolol succinate ER (TOPROL-XL) 100 MG 24 hr tablet; Take 1 tablet (100 mg) by mouth daily  -     spironolactone (ALDACTONE) 25 MG tablet; Take 1 tablet (25 mg) by mouth daily  -     Comprehensive metabolic panel; Future    BPH with obstruction/lower urinary tract symptoms - controlled - continue medication.  -     dutasteride  "(AVODART) 0.5 MG capsule; Take 1 capsule (0.5 mg) by mouth daily    Hyperlipidemia LDL goal <100 - last LDL controlled. Future labs ordered today.   -     pravastatin (PRAVACHOL) 40 MG tablet; Take 1 tablet (40 mg) by mouth daily  -     Lipid panel reflex to direct LDL Fasting; Future  -     Comprehensive metabolic panel; Future    Erectile dysfunction, unspecified erectile dysfunction type - patient discontinued medication.     CKD (chronic kidney disease) stage 3, GFR 30-59 ml/min (H) - last GFR within reference range.   -     Albumin Random Urine Quantitative with Creat Ratio  -     CBC with platelets; Future  -     Comprehensive metabolic panel; Future    Paroxysmal atrial fibrillation (H) - controlled.     Screening for prostate cancer  -     Prostate spec antigen screen; Future    S/P mitral valve replacement - controlled - continue medication.  -     amoxicillin (AMOXIL) 500 MG capsule; Take 4 capsules (2,000 mg) by mouth once as needed Prior to Dental appts    End of Life Planning:  Patient currently has an advanced directive: Yes.  Practitioner is supportive of decision.    COUNSELING:  Reviewed preventive health counseling, as reflected in patient instructions    Estimated body mass index is 26.96 kg/m  as calculated from the following:    Height as of this encounter: 1.88 m (6' 2\").    Weight as of this encounter: 95.3 kg (210 lb).     reports that he quit smoking about 37 years ago. He has a 20.00 pack-year smoking history. He has never used smokeless tobacco.    Appropriate preventive services were discussed with this patient, including applicable screening as appropriate for cardiovascular disease, diabetes, osteopenia/osteoporosis, and glaucoma.  As appropriate for age/gender, discussed screening for colorectal cancer, prostate cancer, breast cancer, and cervical cancer. Checklist reviewing preventive services available has been given to the patient.    Reviewed patients plan of care and provided an " AVS. The Basic Care Plan (routine screening as documented in Health Maintenance) for Tomás meets the Care Plan requirement. This Care Plan has been established and reviewed with the Patient.    Counseling Resources:    He is at risk for lack of exercise and has been provided with information to increase physical activity for the benefit of his well-being.    The patient was counseled and encouraged to consider modifying their diet and eating habits. He was provided with information on recommended healthy diet options.  ATP IV Guidelines  Pooled Cohorts Equation Calculator  Breast Cancer Risk Calculator  FRAX Risk Assessment  ICSI Preventive Guidelines  Dietary Guidelines for Americans, 2010  USDA's MyPlate  ASA Prophylaxis  Lung CA Screening    The information in this document, created by the medical scribe for me, accurately reflects the services I personally performed and the decisions made by me. I have reviewed and approved this document for accuracy prior to leaving the patient care area.  11:51 AM, 12/20/19    Mahamed Groves MD  [facility]

## 2019-12-20 ENCOUNTER — OFFICE VISIT (OUTPATIENT)
Dept: FAMILY MEDICINE | Facility: CLINIC | Age: 79
End: 2019-12-20
Payer: COMMERCIAL

## 2019-12-20 ENCOUNTER — ANTICOAGULATION THERAPY VISIT (OUTPATIENT)
Dept: NURSING | Facility: CLINIC | Age: 79
End: 2019-12-20
Payer: COMMERCIAL

## 2019-12-20 VITALS
TEMPERATURE: 98.5 F | HEIGHT: 74 IN | WEIGHT: 210 LBS | OXYGEN SATURATION: 98 % | BODY MASS INDEX: 26.95 KG/M2 | HEART RATE: 73 BPM | DIASTOLIC BLOOD PRESSURE: 62 MMHG | SYSTOLIC BLOOD PRESSURE: 136 MMHG

## 2019-12-20 DIAGNOSIS — Z95.2 S/P MITRAL VALVE REPLACEMENT: ICD-10-CM

## 2019-12-20 DIAGNOSIS — N13.8 BPH WITH OBSTRUCTION/LOWER URINARY TRACT SYMPTOMS: ICD-10-CM

## 2019-12-20 DIAGNOSIS — I48.0 PAROXYSMAL ATRIAL FIBRILLATION (H): ICD-10-CM

## 2019-12-20 DIAGNOSIS — E78.5 HYPERLIPIDEMIA LDL GOAL <100: ICD-10-CM

## 2019-12-20 DIAGNOSIS — Z12.5 SCREENING FOR PROSTATE CANCER: ICD-10-CM

## 2019-12-20 DIAGNOSIS — N40.1 BPH WITH OBSTRUCTION/LOWER URINARY TRACT SYMPTOMS: ICD-10-CM

## 2019-12-20 DIAGNOSIS — Z79.01 LONG TERM CURRENT USE OF ANTICOAGULANT THERAPY: ICD-10-CM

## 2019-12-20 DIAGNOSIS — N52.9 ERECTILE DYSFUNCTION, UNSPECIFIED ERECTILE DYSFUNCTION TYPE: ICD-10-CM

## 2019-12-20 DIAGNOSIS — N18.30 CKD (CHRONIC KIDNEY DISEASE) STAGE 3, GFR 30-59 ML/MIN (H): ICD-10-CM

## 2019-12-20 DIAGNOSIS — I42.9 CARDIOMYOPATHY, UNSPECIFIED TYPE (H): ICD-10-CM

## 2019-12-20 DIAGNOSIS — Z00.00 ENCOUNTER FOR MEDICARE ANNUAL WELLNESS EXAM: Primary | ICD-10-CM

## 2019-12-20 LAB — INR POINT OF CARE: 4.6 (ref 0.86–1.14)

## 2019-12-20 PROCEDURE — 99397 PER PM REEVAL EST PAT 65+ YR: CPT | Performed by: FAMILY MEDICINE

## 2019-12-20 PROCEDURE — 82043 UR ALBUMIN QUANTITATIVE: CPT | Performed by: FAMILY MEDICINE

## 2019-12-20 PROCEDURE — 36416 COLLJ CAPILLARY BLOOD SPEC: CPT

## 2019-12-20 PROCEDURE — 85610 PROTHROMBIN TIME: CPT | Mod: QW

## 2019-12-20 RX ORDER — FUROSEMIDE 20 MG
20 TABLET ORAL DAILY
Qty: 90 TABLET | Refills: 1 | Status: SHIPPED | OUTPATIENT
Start: 2019-12-20 | End: 2020-01-01

## 2019-12-20 RX ORDER — PRAVASTATIN SODIUM 40 MG
40 TABLET ORAL DAILY
Qty: 90 TABLET | Refills: 3 | Status: SHIPPED | OUTPATIENT
Start: 2019-12-20 | End: 2020-01-01

## 2019-12-20 RX ORDER — SPIRONOLACTONE 25 MG/1
25 TABLET ORAL DAILY
Qty: 90 TABLET | Refills: 1 | Status: SHIPPED | OUTPATIENT
Start: 2019-12-20 | End: 2020-01-01

## 2019-12-20 RX ORDER — AMIODARONE HYDROCHLORIDE 200 MG/1
200 TABLET ORAL DAILY
Qty: 90 TABLET | Refills: 1 | Status: CANCELLED | OUTPATIENT
Start: 2019-12-20

## 2019-12-20 RX ORDER — WARFARIN SODIUM 2 MG/1
TABLET ORAL
Qty: 100 TABLET | Refills: 3 | Status: CANCELLED | OUTPATIENT
Start: 2019-12-20

## 2019-12-20 RX ORDER — AMOXICILLIN 500 MG/1
2000 CAPSULE ORAL
Qty: 8 CAPSULE | Refills: 1 | Status: SHIPPED | OUTPATIENT
Start: 2019-12-20 | End: 2020-01-01

## 2019-12-20 RX ORDER — METOPROLOL SUCCINATE 100 MG/1
100 TABLET, EXTENDED RELEASE ORAL DAILY
Qty: 90 TABLET | Refills: 1 | Status: SHIPPED | OUTPATIENT
Start: 2019-12-20 | End: 2020-01-13

## 2019-12-20 RX ORDER — SILDENAFIL 100 MG/1
100 TABLET, FILM COATED ORAL DAILY PRN
Qty: 12 TABLET | Refills: 11 | Status: CANCELLED | OUTPATIENT
Start: 2019-12-20

## 2019-12-20 RX ORDER — DUTASTERIDE 0.5 MG/1
0.5 CAPSULE, LIQUID FILLED ORAL DAILY
Qty: 90 CAPSULE | Refills: 3 | Status: SHIPPED | OUTPATIENT
Start: 2019-12-20 | End: 2020-01-07

## 2019-12-20 ASSESSMENT — ACTIVITIES OF DAILY LIVING (ADL): CURRENT_FUNCTION: NO ASSISTANCE NEEDED

## 2019-12-20 ASSESSMENT — MIFFLIN-ST. JEOR: SCORE: 1742.3

## 2019-12-20 NOTE — PATIENT INSTRUCTIONS
Patient Education   Preventive Health Recommendations  See your health care provider every year to    Review health changes.     Discuss preventive care.      Review your medicines if your doctor has prescribed any.    Talk with your health care provider about whether you should have a test to screen for prostate cancer (PSA).    Every 3 years, have a diabetes test (fasting glucose). If you are at risk for diabetes, you should have this test more often.    Every 5 years, have a cholesterol test. Have this test more often if you are at risk for high cholesterol or heart disease.     Every 10 years, have a colonoscopy. Or, have a yearly FIT test (stool test). These exams will check for colon cancer.    Talk to with your health care provider about screening for Abdominal Aortic Aneurysm if you have a family history of AAA or have a history of smoking.    Shots:     Get a flu shot each year.     Get a tetanus shot every 10 years.     Talk to your doctor about your pneumonia vaccines. There are now two you should receive - Pneumovax (PPSV 23) and Prevnar (PCV 13).    Talk to your pharmacist about a shingles vaccine.     Talk to your doctor about the hepatitis B vaccine.    Nutrition:     Eat at least 5 servings of fruits and vegetables each day.     Eat whole-grain bread, whole-wheat pasta and brown rice instead of white grains and rice.     Get adequate Calcium and Vitamin D.     Lifestyle    Exercise for at least 150 minutes a week (30 minutes a day, 5 days a week). This will help you control your weight and prevent disease.     Limit alcohol to one drink per day.     No smoking.     Wear sunscreen to prevent skin cancer.     See your dentist every six months for an exam and cleaning.     See your eye doctor every 1 to 2 years to screen for conditions such as glaucoma, macular degeneration and cataracts.    Personalized Prevention Plan  You are due for the preventive services outlined below.  Your care team is  available to assist you in scheduling these services.  If you have already completed any of these items, please share that information with your care team to update in your medical record.  Health Maintenance Due   Topic Date Due     Zoster (Shingles) Vaccine (1 of 2) 08/12/1991     Annual Wellness Visit  11/15/2019     Cholesterol Lab  11/15/2019     Kidney Microalbumin Urine Test  11/15/2019     Liver Monitoring Lab  12/05/2019       Exercise for a Healthier Heart     Exercise with a friend. When activity is fun, you're more likely to stick with it.   You may wonder how you can improve the health of your heart. If you re thinking about exercise, you re on the right track. You don t need to become an athlete, but you do need a certain amount of brisk exercise to help strengthen your heart. If you have been diagnosed with a heart condition, your doctor may recommend exercise to help stabilize your condition. To help make exercise a habit, choose safe, fun activities.  Be sure to check with your healthcare provider before starting an exercise program.   Why exercise?  Exercising regularly offers many healthy rewards. It can help you do all of the following:    Improve your blood cholesterol level to help prevent further heart trouble    Lower your blood pressure to help prevent a stroke or heart attack    Control diabetes, or reduce your risk of getting this disease    Improve your heart and lung function    Reach and maintain a healthy weight    Make your muscles stronger and more limber so you can stay active    Prevent falls and fractures by slowing the loss of bone mass (osteoporosis)    Manage stress better    Reduce your blood pressure    Improve your sense of self and your body image  Exercise tips  Ease into your routine. Set small goals. Then build on them.  Exercise on most days. Aim for a total of 150 or more minutes of moderate to  vigorous intensity activity each week. Consider 40 minutes, 3 to 4 times a  week. For best results, activity should last for 40 minutes on average. It is OK to work up to the 40 minute period over time. Examples of moderate-intensity activity is walking 1 mile in 15 minutes or 30 to 45 minutes of yard work.  Step up your daily activity level. Along with your exercise program, try being more active throughout the day. Walk instead of drive. Do more household tasks or yard work.  Choose one or more activities you enjoy. Walking is one of the easiest things you can do. You can also try swimming, riding a bike, dancing, or taking an exercise class.  Stop exercising and call your doctor if you:    Have chest pain or feel dizzy or lightheaded    Feel burning, tightness, pressure, or heaviness in your chest, neck, shoulders, back, or arms    Have unusual shortness of breath    Have increased joint or muscle pain    Have palpitations or an irregular heartbeat   Date Last Reviewed: 5/1/2016 2000-2018 [employer]. [address] [place] [address]. All rights reserved. This information is not intended as a substitute for professional medical care. Always follow your healthcare professional's instructions.          Understanding USDA MyPlate  The USDA (U.S. Department of Agriculture) has guidelines to help you make healthy food choices. These are called MyPlate. MyPlate shows the food groups that make up healthy meals using the image of a place setting. Before you eat, think about the healthiest choices for what to put onto your plate or into your cup or bowl. To learn more about building a healthy plate, visit www.choosemyplate.gov.    The food groups    Fruits. Any fruit or 100% fruit juice counts as part of the Fruit Group. Fruits may be fresh, canned, frozen, or dried, and may be whole, cut-up, or pureed. Make half your plate fruits and vegetables.    Vegetables. Any vegetable or 100% vegetable juice counts as a member of the Vegetable Group. Vegetables may be fresh,  frozen, canned, or dried. They can be served raw or cooked and may be whole, cut-up, or mashed. Make half your plate fruits and vegetables.    Grains. All foods made from grains are part of the Grains Group. These include wheat, rice, oats, cornmeal, and barley such as bread, pasta, oatmeal, cereal, tortillas, and grits. Grains should be no more than a quarter of your plate. At least half of your grains should be whole grains.    Protein. This group includes meat, poultry, seafood, beans and peas, eggs, processed soy products (like tofu), nuts (including nut butters), and seeds. Make protein choices no more than a quarter of your plate. Meat and poultry choices should be lean or low fat.    Dairy. All fluid milk products and foods made from milk that contain calcium, like yogurt and cheese, are part of the Dairy Group. (Foods that have little calcium, such as cream, butter, and cream cheese, are not part of the group.) Most dairy choices should be low-fat or fat-free.    Oils. These are fats that are liquid at room temperature. They include canola, corn, olive, soybean, and sunflower oil. Foods that are mainly oil include mayonnaise, certain salad dressings, and soft margarines. You should have only 5 to 7 teaspoons of oils a day. You probably already get this much from the food you eat.  Date Last Reviewed: 8/1/2017 2000-2018 [employer]. [address] [place] [address], [place] [address]. All rights reserved. This information is not intended as a substitute for professional medical care. Always follow your healthcare professional's instructions.

## 2019-12-20 NOTE — PROGRESS NOTES
ANTICOAGULATION FOLLOW-UP CLINIC VISIT    Patient Name:  Tomás Cruz  Date:  2019  Contact Type:  Face to Face    SUBJECTIVE:  Patient Findings     Positives:   Change in health (URI)        Clinical Outcomes     Negatives:   Major bleeding event, Thromboembolic event, Anticoagulation-related hospital admission, Anticoagulation-related ED visit, Anticoagulation-related fatality           OBJECTIVE    INR Protime   Date Value Ref Range Status   2019 4.6 (A) 0.86 - 1.14 Final       ASSESSMENT / PLAN  INR assessment SUPRA    Recheck INR In: 3 DAYS    INR Location Clinic      Anticoagulation Summary  As of 2019    INR goal:   2.5-3.5   TTR:   29.7 % (1 y)   INR used for dosin.6! (2019)   Warfarin maintenance plan:   2 mg (2 mg x 1) every Mon, Fri; 1 mg (2 mg x 0.5) all other days   Full warfarin instructions:   : Hold; : Hold; Otherwise 2 mg every Mon, Fri; 1 mg all other days   Weekly warfarin total:   9 mg   Plan last modified:   Myra Holguin RN (2019)   Next INR check:   2019   Priority:   High   Target end date:       Indications    Long-term (current) use of anticoagulants [Z79.01] [Z79.01]  S/P mitral valve replacement [Z95.2]             Anticoagulation Episode Summary     INR check location:   Anticoagulation Clinic    Preferred lab:       Send INR reminders to:   [facility]    Comments:         Anticoagulation Care Providers     Provider Role Specialty Phone number    Mahamed [place] MD [facility] [phone]            See the Encounter Report to view Anticoagulation Flowsheet and Dosing Calendar (Go to Encounters tab in chart review, and find the Anticoagulation Therapy Visit)    Dosage adjustment made based on physician directed care plan.    Rose Marie Madden RN

## 2019-12-21 LAB
CREAT UR-MCNC: 120 MG/DL
MICROALBUMIN UR-MCNC: 40 MG/L
MICROALBUMIN/CREAT UR: 33 MG/G CR (ref 0–17)

## 2019-12-21 NOTE — RESULT ENCOUNTER NOTE
Dear Dereje,    Here is a summary of your recent test results:  -Microalbumin (urine protein) level is elevated. This is suggestive of early damage to your kidneys from high blood pressure.  ADVISE: avoiding anti-inflamatory agents such as ibuprofen (Advil, Motrin) or naproxen (Aleve) as much as possible, keeping your blood pressure in a normal range, and this should be rechecked in 1 year.     For additional lab test information, labtestsonline.org is an excellent reference.           Thank you very much for trusting me and [facility].     Healthy regards,  Marc Groves MD

## 2019-12-23 ENCOUNTER — ANTICOAGULATION THERAPY VISIT (OUTPATIENT)
Dept: NURSING | Facility: CLINIC | Age: 79
End: 2019-12-23
Payer: COMMERCIAL

## 2019-12-23 DIAGNOSIS — Z95.2 S/P MITRAL VALVE REPLACEMENT: ICD-10-CM

## 2019-12-23 DIAGNOSIS — Z79.01 LONG TERM CURRENT USE OF ANTICOAGULANT THERAPY: ICD-10-CM

## 2019-12-23 DIAGNOSIS — Z12.5 SCREENING FOR PROSTATE CANCER: ICD-10-CM

## 2019-12-23 DIAGNOSIS — N18.30 CKD (CHRONIC KIDNEY DISEASE) STAGE 3, GFR 30-59 ML/MIN (H): ICD-10-CM

## 2019-12-23 DIAGNOSIS — E78.5 HYPERLIPIDEMIA LDL GOAL <100: ICD-10-CM

## 2019-12-23 DIAGNOSIS — I42.9 CARDIOMYOPATHY, UNSPECIFIED TYPE (H): ICD-10-CM

## 2019-12-23 LAB
ERYTHROCYTE [DISTWIDTH] IN BLOOD BY AUTOMATED COUNT: 15.6 % (ref 10–15)
HCT VFR BLD AUTO: 52.6 % (ref 40–53)
HGB BLD-MCNC: 17.2 G/DL (ref 13.3–17.7)
INR POINT OF CARE: 1.7 (ref 0.86–1.14)
MCH RBC QN AUTO: 29.2 PG (ref 26.5–33)
MCHC RBC AUTO-ENTMCNC: 32.7 G/DL (ref 31.5–36.5)
MCV RBC AUTO: 89 FL (ref 78–100)
PLATELET # BLD AUTO: 550 10E9/L (ref 150–450)
RBC # BLD AUTO: 5.89 10E12/L (ref 4.4–5.9)
WBC # BLD AUTO: 11 10E9/L (ref 4–11)

## 2019-12-23 PROCEDURE — 85610 PROTHROMBIN TIME: CPT | Mod: QW

## 2019-12-23 PROCEDURE — 36416 COLLJ CAPILLARY BLOOD SPEC: CPT

## 2019-12-23 PROCEDURE — 80053 COMPREHEN METABOLIC PANEL: CPT | Performed by: FAMILY MEDICINE

## 2019-12-23 PROCEDURE — 85027 COMPLETE CBC AUTOMATED: CPT | Performed by: FAMILY MEDICINE

## 2019-12-23 PROCEDURE — G0103 PSA SCREENING: HCPCS | Performed by: FAMILY MEDICINE

## 2019-12-23 PROCEDURE — 80061 LIPID PANEL: CPT | Performed by: FAMILY MEDICINE

## 2019-12-23 NOTE — PROGRESS NOTES
ANTICOAGULATION FOLLOW-UP CLINIC VISIT    Patient Name:  Tomás Cruz  Date:  2019  Contact Type:  Face to Face    SUBJECTIVE:  Patient Findings     Positives:   Missed doses        Clinical Outcomes     Negatives:   Major bleeding event, Thromboembolic event, Anticoagulation-related hospital admission, Anticoagulation-related ED visit, Anticoagulation-related fatality           OBJECTIVE    INR Protime   Date Value Ref Range Status   2019 1.7 (A) 0.86 - 1.14 Final       ASSESSMENT / PLAN  No question data found.  Anticoagulation Summary  As of 2019    INR goal:   2.5-3.5   TTR:   29.2 % (1 y)   INR used for dosin.7! (2019)   Warfarin maintenance plan:   2 mg (2 mg x 1) every Mon, Fri; 1 mg (2 mg x 0.5) all other days   Full warfarin instructions:   : 2 mg; Otherwise 2 mg every Mon, Fri; 1 mg all other days   Weekly warfarin total:   9 mg   Plan last modified:   Myra Holguin RN (2019)   Next INR check:   2019   Priority:   High   Target end date:       Indications    Long-term (current) use of anticoagulants [Z79.01] [Z79.01]  S/P mitral valve replacement [Z95.2]             Anticoagulation Episode Summary     INR check location:   Anticoagulation Clinic    Preferred lab:       Send INR reminders to:   [facility]    Comments:         Anticoagulation Care Providers     Provider Role Specialty Phone number    Mahamed [place] MD [facility] [phone]            See the Encounter Report to view Anticoagulation Flowsheet and Dosing Calendar (Go to Encounters tab in chart review, and find the Anticoagulation Therapy Visit)    Dosage adjustment made based on physician directed care plan.    Myra Holguin RN

## 2019-12-24 LAB
ALBUMIN SERPL-MCNC: 4 G/DL (ref 3.4–5)
ALP SERPL-CCNC: 67 U/L (ref 40–150)
ALT SERPL W P-5'-P-CCNC: 25 U/L (ref 0–70)
ANION GAP SERPL CALCULATED.3IONS-SCNC: 4 MMOL/L (ref 3–14)
AST SERPL W P-5'-P-CCNC: 17 U/L (ref 0–45)
BILIRUB SERPL-MCNC: 0.9 MG/DL (ref 0.2–1.3)
BUN SERPL-MCNC: 39 MG/DL (ref 7–30)
CALCIUM SERPL-MCNC: 9.4 MG/DL (ref 8.5–10.1)
CHLORIDE SERPL-SCNC: 104 MMOL/L (ref 94–109)
CHOLEST SERPL-MCNC: 152 MG/DL
CO2 SERPL-SCNC: 27 MMOL/L (ref 20–32)
CREAT SERPL-MCNC: 1.75 MG/DL (ref 0.66–1.25)
GFR SERPL CREATININE-BSD FRML MDRD: 36 ML/MIN/{1.73_M2}
GLUCOSE SERPL-MCNC: 138 MG/DL (ref 70–99)
HDLC SERPL-MCNC: 31 MG/DL
LDLC SERPL CALC-MCNC: 71 MG/DL
NONHDLC SERPL-MCNC: 121 MG/DL
POTASSIUM SERPL-SCNC: 5 MMOL/L (ref 3.4–5.3)
PROT SERPL-MCNC: 7.4 G/DL (ref 6.8–8.8)
PSA SERPL-ACNC: 2.48 UG/L (ref 0–4)
SODIUM SERPL-SCNC: 137 MMOL/L (ref 133–144)
TRIGL SERPL-MCNC: 252 MG/DL

## 2019-12-27 ENCOUNTER — PRE VISIT (OUTPATIENT)
Dept: UROLOGY | Facility: CLINIC | Age: 79
End: 2019-12-27

## 2019-12-27 NOTE — TELEPHONE ENCOUNTER
Chief Complaint : Return-3 Mo    Hx/Sx: Nocturia/BPH    Records/Orders: Available     Pt Contacted: n/a    At Rooming: Normal

## 2019-12-29 PROBLEM — D75.839 THROMBOCYTOSIS: Status: ACTIVE | Noted: 2019-12-29

## 2019-12-29 NOTE — RESULT ENCOUNTER NOTE
Please call to inform him of the below recommendations and schedule a followup appointment.     Here is a summary of your recent test results:  -Normal red blood cell (hgb) levels, normal white blood cell count and persistently elevated platelet levels.  ADVISE: scheduling a followup appointment to do further testing  -PSA (prostate specific antigen) test is normal now.  This indicates a low likelihood of prostate cancer.  ADVISE: rechecking this in 1 year.  -Cholesterol levels are at your goal levels.  ADVISE: continuing your medication, a regular exercise program with at least 150 minutes of aerobic exercise per week, and eating a low saturated fat/low carbohydrate diet.  Also, you should recheck this fasting cholesterol panel in 12 months.  -Liver and gallbladder tests (ALT,AST, Alk phos,bilirubin) are normal.  -Kidney function (GFR) is decreased but stable.  ADVISE: rechecking this in 3-6 months  -Sodium is normal.  -Potassium is normal.  -Calcium is normal.  -Glucose is elevated again and a sign diabetes.  ADVISE: scheduling an appointment with me to discuss treatment recommendations.    For additional lab test information, [email or web address] is an excellent reference.           Thank you very much for trusting me and [facility].     Healthy regards,  Marc [place] MD

## 2019-12-30 ENCOUNTER — ANTICOAGULATION THERAPY VISIT (OUTPATIENT)
Dept: NURSING | Facility: CLINIC | Age: 79
End: 2019-12-30
Payer: COMMERCIAL

## 2019-12-30 DIAGNOSIS — Z79.01 LONG TERM CURRENT USE OF ANTICOAGULANT THERAPY: ICD-10-CM

## 2019-12-30 DIAGNOSIS — Z95.2 S/P MITRAL VALVE REPLACEMENT: ICD-10-CM

## 2019-12-30 LAB — INR POINT OF CARE: 1.9 (ref 0.86–1.14)

## 2019-12-30 PROCEDURE — 36416 COLLJ CAPILLARY BLOOD SPEC: CPT

## 2019-12-30 PROCEDURE — 85610 PROTHROMBIN TIME: CPT | Mod: QW

## 2019-12-30 NOTE — PROGRESS NOTES
ANTICOAGULATION FOLLOW-UP CLINIC VISIT    Patient Name:  Tomás Cruz  Date:  2019  Contact Type:  Face to Face    SUBJECTIVE:  Patient Findings     Comments:   Patient denies any identifiable changes that caused the sub therapeutic INR.  He may have eaten more morris based stuff but is not sure           Clinical Outcomes     Negatives:   Major bleeding event, Thromboembolic event, Anticoagulation-related hospital admission, Anticoagulation-related ED visit, Anticoagulation-related fatality    Comments:   Patient denies any identifiable changes that caused the sub therapeutic INR.  He may have eaten more morris based stuff but is not sure              OBJECTIVE    INR Protime   Date Value Ref Range Status   2019 1.9 (A) 0.86 - 1.14 Final       ASSESSMENT / PLAN  No question data found.  Anticoagulation Summary  As of 2019    INR goal:   2.5-3.5   TTR:   28.6 % (1 y)   INR used for dosin.9! (2019)   Warfarin maintenance plan:   2 mg (2 mg x 1) every Mon, Fri; 1 mg (2 mg x 0.5) all other days   Full warfarin instructions:   : 3 mg; : 2 mg; Otherwise 2 mg every Mon, Fri; 1 mg all other days   Weekly warfarin total:   9 mg   Plan last modified:   Myra Holguin RN (2019)   Next INR check:   2020   Priority:   High   Target end date:       Indications    Long-term (current) use of anticoagulants [Z79.01] [Z79.01]  S/P mitral valve replacement [Z95.2]             Anticoagulation Episode Summary     INR check location:   Anticoagulation Clinic    Preferred lab:       Send INR reminders to:   [facility]    Comments:         Anticoagulation Care Providers     Provider Role Specialty Phone number    Mahamed Groves MD [facility] [phone]            See the Encounter Report to view Anticoagulation Flowsheet and Dosing Calendar (Go to Encounters tab in chart review, and find the Anticoagulation Therapy Visit)    Dosage adjustment made based  on physician directed care plan.    Myra Holguin RN

## 2020-01-01 ENCOUNTER — ANTICOAGULATION THERAPY VISIT (OUTPATIENT)
Dept: FAMILY MEDICINE | Facility: CLINIC | Age: 80
End: 2020-01-01

## 2020-01-01 ENCOUNTER — TRANSFERRED RECORDS (OUTPATIENT)
Dept: HEALTH INFORMATION MANAGEMENT | Facility: CLINIC | Age: 80
End: 2020-01-01

## 2020-01-01 ENCOUNTER — PRE VISIT (OUTPATIENT)
Dept: UROLOGY | Facility: CLINIC | Age: 80
End: 2020-01-01

## 2020-01-01 ENCOUNTER — HOSPITAL ENCOUNTER (OUTPATIENT)
Dept: CT IMAGING | Facility: CLINIC | Age: 80
Discharge: HOME | End: 2020-11-16
Attending: INTERNAL MEDICINE | Admitting: INTERNAL MEDICINE
Payer: COMMERCIAL

## 2020-01-01 ENCOUNTER — TELEPHONE (OUTPATIENT)
Dept: FAMILY MEDICINE | Facility: CLINIC | Age: 80
End: 2020-01-01

## 2020-01-01 ENCOUNTER — ALLIED HEALTH/NURSE VISIT (OUTPATIENT)
Dept: NURSING | Facility: CLINIC | Age: 80
End: 2020-01-01
Payer: COMMERCIAL

## 2020-01-01 ENCOUNTER — OFFICE VISIT (OUTPATIENT)
Dept: FAMILY MEDICINE | Facility: CLINIC | Age: 80
End: 2020-01-01
Payer: COMMERCIAL

## 2020-01-01 ENCOUNTER — HOSPITAL ENCOUNTER (EMERGENCY)
Facility: CLINIC | Age: 80
Discharge: HOME | End: 2020-04-18
Attending: PHYSICIAN ASSISTANT | Admitting: PHYSICIAN ASSISTANT
Payer: COMMERCIAL

## 2020-01-01 ENCOUNTER — ANTICOAGULATION THERAPY VISIT (OUTPATIENT)
Dept: FAMILY MEDICINE | Facility: CLINIC | Age: 80
End: 2020-01-01
Payer: COMMERCIAL

## 2020-01-01 ENCOUNTER — HOSPITAL ENCOUNTER (OUTPATIENT)
Dept: RESPIRATORY THERAPY | Facility: CLINIC | Age: 80
Discharge: HOME | End: 2020-11-23
Attending: CLINICAL NURSE SPECIALIST | Admitting: CLINICAL NURSE SPECIALIST
Payer: COMMERCIAL

## 2020-01-01 ENCOUNTER — HOSPITAL ENCOUNTER (INPATIENT)
Facility: CLINIC | Age: 80
LOS: 1 days | Discharge: HOME HEALTH | DRG: 065 | End: 2020-12-15
Attending: EMERGENCY MEDICINE | Admitting: HOSPITALIST
Payer: MEDICARE

## 2020-01-01 ENCOUNTER — NURSE TRIAGE (OUTPATIENT)
Dept: NURSING | Facility: CLINIC | Age: 80
End: 2020-01-01

## 2020-01-01 ENCOUNTER — VIRTUAL VISIT (OUTPATIENT)
Dept: UROLOGY | Facility: CLINIC | Age: 80
End: 2020-01-01
Payer: COMMERCIAL

## 2020-01-01 ENCOUNTER — TELEPHONE (OUTPATIENT)
Dept: CARE COORDINATION | Facility: CLINIC | Age: 80
End: 2020-01-01

## 2020-01-01 ENCOUNTER — APPOINTMENT (OUTPATIENT)
Dept: CT IMAGING | Facility: CLINIC | Age: 80
DRG: 065 | End: 2020-01-01
Attending: EMERGENCY MEDICINE
Payer: MEDICARE

## 2020-01-01 ENCOUNTER — PRE VISIT (OUTPATIENT)
Dept: ONCOLOGY | Facility: CLINIC | Age: 80
End: 2020-01-01

## 2020-01-01 ENCOUNTER — APPOINTMENT (OUTPATIENT)
Dept: PHYSICAL THERAPY | Facility: CLINIC | Age: 80
DRG: 065 | End: 2020-01-01
Attending: INTERNAL MEDICINE
Payer: MEDICARE

## 2020-01-01 ENCOUNTER — VIRTUAL VISIT (OUTPATIENT)
Dept: UROLOGY | Facility: CLINIC | Age: 80
End: 2020-01-01

## 2020-01-01 ENCOUNTER — APPOINTMENT (OUTPATIENT)
Dept: CARDIOLOGY | Facility: CLINIC | Age: 80
DRG: 065 | End: 2020-01-01
Attending: HOSPITALIST
Payer: MEDICARE

## 2020-01-01 ENCOUNTER — VIRTUAL VISIT (OUTPATIENT)
Dept: ONCOLOGY | Facility: CLINIC | Age: 80
End: 2020-01-01
Attending: INTERNAL MEDICINE
Payer: COMMERCIAL

## 2020-01-01 ENCOUNTER — TELEPHONE (OUTPATIENT)
Dept: UROLOGY | Facility: CLINIC | Age: 80
End: 2020-01-01

## 2020-01-01 ENCOUNTER — NURSE TRIAGE (OUTPATIENT)
Dept: FAMILY MEDICINE | Facility: CLINIC | Age: 80
End: 2020-01-01

## 2020-01-01 ENCOUNTER — PATIENT OUTREACH (OUTPATIENT)
Dept: NURSING | Facility: CLINIC | Age: 80
End: 2020-01-01
Attending: HOSPITALIST
Payer: COMMERCIAL

## 2020-01-01 ENCOUNTER — APPOINTMENT (OUTPATIENT)
Dept: ULTRASOUND IMAGING | Facility: CLINIC | Age: 80
End: 2020-01-01
Attending: PHYSICIAN ASSISTANT
Payer: COMMERCIAL

## 2020-01-01 ENCOUNTER — APPOINTMENT (OUTPATIENT)
Dept: OCCUPATIONAL THERAPY | Facility: CLINIC | Age: 80
DRG: 065 | End: 2020-01-01
Attending: HOSPITALIST
Payer: MEDICARE

## 2020-01-01 ENCOUNTER — HOSPITAL ENCOUNTER (EMERGENCY)
Facility: CLINIC | Age: 80
Discharge: HOME | End: 2020-06-12
Attending: EMERGENCY MEDICINE | Admitting: EMERGENCY MEDICINE
Payer: COMMERCIAL

## 2020-01-01 ENCOUNTER — VIRTUAL VISIT (OUTPATIENT)
Dept: PULMONOLOGY | Facility: CLINIC | Age: 80
End: 2020-01-01
Attending: INTERNAL MEDICINE
Payer: COMMERCIAL

## 2020-01-01 ENCOUNTER — TRANSCRIBE ORDERS (OUTPATIENT)
Dept: ONCOLOGY | Facility: CLINIC | Age: 80
End: 2020-01-01

## 2020-01-01 ENCOUNTER — ALLIED HEALTH/NURSE VISIT (OUTPATIENT)
Dept: FAMILY MEDICINE | Facility: CLINIC | Age: 80
End: 2020-01-01
Payer: COMMERCIAL

## 2020-01-01 ENCOUNTER — APPOINTMENT (OUTPATIENT)
Dept: CT IMAGING | Facility: CLINIC | Age: 80
End: 2020-01-01
Attending: EMERGENCY MEDICINE
Payer: COMMERCIAL

## 2020-01-01 ENCOUNTER — ANTICOAGULATION THERAPY VISIT (OUTPATIENT)
Dept: NURSING | Facility: CLINIC | Age: 80
End: 2020-01-01

## 2020-01-01 VITALS
TEMPERATURE: 98.7 F | DIASTOLIC BLOOD PRESSURE: 67 MMHG | WEIGHT: 198.3 LBS | OXYGEN SATURATION: 95 % | RESPIRATION RATE: 18 BRPM | HEIGHT: 74 IN | SYSTOLIC BLOOD PRESSURE: 142 MMHG | HEART RATE: 79 BPM | BODY MASS INDEX: 25.45 KG/M2

## 2020-01-01 VITALS
SYSTOLIC BLOOD PRESSURE: 133 MMHG | OXYGEN SATURATION: 95 % | RESPIRATION RATE: 18 BRPM | DIASTOLIC BLOOD PRESSURE: 83 MMHG | TEMPERATURE: 97.7 F | HEART RATE: 65 BPM

## 2020-01-01 VITALS
DIASTOLIC BLOOD PRESSURE: 75 MMHG | TEMPERATURE: 97.2 F | SYSTOLIC BLOOD PRESSURE: 122 MMHG | OXYGEN SATURATION: 93 % | RESPIRATION RATE: 18 BRPM | HEART RATE: 75 BPM

## 2020-01-01 VITALS
DIASTOLIC BLOOD PRESSURE: 75 MMHG | TEMPERATURE: 97.2 F | RESPIRATION RATE: 18 BRPM | OXYGEN SATURATION: 93 % | SYSTOLIC BLOOD PRESSURE: 122 MMHG | HEART RATE: 75 BPM

## 2020-01-01 VITALS
WEIGHT: 201 LBS | SYSTOLIC BLOOD PRESSURE: 122 MMHG | DIASTOLIC BLOOD PRESSURE: 78 MMHG | HEART RATE: 74 BPM | HEIGHT: 74 IN | TEMPERATURE: 96.3 F | BODY MASS INDEX: 25.8 KG/M2 | OXYGEN SATURATION: 99 %

## 2020-01-01 VITALS
OXYGEN SATURATION: 99 % | HEART RATE: 68 BPM | SYSTOLIC BLOOD PRESSURE: 135 MMHG | RESPIRATION RATE: 18 BRPM | DIASTOLIC BLOOD PRESSURE: 82 MMHG

## 2020-01-01 DIAGNOSIS — Z95.2 S/P MITRAL VALVE REPLACEMENT: ICD-10-CM

## 2020-01-01 DIAGNOSIS — I42.9 CARDIOMYOPATHY, UNSPECIFIED TYPE (H): ICD-10-CM

## 2020-01-01 DIAGNOSIS — Z79.01 LONG TERM CURRENT USE OF ANTICOAGULANT THERAPY: ICD-10-CM

## 2020-01-01 DIAGNOSIS — I48.0 PAROXYSMAL ATRIAL FIBRILLATION (H): ICD-10-CM

## 2020-01-01 DIAGNOSIS — N40.1 BPH WITH OBSTRUCTION/LOWER URINARY TRACT SYMPTOMS: Primary | ICD-10-CM

## 2020-01-01 DIAGNOSIS — E11.65 TYPE 2 DIABETES MELLITUS WITH HYPERGLYCEMIA, WITHOUT LONG-TERM CURRENT USE OF INSULIN (H): Primary | ICD-10-CM

## 2020-01-01 DIAGNOSIS — L60.0 INGROWING NAIL WITH INFECTION: ICD-10-CM

## 2020-01-01 DIAGNOSIS — T81.49XA INCISIONAL INFECTION: Primary | ICD-10-CM

## 2020-01-01 DIAGNOSIS — E11.29 TYPE 2 DIABETES MELLITUS WITH OTHER DIABETIC KIDNEY COMPLICATION, WITHOUT LONG-TERM CURRENT USE OF INSULIN (H): Primary | ICD-10-CM

## 2020-01-01 DIAGNOSIS — Z48.02 ENCOUNTER FOR REMOVAL OF SUTURES: Primary | ICD-10-CM

## 2020-01-01 DIAGNOSIS — R91.8 LUNG MASS: Primary | ICD-10-CM

## 2020-01-01 DIAGNOSIS — G58.9 NERVE PALSY: ICD-10-CM

## 2020-01-01 DIAGNOSIS — Z79.01 ANTICOAGULATION GOAL OF INR 2.5 TO 3.5: ICD-10-CM

## 2020-01-01 DIAGNOSIS — R97.20 ELEVATED PROSTATE SPECIFIC ANTIGEN (PSA): Primary | ICD-10-CM

## 2020-01-01 DIAGNOSIS — R91.8 LUNG MASS: ICD-10-CM

## 2020-01-01 DIAGNOSIS — N18.30 STAGE 3 CHRONIC KIDNEY DISEASE, UNSPECIFIED WHETHER STAGE 3A OR 3B CKD (H): ICD-10-CM

## 2020-01-01 DIAGNOSIS — H53.2 DIPLOPIA: ICD-10-CM

## 2020-01-01 DIAGNOSIS — I48.0 PAROXYSMAL ATRIAL FIBRILLATION (H): Primary | ICD-10-CM

## 2020-01-01 DIAGNOSIS — S01.21XA NASAL LACERATION, INITIAL ENCOUNTER: ICD-10-CM
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
## 2020-01-01 DIAGNOSIS — M79.661 PAIN OF RIGHT LOWER LEG: ICD-10-CM

## 2020-01-01 DIAGNOSIS — R91.8 PULMONARY NODULES: ICD-10-CM

## 2020-01-01 DIAGNOSIS — R91.8 PULMONARY NODULES: Primary | ICD-10-CM

## 2020-01-01 DIAGNOSIS — S01.81XA LACERATION OF FOREHEAD, INITIAL ENCOUNTER: ICD-10-CM

## 2020-01-01 DIAGNOSIS — Z23 NEED FOR PROPHYLACTIC VACCINATION AND INOCULATION AGAINST INFLUENZA: Primary | ICD-10-CM

## 2020-01-01 DIAGNOSIS — Z51.81 ANTICOAGULATION GOAL OF INR 2.5 TO 3.5: ICD-10-CM

## 2020-01-01 DIAGNOSIS — R97.20 ELEVATED PROSTATE SPECIFIC ANTIGEN (PSA): ICD-10-CM

## 2020-01-01 DIAGNOSIS — S00.511A LIP ABRASION, INITIAL ENCOUNTER: ICD-10-CM

## 2020-01-01 DIAGNOSIS — E78.5 HYPERLIPIDEMIA LDL GOAL <100: ICD-10-CM

## 2020-01-01 DIAGNOSIS — N13.8 BPH WITH OBSTRUCTION/LOWER URINARY TRACT SYMPTOMS: Primary | ICD-10-CM

## 2020-01-01 LAB
ANION GAP SERPL CALCULATED.3IONS-SCNC: 2 MMOL/L (ref 3–14)
ANION GAP SERPL CALCULATED.3IONS-SCNC: 6 MMOL/L (ref 3–14)
BASOPHILS # BLD AUTO: 0.2 10E9/L (ref 0–0.2)
BASOPHILS NFR BLD AUTO: 1.2 %
BUN SERPL-MCNC: 29 MG/DL (ref 7–30)
BUN SERPL-MCNC: 30 MG/DL (ref 7–30)
CALCIUM SERPL-MCNC: 8.8 MG/DL (ref 8.5–10.1)
CALCIUM SERPL-MCNC: 9 MG/DL (ref 8.5–10.1)
CAPILLARY BLOOD COLLECTION: NORMAL
CHLORIDE SERPL-SCNC: 109 MMOL/L (ref 94–109)
CHLORIDE SERPL-SCNC: 109 MMOL/L (ref 94–109)
CHOLEST SERPL-MCNC: 136 MG/DL
CO2 SERPL-SCNC: 24 MMOL/L (ref 20–32)
CO2 SERPL-SCNC: 27 MMOL/L (ref 20–32)
CREAT SERPL-MCNC: 1.5 MG/DL (ref 0.66–1.25)
CREAT SERPL-MCNC: 1.54 MG/DL (ref 0.66–1.25)
DIFFERENTIAL METHOD BLD: ABNORMAL
DLCOUNC-%PRED-PRE: 84 %
DLCOUNC-PRE: 23.48 ML/MIN/MMHG
DLCOUNC-PRED: 27.65 ML/MIN/MMHG
EJECTION FRACTION: NORMAL %
EOSINOPHIL # BLD AUTO: 0.4 10E9/L (ref 0–0.7)
EOSINOPHIL NFR BLD AUTO: 3 %
ERV-%PRED-PRE: 76 %
ERV-PRE: 1.03 L
ERV-PRED: 1.35 L
ERYTHROCYTE [DISTWIDTH] IN BLOOD BY AUTOMATED COUNT: 16.9 % (ref 10–15)
ERYTHROCYTE [DISTWIDTH] IN BLOOD BY AUTOMATED COUNT: 17.3 % (ref 10–15)
EXPTIME-PRE: 12.21 SEC
FEF2575-%PRED-PRE: 59 %
FEF2575-PRE: 1.34 L/SEC
FEF2575-PRED: 2.27 L/SEC
FEFMAX-%PRED-PRE: 79 %
FEFMAX-PRE: 6.62 L/SEC
FEFMAX-PRED: 8.29 L/SEC
FEV1-%PRED-PRE: 74 %
FEV1-PRE: 2.44 L
FEV1FEV6-PRE: 70 %
FEV1FEV6-PRED: 76 %
FEV1FVC-PRE: 66 %
FEV1FVC-PRED: 74 %
FEV1SVC-PRE: 66 %
FEV1SVC-PRED: 63 %
FIFMAX-PRE: 4.49 L/SEC
FRCPLETH-%PRED-PRE: 101 %
FRCPLETH-PRE: 4.08 L
FRCPLETH-PRED: 4.02 L
FVC-%PRED-PRE: 82 %
FVC-PRE: 3.71 L
FVC-PRED: 4.49 L
GFR SERPL CREATININE-BSD FRML MDRD: 42 ML/MIN/{1.73_M2}
GFR SERPL CREATININE-BSD FRML MDRD: 44 ML/MIN/{1.73_M2}
GLUCOSE BLDC GLUCOMTR-MCNC: 111 MG/DL (ref 70–99)
GLUCOSE BLDC GLUCOMTR-MCNC: 135 MG/DL (ref 70–99)
GLUCOSE BLDC GLUCOMTR-MCNC: 144 MG/DL (ref 70–99)
GLUCOSE BLDC GLUCOMTR-MCNC: 180 MG/DL (ref 70–99)
GLUCOSE BLDC GLUCOMTR-MCNC: 196 MG/DL (ref 70–99)
GLUCOSE BLDC GLUCOMTR-MCNC: 299 MG/DL (ref 70–99)
GLUCOSE SERPL-MCNC: 137 MG/DL (ref 70–99)
GLUCOSE SERPL-MCNC: 149 MG/DL (ref 70–99)
GLUCOSE SERPL-MCNC: 291 MG/DL (ref 70–99)
HBA1C MFR BLD: 8.3 % (ref 0–5.6)
HCT VFR BLD AUTO: 47.7 % (ref 40–53)
HCT VFR BLD AUTO: 48.9 % (ref 40–53)
HDLC SERPL-MCNC: 26 MG/DL
HGB BLD-MCNC: 15.2 G/DL (ref 13.3–17.7)
HGB BLD-MCNC: 15.4 G/DL (ref 13.3–17.7)
IC-%PRED-PRE: 68 %
IC-PRE: 2.67 L
IC-PRED: 3.91 L
IMM GRANULOCYTES # BLD: 0.1 10E9/L (ref 0–0.4)
IMM GRANULOCYTES NFR BLD: 0.8 %
INR BLD: 1.9 (ref 0.86–1.14)
INR BLD: 2.2 (ref 0.86–1.14)
INR BLD: 2.2 (ref 0.86–1.14)
INR BLD: 2.4 (ref 0.86–1.14)
INR BLD: 2.6 (ref 0.86–1.14)
INR BLD: 2.6 (ref 0.86–1.14)
INR BLD: 2.8 (ref 0.86–1.14)
INR BLD: 3 (ref 0.86–1.14)
INR BLD: 3.2 (ref 0.86–1.14)
INR BLD: 3.5 (ref 0.86–1.14)
INR POINT OF CARE: 1.9 (ref 0.86–1.14)
INR PPP: 2.2 (ref 0.86–1.14)
INR PPP: 2.7 (ref 0.9–1.1)
INR PPP: 2.78 (ref 0.86–1.14)
INR PPP: 2.85 (ref 0.86–1.14)
INR PPP: 3.1 (ref 0.86–1.14)
INR PPP: 3.1 (ref 0.9–1.1)
INR PPP: 3.15 (ref 0.86–1.14)
INTERPRETATION ECG - MUSE: NORMAL
LABORATORY COMMENT REPORT: NORMAL
LDLC SERPL CALC-MCNC: 68 MG/DL
LYMPHOCYTES # BLD AUTO: 1 10E9/L (ref 0.8–5.3)
LYMPHOCYTES NFR BLD AUTO: 7.5 %
MAGNESIUM SERPL-MCNC: 2.2 MG/DL (ref 1.6–2.3)
MCH RBC QN AUTO: 27.7 PG (ref 26.5–33)
MCH RBC QN AUTO: 27.8 PG (ref 26.5–33)
MCHC RBC AUTO-ENTMCNC: 31.5 G/DL (ref 31.5–36.5)
MCHC RBC AUTO-ENTMCNC: 31.9 G/DL (ref 31.5–36.5)
MCV RBC AUTO: 87 FL (ref 78–100)
MCV RBC AUTO: 88 FL (ref 78–100)
MONOCYTES # BLD AUTO: 1.4 10E9/L (ref 0–1.3)
MONOCYTES NFR BLD AUTO: 10.7 %
NEUTROPHILS # BLD AUTO: 10.3 10E9/L (ref 1.6–8.3)
NEUTROPHILS NFR BLD AUTO: 76.8 %
NONHDLC SERPL-MCNC: 110 MG/DL
NRBC # BLD AUTO: 0 10*3/UL
NRBC BLD AUTO-RTO: 0 /100
PLATELET # BLD AUTO: 462 10E9/L (ref 150–450)
PLATELET # BLD AUTO: 516 10E9/L (ref 150–450)
POTASSIUM SERPL-SCNC: 4.4 MMOL/L (ref 3.4–5.3)
POTASSIUM SERPL-SCNC: 4.4 MMOL/L (ref 3.4–5.3)
POTASSIUM SERPL-SCNC: 4.7 MMOL/L (ref 3.4–5.3)
PSA SERPL-ACNC: 1.59 UG/L (ref 0–4)
PSA SERPL-MCNC: 1.91 UG/L (ref 0–4)
RBC # BLD AUTO: 5.46 10E12/L (ref 4.4–5.9)
RBC # BLD AUTO: 5.55 10E12/L (ref 4.4–5.9)
RVPLETH-%PRED-PRE: 102 %
RVPLETH-PRE: 3.05 L
RVPLETH-PRED: 2.97 L
SARS-COV-2 RNA SPEC QL NAA+PROBE: NEGATIVE
SARS-COV-2 RNA SPEC QL NAA+PROBE: NORMAL
SODIUM SERPL-SCNC: 137 MMOL/L (ref 133–144)
SODIUM SERPL-SCNC: 138 MMOL/L (ref 133–144)
SPECIMEN SOURCE: NORMAL
SPECIMEN SOURCE: NORMAL
TLCPLETH-%PRED-PRE: 84 %
TLCPLETH-PRE: 6.74 L
TLCPLETH-PRED: 7.94 L
TRIGL SERPL-MCNC: 212 MG/DL
VA-%PRED-PRE: 75 %
VA-PRE: 5.46 L
VC-%PRED-PRE: 70 %
VC-PRE: 3.69 L
VC-PRED: 5.26 L
WBC # BLD AUTO: 12.2 10E9/L (ref 4–11)
WBC # BLD AUTO: 13.4 10E9/L (ref 4–11)

## 2020-01-01 PROCEDURE — 94729 DIFFUSING CAPACITY: CPT

## 2020-01-01 PROCEDURE — 36416 COLLJ CAPILLARY BLOOD SPEC: CPT | Performed by: FAMILY MEDICINE

## 2020-01-01 PROCEDURE — 90662 IIV NO PRSV INCREASED AG IM: CPT

## 2020-01-01 PROCEDURE — 36415 COLL VENOUS BLD VENIPUNCTURE: CPT | Performed by: PHYSICIAN ASSISTANT

## 2020-01-01 PROCEDURE — 85610 PROTHROMBIN TIME: CPT | Mod: QW | Performed by: FAMILY MEDICINE

## 2020-01-01 PROCEDURE — 93005 ELECTROCARDIOGRAM TRACING: CPT

## 2020-01-01 PROCEDURE — 250N000013 HC RX MED GY IP 250 OP 250 PS 637: Performed by: HOSPITALIST

## 2020-01-01 PROCEDURE — 99207 ZZC NO CHARGE NURSE ONLY: CPT | Performed by: FAMILY MEDICINE

## 2020-01-01 PROCEDURE — 94726 PLETHYSMOGRAPHY LUNG VOLUMES: CPT

## 2020-01-01 PROCEDURE — U0003 INFECTIOUS AGENT DETECTION BY NUCLEIC ACID (DNA OR RNA); SEVERE ACUTE RESPIRATORY SYNDROME CORONAVIRUS 2 (SARS-COV-2) (CORONAVIRUS DISEASE [COVID-19]), AMPLIFIED PROBE TECHNIQUE, MAKING USE OF HIGH THROUGHPUT TECHNOLOGIES AS DESCRIBED BY CMS-2020-01-R: HCPCS | Performed by: EMERGENCY MEDICINE

## 2020-01-01 PROCEDURE — 82947 ASSAY GLUCOSE BLOOD QUANT: CPT | Performed by: FAMILY MEDICINE

## 2020-01-01 PROCEDURE — 999N000208 ECHOCARDIOGRAM COMPLETE

## 2020-01-01 PROCEDURE — 71250 CT THORAX DX C-: CPT

## 2020-01-01 PROCEDURE — 85610 PROTHROMBIN TIME: CPT | Performed by: FAMILY MEDICINE

## 2020-01-01 PROCEDURE — 70450 CT HEAD/BRAIN W/O DYE: CPT

## 2020-01-01 PROCEDURE — 99223 1ST HOSP IP/OBS HIGH 75: CPT | Mod: AI | Performed by: HOSPITALIST

## 2020-01-01 PROCEDURE — 999N001017 HC STATISTIC GLUCOSE BY METER IP

## 2020-01-01 PROCEDURE — 93306 TTE W/DOPPLER COMPLETE: CPT | Mod: 26 | Performed by: INTERNAL MEDICINE

## 2020-01-01 PROCEDURE — 258N000001 HC RX 258: Performed by: HOSPITALIST

## 2020-01-01 PROCEDURE — 99202 OFFICE O/P NEW SF 15 MIN: CPT | Mod: 95 | Performed by: THORACIC SURGERY (CARDIOTHORACIC VASCULAR SURGERY)

## 2020-01-01 PROCEDURE — 255N000002 HC RX 255 OP 636: Performed by: HOSPITALIST

## 2020-01-01 PROCEDURE — 85027 COMPLETE CBC AUTOMATED: CPT | Performed by: HOSPITALIST

## 2020-01-01 PROCEDURE — 999N000157 HC STATISTIC RCP TIME EA 10 MIN

## 2020-01-01 PROCEDURE — 99213 OFFICE O/P EST LOW 20 MIN: CPT | Mod: TEL | Performed by: PHYSICIAN ASSISTANT

## 2020-01-01 PROCEDURE — 85610 PROTHROMBIN TIME: CPT | Performed by: PHYSICIAN ASSISTANT

## 2020-01-01 PROCEDURE — 97166 OT EVAL MOD COMPLEX 45 MIN: CPT | Mod: GO | Performed by: REHABILITATION PRACTITIONER

## 2020-01-01 PROCEDURE — G0103 PSA SCREENING: HCPCS | Performed by: UROLOGY

## 2020-01-01 PROCEDURE — 36416 COLLJ CAPILLARY BLOOD SPEC: CPT | Performed by: UROLOGY

## 2020-01-01 PROCEDURE — 97161 PT EVAL LOW COMPLEX 20 MIN: CPT | Mod: GP

## 2020-01-01 PROCEDURE — 72125 CT NECK SPINE W/O DYE: CPT

## 2020-01-01 PROCEDURE — 85610 PROTHROMBIN TIME: CPT | Performed by: EMERGENCY MEDICINE

## 2020-01-01 PROCEDURE — 84153 ASSAY OF PSA TOTAL: CPT | Performed by: UROLOGY

## 2020-01-01 PROCEDURE — 99207 PR NO CHARGE NURSE ONLY: CPT | Performed by: FAMILY MEDICINE

## 2020-01-01 PROCEDURE — 97530 THERAPEUTIC ACTIVITIES: CPT | Mod: GO | Performed by: REHABILITATION PRACTITIONER

## 2020-01-01 PROCEDURE — 80048 BASIC METABOLIC PNL TOTAL CA: CPT | Performed by: HOSPITALIST

## 2020-01-01 PROCEDURE — 120N000001 HC R&B MED SURG/OB

## 2020-01-01 PROCEDURE — 70496 CT ANGIOGRAPHY HEAD: CPT

## 2020-01-01 PROCEDURE — 250N000009 HC RX 250: Performed by: EMERGENCY MEDICINE

## 2020-01-01 PROCEDURE — 99285 EMERGENCY DEPT VISIT HI MDM: CPT | Mod: 25

## 2020-01-01 PROCEDURE — 84132 ASSAY OF SERUM POTASSIUM: CPT | Mod: 91 | Performed by: HOSPITALIST

## 2020-01-01 PROCEDURE — 99495 TRANSJ CARE MGMT MOD F2F 14D: CPT | Performed by: FAMILY MEDICINE

## 2020-01-01 PROCEDURE — 99284 EMERGENCY DEPT VISIT MOD MDM: CPT | Mod: 25

## 2020-01-01 PROCEDURE — 12011 RPR F/E/E/N/L/M 2.5 CM/<: CPT

## 2020-01-01 PROCEDURE — 36415 COLL VENOUS BLD VENIPUNCTURE: CPT | Performed by: HOSPITALIST

## 2020-01-01 PROCEDURE — 85610 PROTHROMBIN TIME: CPT | Performed by: HOSPITALIST

## 2020-01-01 PROCEDURE — G0426 INPT/ED TELECONSULT50: HCPCS | Mod: G0 | Performed by: NURSE PRACTITIONER

## 2020-01-01 PROCEDURE — 99207 ZZC NO CHARGE NURSE ONLY: CPT

## 2020-01-01 PROCEDURE — 85025 COMPLETE CBC W/AUTO DIFF WBC: CPT | Performed by: EMERGENCY MEDICINE

## 2020-01-01 PROCEDURE — 85610 PROTHROMBIN TIME: CPT | Mod: QW | Performed by: UROLOGY

## 2020-01-01 PROCEDURE — 80048 BASIC METABOLIC PNL TOTAL CA: CPT | Performed by: EMERGENCY MEDICINE

## 2020-01-01 PROCEDURE — 25000132 ZZH RX MED GY IP 250 OP 250 PS 637: Performed by: EMERGENCY MEDICINE

## 2020-01-01 PROCEDURE — 94010 BREATHING CAPACITY TEST: CPT

## 2020-01-01 PROCEDURE — 99213 OFFICE O/P EST LOW 20 MIN: CPT | Performed by: NURSE PRACTITIONER

## 2020-01-01 PROCEDURE — 93971 EXTREMITY STUDY: CPT | Mod: RT

## 2020-01-01 PROCEDURE — 99239 HOSP IP/OBS DSCHRG MGMT >30: CPT | Performed by: INTERNAL MEDICINE

## 2020-01-01 PROCEDURE — 83036 HEMOGLOBIN GLYCOSYLATED A1C: CPT | Performed by: EMERGENCY MEDICINE

## 2020-01-01 PROCEDURE — 250N000011 HC RX IP 250 OP 636: Performed by: EMERGENCY MEDICINE

## 2020-01-01 PROCEDURE — 97116 GAIT TRAINING THERAPY: CPT | Mod: GP

## 2020-01-01 PROCEDURE — 90471 IMMUNIZATION ADMIN: CPT

## 2020-01-01 PROCEDURE — 999N001193 HC VIDEO/TELEPHONE VISIT; NO CHARGE

## 2020-01-01 PROCEDURE — C9803 HOPD COVID-19 SPEC COLLECT: HCPCS

## 2020-01-01 PROCEDURE — 97535 SELF CARE MNGMENT TRAINING: CPT | Mod: GO | Performed by: REHABILITATION PRACTITIONER

## 2020-01-01 PROCEDURE — 80061 LIPID PANEL: CPT | Performed by: HOSPITALIST

## 2020-01-01 PROCEDURE — 83735 ASSAY OF MAGNESIUM: CPT | Performed by: HOSPITALIST

## 2020-01-01 PROCEDURE — 99214 OFFICE O/P EST MOD 30 MIN: CPT | Mod: GT | Performed by: INTERNAL MEDICINE

## 2020-01-01 RX ORDER — IOPAMIDOL 755 MG/ML
500 INJECTION, SOLUTION INTRAVASCULAR ONCE
Status: COMPLETED | OUTPATIENT
Start: 2020-01-01 | End: 2020-01-01

## 2020-01-01 RX ORDER — NICOTINE POLACRILEX 4 MG
15-30 LOZENGE BUCCAL
Status: DISCONTINUED | OUTPATIENT
Start: 2020-01-01 | End: 2020-01-01 | Stop reason: HOSPADM

## 2020-01-01 RX ORDER — DAPAGLIFLOZIN 5 MG/1
5 TABLET, FILM COATED ORAL DAILY
Qty: 30 TABLET | Refills: 0 | Status: SHIPPED | OUTPATIENT
Start: 2020-01-01 | End: 2020-01-01

## 2020-01-01 RX ORDER — ONDANSETRON 2 MG/ML
4 INJECTION INTRAMUSCULAR; INTRAVENOUS EVERY 6 HOURS PRN
Status: DISCONTINUED | OUTPATIENT
Start: 2020-01-01 | End: 2020-01-01 | Stop reason: HOSPADM

## 2020-01-01 RX ORDER — CEPHALEXIN 500 MG/1
500 CAPSULE ORAL 3 TIMES DAILY
Qty: 21 CAPSULE | Refills: 0 | Status: SHIPPED | OUTPATIENT
Start: 2020-01-01 | End: 2021-01-01

## 2020-01-01 RX ORDER — PRAVASTATIN SODIUM 20 MG
40 TABLET ORAL AT BEDTIME
Status: DISCONTINUED | OUTPATIENT
Start: 2020-01-01 | End: 2020-01-01 | Stop reason: HOSPADM

## 2020-01-01 RX ORDER — ACETAMINOPHEN 325 MG/1
650 TABLET ORAL ONCE
Status: COMPLETED | OUTPATIENT
Start: 2020-01-01 | End: 2020-01-01

## 2020-01-01 RX ORDER — WARFARIN SODIUM 1 MG/1
2 TABLET ORAL
Status: COMPLETED | OUTPATIENT
Start: 2020-01-01 | End: 2020-01-01

## 2020-01-01 RX ORDER — LIDOCAINE HYDROCHLORIDE 10 MG/ML
INJECTION, SOLUTION EPIDURAL; INFILTRATION; INTRACAUDAL; PERINEURAL
Status: DISCONTINUED
Start: 2020-01-01 | End: 2020-01-01 | Stop reason: HOSPADM

## 2020-01-01 RX ORDER — PRAVASTATIN SODIUM 40 MG
40 TABLET ORAL DAILY
Qty: 90 TABLET | Refills: 3 | Status: SHIPPED | OUTPATIENT
Start: 2020-01-01

## 2020-01-01 RX ORDER — AMOXICILLIN 500 MG/1
2000 CAPSULE ORAL
Qty: 8 CAPSULE | Refills: 1 | Status: SHIPPED | OUTPATIENT
Start: 2020-01-01 | End: 2021-01-01

## 2020-01-01 RX ORDER — LANCETS
EACH MISCELLANEOUS
Qty: 100 EACH | Refills: 11 | Status: ON HOLD | OUTPATIENT
Start: 2020-01-01 | End: 2021-01-01

## 2020-01-01 RX ORDER — LIDOCAINE HYDROCHLORIDE AND EPINEPHRINE 10; 10 MG/ML; UG/ML
INJECTION, SOLUTION INFILTRATION; PERINEURAL
Status: DISCONTINUED
Start: 2020-01-01 | End: 2020-01-01 | Stop reason: HOSPADM

## 2020-01-01 RX ORDER — METOPROLOL SUCCINATE 25 MG/1
25 TABLET, EXTENDED RELEASE ORAL DAILY
Status: DISCONTINUED | OUTPATIENT
Start: 2020-01-01 | End: 2020-01-01 | Stop reason: HOSPADM

## 2020-01-01 RX ORDER — GABAPENTIN 300 MG/1
300 CAPSULE ORAL 3 TIMES DAILY
Status: ON HOLD | COMMUNITY
Start: 2020-01-01 | End: 2020-01-01

## 2020-01-01 RX ORDER — WARFARIN SODIUM 2 MG/1
TABLET ORAL
Qty: 90 TABLET | Refills: 0 | Status: SHIPPED | OUTPATIENT
Start: 2020-01-01 | End: 2020-01-01

## 2020-01-01 RX ORDER — ACYCLOVIR 200 MG/1
30 CAPSULE ORAL ONCE
Status: COMPLETED | OUTPATIENT
Start: 2020-01-01 | End: 2020-01-01

## 2020-01-01 RX ORDER — FUROSEMIDE 20 MG
20 TABLET ORAL DAILY
Status: CANCELLED | OUTPATIENT
Start: 2020-01-01

## 2020-01-01 RX ORDER — ONDANSETRON 4 MG/1
4 TABLET, ORALLY DISINTEGRATING ORAL EVERY 6 HOURS PRN
Status: DISCONTINUED | OUTPATIENT
Start: 2020-01-01 | End: 2020-01-01 | Stop reason: HOSPADM

## 2020-01-01 RX ORDER — LIDOCAINE 40 MG/G
CREAM TOPICAL
Status: DISCONTINUED | OUTPATIENT
Start: 2020-01-01 | End: 2020-01-01 | Stop reason: HOSPADM

## 2020-01-01 RX ORDER — EPLERENONE 25 MG/1
25 TABLET, FILM COATED ORAL DAILY
Status: DISCONTINUED | OUTPATIENT
Start: 2020-01-01 | End: 2020-01-01 | Stop reason: HOSPADM

## 2020-01-01 RX ORDER — WARFARIN SODIUM 2 MG/1
TABLET ORAL
Qty: 90 TABLET | Refills: 1 | Status: SHIPPED | OUTPATIENT
Start: 2020-01-01 | End: 2021-01-01

## 2020-01-01 RX ORDER — DEXTROSE MONOHYDRATE 25 G/50ML
25-50 INJECTION, SOLUTION INTRAVENOUS
Status: DISCONTINUED | OUTPATIENT
Start: 2020-01-01 | End: 2020-01-01 | Stop reason: HOSPADM

## 2020-01-01 RX ORDER — ACETAMINOPHEN 325 MG/1
650 TABLET ORAL EVERY 4 HOURS PRN
Status: DISCONTINUED | OUTPATIENT
Start: 2020-01-01 | End: 2020-01-01 | Stop reason: HOSPADM

## 2020-01-01 RX ORDER — METOPROLOL SUCCINATE 50 MG/1
TABLET, EXTENDED RELEASE ORAL
Qty: 135 TABLET | Refills: 1 | Status: SHIPPED | OUTPATIENT
Start: 2020-01-01 | End: 2021-01-01

## 2020-01-01 RX ORDER — DAPAGLIFLOZIN 5 MG/1
5 TABLET, FILM COATED ORAL DAILY
Qty: 90 TABLET | Refills: 3 | Status: SHIPPED | OUTPATIENT
Start: 2020-01-01

## 2020-01-01 RX ORDER — GLUCOSAMINE HCL/CHONDROITIN SU 500-400 MG
CAPSULE ORAL
Qty: 100 EACH | Refills: 3 | Status: SHIPPED | OUTPATIENT
Start: 2020-01-01

## 2020-01-01 RX ORDER — DUTASTERIDE 0.5 MG/1
0.5 CAPSULE, LIQUID FILLED ORAL DAILY
Status: CANCELLED | OUTPATIENT
Start: 2020-01-01

## 2020-01-01 RX ORDER — GABAPENTIN 300 MG/1
300 CAPSULE ORAL 3 TIMES DAILY
Status: CANCELLED | OUTPATIENT
Start: 2020-01-01

## 2020-01-01 RX ORDER — EPLERENONE 25 MG/1
25 TABLET, FILM COATED ORAL DAILY
COMMUNITY
Start: 2020-01-01

## 2020-01-01 RX ADMIN — METOPROLOL SUCCINATE 25 MG: 25 TABLET, FILM COATED, EXTENDED RELEASE ORAL at 09:45

## 2020-01-01 RX ADMIN — SODIUM CHLORIDE 80 ML: 9 INJECTION, SOLUTION INTRAVENOUS at 13:43

## 2020-01-01 RX ADMIN — ACETAMINOPHEN 650 MG: 325 TABLET, FILM COATED ORAL at 15:03

## 2020-01-01 RX ADMIN — WARFARIN SODIUM 2 MG: 1 TABLET ORAL at 17:31

## 2020-01-01 RX ADMIN — PRAVASTATIN SODIUM 40 MG: 20 TABLET ORAL at 22:17

## 2020-01-01 RX ADMIN — EPLERENONE 25 MG: 25 TABLET, FILM COATED ORAL at 09:45

## 2020-01-01 RX ADMIN — SODIUM CHLORIDE 30 ML: 9 INJECTION, SOLUTION INTRAMUSCULAR; INTRAVENOUS; SUBCUTANEOUS at 10:25

## 2020-01-01 RX ADMIN — HUMAN ALBUMIN MICROSPHERES AND PERFLUTREN 3 ML: 10; .22 INJECTION, SOLUTION INTRAVENOUS at 10:30

## 2020-01-01 RX ADMIN — IOPAMIDOL 70 ML: 755 INJECTION, SOLUTION INTRAVENOUS at 13:43

## 2020-01-01 ASSESSMENT — ACTIVITIES OF DAILY LIVING (ADL)
PATIENT_/_FAMILY_COMMUNICATION_STYLE: SPOKEN LANGUAGE (ENGLISH OR BILINGUAL)
WEAR_GLASSES_OR_BLIND: NO
WEAR_GLASSES_OR_BLIND: YES
DIFFICULTY_EATING/SWALLOWING: NO
ADLS_ACUITY_SCORE: 13
DIFFICULTY_COMMUNICATING: NO
DOING_ERRANDS_INDEPENDENTLY_DIFFICULTY: NO
ADLS_ACUITY_SCORE: 14
FALL_HISTORY_WITHIN_LAST_SIX_MONTHS: NO
ADLS_ACUITY_SCORE: 13
ADLS_ACUITY_SCORE: 13
WALKING_OR_CLIMBING_STAIRS_DIFFICULTY: NO
DRESSING/BATHING_DIFFICULTY: NO
ADLS_ACUITY_SCORE: 12
CONCENTRATING,_REMEMBERING_OR_MAKING_DECISIONS_DIFFICULTY: NO
TOILETING_ISSUES: NO
HEARING_DIFFICULTY_OR_DEAF: NO
ADLS_ACUITY_SCORE: 13

## 2020-01-01 ASSESSMENT — PAIN SCALES - GENERAL: PAINLEVEL: NO PAIN (0)

## 2020-01-01 ASSESSMENT — ENCOUNTER SYMPTOMS
DIARRHEA: 0
FEVER: 0
WOUND: 1
JOINT SWELLING: 0
SHORTNESS OF BREATH: 0
SHORTNESS OF BREATH: 0
FEVER: 0
VOMITING: 0
COLOR CHANGE: 0
NECK PAIN: 0
HEADACHES: 0
COUGH: 0
NUMBNESS: 0
MYALGIAS: 1
ARTHRALGIAS: 1
WEAKNESS: 0

## 2020-01-01 ASSESSMENT — MIFFLIN-ST. JEOR
SCORE: 1703.28
SCORE: 1696.48
SCORE: 1684.23

## 2020-01-06 ENCOUNTER — ANTICOAGULATION THERAPY VISIT (OUTPATIENT)
Dept: NURSING | Facility: CLINIC | Age: 80
End: 2020-01-06
Payer: COMMERCIAL

## 2020-01-06 DIAGNOSIS — Z95.2 S/P MITRAL VALVE REPLACEMENT: ICD-10-CM

## 2020-01-06 DIAGNOSIS — Z79.01 LONG TERM CURRENT USE OF ANTICOAGULANT THERAPY: ICD-10-CM

## 2020-01-06 LAB — INR POINT OF CARE: 2 (ref 0.86–1.14)

## 2020-01-06 PROCEDURE — 85610 PROTHROMBIN TIME: CPT | Mod: QW

## 2020-01-06 PROCEDURE — 36416 COLLJ CAPILLARY BLOOD SPEC: CPT

## 2020-01-06 NOTE — PROGRESS NOTES
ANTICOAGULATION FOLLOW-UP CLINIC VISIT    Patient Name:  Tomás Cruz  Date:  2020  Contact Type:  Face to Face    SUBJECTIVE:  Patient Findings     Comments:   The patient was assessed for diet, medication, and activity level changes, missed or extra doses, bruising or bleeding, with no problem findings.        denies any reason for being low       Clinical Outcomes     Negatives:   Major bleeding event, Thromboembolic event, Anticoagulation-related hospital admission, Anticoagulation-related ED visit, Anticoagulation-related fatality    Comments:   The patient was assessed for diet, medication, and activity level changes, missed or extra doses, bruising or bleeding, with no problem findings.             OBJECTIVE    INR Protime   Date Value Ref Range Status   2020 2.0 (A) 0.86 - 1.14 Final       ASSESSMENT / PLAN  No question data found.  Anticoagulation Summary  As of 2020    INR goal:   2.5-3.5   TTR:   28.6 % (1 y)   INR used for dosin.0! (2020)   Warfarin maintenance plan:   2 mg (2 mg x 1) every Mon, Fri; 1 mg (2 mg x 0.5) all other days   Full warfarin instructions:   : 3 mg; : 2 mg; : 2 mg; Otherwise 2 mg every Mon, Fri; 1 mg all other days   Weekly warfarin total:   9 mg   Plan last modified:   Myra Holguin RN (2019)   Next INR check:   2020   Priority:   High   Target end date:       Indications    Long-term (current) use of anticoagulants [Z79.01] [Z79.01]  S/P mitral valve replacement [Z95.2]             Anticoagulation Episode Summary     INR check location:   Anticoagulation Clinic    Preferred lab:       Send INR reminders to:   [facility]    Comments:         Anticoagulation Care Providers     Provider Role Specialty Phone number    Mahamed Groves MD [facility] [phone]            See the Encounter Report to view Anticoagulation Flowsheet and Dosing Calendar (Go to Encounters tab in chart review, and find the  Anticoagulation Therapy Visit)    Dosage adjustment made based on physician directed care plan.    Myra Holguin RN

## 2020-01-07 ENCOUNTER — OFFICE VISIT (OUTPATIENT)
Dept: UROLOGY | Facility: CLINIC | Age: 80
End: 2020-01-07
Payer: COMMERCIAL

## 2020-01-07 VITALS
BODY MASS INDEX: 26.73 KG/M2 | SYSTOLIC BLOOD PRESSURE: 153 MMHG | WEIGHT: 208.3 LBS | HEIGHT: 74 IN | HEART RATE: 37 BPM | DIASTOLIC BLOOD PRESSURE: 82 MMHG

## 2020-01-07 DIAGNOSIS — N40.1 BPH WITH OBSTRUCTION/LOWER URINARY TRACT SYMPTOMS: ICD-10-CM

## 2020-01-07 DIAGNOSIS — N13.8 BPH WITH OBSTRUCTION/LOWER URINARY TRACT SYMPTOMS: ICD-10-CM

## 2020-01-07 RX ORDER — DUTASTERIDE 0.5 MG/1
0.5 CAPSULE, LIQUID FILLED ORAL DAILY
Qty: 90 CAPSULE | Refills: 3 | Status: ON HOLD | OUTPATIENT
Start: 2020-01-07 | End: 2020-01-01

## 2020-01-07 ASSESSMENT — PAIN SCALES - GENERAL: PAINLEVEL: NO PAIN (0)

## 2020-01-07 ASSESSMENT — MIFFLIN-ST. JEOR: SCORE: 1734.59

## 2020-01-07 NOTE — PATIENT INSTRUCTIONS
- Follow up with Dr. Groves in 6 days to address dizziness.    - Continue Avodart - reviewed side effect profile.  This shouldnt cause dizziness.    - Follow up with Dr. Medrano in April with a PSA first - to follow up regarding prostate cancer risk.   - Return to see me in 9 months.     Cat Zarco

## 2020-01-07 NOTE — NURSING NOTE
"Chief Complaint   Patient presents with     RECHECK       Blood pressure (!) 153/82, pulse (!) 37, height 1.88 m (6' 2\"), weight 94.5 kg (208 lb 4.8 oz). Body mass index is 26.74 kg/m .    Patient Active Problem List   Diagnosis     Impotence of organic origin     Hypersomnia with sleep apnea     Cardiomyopathy, nonischemic     Polyp of colon     Hyperlipidemia LDL goal <100     Advance Care Planning     Syncope     S/P mitral valve replacement     Health Care Home     Long-term (current) use of anticoagulants [Z79.01]     Essential tremor     Paroxysmal atrial fibrillation (H)     History of prosthetic heart valve     Automatic implantable cardioverter-defibrillator in situ     Elevated prostate specific antigen (PSA)     Decreased GFR     Essential hypertension with goal blood pressure less than 140/90     Arthritis of knee, right     Intention tremor     Cerebral infarction (H)     Atrial flutter (H)     Chronic systolic congestive heart failure (H)     Hypercalcemia     Lung mass     Neuropathy     Amnesia     CKD (chronic kidney disease) stage 3, GFR 30-59 ml/min (H)     Thrombocytosis (H)       No Known Allergies    Current Outpatient Medications   Medication Sig Dispense Refill     amoxicillin (AMOXIL) 500 MG capsule Take 4 capsules (2,000 mg) by mouth once as needed Prior to Dental appts 8 capsule 1     dutasteride (AVODART) 0.5 MG capsule Take 1 capsule (0.5 mg) by mouth daily 90 capsule 3     furosemide (LASIX) 20 MG tablet Take 1 tablet (20 mg) by mouth daily 90 tablet 1     metoprolol succinate ER (TOPROL-XL) 100 MG 24 hr tablet Take 1 tablet (100 mg) by mouth daily 90 tablet 1     multivitamin w/minerals (MULTI-VITAMIN) tablet Take 1 tablet by mouth daily       pravastatin (PRAVACHOL) 40 MG tablet Take 1 tablet (40 mg) by mouth daily 90 tablet 3     spironolactone (ALDACTONE) 25 MG tablet Take 1 tablet (25 mg) by mouth daily 90 tablet 1     warfarin ANTICOAGULANT (COUMADIN) 2 MG tablet TAKE 1 AND 1/2 " TABLETS (3 MG) ON MONDAY & 1 TABLET BY MOUTH ON ALL OTHER DAYS OF THE WK PER INR  tablet 0       Social History     Tobacco Use     Smoking status: Former Smoker     Packs/day: 1.00     Years: 20.00     Pack years: 20.00     Last attempt to quit: 1982     Years since quittin.6     Smokeless tobacco: Never Used   Substance Use Topics     Alcohol use: No     Drug use: No       Rochelle Oliver CMA, AMADO  2020  11:24 AM

## 2020-01-07 NOTE — Clinical Note
"1/7/2020       RE: Tomás Cruz  [address] [address]     Dear Colleague,    Thank you for referring your patient, Tomás Cruz, to the [facility] UROLOGY AND INST FOR PROSTATE AND UROLOGIC CANCERS at [facility]. Please see a copy of my visit note below.    HPI: Mr. Tomás Cruz is a 79 year old year old male presenting today for evaluation of chief complaint(s): RECHECK    Mr. Mckeon is a very pleasant 78-year-old male with PMH significant for right nephrectomy (2008, path showing benign cyst), mechanical heart valve on warfarin, afib, and he also has a pacemaker.  He has previously seen Dr. Saleem for elevated PSA (see below).  He has been unable to obtain MRI d/t pacemaker.  On 4/2/19 Dr. Saleem sent an ExoDx test with an IntelliScore of 20.8 corresponding with ABOVE the cutoff of high-grade prostate cancer indicating that there is a higher risk of high grade prostate cancer.  He started the patient on Flomax. Has never had a biopsy.  Is adopted so doesn't know family history.     When the patient was last seen in urology clinic on 10/8/19 by me his IPSS was:  23 (5,5,2,4,3,2,2) \"mixed\".  He hadn't taken the flomax out of concern for possible dizziness. He was having hesitancy, weak stream and sensation of incomplete emptying. His PVR was 60mL.  Avodart was RX'd.  A GARCÍA revealed a large prostate with granularity at left apex. His creatinine was 1.6mg/dL up from 1.3mg/dL therefore he was sent for a renal US to r/o abnormalities in his solitary left kidney - this showed a 4cm parapelvic cyst but was otherwise normal.     Today he returns in routine followup. He is taking Avodart. Nocturia x 0-1 - improved from previous.  Overall he feels his urinary symptoms are better.  He denies dysuria, hematuria. AUA SS today: 17 (4,3,4,1,3,1,1) \"mixed\" with high scores for urgency.   Previously used sildenafil for ED but no longer takes because this can " "cause dizziness.  At this point, sexual fxn is not a concern.  .     - Is having dizzy spells.  No worse or better today  - Has seen eye doctor. Has seen a neurologist.    - Has also had intermittent SOB.  Intermittent confusion.  In the spring was hospitalized for a CVA scare.    - Previously worked as a [employer]     REVIEW OF DIAGNOSTICS:  12/23/19 - Cr 1.75mg/dL  10/8/19 - UA: Negative  10/7/19 - PSA 4.49ng/dL  4/2/19 - PSA 4.70ng/dL  7/19/18 - PSA 5.3ng/dL  6/6/17 - PSA 4.0ng/dL  11/16/16 - PSA 4.8ng/dL    Current Outpatient Medications   Medication Sig Dispense Refill     amoxicillin (AMOXIL) 500 MG capsule Take 4 capsules (2,000 mg) by mouth once as needed Prior to Dental appts 8 capsule 1     dutasteride (AVODART) 0.5 MG capsule Take 1 capsule (0.5 mg) by mouth daily 90 capsule 3     furosemide (LASIX) 20 MG tablet Take 1 tablet (20 mg) by mouth daily 90 tablet 1     metoprolol succinate ER (TOPROL-XL) 100 MG 24 hr tablet Take 1 tablet (100 mg) by mouth daily 90 tablet 1     multivitamin w/minerals (MULTI-VITAMIN) tablet Take 1 tablet by mouth daily       pravastatin (PRAVACHOL) 40 MG tablet Take 1 tablet (40 mg) by mouth daily 90 tablet 3     spironolactone (ALDACTONE) 25 MG tablet Take 1 tablet (25 mg) by mouth daily 90 tablet 1     warfarin ANTICOAGULANT (COUMADIN) 2 MG tablet TAKE 1 AND 1/2 TABLETS (3 MG) ON MONDAY & 1 TABLET BY MOUTH ON ALL OTHER DAYS OF THE WK PER INR  tablet 0       ALLERGIES: Patient has no known allergies.      REVIEW OF SYSTEMS:  As above in HPI    GENERAL PHYSICAL EXAM:   Vitals: BP (!) 153/82   Pulse (!) 37   Ht 1.88 m (6' 2\")   Wt 94.5 kg (208 lb 4.8 oz)   BMI 26.74 kg/m     Body mass index is 26.74 kg/m .    GENERAL: Well groomed, well developed, well nourished male in NAD.  NEURO: Alert and oriented x 3.  PSYCH: Normal mood and affect, pleasant and agreeable during interview and exam.    LABS: The last test results for Mr. Tomás Cruz were reviewed:  PSA - "   Lab Results   Component Value Date    PSA 2.48 12/23/2019    PSA 4.49 10/07/2019    PSA 4.70 04/02/2019    PSA 5.30 07/19/2018    PSA 4.00 06/06/2017    PSA 4.80 11/16/2016    PSA 5.55 10/25/2016    PSA 3.82 10/12/2015    PSA 3.23 05/28/2013    PSA 2.43 02/03/2012     BMP -   Recent Labs   Lab Test 12/23/19  1047 10/07/19  1658 08/15/19  1046  04/15/19  0857  04/14/19  1914 04/14/19  0715    138 139   < >  --    < >  --  141   POTASSIUM 5.0 4.7 5.0   < > 4.1   < >  --  4.1   CHLORIDE 104 103 106   < >  --    < >  --  112*   CO2 27 25 23   < >  --    < >  --  23   BUN 39* 44* 39*   < >  --    < >  --  30   CR 1.75* 1.88* 1.61*   < >  --    < >  --  1.22   * 134* 154*   < >  --    < >  --  106*   BRAXTON 9.4 9.7 9.2   < >  --    < >  --  8.5   MAG  --   --   --   --  2.0  --  2.1 2.0    < > = values in this interval not displayed.       CBC -   Recent Labs   Lab Test 12/23/19  1047 10/07/19  1658 04/14/19  0715   WBC 11.0 12.9* 8.9   HGB 17.2 17.7 15.2   * 476* 332       ASSESSMENT:   1) Dizzy spells.   2) LUTS with urgency    PLAN:   - Pulse taken manually today - 70bpm.  Rate is irregular with pauses - does have history of atrial fibrillation but also has a ventricular pacemaker.  Will send inbasket to Dr. Groves.    - Also has appt with Dr. Groves in 6 days.  - Aware of sexual side effects and not concerned. Has stopped sildenafil  - RX: Avodart - wont cause dizziness.  Shouldn't interact with cardiac meds but patient will discuss with cardiologist first.  Discussed it will take 3-6 months to notice a urologic benefit  - Renal ultrasound.   - Return in 3 months to see me to reassess urinary symptoms.   - Follow up with Dr. Medrano in April with a PSA first - to follow up regarding prostate cancer risk.     JOSE Kern-FAVIOLA  Department of Urologic Surgery      Again, thank you for allowing me to participate in the care of your patient.      Sincerely,    JOSE Perdomo

## 2020-01-07 NOTE — PROGRESS NOTES
"HPI: Mr. Tomás Cruz is a 79 year old year old male presenting today for evaluation of chief complaint(s): RECHECK    Mr. Mckeon is a very pleasant 78-year-old male with PMH significant for right nephrectomy (2008, path showing benign cyst), mechanical heart valve on warfarin, afib, and he also has a pacemaker.  He has previously seen Dr. Saleem for elevated PSA (see below).  He has been unable to obtain MRI d/t pacemaker.  On 4/2/19 Dr. Saleem sent an ExoDx test with an IntelliScore of 20.8 corresponding with ABOVE the cutoff of high-grade prostate cancer indicating that there is a higher risk of high grade prostate cancer. He started the patient on Flomax. Has never had a biopsy.  Is adopted so doesn't know family history.     When the patient was last seen in urology clinic on 10/8/19 by me his IPSS was:  23 (5,5,2,4,3,2,2) \"mixed\".  He hadn't taken the flomax out of concern for possible dizziness. He was having hesitancy, weak stream and sensation of incomplete emptying. His PVR was 60mL.  Avodart was RX'd.  A GARCÍA revealed a large prostate with granularity at left apex. His creatinine was 1.6mg/dL up from 1.3mg/dL therefore he was sent for a renal US to r/o abnormalities in his solitary left kidney - this showed a 4cm parapelvic cyst but was otherwise normal.     Today he returns in routine followup. He is taking Avodart. Nocturia x 0-1 - improved from previous.  Overall he feels his urinary symptoms are better.  He denies dysuria, hematuria. AUA SS today: 17 (4,3,4,1,3,1,1) \"mixed\" with high scores for urgency.   Previously used sildenafil for ED but no longer takes because this can cause dizziness.  At this point, sexual fxn is not a concern.  .     - Is having dizzy spells.  No worse or better today  - Has seen eye doctor. Has seen a neurologist.    - Has also had intermittent SOB.  Intermittent confusion.  In the spring was hospitalized for a CVA scare.    - Previously worked as a " "[employer]     REVIEW OF DIAGNOSTICS:  12/23/19 - Cr 1.75mg/dL  10/8/19 - UA: Negative  10/7/19 - PSA 4.49ng/dL  4/2/19 - PSA 4.70ng/dL  7/19/18 - PSA 5.3ng/dL  6/6/17 - PSA 4.0ng/dL  11/16/16 - PSA 4.8ng/dL    Current Outpatient Medications   Medication Sig Dispense Refill     amoxicillin (AMOXIL) 500 MG capsule Take 4 capsules (2,000 mg) by mouth once as needed Prior to Dental appts 8 capsule 1     dutasteride (AVODART) 0.5 MG capsule Take 1 capsule (0.5 mg) by mouth daily 90 capsule 3     furosemide (LASIX) 20 MG tablet Take 1 tablet (20 mg) by mouth daily 90 tablet 1     metoprolol succinate ER (TOPROL-XL) 100 MG 24 hr tablet Take 1 tablet (100 mg) by mouth daily 90 tablet 1     multivitamin w/minerals (MULTI-VITAMIN) tablet Take 1 tablet by mouth daily       pravastatin (PRAVACHOL) 40 MG tablet Take 1 tablet (40 mg) by mouth daily 90 tablet 3     spironolactone (ALDACTONE) 25 MG tablet Take 1 tablet (25 mg) by mouth daily 90 tablet 1     warfarin ANTICOAGULANT (COUMADIN) 2 MG tablet TAKE 1 AND 1/2 TABLETS (3 MG) ON MONDAY & 1 TABLET BY MOUTH ON ALL OTHER DAYS OF THE WK PER INR  tablet 0       ALLERGIES: Patient has no known allergies.      REVIEW OF SYSTEMS:  As above in HPI    GENERAL PHYSICAL EXAM:   Vitals: BP (!) 153/82   Pulse (!) 37   Ht 1.88 m (6' 2\")   Wt 94.5 kg (208 lb 4.8 oz)   BMI 26.74 kg/m    Body mass index is 26.74 kg/m .    GENERAL: Well groomed, well developed, well nourished male in NAD.  NEURO: Alert and oriented x 3.  PSYCH: Normal mood and affect, pleasant and agreeable during interview and exam.    LABS: The last test results for Mr. Tomás Cruz were reviewed:  PSA -   Lab Results   Component Value Date    PSA 2.48 12/23/2019    PSA 4.49 10/07/2019    PSA 4.70 04/02/2019    PSA 5.30 07/19/2018    PSA 4.00 06/06/2017    PSA 4.80 11/16/2016    PSA 5.55 10/25/2016    PSA 3.82 10/12/2015    PSA 3.23 05/28/2013    PSA 2.43 02/03/2012     BMP -   Recent Labs   Lab Test " 12/23/19  1047 10/07/19  1658 08/15/19  1046  04/15/19  0857  04/14/19  1914 04/14/19  0715    138 139   < >  --    < >  --  141   POTASSIUM 5.0 4.7 5.0   < > 4.1   < >  --  4.1   CHLORIDE 104 103 106   < >  --    < >  --  112*   CO2 27 25 23   < >  --    < >  --  23   BUN 39* 44* 39*   < >  --    < >  --  30   CR 1.75* 1.88* 1.61*   < >  --    < >  --  1.22   * 134* 154*   < >  --    < >  --  106*   BRAXTON 9.4 9.7 9.2   < >  --    < >  --  8.5   MAG  --   --   --   --  2.0  --  2.1 2.0    < > = values in this interval not displayed.       CBC -   Recent Labs   Lab Test 12/23/19  1047 10/07/19  1658 04/14/19  0715   WBC 11.0 12.9* 8.9   HGB 17.2 17.7 15.2   * 476* 332       ASSESSMENT:   1) Dizzy spells.   2) LUTS with urgency    PLAN:   - Pulse taken manually today - 70bpm.  Rate is irregular with pauses - does have history of atrial fibrillation but also has a ventricular pacemaker.  Will send inbasket to Dr. Groves.    - Also has appt with Dr. Groves in 6 days.  - Aware of sexual side effects and not concerned. Has stopped sildenafil  - RX: Avodart - wont cause dizziness.  Shouldn't interact with cardiac meds but patient will discuss with cardiologist first.  Discussed it will take 3-6 months to notice a urologic benefit  - Renal ultrasound.   - Return in 3 months to see me to reassess urinary symptoms.   - Follow up with Dr. Medrano in April with a PSA first - to follow up regarding prostate cancer risk.     Cat Zarco PA-C  Department of Urologic Surgery

## 2020-01-13 ENCOUNTER — OFFICE VISIT (OUTPATIENT)
Dept: FAMILY MEDICINE | Facility: CLINIC | Age: 80
End: 2020-01-13
Payer: COMMERCIAL

## 2020-01-13 ENCOUNTER — ANTICOAGULATION THERAPY VISIT (OUTPATIENT)
Dept: NURSING | Facility: CLINIC | Age: 80
End: 2020-01-13
Payer: COMMERCIAL

## 2020-01-13 VITALS
DIASTOLIC BLOOD PRESSURE: 84 MMHG | SYSTOLIC BLOOD PRESSURE: 136 MMHG | BODY MASS INDEX: 26.69 KG/M2 | HEIGHT: 74 IN | WEIGHT: 208 LBS | HEART RATE: 50 BPM | OXYGEN SATURATION: 95 %

## 2020-01-13 DIAGNOSIS — D75.839 THROMBOCYTOSIS: ICD-10-CM

## 2020-01-13 DIAGNOSIS — H53.9 VISUAL DISTURBANCE: ICD-10-CM

## 2020-01-13 DIAGNOSIS — R42 RECURRENT VERTIGO: ICD-10-CM

## 2020-01-13 DIAGNOSIS — I42.9 CARDIOMYOPATHY, UNSPECIFIED TYPE (H): ICD-10-CM

## 2020-01-13 DIAGNOSIS — Z79.01 LONG TERM CURRENT USE OF ANTICOAGULANT THERAPY: ICD-10-CM

## 2020-01-13 DIAGNOSIS — M79.601 PAIN OF RIGHT UPPER EXTREMITY: ICD-10-CM

## 2020-01-13 DIAGNOSIS — N18.30 CKD (CHRONIC KIDNEY DISEASE) STAGE 3, GFR 30-59 ML/MIN (H): ICD-10-CM

## 2020-01-13 DIAGNOSIS — M54.2 NECK PAIN: ICD-10-CM

## 2020-01-13 DIAGNOSIS — Z95.2 S/P MITRAL VALVE REPLACEMENT: ICD-10-CM

## 2020-01-13 DIAGNOSIS — I48.0 PAROXYSMAL ATRIAL FIBRILLATION (H): ICD-10-CM

## 2020-01-13 DIAGNOSIS — R42 LIGHTHEADEDNESS: Primary | ICD-10-CM

## 2020-01-13 LAB
ERYTHROCYTE [DISTWIDTH] IN BLOOD BY AUTOMATED COUNT: 15.5 % (ref 10–15)
GLUCOSE BLD-MCNC: 188 MG/DL (ref 70–99)
HCT VFR BLD AUTO: 50.3 % (ref 40–53)
HGB BLD-MCNC: 16.5 G/DL (ref 13.3–17.7)
INR POINT OF CARE: 2.2 (ref 0.86–1.14)
MCH RBC QN AUTO: 29.1 PG (ref 26.5–33)
MCHC RBC AUTO-ENTMCNC: 32.8 G/DL (ref 31.5–36.5)
MCV RBC AUTO: 89 FL (ref 78–100)
PLATELET # BLD AUTO: 481 10E9/L (ref 150–450)
RBC # BLD AUTO: 5.67 10E12/L (ref 4.4–5.9)
WBC # BLD AUTO: 12.4 10E9/L (ref 4–11)

## 2020-01-13 PROCEDURE — 99214 OFFICE O/P EST MOD 30 MIN: CPT | Performed by: FAMILY MEDICINE

## 2020-01-13 PROCEDURE — 85610 PROTHROMBIN TIME: CPT | Mod: QW

## 2020-01-13 PROCEDURE — 85027 COMPLETE CBC AUTOMATED: CPT | Performed by: FAMILY MEDICINE

## 2020-01-13 PROCEDURE — 82947 ASSAY GLUCOSE BLOOD QUANT: CPT | Performed by: FAMILY MEDICINE

## 2020-01-13 PROCEDURE — 36416 COLLJ CAPILLARY BLOOD SPEC: CPT

## 2020-01-13 PROCEDURE — 80053 COMPREHEN METABOLIC PANEL: CPT | Performed by: FAMILY MEDICINE

## 2020-01-13 RX ORDER — METOPROLOL SUCCINATE 50 MG/1
75 TABLET, EXTENDED RELEASE ORAL DAILY
Qty: 135 TABLET | Refills: 1 | Status: SHIPPED | OUTPATIENT
Start: 2020-01-13 | End: 2020-01-01

## 2020-01-13 ASSESSMENT — MIFFLIN-ST. JEOR: SCORE: 1733.23

## 2020-01-13 NOTE — PROGRESS NOTES
"Subjective   Tomás Cruz is a 79 year old male who presents to clinic today for the following health issues:    HPI   Dizziness  Balance issues for the last 4-5 days increased frequency. He saw a neurologist. The first time it happened was 2-3 weeks after heart surgery when the room started spinning when he was sitting in a chair. He was playing Solitaire on the computer yesterday and his vision was blurry. He closed his eyes and opened his right eye and the bottom part of the vision was grey for about 20 seconds. His left eye was normal. The lightheaded spells are becoming more frequent. He gets vertigo at times.  H/o paroxysmal A fib    Right Arm Pain  It radiates into right arm from shoulder with neck pain and chest pain on the right side that last about 4-5 minutes increased from 1 minute. He does not get SOB. No typical chest pain symptoms     Reviewed and updated as needed this visit by provider:  Tobacco  Allergies  Meds  Problems  Med Hx  Surg Hx  Fam Hx         Review of Systems   Constitutional, HEENT, cardiovascular, pulmonary, GI, , musculoskeletal, neuro, skin, endocrine and psych systems are negative, except as otherwise noted.    This document serves as a record of the services and decisions personally performed and made by Mahamed Groves MD. It was created on his behalf by Ignacio Mix, a trained medical scribe. The creation of this document is based the provider's statements to the medical scribe.  Gris Mix 11:42 AM, January 13, 2020        Objective   /84   Pulse 50   Ht 1.88 m (6' 2\")   Wt 94.3 kg (208 lb)   SpO2 95%   BMI 26.71 kg/m   Body mass index is 26.71 kg/m .  Physical Exam   GENERAL: healthy, alert, well nourished, well hydrated, no distress  EYES: Eyes grossly normal to inspection, extraocular movements - intact, and PERRL  HENT: ear canals- normal; TMs- normal; Nose- normal; Mouth- no ulcers, no lesions  NECK: no tenderness, no adenopathy, no " asymmetry, no masses, no stiffness; thyroid- normal to palpation  RESP: lungs clear to auscultation - no rales, no rhonchi, no wheezes  CV: frequent extra beats, otherwise, regular rates and rhythm, normal S1 S2, no S3 or S4 and no murmur, no click or rub -  MS: extremities- no gross deformities noted, no edema  NEURO: strength and tone- normal, sensory exam- grossly normal, mentation- intact, speech- normal, reflexes- symmetric  PSYCH: Alert and oriented times 3; speech- coherent , normal rate and volume; able to articulate logical thoughts, able to abstract reason, no tangential thoughts, no hallucinations or delusions, affect- normal  LYMPHATICS: ant. cervical- normal, post. cervical- normal, axillary- normal, supraclavicular- normal, inguinal- normal    Results for orders placed or performed in visit on 01/13/20   Glucose, whole blood     Status: Abnormal   Result Value Ref Range    Glucose Whole Blood 188 (H) 70 - 99 mg/dL   CBC with platelets     Status: Abnormal   Result Value Ref Range    WBC 12.4 (H) 4.0 - 11.0 10e9/L    RBC Count 5.67 4.4 - 5.9 10e12/L    Hemoglobin 16.5 13.3 - 17.7 g/dL    Hematocrit 50.3 40.0 - 53.0 %    MCV 89 78 - 100 fl    MCH 29.1 26.5 - 33.0 pg    MCHC 32.8 31.5 - 36.5 g/dL    RDW 15.5 (H) 10.0 - 15.0 %    Platelet Count 481 (H) 150 - 450 10e9/L   Results for orders placed or performed in visit on 01/13/20   INR point of care     Status: Abnormal   Result Value Ref Range    INR Protime 2.2 (A) 0.86 - 1.14     Glucose   Date Value Ref Range Status   12/23/2019 138 (H) 70 - 99 mg/dL Final   10/07/2019 134 (H) 70 - 99 mg/dL Final   08/15/2019 154 (H) 70 - 99 mg/dL Final   04/19/2019 87 70 - 99 mg/dL Final   04/17/2019 95 70 - 99 mg/dL Final           Assessment & Plan   Tomás was seen today for recheck.    Diagnoses and all orders for this visit:    Lightheadedness / Recurrent vertigo - worse the last 4-5 days. Will try a lower metoprolol dose (75 mg). Recommend heart monitor and  track episodes. Event monitor ordered today. Call cardiology to set up a follow up. Exercise as tolerated is recommended.   -     Cardiac Event Monitor Adult Pediatric; Future    Cardiomyopathy, unspecified type (H) - controlled. Pulse is low today. Start lower dose:  -     metoprolol succinate ER (TOPROL-XL) 50 MG 24 hr tablet; Take 1.5 tablets (75 mg) by mouth daily  -     Cardiac Event Monitor Adult Pediatric; Future    Paroxysmal atrial fibrillation (H) - controlled - continue medication.  -     Cardiac Event Monitor Adult Pediatric; Future    Thrombocytosis (H) - controlled.     CKD (chronic kidney disease) stage 3, GFR 30-59 ml/min (H) - controlled.     Visual disturbance - episode yesterday with vision in his right eye distorted.     Pain of right upper extremity / Neck pain - recurrent with intermittent episodes and length of episodes increasing lately.     See Patient Instructions    Return in about 2 weeks (around 1/27/2020).    The information in this document, created by the medical scribe for me, accurately reflects the services I personally performed and the decisions made by me. I have reviewed and approved this document for accuracy prior to leaving the patient care area.  1:17 PM, 01/13/20      Marc Groves MD      [address] [address]  [email or web address]   Office: [phone]  Pager: [phone]

## 2020-01-13 NOTE — PROGRESS NOTES
ANTICOAGULATION FOLLOW-UP CLINIC VISIT    Patient Name:  Tomás Cruz  Date:  2020  Contact Type:  Face to Face    SUBJECTIVE:  Patient Findings     Comments:   The patient was assessed for diet, medication, and activity level changes, missed or extra doses, bruising or bleeding, with no problem findings.          Clinical Outcomes     Negatives:   Major bleeding event, Thromboembolic event, Anticoagulation-related hospital admission, Anticoagulation-related ED visit, Anticoagulation-related fatality    Comments:   The patient was assessed for diet, medication, and activity level changes, missed or extra doses, bruising or bleeding, with no problem findings.             OBJECTIVE    INR Protime   Date Value Ref Range Status   2020 2.2 (A) 0.86 - 1.14 Final       ASSESSMENT / PLAN  No question data found.  Anticoagulation Summary  As of 2020    INR goal:   2.5-3.5   TTR:   28.6 % (1 y)   INR used for dosin.2! (2020)   Warfarin maintenance plan:   2 mg (2 mg x 1) every Mon, Fri; 1 mg (2 mg x 0.5) all other days   Full warfarin instructions:   : 4 mg; : 2 mg; 1/15: 2 mg; Otherwise 2 mg every Mon, Fri; 1 mg all other days   Weekly warfarin total:   9 mg   Plan last modified:   Myra Holguin RN (2019)   Next INR check:   2020   Priority:   High   Target end date:       Indications    Long-term (current) use of anticoagulants [Z79.01] [Z79.01]  S/P mitral valve replacement [Z95.2]             Anticoagulation Episode Summary     INR check location:   Anticoagulation Clinic    Preferred lab:       Send INR reminders to:   [facility] [place]    Comments:         Anticoagulation Care Providers     Provider Role Specialty Phone number    Mahamed Groves MD [facility] [phone]            See the Encounter Report to view Anticoagulation Flowsheet and Dosing Calendar (Go to Encounters tab in chart review, and find the Anticoagulation Therapy  Visit)    Dosage adjustment made based on physician directed care plan.    Myra Holguin RN

## 2020-01-14 LAB
ALBUMIN SERPL-MCNC: 4.1 G/DL (ref 3.4–5)
ALP SERPL-CCNC: 69 U/L (ref 40–150)
ALT SERPL W P-5'-P-CCNC: 26 U/L (ref 0–70)
ANION GAP SERPL CALCULATED.3IONS-SCNC: 8 MMOL/L (ref 3–14)
AST SERPL W P-5'-P-CCNC: 15 U/L (ref 0–45)
BILIRUB SERPL-MCNC: 0.5 MG/DL (ref 0.2–1.3)
BUN SERPL-MCNC: 45 MG/DL (ref 7–30)
CALCIUM SERPL-MCNC: 9.5 MG/DL (ref 8.5–10.1)
CHLORIDE SERPL-SCNC: 104 MMOL/L (ref 94–109)
CO2 SERPL-SCNC: 26 MMOL/L (ref 20–32)
CREAT SERPL-MCNC: 1.74 MG/DL (ref 0.66–1.25)
GFR SERPL CREATININE-BSD FRML MDRD: 37 ML/MIN/{1.73_M2}
GLUCOSE SERPL-MCNC: 182 MG/DL (ref 70–99)
POTASSIUM SERPL-SCNC: 5.1 MMOL/L (ref 3.4–5.3)
PROT SERPL-MCNC: 7.6 G/DL (ref 6.8–8.8)
SODIUM SERPL-SCNC: 138 MMOL/L (ref 133–144)

## 2020-01-14 NOTE — RESULT ENCOUNTER NOTE
Dear Dereje,    Here is a summary of your recent test results:  -Normal red blood cell (hgb) levels, minimally elevated white blood cell count and essentially normal platelet levels.  -Glucose is mildly elevated but you were nonfasting and this is okay..    For additional lab test information, [email or web address] is an excellent reference.           Thank you very much for trusting me and [facility].     Healthy regards,  Marc Groves MD

## 2020-01-17 ENCOUNTER — HOSPITAL ENCOUNTER (OUTPATIENT)
Dept: CARDIOLOGY | Facility: CLINIC | Age: 80
Discharge: HOME | End: 2020-01-17
Attending: FAMILY MEDICINE | Admitting: FAMILY MEDICINE
Payer: COMMERCIAL

## 2020-01-17 DIAGNOSIS — I48.0 PAROXYSMAL ATRIAL FIBRILLATION (H): ICD-10-CM

## 2020-01-17 DIAGNOSIS — I42.9 CARDIOMYOPATHY, UNSPECIFIED TYPE (H): ICD-10-CM

## 2020-01-17 DIAGNOSIS — R42 LIGHTHEADEDNESS: ICD-10-CM

## 2020-01-17 PROCEDURE — 93270 REMOTE 30 DAY ECG REV/REPORT: CPT

## 2020-01-17 PROCEDURE — 93272 ECG/REVIEW INTERPRET ONLY: CPT | Performed by: INTERNAL MEDICINE

## 2020-01-20 ENCOUNTER — ANTICOAGULATION THERAPY VISIT (OUTPATIENT)
Dept: NURSING | Facility: CLINIC | Age: 80
End: 2020-01-20
Payer: COMMERCIAL

## 2020-01-20 DIAGNOSIS — Z79.01 LONG TERM CURRENT USE OF ANTICOAGULANT THERAPY: ICD-10-CM

## 2020-01-20 DIAGNOSIS — Z95.2 S/P MITRAL VALVE REPLACEMENT: ICD-10-CM

## 2020-01-20 LAB — INR POINT OF CARE: 2.7 (ref 0.86–1.14)

## 2020-01-20 PROCEDURE — 85610 PROTHROMBIN TIME: CPT | Mod: QW

## 2020-01-20 PROCEDURE — 36416 COLLJ CAPILLARY BLOOD SPEC: CPT

## 2020-01-20 NOTE — PROGRESS NOTES
ANTICOAGULATION FOLLOW-UP CLINIC VISIT    Patient Name:  Tomás Cruz  Date:  2020  Contact Type:  Face to Face    SUBJECTIVE:  Patient Findings         Clinical Outcomes     Negatives:   Major bleeding event, Thromboembolic event, Anticoagulation-related hospital admission, Anticoagulation-related ED visit, Anticoagulation-related fatality           OBJECTIVE    INR Protime   Date Value Ref Range Status   2020 2.7 (A) 0.86 - 1.14 Final       ASSESSMENT / PLAN  INR assessment THER    Recheck INR In: 2 WEEKS    INR Location Clinic      Anticoagulation Summary  As of 2020    INR goal:   2.5-3.5   TTR:   28.4 % (1 y)   INR used for dosin.7 (2020)   Warfarin maintenance plan:   1 mg (2 mg x 0.5) every Wed; 2 mg (2 mg x 1) all other days   Full warfarin instructions:   1 mg every Wed; 2 mg all other days   Weekly warfarin total:   13 mg   Plan last modified:   Rose Marie Madden RN (2020)   Next INR check:   2/3/2020   Priority:   High   Target end date:       Indications    Long-term (current) use of anticoagulants [Z79.01] [Z79.01]  S/P mitral valve replacement [Z95.2]             Anticoagulation Episode Summary     INR check location:   Anticoagulation Clinic    Preferred lab:       Send INR reminders to:   [facility] [place]    Comments:         Anticoagulation Care Providers     Provider Role Specialty Phone number    Mahamed Groves MD [facility] [phone]            See the Encounter Report to view Anticoagulation Flowsheet and Dosing Calendar (Go to Encounters tab in chart review, and find the Anticoagulation Therapy Visit)    Dosage adjustment made based on physician directed care plan.    Decision made to increase weekly maintenance dose as last 4 INRs have been sub-therapeutic.  He has had 13 mg for the past week and is within range. So weekly dose changed to 13 mg.          Rose Marie Madden, RN

## 2020-02-02 DIAGNOSIS — I42.9 CARDIOMYOPATHY, UNSPECIFIED TYPE (H): ICD-10-CM

## 2020-02-03 ENCOUNTER — ANTICOAGULATION THERAPY VISIT (OUTPATIENT)
Dept: NURSING | Facility: CLINIC | Age: 80
End: 2020-02-03
Payer: COMMERCIAL

## 2020-02-03 DIAGNOSIS — Z95.2 S/P MITRAL VALVE REPLACEMENT: ICD-10-CM

## 2020-02-03 DIAGNOSIS — Z79.01 LONG TERM CURRENT USE OF ANTICOAGULANT THERAPY: ICD-10-CM

## 2020-02-03 LAB — INR POINT OF CARE: 2.5 (ref 0.86–1.14)

## 2020-02-03 PROCEDURE — 36416 COLLJ CAPILLARY BLOOD SPEC: CPT

## 2020-02-03 PROCEDURE — 85610 PROTHROMBIN TIME: CPT | Mod: QW

## 2020-02-03 NOTE — TELEPHONE ENCOUNTER
Requested Prescriptions   Pending Prescriptions Disp Refills     amiodarone (PACERONE/CODARONE) 200 MG tablet [Pharmacy Med Name: AMIODARONE  MG TABLET]      Last Written Prescription Date: 05/14/19  Last Fill Quantity: 90 # refills:1  Last office visit: 1/13/2020 with prescribing provider:  Mahamed Groves MD         Future Office Visit:   Next 5 appointments (look out 90 days)    Feb 17, 2020 12:00 PM CST  Return Visit with Tash Nelson MD  [facility] ([facility]) [address] [address]    [facility] [address]  [phone]            90 tablet 1     Sig: TAKE 1 TABLET BY MOUTH EVERY DAY       There is no refill protocol information for this order

## 2020-02-03 NOTE — PROGRESS NOTES
ANTICOAGULATION FOLLOW-UP CLINIC VISIT    Patient Name:  Tomás Cruz  Date:  2/3/2020  Contact Type:  Face to Face    SUBJECTIVE:  Patient Findings     Comments:   Pt stated that he fell this morning, he landed on his hands and knees, pt stated that he did not hit his head, pt stated he just lost his balance.   Pt stated he does not have any brusing present         Clinical Outcomes     Negatives:   Major bleeding event, Thromboembolic event, Anticoagulation-related hospital admission, Anticoagulation-related ED visit, Anticoagulation-related fatality    Comments:   Pt stated that he fell this morning, he landed on his hands and knees, pt stated that he did not hit his head, pt stated he just lost his balance.   Pt stated he does not have any brusing present            OBJECTIVE    INR Protime   Date Value Ref Range Status   2020 2.5 (A) 0.86 - 1.14 Final       ASSESSMENT / PLAN  No question data found.  Anticoagulation Summary  As of 2/3/2020    INR goal:   2.5-3.5   TTR:   28.4 % (1 y)   INR used for dosin.5 (2/3/2020)   Warfarin maintenance plan:   1 mg (2 mg x 0.5) every Wed; 2 mg (2 mg x 1) all other days   Full warfarin instructions:   1 mg every Wed; 2 mg all other days   Weekly warfarin total:   13 mg   No change documented:   Myra Holguin RN   Plan last modified:   Rose Marie Madden RN (2020)   Next INR check:   3/2/2020   Priority:   High   Target end date:       Indications    Long-term (current) use of anticoagulants [Z79.01] [Z79.01]  S/P mitral valve replacement [Z95.2]             Anticoagulation Episode Summary     INR check location:   Anticoagulation Clinic    Preferred lab:       Send INR reminders to:   [facility] [place]    Comments:         Anticoagulation Care Providers     Provider Role Specialty Phone number    Mahamed Groves MD [facility] [phone]            See the Encounter Report to view Anticoagulation Flowsheet and Dosing  Calendar (Go to Encounters tab in chart review, and find the Anticoagulation Therapy Visit)    Dosage adjustment made based on physician directed care plan.    Myra Holguin RN

## 2020-02-04 RX ORDER — AMIODARONE HYDROCHLORIDE 200 MG/1
TABLET ORAL
Qty: 90 TABLET | Refills: 1 | Status: SHIPPED | OUTPATIENT
Start: 2020-02-04 | End: 2020-01-01

## 2020-02-05 NOTE — RESULT ENCOUNTER NOTE
Note to Staff: please call the patient to explain results and to check on current symptoms.  Also send a result note if they would like that.     -your heart monitor showed a normal rhythm with an occasional skipped beat.     For additional lab test information, [email or web address].org is an excellent reference.

## 2020-02-07 ENCOUNTER — HOSPITAL ENCOUNTER (OUTPATIENT)
Dept: CT IMAGING | Facility: CLINIC | Age: 80
Discharge: HOME | End: 2020-02-07
Attending: INTERNAL MEDICINE | Admitting: INTERNAL MEDICINE
Payer: COMMERCIAL

## 2020-02-07 DIAGNOSIS — R91.8 PULMONARY NODULES: ICD-10-CM

## 2020-02-07 PROCEDURE — 71250 CT THORAX DX C-: CPT

## 2020-02-12 ENCOUNTER — PATIENT OUTREACH (OUTPATIENT)
Dept: CARE COORDINATION | Facility: CLINIC | Age: 80
End: 2020-02-12

## 2020-02-12 NOTE — PROGRESS NOTES
Community Health Worker called the patient for Clinic Care Coordination outreach follow up on goals and action steps.  Spoke to Dereje    Patient has no concerns or needs at this time.  Discussed transitioning to Graduation.  Patient agreed    Plan  I will send message to Faye to complete Graduation    ______________________  Next Outreach: NA   Planned Outreach Frequency: Graduated  Preferred Phone Number: [phone]    Last Assessment Date: 04/18/19  Care Plan Completion Date: 04/18/19  ______________________

## 2020-02-13 NOTE — PROGRESS NOTES
Clinic Care Coordination Contact    CHW spoke with patient today for update.  Patient is doing well. No new needs identified.  Will plan to graduate.     Faye Akbar RN  Care Coordination  Phone:  [phone]  Email: [email or web address]  [facility] [place] [facility]

## 2020-02-17 ENCOUNTER — ONCOLOGY VISIT (OUTPATIENT)
Dept: ONCOLOGY | Facility: CLINIC | Age: 80
End: 2020-02-17
Attending: INTERNAL MEDICINE
Payer: COMMERCIAL

## 2020-02-17 ENCOUNTER — PATIENT OUTREACH (OUTPATIENT)
Dept: ONCOLOGY | Facility: CLINIC | Age: 80
End: 2020-02-17

## 2020-02-17 VITALS
TEMPERATURE: 96.9 F | OXYGEN SATURATION: 98 % | RESPIRATION RATE: 16 BRPM | HEART RATE: 55 BPM | DIASTOLIC BLOOD PRESSURE: 62 MMHG | BODY MASS INDEX: 26.85 KG/M2 | WEIGHT: 209.1 LBS | SYSTOLIC BLOOD PRESSURE: 128 MMHG

## 2020-02-17 DIAGNOSIS — I25.10 CORONARY ARTERY CALCIFICATION SEEN ON CT SCAN: ICD-10-CM

## 2020-02-17 DIAGNOSIS — N62 GYNECOMASTIA, MALE: Primary | ICD-10-CM

## 2020-02-17 DIAGNOSIS — R91.8 PULMONARY NODULES: ICD-10-CM

## 2020-02-17 PROCEDURE — G0463 HOSPITAL OUTPT CLINIC VISIT: HCPCS

## 2020-02-17 ASSESSMENT — PAIN SCALES - GENERAL: PAINLEVEL: NO PAIN (0)

## 2020-02-17 NOTE — NURSING NOTE
"Oncology Rooming Note    February 17, 2020 12:02 PM   Tomás Cruz is a 79 year old male who presents for:    Chief Complaint   Patient presents with     Oncology Clinic Visit     Pulmonary nodules      Initial Vitals: /62   Pulse 55   Temp 96.9  F (36.1  C) (Tympanic)   Resp 16   Wt 94.8 kg (209 lb 1.6 oz)   SpO2 98%   BMI 26.85 kg/m   Estimated body mass index is 26.85 kg/m  as calculated from the following:    Height as of 1/13/20: 1.88 m (6' 2\").    Weight as of this encounter: 94.8 kg (209 lb 1.6 oz). Body surface area is 2.22 meters squared.  No Pain (0) Comment: Data Unavailable   No LMP for male patient.  Allergies reviewed: Yes  Medications reviewed: Yes    Medications: Medication refills not needed today.  Pharmacy name entered into EPIC:    [facility]  [id number] - [place], [place] - [address]    Clinical concerns: Follow Up       Tracy Florentino CMA              "

## 2020-02-17 NOTE — LETTER
"    2/17/2020         RE: Tomás Cruz  [address] [facility] [address]        Dear Colleague,    Thank you for referring your patient, Tomás Cruz, to the [facility]. Please see a copy of my visit note below.    [facility] Nodule Clinic Follow Up Visit    Reason for Visit  Tomás Cruz is a 79 year old male who is followed for lung nodule  Pulmonary HPI    - reports difficulty with vertigo, had an episode last night   - incidental lung nodule found when hospitalized at [place] for unexplained transient global amnesia   He has had for years right chest pressure radiating to the arm with exertion, happens every few months, resolves in a few minutes. For a while he thought it was happening more recently but seems to have backed off.   - he reports a \"cold that he can't shake\" characterized by nasal congestion and runny nose with clear discharge. No cough, difficulty breathing or sore throat.  - he reports being in better shape in the past, treadmill regularly but with vertigo he has been limiting his physical activity. Recently got dizzy while shoveling snow and fell down.   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  The patient was seen and examined by Tash Nelson MD   Current Outpatient Medications   Medication     amiodarone (PACERONE/CODARONE) 200 MG tablet     amoxicillin (AMOXIL) 500 MG capsule     dutasteride (AVODART) " 0.5 MG capsule     furosemide (LASIX) 20 MG tablet     metoprolol succinate ER (TOPROL-XL) 50 MG 24 hr tablet     multivitamin w/minerals (MULTI-VITAMIN) tablet     pravastatin (PRAVACHOL) 40 MG tablet     spironolactone (ALDACTONE) 25 MG tablet     warfarin ANTICOAGULANT (COUMADIN) 2 MG tablet     No current facility-administered medications for this visit.      No Known Allergies  Social History     Socioeconomic History     Marital status:      Spouse name: Ava     Number of children: 4     Years of education: Not on file     Highest education level: Not on file   Occupational History     Occupation: retired [employer]     Employer: [employer]     Employer: [employer]   Social Needs     Financial resource strain: Not on file     Food insecurity:     Worry: Not on file     Inability: Not on file     Transportation needs:     Medical: Not on file     Non-medical: Not on file   Tobacco Use     Smoking status: Former Smoker     Packs/day: 1.00     Years: 20.00     Pack years: 20.00     Last attempt to quit: 1982     Years since quittin.7     Smokeless tobacco: Never Used   Substance and Sexual Activity     Alcohol use: No     Drug use: No     Sexual activity: Yes     Partners: Female   Lifestyle     Physical activity:     Days per week: Not on file     Minutes per session: Not on file     Stress: Not on file   Relationships     Social connections:     Talks on phone: Not on file     Gets together: Not on file     Attends Jew service: Not on file     Active member of club or organization: Not on file     Attends meetings of clubs or organizations: Not on file     Relationship status: Not on file     Intimate partner violence:     Fear of current or ex partner: Not on file     Emotionally abused: Not on file     Physically abused: Not on file     Forced sexual activity: Not on file   Other Topics Concern      Service Not Asked     Blood Transfusions Not Asked     Caffeine  Concern No     Occupational Exposure Not Asked     Hobby Hazards Not Asked     Sleep Concern Not Asked     Stress Concern Not Asked     Weight Concern Not Asked     Special Diet Not Asked     Back Care Not Asked     Exercise No     Comment: rec 30minutes; 4-5 x/wk; hard with knee pain     Bike Helmet Not Asked     Seat Belt Yes     Self-Exams Not Asked     Parent/sibling w/ CABG, MI or angioplasty before 65F 55M? No   Social History Narrative    Worked in a chemical lab plating [employer]     Past Medical History:   Diagnosis Date     Atrial fibrillation (H)     Transient around time of MVR.  Had MAZE procedure by report.     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     Hypertension goal BP (blood pressure) < 140/90 4/05     Lung mass 4/30/2019     Mitral valve disorders(424.0)     s/p mitral valve replacment- 2003; SBE prophylaxsis and anticoagulated; echo 2/13- EF 30-35%     Non-ischemic cardiomyopathy (H)     EF as above.  Follows with [facility].     Polyp of colon      Renal mass     Benign per patient report, Right kidney removed.     Past Surgical History:   Procedure Laterality Date     ANESTHESIA CARDIOVERSION N/A 4/15/2019    Procedure: ANESTHESIA CARDIOVERSION;  Surgeon: GENERIC ANESTHESIA PROVIDER;  Location: [facility] OR     ARTHROSCOPY KNEE Left 2001    left knee arthroscopy     ARTHROSCOPY KNEE  04/2018    right knee partial     AS TOTAL KNEE ARTHROPLASTY Left 2006    Dr. Castro     CARDIAC SURGERY  2003    Mitral valve replacement     CATARACT IOL, RT/LT  2014    Cataracts, bilateral     COLONOSCOPY  2016    [place] [facility]     COLONOSCOPY N/A 2/21/2019    Procedure: COMBINED COLONOSCOPY, SINGLE OR MULTIPLE BIOPSY/POLYPECTOMY BY BIOPSies by cold forcep;  Surgeon:  Ronald Henderson MD;  Location:  GI     HERNIA REPAIR  2005     IMPLANT AUTOMATIC IMPLANTABLE CARDIOVERTER DEFIBRILLATOR  5/2013     KIDNEY SURGERY  2008    Laparoscopic right radical nephrectomy.- due to complex hemorrhagic cyst     Family History   Adopted: Yes   Problem Relation Age of Onset     No Known Problems Daughter      No Known Problems Daughter      No Known Problems Daughter      No Known Problems Daughter      Diabetes No family hx of      Coronary Artery Disease No family hx of        Exam:   /62   Pulse 55   Temp 96.9  F (36.1  C) (Tympanic)   Resp 16   Wt 94.8 kg (209 lb 1.6 oz)   SpO2 98%   BMI 26.85 kg/m     GENERAL APPEARANCE: Well developed, well nourished, alert, and in no apparent distress.  EYES: PERRL, EOMI  HENT: Nasal mucosa with no edema and no hyperemia. No nasal polyps.  EARS: Canals clear, TMs normal  MOUTH: Oral mucosa is moist, without any lesions, no tonsillar enlargement, no oropharyngeal exudate.  NECK: supple, no masses, no thyromegaly.  LYMPHATICS: No significant axillary, cervical, or supraclavicular nodes.  RESP: normal percussion, good air flow throughout.  No crackles. No rhonchi. No wheezes.  CV: Normal S1, S2, regular rhythm, normal rate. No murmur.  No rub. No gallop. No LE edema.    Asymmetry, left gynecomastia, no palpable or other visible abnormality  ABDOMEN:  Bowel sounds normal, soft, nontender, no HSM or masses.   MS: extremities normal. No clubbing. No cyanosis.  SKIN: no rash on limited exam  NEURO: Mentation intact, speech normal, normal strength and tone, normal gait and stance  PSYCH: mentation appears normal. and affect normal/bright  Results:  - My interpretation of the images relevant for this visit includes: CT chest today compared to priors August and May 2019 ([place]) showing a new RUL ground glass opacity 2.2 x 1.4 cm not seen on previous CT in 2004.  He also has moderate coronary artery calcification    - My interpretation of the PFT's  relevant for this visit includes: None         Assessment and plan: Shiva is a 78 yo male with incidental lung nodule, atrial fibrillation, mechanical mitral valve.   Lung nodule - seen on chest CT, done while evaluating an episode of transient global amnesia May 2019. Stable but mixed solid appearance is suspicious for adenocarcinoma in situ. He is considering whether he would want surgery. If he decides he wants to discuss with Dr Stewart, he will call to make an appointment   recommend follow up CT in 9 months per Hasmukh 2017    CAC - in light of his chronic right chest pain, dizziness I will bring this to the attention of his cardiologist. Last testing was in 2015 so this is chronic.   Nipple pain on the left - images reviewed, probable gynecomastia due to spironolactone use      Again, thank you for allowing me to participate in the care of your patient.        Sincerely,        Tash Nelson MD

## 2020-02-17 NOTE — PROGRESS NOTES
"[facility] Follow Up Visit    Reason for Visit  Tomás Cruz is a 79 year old male who is followed for lung nodule  Pulmonary HPI    - reports difficulty with vertigo, had an episode last night   - incidental lung nodule found when hospitalized at [facility] [place] for unexplained transient global amnesia   He has had for years right chest pressure radiating to the arm with exertion, happens every few months, resolves in a few minutes. For a while he thought it was happening more recently but seems to have backed off.   - he reports a \"cold that he can't shake\" characterized by nasal congestion and runny nose with clear discharge. No cough, difficulty breathing or sore throat.  - he reports being in better shape in the past, treadmill regularly but with vertigo he has been limiting his physical activity. Recently got dizzy while shoveling snow and fell down.   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  The patient was seen and examined by Tash Nelson MD   Current Outpatient Medications   Medication     amiodarone (PACERONE/CODARONE) 200 MG tablet     amoxicillin (AMOXIL) 500 MG capsule     dutasteride (AVODART) 0.5 MG capsule     furosemide (LASIX) 20 MG tablet     metoprolol succinate ER (TOPROL-XL) 50 MG 24 hr tablet     multivitamin w/minerals (MULTI-VITAMIN) tablet     pravastatin (PRAVACHOL) 40 MG tablet     spironolactone (ALDACTONE) 25 MG tablet "     warfarin ANTICOAGULANT (COUMADIN) 2 MG tablet     No current facility-administered medications for this visit.      No Known Allergies  Social History     Socioeconomic History     Marital status:      Spouse name: Ava     Number of children: 4     Years of education: Not on file     Highest education level: Not on file   Occupational History     Occupation: retired postman     Employer: [employer]     Employer: RETIRED   Social Needs     Financial resource strain: Not on file     Food insecurity:     Worry: Not on file     Inability: Not on file     Transportation needs:     Medical: Not on file     Non-medical: Not on file   Tobacco Use     Smoking status: Former Smoker     Packs/day: 1.00     Years: 20.00     Pack years: 20.00     Last attempt to quit: 1982     Years since quittin.7     Smokeless tobacco: Never Used   Substance and Sexual Activity     Alcohol use: No     Drug use: No     Sexual activity: Yes     Partners: Female   Lifestyle     Physical activity:     Days per week: Not on file     Minutes per session: Not on file     Stress: Not on file   Relationships     Social connections:     Talks on phone: Not on file     Gets together: Not on file     Attends Congregational service: Not on file     Active member of club or organization: Not on file     Attends meetings of clubs or organizations: Not on file     Relationship status: Not on file     Intimate partner violence:     Fear of current or ex partner: Not on file     Emotionally abused: Not on file     Physically abused: Not on file     Forced sexual activity: Not on file   Other Topics Concern      Service Not Asked     Blood Transfusions Not Asked     Caffeine Concern No     Occupational Exposure Not Asked     Hobby Hazards Not Asked     Sleep Concern Not Asked     Stress Concern Not Asked     Weight Concern Not Asked     Special Diet Not Asked     Back Care Not Asked     Exercise No     Comment: rec  30minutes; 4-5 x/wk; hard with knee pain     Bike Helmet Not Asked     Seat Belt Yes     Self-Exams Not Asked     Parent/sibling w/ CABG, MI or angioplasty before 65F 55M? No   Social History Narrative    Worked in a chemical lab plating circuit boards     Past Medical History:   Diagnosis Date     Atrial fibrillation (H)     Transient around time of MVR.  Had MAZE procedure by report.     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     Hypertension goal BP (blood pressure) < 140/90 4/05     Lung mass 4/30/2019     Mitral valve disorders(424.0)     s/p mitral valve replacment- 2003; SBE prophylaxsis and anticoagulated; echo 2/13- EF 30-35%     Non-ischemic cardiomyopathy (H)     EF as above.  Follows with [facility].     Polyp of colon      Renal mass     Benign per patient report, Right kidney removed.     Past Surgical History:   Procedure Laterality Date     ANESTHESIA CARDIOVERSION N/A 4/15/2019    Procedure: ANESTHESIA CARDIOVERSION;  Surgeon: GENERIC ANESTHESIA PROVIDER;  Location: [facility] OR     ARTHROSCOPY KNEE Left 2001    left knee arthroscopy     ARTHROSCOPY KNEE  04/2018    right knee partial     AS TOTAL KNEE ARTHROPLASTY Left 2006    Dr. Castro     CARDIAC SURGERY  2003    Mitral valve replacement     CATARACT IOL, RT/LT  2014    Cataracts, bilateral     COLONOSCOPY  2016    [facility]     COLONOSCOPY N/A 2/21/2019    Procedure: COMBINED COLONOSCOPY, SINGLE OR MULTIPLE BIOPSY/POLYPECTOMY BY BIOPSies by cold forcep;  Surgeon: Ronald Henderson MD;  Location:  GI     HERNIA REPAIR  2005     IMPLANT AUTOMATIC IMPLANTABLE CARDIOVERTER DEFIBRILLATOR  5/2013     KIDNEY SURGERY  2008    Laparoscopic right radical nephrectomy.- due to complex hemorrhagic cyst     Family  History   Adopted: Yes   Problem Relation Age of Onset     No Known Problems Daughter      No Known Problems Daughter      No Known Problems Daughter      No Known Problems Daughter      Diabetes No family hx of      Coronary Artery Disease No family hx of        Exam:   /62   Pulse 55   Temp 96.9  F (36.1  C) (Tympanic)   Resp 16   Wt 94.8 kg (209 lb 1.6 oz)   SpO2 98%   BMI 26.85 kg/m    GENERAL APPEARANCE: Well developed, well nourished, alert, and in no apparent distress.  EYES: PERRL, EOMI  HENT: Nasal mucosa with no edema and no hyperemia. No nasal polyps.  EARS: Canals clear, TMs normal  MOUTH: Oral mucosa is moist, without any lesions, no tonsillar enlargement, no oropharyngeal exudate.  NECK: supple, no masses, no thyromegaly.  LYMPHATICS: No significant axillary, cervical, or supraclavicular nodes.  RESP: normal percussion, good air flow throughout.  No crackles. No rhonchi. No wheezes.  CV: Normal S1, S2, regular rhythm, normal rate. No murmur.  No rub. No gallop. No LE edema.    Asymmetry, left gynecomastia, no palpable or other visible abnormality  ABDOMEN:  Bowel sounds normal, soft, nontender, no HSM or masses.   MS: extremities normal. No clubbing. No cyanosis.  SKIN: no rash on limited exam  NEURO: Mentation intact, speech normal, normal strength and tone, normal gait and stance  PSYCH: mentation appears normal. and affect normal/bright  Results:  - My interpretation of the images relevant for this visit includes: CT chest today compared to priors August and May 2019 ([place]) showing a new RUL ground glass opacity 2.2 x 1.4 cm not seen on previous CT in 2004.  He also has moderate coronary artery calcification    - My interpretation of the PFT's relevant for this visit includes: None         Assessment and plan: Shiva is a 78 yo male with incidental lung nodule, atrial fibrillation, mechanical mitral valve.   Lung nodule - seen on chest CT, done while evaluating an episode of transient  global amnesia May 2019. Stable but mixed solid appearance is suspicious for adenocarcinoma in situ. He is considering whether he would want surgery. If he decides he wants to discuss with Dr Stewart, he will call to make an appointment   recommend follow up CT in 9 months per Hasmukh 2017    CAC - in light of his chronic right chest pain, dizziness I will bring this to the attention of his cardiologist. Last testing was in 2015 so this is chronic.   Nipple pain on the left - images reviewed, probable gynecomastia due to spironolactone use

## 2020-02-17 NOTE — PROGRESS NOTES
Writer was requested by Dr. Nelson to contact the patient's cardiologist's office (Dr. Lemuel Oakes with [facility]) about Dereje's CT scan results showing a moderate amount of coronary artery calcification. The patient is still complaining of chest pain, pressure, jaw pain and this has been an ongoing complaint but his last cardiac test was 2015.     Dr. Nelson also wants to notify Dr. Oakes that Dereje is experiencing nipple pain, which she feels may be related to his gynecomastia which may be from his spironolactone.     Writer called and left a message with Dr. Champ Gonsalves's nurse. Writer requested a call back at [phone], option 4 for the nurse line.     Padmini Clements RN, BSN, [facility]  Patient Care Coordinator  [facility]  [phone]

## 2020-02-17 NOTE — PATIENT INSTRUCTIONS
The CT scan you had here shows some plaque in the heart arteries. I will make sure your cardiologist knows about this so he can consider it in light of your arm and chest pain.     The lung nodule hasn't changed since May 2019 so that's good news. But it does look suspicious for possibly a slow-growing lung cancer. If we were to pursue diagnosis and treatment would likely be surgery (video-assisted thoracoscopic surgery or VATS).     Let's repeat a CT in 9 months to keep an eye on it. If you decide you'd like to meet with a chest surgeon to talk about what surgery would entail, we can arrange a visit with Dr Stewart here in the [facility].     CT and appointment with Dr. Nelson scheduled 11/16/20  Padmini Clements RN, BSN, [facility]  Patient Care Coordinator  [facility]  [phone]

## 2020-02-18 NOTE — PROGRESS NOTES
Brina called back to report that she spoke to Dr. Oakes who changed his medication to Eplerenone. She will call the patient to inform him of this change and further assess his symptoms.   Rupa Cano RN on 2/18/2020 at 11:51 AM

## 2020-03-02 ENCOUNTER — ANTICOAGULATION THERAPY VISIT (OUTPATIENT)
Dept: NURSING | Facility: CLINIC | Age: 80
End: 2020-03-02
Payer: COMMERCIAL

## 2020-03-02 DIAGNOSIS — Z95.2 S/P MITRAL VALVE REPLACEMENT: ICD-10-CM

## 2020-03-02 DIAGNOSIS — Z79.01 LONG TERM CURRENT USE OF ANTICOAGULANT THERAPY: ICD-10-CM

## 2020-03-02 LAB — INR POINT OF CARE: 3.5 (ref 0.86–1.14)

## 2020-03-02 PROCEDURE — 36416 COLLJ CAPILLARY BLOOD SPEC: CPT

## 2020-03-02 PROCEDURE — 85610 PROTHROMBIN TIME: CPT | Mod: QW

## 2020-03-02 NOTE — PROGRESS NOTES
ANTICOAGULATION FOLLOW-UP CLINIC VISIT    Patient Name:  Tomás Cruz  Date:  3/2/2020  Contact Type:  Face to Face    SUBJECTIVE:  Patient Findings     Comments:   The patient was assessed for diet, medication, and activity level changes, missed or extra doses, bruising or bleeding, with no problem findings.          Clinical Outcomes     Negatives:   Major bleeding event, Thromboembolic event, Anticoagulation-related hospital admission, Anticoagulation-related ED visit, Anticoagulation-related fatality    Comments:   The patient was assessed for diet, medication, and activity level changes, missed or extra doses, bruising or bleeding, with no problem findings.             OBJECTIVE    INR Protime   Date Value Ref Range Status   03/02/2020 3.5 (A) 0.86 - 1.14 Final       ASSESSMENT / PLAN  No question data found.  Anticoagulation Summary  As of 3/2/2020    INR goal:   2.5-3.5   TTR:   31.0 % (1 y)   INR used for dosing:   3.5 (3/2/2020)   Warfarin maintenance plan:   1 mg (2 mg x 0.5) every Wed; 2 mg (2 mg x 1) all other days   Full warfarin instructions:   1 mg every Wed; 2 mg all other days   Weekly warfarin total:   13 mg   No change documented:   Myra Holguin, RN   Plan last modified:   Rose Marie Madden, RN (1/20/2020)   Next INR check:   4/13/2020   Priority:   High   Target end date:       Indications    Long-term (current) use of anticoagulants [Z79.01] [Z79.01]  S/P mitral valve replacement [Z95.2]             Anticoagulation Episode Summary     INR check location:   Anticoagulation Clinic    Preferred lab:       Send INR reminders to:   [facility]    Comments:         Anticoagulation Care Providers     Provider Role Specialty Phone number    Mahamed Groves MD Referring [facility] [phone]            See the Encounter Report to view Anticoagulation Flowsheet and Dosing Calendar (Go to Encounters tab in chart review, and find the Anticoagulation Therapy Visit)      Pt stated  that he has been taking 2 mg daily for a long time       Dosage adjustment made based on physician directed care plan.    Myra Holguin RN

## 2020-03-11 ENCOUNTER — TRANSFERRED RECORDS (OUTPATIENT)
Dept: HEALTH INFORMATION MANAGEMENT | Facility: CLINIC | Age: 80
End: 2020-03-11

## 2020-03-13 ENCOUNTER — PATIENT OUTREACH (OUTPATIENT)
Dept: CARE COORDINATION | Facility: CLINIC | Age: 80
End: 2020-03-13

## 2020-03-13 NOTE — LETTER
[place] CARE COORDINATION  [address] [facility] [facility] [address]    March 13, 2020    Tomás Cruz  [address] [address]    Dear Tomás,  Your Care Team congratulates you on your journey to maintain wellness. This document will help guide you on your journey to maintain a healthy lifestyle.  You can use this to help you overcome any barriers you may encounter.  If you should have any questions or concerns, you can contact the members of your Care Team or contact your Primary Care Clinic for assistance.     Health Maintenance  Health Maintenance Reviewed:      My Access Plan  Medical Emergency 911   Primary Clinic Line [facility] - [phone]   24 Hour Appointment Line [phone] or  [phone] (toll-free)   24 Hour Nurse Line [phone] (toll-free)   Preferred Urgent Care     Preferred Hospital     Preferred Pharmacy [facility] - NON-EPRESCRIBE     Behavioral Health Crisis Line The National Suicide Prevention Lifeline at 1-602.320.9504 or 911     My Care Team Members  Patient Care Team       Relationship Specialty Notifications Start End    Mahamed Groves MD PCP - General   2/3/05     Phone: [phone] Fax: [phone]         [address] [place] [address]    Mahamed Groves MD Assigned PCP   11/16/14     Phone: [phone] Fax: [phone]         [address] [place] [address]    Sammie Saleem MD MD Cardiology  9/4/18     Phone: [phone] Fax: [phone]         [address] [facility] [address]    Мария Saleem MD MD Urology  9/5/18     Phone: [phone] Fax: [phone]         [address] [facility] [address]    Lindsey Benítez, RN Registered Nurse Oncology  9/5/18     Phone: [phone] Pager: [phone]                  Goals       COMPLETED: 1. Improve chronic symptoms (pt-stated)      Goal Statement: I would like to better manage my CHF  and remain out of the hospital.   Measure of Success: Patient will use Heart Failure action plan to monitor symptoms; symptoms will remain in green range. Patient will weigh himself daily and record weights. Patient will follow a low salt diet.   Supportive Steps to Achieve: Continued CCRN support. Patient will take medications as prescribed. Follow up with providers as recommended. Patient will call RNCC with questions, concerns, support needs. RNCC will be available as needed. RNCC to mail patient CHF action plan, weight log, and education in addition to introduction letter, complex care plan and medication list.   Barriers: Likes the taste of salt.   Strengths: Engaged in care coordination, feels he is in pretty good shape, motivated to get into an exercise regimen.   Date to Achieve By: 09/2019  Patient expressed understanding of goal: Yes    As of today's date 3/13/2020 goal is met at 76 - 100%.   Goal Status:  Complete  GOAL HAS BEEN MET    As of today's date 12/5/2019 goal is met at 76 - 100%.   Goal Status:  Complete  Per chart review,pt is following up with providers recommended.  No ED/IP admissions in over 6 months.  Patient to be moved to maintenance status.                             It has been your Clinic Care Team's pleasure to work with you on your goals.    Regards,    Faye Akbar RN  Care Coordination  Phone:  [phone]  Email: [email or web address]  [facility]

## 2020-03-13 NOTE — PROGRESS NOTES
Clinic Care Coordination Contact    Assessment: Care Coordinator contacted patient for 2 month follow up.  Patient has continued to follow the plan of care and assessment is negative for any new needs or concerns.    Enrollment status: Graduated.      Plan: No further outreaches at this time.  Patient will continue to follow the plan of care.  If new needs arise a new Care Coordination referral may be placed.  FYI to PCP    Faye Akbar RN  Care Coordination  Phone:  [phone]  Email: [email or web address]  [facility]

## 2020-03-13 NOTE — LETTER
[place] CARE COORDINATION  ***  March 13, 2020    Tomás Cruz  [address] [address]    Dear Tomás,  Your Care Team congratulates you on your journey to maintain wellness. This document will help guide you on your journey to maintain a healthy lifestyle.  You can use this to help you overcome any barriers you may encounter.  If you should have any questions or concerns, you can contact the members of your Care Team or contact your Primary Care Clinic for assistance.     Health Maintenance  Health Maintenance Reviewed:      My Access Plan  Medical Emergency 911   Primary Clinic Line [facility] - [phone]   24 Hour Appointment Line [phone] or  [phone] (toll-free)   24 Hour Nurse Line [phone] (toll-free)   Preferred Urgent Care     Preferred Hospital     Preferred Pharmacy [facility] - NON-EPRESCRIBE     Behavioral Health Crisis Line The National Suicide Prevention Lifeline at 1-785.764.2604 or 911     My Care Team Members  Patient Care Team       Relationship Specialty Notifications Start End    Mahamed Groves MD PCP - General   2/3/05     Phone: [phone] Fax: [phone]         [address] [facility] [address]    Mahamed Groves MD Assigned PCP   11/16/14     Phone: [phone] Fax: [phone]         [address] [place] [address]    Sammie Saleem MD MD Cardiology  9/4/18     Phone: [phone] Fax: [phone]         [address] [place] [address] [address] [facility] [address]    Мария Saleem MD MD Urology  9/5/18     Phone: [phone] Fax: [phone]         [address] [place] [address] [address] [facility] [address]    Lindsey Benítez, RN Registered Nurse Oncology  9/5/18     Phone: [phone] Pager: [phone]                  Goals       COMPLETED: 1. Improve chronic symptoms (pt-stated)      Goal Statement: I would like to better manage my CHF and remain out of the hospital.   Measure  of Success: Patient will use Heart Failure action plan to monitor symptoms; symptoms will remain in green range. Patient will weigh himself daily and record weights. Patient will follow a low salt diet.   Supportive Steps to Achieve: Continued CCRN support. Patient will take medications as prescribed. Follow up with providers as recommended. Patient will call RNCC with questions, concerns, support needs. RNCC will be available as needed. RNCC to mail patient CHF action plan, weight log, and education in addition to introduction letter, complex care plan and medication list.   Barriers: Likes the taste of salt.   Strengths: Engaged in care coordination, feels he is in pretty good shape, motivated to get into an exercise regimen.   Date to Achieve By: 09/2019  Patient expressed understanding of goal: Yes    As of today's date 12/5/2019 goal is met at 76 - 100%.   Goal Status:  Complete  Per chart review,pt is following up with providers recommended.  No ED/IP admissions in over 6 months.  Patient to be moved to maintenance status.                         Advance Care Plans/Directives Type:      {Additional ACP Information:743757}    It has been your Clinic Care Team's pleasure to work with you on your goals.    Regards,  Your Clinic Care Team

## 2020-03-26 NOTE — PROGRESS NOTES
INR standing order placed.      ANEL Ham, RN, PHN  [facility]  Office: [phone]  Fax: [phone]

## 2020-04-13 NOTE — PROGRESS NOTES
ANTICOAGULATION FOLLOW-UP CLINIC VISIT    Patient Name:  Tomás Cruz  Date:  2020  Contact Type:  Telephone/ left message with pts spouse     SUBJECTIVE:  Patient Findings     Comments:   Patient denies any identifiable changes that caused the sub therapeutic INR.           Clinical Outcomes     Negatives:   Major bleeding event, Thromboembolic event, Anticoagulation-related hospital admission, Anticoagulation-related ED visit, Anticoagulation-related fatality    Comments:   Patient denies any identifiable changes that caused the sub therapeutic INR.              OBJECTIVE    INR Point of Care   Date Value Ref Range Status   2020 2.2 (H) 0.86 - 1.14 Final     Comment:     This test is intended for monitoring Coumadin therapy.  Results are not   accurate in patients with prolonged INR due to factor deficiency.         ASSESSMENT / PLAN  No question data found.  Anticoagulation Summary  As of 2020    INR goal:   2.5-3.5   TTR:   31.5 % (1 y)   INR used for dosin.2! (2020)   Warfarin maintenance plan:   2 mg (2 mg x 1) every day   Full warfarin instructions:   : 4 mg; Otherwise 2 mg every day   Weekly warfarin total:   14 mg   Plan last modified:   Myra Holguin RN (3/2/2020)   Next INR check:   2020   Priority:   High   Target end date:   Indefinite    Indications    Long-term (current) use of anticoagulants [Z79.01] [Z79.01]  S/P mitral valve replacement [Z95.2]             Anticoagulation Episode Summary     INR check location:   Anticoagulation Clinic    Preferred lab:       Send INR reminders to:   [facility]    Comments:         Anticoagulation Care Providers     Provider Role Specialty Phone number    Mahamed Groves MD Referring [facility] [phone]            See the Encounter Report to view Anticoagulation Flowsheet and Dosing Calendar (Go to Encounters tab in chart review, and find the Anticoagulation Therapy Visit)    Dosage adjustment made  based on physician directed care plan.    Myra Holguin RN

## 2020-04-18 NOTE — ED TRIAGE NOTES
Pt took a tylenol PM for pain and was able to sleep overnight. Pt elevated and used heating pad yesterday and this am for pain. Pain started in right thigh earlier this week and through the week has went down leg. Main pain is now in right ankle.

## 2020-04-18 NOTE — DISCHARGE INSTRUCTIONS
Your ultrasound here today is reassuring.  There is no signs of blood clot in your leg.  At home, try gentle massage, heating, stretching.  Contact your primary care provider if pain continues next week.  Return if there is increased swelling or pain within the leg.  You may then need repeat ultrasound imaging.  Your INR was therapeutic today.

## 2020-04-18 NOTE — TELEPHONE ENCOUNTER
Someone will get him to the ER for evaluation today. He is on blood thinners. The pain moved from above his knee to below near his ankle this morning.   Gwendolyn Patterson RN  [facility]      Reason for Disposition    [1] SEVERE pain (e.g., excruciating, unable to do any normal activities) AND [2] not improved after 2 hours of pain medicine     Pain at 8    Additional Information    Negative: Looks like a broken bone or dislocated joint (e.g., crooked or deformed)    Negative: Sounds like a life-threatening emergency to the triager    Negative: Followed a leg injury    Negative: Leg swelling is main symptom    Negative: Back pain radiating (shooting) into leg(s)    Negative: Knee pain is main symptom    Negative: Ankle pain is main symptom    Negative: Pregnant    Negative: Chest pain    Negative: Difficulty breathing    Negative: Entire foot is cool or blue in comparison to other side    Negative: Unable to walk    Negative: [1] Red area or streak AND [2] fever    Negative: [1] Swollen joint AND [2] fever    Negative: [1] Cast on leg or ankle AND [2] now increased pain    Negative: Patient sounds very sick or weak to the triager    Protocols used: LEG PAIN-A-AH

## 2020-04-18 NOTE — ED AVS SNAPSHOT
[facility] Emergency Department  [address]  [facility] [address]  Phone:  [phone]  Fax:  [phone]                                    Tomás Cruz   MRN: [id number]    Department:  [facility] Emergency Department   Date of Visit:  4/18/2020           After Visit Summary Signature Page    I have received my discharge instructions, and my questions have been answered. I have discussed any challenges I see with this plan with the nurse or doctor.    ..........................................................................................................................................  Patient/Patient Representative Signature      ..........................................................................................................................................  Patient Representative Print Name and Relationship to Patient    ..................................................               ................................................  Date                                   Time    ..........................................................................................................................................  Reviewed by Signature/Title    ...................................................              ..............................................  Date                                               Time          22EPIC Rev 08/18

## 2020-04-18 NOTE — ED PROVIDER NOTES
History     Chief Complaint:  Ankle Pain.      The history is provided by the patient.      Tomás Cruz is a 79 year old male with a history of CKD, CHF, Afib, CVA, TIA, HTN, HLD, prostate cancer who presents to the emergency department for evaluation of ankle pain. Patient reports that earlier this week he started developing right upper leg pain, which is now mostly isolated around the right ankle. This pain has given him trouble walking but he has not been using a cane or walker. Last night he took Tylenol PM and was able to get a full night of sleep. He has also been using a heating pain. No pain medications were taken today. He has a history of right total knee arthroplasty approximately five years ago and notes that intermittently he will have some right knee pain as well but at this time the pain is isolated to the ankle. The ankle has no swelling or erythema and looks the same as his left ankle. He has never experienced similar symptoms in this ankle. No new activities. Denies recent injury, travels or hospitalizations. Patient on coumadin, last INR was 2.2 on 4/13/20. Goal 2.5-3.5 with his prosthetic valve. Denies, fever, chest pain, shortness of breath, cough, diarrhea, numbness or tingling. No history of DVT. No personal or family history of clotting disorders.     Allergies:  Spironolactone     Medications:    amiodarone (PACERONE/CODARONE)  amoxicillin (AMOXIL)  dutasteride (AVODART)  furosemide (LASIX)  metoprolol (TOPROL)  pravastatin (PRAVACHOL)  spironolactone (ALDACTONE)   warfarin (COUMADIN)     Past Medical History:    HLD  Cardiomyopathy, nonischemic   Implantable defibrillator  Thrombocytosis   CKD stage 3  Chronic systolic congestive heart failure  Hypercalcemia   Lung mass   Atrial flutter   TIA  Neuropathy   HTN  Arthritis  Syncope   Afib  Hypersomnia with sleep apnea   Impotence  CVA  Non-ischemic cardiomyopathy  Prostate cancer    Past Surgical History:    Mitral valve  replacement   Cardioversion  Left knee arthroscopy  Right TKA  Bilateral cataract removal  Colonoscopy  Hernia repair  Right radical nephrectomy    Family History:    No past pertinent family history on file.    Social History:  Former smoker.  Negative for alcohol use.  Negative for street drug use.   PCP: Mahamed Groves  Presents alone today.  Marital Status:   [2]     Review of Systems   Constitutional: Negative for fever.   Respiratory: Negative for cough and shortness of breath.    Cardiovascular: Negative for chest pain.   Gastrointestinal: Negative for diarrhea.   Musculoskeletal: Positive for arthralgias, gait problem and myalgias. Negative for joint swelling.   Skin: Negative for color change.   Neurological: Negative for numbness.        Negative for paresthesias    All other systems reviewed and are negative.    Physical Exam     Patient Vitals for the past 24 hrs:   BP Temp Temp src Pulse Resp SpO2   04/18/20 1100 133/83 -- -- 65 -- 95 %   04/18/20 1029 -- 97.7  F (36.5  C) Oral 76 18 99 %     Physical Exam  General: Well appearing, well nourished. Normal mood and affect.  Skin: Good turgor, no rash, no unusual bruising or prominent lesions.  HEENT: Head: Normocephalic, atraumatic, no visible masses.   Eyes: Conjunctiva clear.  Cardiac: Normal rate and regular rhythm, no murmur or gallop.   Lungs: Clear to auscultation.  Abdomen: Abdomen soft, non-tender. No rebound tenderness of guarding.   Musculoskeletal:   Right leg: No reproducible discomfort with palpation of the leg from hip to foot.  No appreciable edema.  No calf tenderness.  Full range of motion of right hip without significant difficulty.  Full flexion-extension of right knee without difficulty.  Palpation of the bones throughout the foot are non-tender. Dorsalis pedis and posterior tibial arteries intact. Normal capillary refill to all toes. The talus, navicular, tarsals, and metatarsals are without obvious fracture. Toes are grossly  normal. Normal sensation to the foot. Flexors and extensors to the toes are normal. Dorsiflexion and plantarflexion to the ankle/foot is normal. Internal and external rotation of the ankle.  Neurologic: Oriented x 3. GCS: 15.  Psychiatric: Intact recent and remote memory, judgment and insight, normal mood and affect.     Emergency Department Course     Imaging:  Radiology findings were communicated with the patient who voiced understanding of the findings.    US Lower Extremity Venous Duplex Right  IMPRESSION: No deep venous thrombosis in the right lower extremity  As per radiology.     Laboratory:  Laboratory findings were communicated with the patient who voiced understanding of the findings.    INR: 3.15 (H)     Emergency Department Course:  Past medical records, nursing notes, and vitals reviewed.    1041 I performed an exam of the patient as documented above.     IV was inserted and blood was drawn for laboratory testing, results above.  The patient was sent for a US while in the emergency department, results above.     1215 I rechecked the patient and discussed the results of his workup thus far.     Findings and plan explained to the Patient. Patient discharged home with instructions regarding supportive care, medications, and reasons to return. The importance of close follow-up was reviewed.    I personally reviewed the laboratory and imaging results with the Patient and answered all related questions prior to discharge.     Impression & Plan     Medical Decision Making:  Tomás Cruz is a 79 year old male who presents for evaluation of leg swelling. A broad differential was considered including Baker's cyst rupture, Baker's cyst, hematoma, compartment syndrome, muscle rupture, contusion, strain/sprain, fracture, dislocation DVT, superficial thrombophlebitis, myositis, cellulitis/abscess, amongst.  Ultrasound completed here is negative.  There are no signs of compartment syndrome or other worrisome  etiologies at this time.  I feel the patient can be discharged to have outpatient management.  I suspect muscular strain at this time.he does not activity change given the current pandemic and decreased activity.  Conservative and symptomatic measures discussed.  They will elevate the leg, do gentle stretching with dorsiflexion and plantarflexion, use Tylenol, ice, avoid strenuous activity.  Ace wrap was applied here, patient will continue use at home as this provides them relief.  They will follow-up with their primary care provider within the next few days if not improving.  If symptoms change or worsen, not improving within 7-10 days, they will re-present for repeat ultrasound.  They will also return to the ED for any changing or worsening symptoms, chest pain, shortness of breath, leg discoloration, worsening pain, new concerns. INR was therapeutic today. All questions answered prior to discharge.  Patient in agreement with the treatment plan as stated above.     Diagnosis:    ICD-10-CM    1. Pain of right lower leg  M79.661    2. Anticoagulation goal of INR 2.5 to 3.5  Z51.81     Z79.01        Disposition:  Discharged to home.    Scribe Disclosure:  I, Anthony Rincon, am serving as a scribe at 10:41 AM on 4/18/2020 to document services personally performed by Rachna Landrum PA based on my observations and the provider's statements to me.     This was created at least in part with a voice recognition software. Mistakes/typos may be present.      Rachna Landrum PA  04/18/20 1370

## 2020-04-18 NOTE — ED TRIAGE NOTES
ABCs intact. Pt is on warfarin. Pt c/o R upper leg pain for the past few weeks. Pt states pain now radiates down leg. Last INR 2.2 on Monday. Denies fever, cough, CP, SOB, diarrhea.

## 2020-04-28 NOTE — TELEPHONE ENCOUNTER
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 He does not know whether he needs to stop taking them or not in regards to him getting a cortisone shot in his back. Please give pt a call to assess. Thank you.    Phone Number Patient can be reached at: Home number on file [phone] (home)    Best Time:     Can we leave a detailed message on this number? YES    Call taken on 4/28/2020 at 1:30 PM by Clare Mcleod

## 2020-04-28 NOTE — TELEPHONE ENCOUNTER
Pt calling to report that he is going to have an injection in his back and needs to be off Warfarin for 5 days prior to procedure. Pt will need dosing to bridge. Writer advised this will be handled by an INR nurse.   Pt advised that INR RN will call back. Pt declined wanting a call back stating he is not getting call backs from anyone recently so he will hold.     Will transfer to INR nurse once available.    Fredis Ramos RN   [facility]

## 2020-04-28 NOTE — TELEPHONE ENCOUNTER
Start holding coumadin 5 days prior to procedure.  Two days after holding coumadin, start lovenox 100 mg every 12 hours.    Date: Instructions  4/29/2020: Hold Coumadin  4/30/2020: Hold Coumadin  5/1/2020: Hold coumadin, start Lovenox 100 mg every 12 hours  5/2/2020: Hold coumadin, Lovenox 100 mg every 12 hours  5/3/2020: Hold coumadin, Lovenox in AM only (if at least 24 hours prior to procedure)  5/4/2020: Day of procedure- 4 mg coumadin if OK with surgeon, no lovenox  5/5/2020: Coumadin 4 mg, Lovenox 100 mg every 12 hours  5/6/2020: Coumadin 2 mg, Lovenox 100 mg every 12 hours  5/7/2020: Lovenox in AM, INR appointment 10:45 am in [facility] lab      CrCl is 46.69      Rose Marie Madden, ANEL, RN, PHN  [facility]  Office: [phone]  Fax: [phone]

## 2020-04-28 NOTE — PROGRESS NOTES
ANTICOAGULATION FOLLOW-UP CLINIC VISIT    Patient Name:  Tomás Cruz  Date:  4/28/2020  Contact Type:  Telephone/ spoke wtkeri Reyez    SUBJECTIVE:  Patient Findings     Positives:   Upcoming invasive procedure (Cortisone injection in back on 5/4/2020)        Clinical Outcomes     Negatives:   Major bleeding event, Thromboembolic event, Anticoagulation-related hospital admission, Anticoagulation-related ED visit, Anticoagulation-related fatality           OBJECTIVE    INR   Date Value Ref Range Status   04/18/2020 3.15 (H) 0.86 - 1.14 Final       ASSESSMENT / PLAN  No question data found.  Anticoagulation Summary  As of 4/28/2020    INR goal:   2.5-3.5   TTR:   30.1 % (11.8 mo)   INR used for dosing:   No new INR was available at the time of this encounter.   Warfarin maintenance plan:   2 mg (2 mg x 1) every day   Full warfarin instructions:   4/29: Hold; 4/30: Hold; 5/1: Hold; 5/2: Hold; 5/3: Hold; 5/4: 4 mg; 5/5: 4 mg; Otherwise 2 mg every day   Weekly warfarin total:   14 mg   Plan last modified:   Myra Holguin RN (3/2/2020)   Next INR check:   5/7/2020   Priority:   High   Target end date:   Indefinite    Indications    Long-term (current) use of anticoagulants [Z79.01] [Z79.01]  S/P mitral valve replacement [Z95.2]             Anticoagulation Episode Summary     INR check location:   Anticoagulation Clinic    Preferred lab:       Send INR reminders to:   [facility]    Comments:         Anticoagulation Care Providers     Provider Role Specialty Phone number    Mahamed Groves MD Referring [facility] [phone]            See the Encounter Report to view Anticoagulation Flowsheet and Dosing Calendar (Go to Encounters tab in chart review, and find the Anticoagulation Therapy Visit)    Dosage adjustment made based on physician directed care plan.    Start holding coumadin 5 days prior to procedure.  Two days after holding coumadin, start lovenox 100 mg every 12 hours.    Date:  Instructions  4/29/2020: Hold Coumadin  4/30/2020: Hold Coumadin  5/1/2020: Hold coumadin, start Lovenox 100 mg every 12 hours  5/2/2020: Hold coumadin, Lovenox 100 mg every 12 hours  5/3/2020: Hold coumadin, Lovenox in AM only (if at least 24 hours prior to procedure)  5/4/2020: Day of procedure- 4 mg coumadin if OK with surgeon, no lovenox  5/5/2020: Coumadin 4 mg, Lovenox 100 mg every 12 hours  5/6/2020: Coumadin 2 mg, Lovenox 100 mg every 12 hours  5/7/2020: Lovenox in AM, INR appointment 10:45 am in [facility] lab      CrCl is 46.69      ANEL Ham, RN, PHN  [facility]  Office: [phone]  Fax: [phone]

## 2020-04-28 NOTE — PATIENT INSTRUCTIONS
Start holding coumadin 5 days prior to procedure.  Two days after holding coumadin, start lovenox 100 mg every 12 hours.    Date: Instructions  4/29/2020: Hold Coumadin  4/30/2020: Hold Coumadin  5/1/2020: Hold coumadin, start Lovenox 100 mg every 12 hours  5/2/2020: Hold coumadin, Lovenox 100 mg every 12 hours  5/3/2020: Hold coumadin, Lovenox in AM only (if at least 24 hours prior to procedure)  5/4/2020: Day of procedure- 4 mg coumadin if OK with surgeon, no lovenox  5/5/2020: Coumadin 4 mg, Lovenox 100 mg every 12 hours  5/6/2020: Coumadin 2 mg, Lovenox 100 mg every 12 hours  5/7/2020: Lovenox in AM, INR appointment 10:45 am in [facility] lab      CrCl is 46.69      Rose Marie Madden, ANEL, RN, PHN  [facility]  Office: [phone]  Fax: [phone]

## 2020-04-28 NOTE — TELEPHONE ENCOUNTER
He is having a cortisone shot in his back on 5/4/2020 at [facility] in [place].    He said they advised him to hold his Warfarin for 5 days before procedure.    He is wondering if he needs to be bridged with Lovenox?      Routing to provider pool to review and advise.      ANEL Ham, RN, N  [facility]  Office: [phone]  Fax: [phone]

## 2020-05-07 NOTE — PROGRESS NOTES
ANTICOAGULATION FOLLOW-UP CLINIC VISIT    Patient Name:  Tomás Cruz  Date:  2020  Contact Type:  Telephone/ spoke with patient    SUBJECTIVE:  Patient Findings     Positives:   Upcoming invasive procedure (patient was on hold for procedure, has since restarted warfarin, but only for days ago)    Comments:   The patient was assessed for diet, medication, and activity level changes, missed or extra doses, bruising or bleeding, with no problem findings.        Clinical Outcomes     Negatives:   Major bleeding event, Thromboembolic event, Anticoagulation-related hospital admission, Anticoagulation-related ED visit, Anticoagulation-related fatality    Comments:   The patient was assessed for diet, medication, and activity level changes, missed or extra doses, bruising or bleeding, with no problem findings.           OBJECTIVE    INR Point of Care   Date Value Ref Range Status   2020 2.2 (H) 0.86 - 1.14 Final     Comment:     This test is intended for monitoring Coumadin therapy.  Results are not   accurate in patients with prolonged INR due to factor deficiency.         ASSESSMENT / PLAN  INR assessment SUB    Recheck INR In: 1 DAY    INR Location Clinic      Anticoagulation Summary  As of 2020    INR goal:   2.5-3.5   TTR:   32.6 % (1 y)   INR used for dosin.2! (2020)   Warfarin maintenance plan:   2 mg (2 mg x 1) every day   Full warfarin instructions:   2 mg every day   Weekly warfarin total:   14 mg   Plan last modified:   Myra Holguin RN (3/2/2020)   Next INR check:   2020   Priority:   High   Target end date:   Indefinite    Indications    Long-term (current) use of anticoagulants [Z79.01] [Z79.01]  S/P mitral valve replacement [Z95.2]             Anticoagulation Episode Summary     INR check location:   Anticoagulation Clinic    Preferred lab:       Send INR reminders to:   [facility]    Comments:         Anticoagulation Care Providers     Provider Role Specialty  Phone number    Mahamed Groves MD Referring [facility] [phone]            See the Encounter Report to view Anticoagulation Flowsheet and Dosing Calendar (Go to Encounters tab in chart review, and find the Anticoagulation Therapy Visit)    Dosage adjustment made based on physician directed care plan - patient was on recent warfarin old for an injection. He reports that he has been taking double his warfarin dose since his injection on Monday. Advised to resume his usual dosing today and continue Lovenox injections. Recheck INR tomorrow.    ANEL HaleyN, RN

## 2020-05-08 NOTE — TELEPHONE ENCOUNTER
Patient has INR goal of 2.5-3.5. Patient restarted warfarin Monday evening. He is currently bridging with Lovenox.  INR today is 2.4.   Patient was sleeping at time of call. Per wife, try back in about 1 hour. No CTC on file.  Brina Caballero RN

## 2020-05-08 NOTE — TELEPHONE ENCOUNTER
Looks like in anticoag encounter Brina was able to speak for Dereje and instructions given. Esperanza Vallejo R.N.

## 2020-05-08 NOTE — PROGRESS NOTES
ANTICOAGULATION FOLLOW-UP CLINIC VISIT    Patient Name:  Tomás Cruz  Date:  5/8/2020  Contact Type:  Telephone/ spoke with the patient    SUBJECTIVE:  Patient Findings     Positives:   Change in health (Reports back pain is 10/10), Missed doses (missed 2 doses of Lovenox, one dose last evening and one this morning.), Change in medications (Reports taking old prescription of hydrocodone every 6 hours.for back pain that radiates down his leg.)        Clinical Outcomes     Negatives:   Major bleeding event, Thromboembolic event, Anticoagulation-related hospital admission, Anticoagulation-related ED visit, Anticoagulation-related fatality           OBJECTIVE    INR Point of Care   Date Value Ref Range Status   05/08/2020 2.4 (H) 0.86 - 1.14 Final     Comment:     This test is intended for monitoring Coumadin therapy.  Results are not   accurate in patients with prolonged INR due to factor deficiency.         ASSESSMENT / PLAN  INR assessment SUB    Recheck INR In: 3 DAYS    INR Location Outside lab      Anticoagulation Summary  As of 5/8/2020    INR goal:   2.5-3.5   TTR:   32.6 % (1 y)   INR used for dosing:   No new INR was available at the time of this encounter.   Warfarin maintenance plan:   2 mg (2 mg x 1) every day   Full warfarin instructions:   2 mg every day   Weekly warfarin total:   14 mg   Plan last modified:   Myra Holguin RN (3/2/2020)   Next INR check:      Priority:   High   Target end date:   Indefinite    Indications    Long-term (current) use of anticoagulants [Z79.01] [Z79.01]  S/P mitral valve replacement [Z95.2]             Anticoagulation Episode Summary     INR check location:   Anticoagulation Clinic    Preferred lab:       Send INR reminders to:   [facility]    Comments:         Anticoagulation Care Providers     Provider Role Specialty Phone number    Mahamed Groves MD Referring [facility] [phone]            See the Encounter Report to view Anticoagulation  Flowsheet and Dosing Calendar (Go to Encounters tab in chart review, and find the Anticoagulation Therapy Visit)    Patient to continue warfarin 2 mg daily and recheck INR on Monday 5/11. Reinforced importance of taking warfarin and continuing Lovenox injection to prevent clotting. Signs of clotting reviewed.  Advised to do injection of Lovenox now and resume 12 hour intervals tomorrow.  Patient verbalizes understanding. Understands that there is a refill of Lovenox at the pharmacy.     Brina Caballero RN

## 2020-05-11 NOTE — PROGRESS NOTES
ANTICOAGULATION FOLLOW-UP CLINIC VISIT    Patient Name:  Tomás Cruz  Date:  2020  Contact Type:  Telephone/ lab    SUBJECTIVE:  Patient Findings     Positives:   Other complaints    Comments:   Recent hold, still having some pain        Clinical Outcomes     Negatives:   Major bleeding event, Thromboembolic event, Anticoagulation-related hospital admission, Anticoagulation-related ED visit, Anticoagulation-related fatality    Comments:   Recent hold, still having some pain           OBJECTIVE    Recent labs: (last 7 days)     20  1039   INR 2.8*       ASSESSMENT / PLAN  INR assessment THER    Recheck INR In: 1 WEEK    INR Location Clinic      Anticoagulation Summary  As of 2020    INR goal:   2.5-3.5   TTR:   33.3 % (1 y)   INR used for dosin.8 (2020)   Warfarin maintenance plan:   2 mg (2 mg x 1) every day   Full warfarin instructions:   2 mg every day   Weekly warfarin total:   14 mg   No change documented:   Esperanza Vallejo, RN   Plan last modified:   Myra Holguin RN (3/2/2020)   Next INR check:   2020   Priority:   High   Target end date:   Indefinite    Indications    Long-term (current) use of anticoagulants [Z79.01] [Z79.01]  S/P mitral valve replacement [Z95.2]             Anticoagulation Episode Summary     INR check location:   Anticoagulation Clinic    Preferred lab:       Send INR reminders to:   [facility]    Comments:         Anticoagulation Care Providers     Provider Role Specialty Phone number    Mahamed Groves MD Referring [facility] [phone]            See the Encounter Report to view Anticoagulation Flowsheet and Dosing Calendar (Go to Encounters tab in chart review, and find the Anticoagulation Therapy Visit)    Dosage adjustment made based on physician directed care plan.    In range today, will stop Lovenox, did injection this morning. Will return to maintenance dosage and will recheck in one week. Had injection in back, says  still in a lot of pain in the morning, but it gets better throughout the day. Increasing activity as tolerated.         Esperanza Vallejo RN

## 2020-05-18 NOTE — PROGRESS NOTES
ANTICOAGULATION FOLLOW-UP CLINIC VISIT    Patient Name:  Tomás Cruz  Date:  5/18/2020  Contact Type:  Telephone/ lab POC    SUBJECTIVE:  Patient Findings     Positives:   Missed doses             OBJECTIVE    Recent labs: (last 7 days)     05/18/20  1303   INR 1.9*       ASSESSMENT / PLAN  INR assessment SUB    Recheck INR In: 8 DAYS    INR Location Clinic      Anticoagulation Summary  As of 5/18/2020    INR goal:   2.5-3.5   TTR:   33.9 % (1 y)   INR used for dosing:      Warfarin maintenance plan:   2 mg (2 mg x 1) every day   Full warfarin instructions:   5/18: 4 mg; Otherwise 2 mg every day   Weekly warfarin total:   14 mg   Plan last modified:   Myra Holguin RN (3/2/2020)   Next INR check:   5/26/2020   Priority:   High   Target end date:   Indefinite    Indications    Long-term (current) use of anticoagulants [Z79.01] [Z79.01]  S/P mitral valve replacement [Z95.2]             Anticoagulation Episode Summary     INR check location:   Anticoagulation Clinic    Preferred lab:       Send INR reminders to:   [facility]    Comments:         Anticoagulation Care Providers     Provider Role Specialty Phone number    Mahamed Groves MD Referring [facility] [phone]            See the Encounter Report to view Anticoagulation Flowsheet and Dosing Calendar (Go to Encounters tab in chart review, and find the Anticoagulation Therapy Visit)    Dosage adjustment made based on physician directed care plan.    Dereje missed a dose last week and took the next day, then missed yesterday and took extra today. Since he has already added extra dose will make no further changes at this time. Will recheck in 8 days. Need to closely monitor to make sure he returns to baseline, if still low at that time may need to make further dosage changes. Dereje in agreement with this plan. Appointment scheduled.     Esperanza Vallejo RN

## 2020-05-26 NOTE — PROGRESS NOTES
ANTICOAGULATION FOLLOW-UP CLINIC VISIT    Patient Name:  Tomás Cruz  Date:  2020  Contact Type:  Telephone/ with pt on the phone     SUBJECTIVE:  Patient Findings     Comments:   The patient was assessed for diet, medication, and activity level changes, missed or extra doses, bruising or bleeding, with no problem findings.          Clinical Outcomes     Negatives:   Major bleeding event, Thromboembolic event, Anticoagulation-related hospital admission, Anticoagulation-related ED visit, Anticoagulation-related fatality    Comments:   The patient was assessed for diet, medication, and activity level changes, missed or extra doses, bruising or bleeding, with no problem findings.             OBJECTIVE    Recent labs: (last 7 days)     20  1405   INR 2.4*       ASSESSMENT / PLAN  No question data found.  Anticoagulation Summary  As of 2020    INR goal:   2.5-3.5   TTR:   33.8 % (1 y)   INR used for dosin.4! (2020)   Warfarin maintenance plan:   2 mg (2 mg x 1) every day   Full warfarin instructions:   2 mg every day   Weekly warfarin total:   14 mg   No change documented:   Myra Holguin RN   Plan last modified:   Myra Holguin RN (3/2/2020)   Next INR check:   2020   Priority:   High   Target end date:   Indefinite    Indications    Long-term (current) use of anticoagulants [Z79.01] [Z79.01]  S/P mitral valve replacement [Z95.2]             Anticoagulation Episode Summary     INR check location:   Anticoagulation Clinic    Preferred lab:       Send INR reminders to:   [facility]    Comments:         Anticoagulation Care Providers     Provider Role Specialty Phone number    Mahamed Groves MD Referring [facility] [phone]            See the Encounter Report to view Anticoagulation Flowsheet and Dosing Calendar (Go to Encounters tab in chart review, and find the Anticoagulation Therapy Visit)    Dosage adjustment made based on physician directed care  plan.    Myra Holguin RN

## 2020-06-09 NOTE — PROGRESS NOTES
ANTICOAGULATION FOLLOW-UP CLINIC VISIT    Patient Name:  Tomás Cruz  Date:  6/9/2020  Contact Type:  Telephone/ spoke with the patient    SUBJECTIVE:         OBJECTIVE    Recent labs: (last 7 days)     06/09/20  1300   INR 2.6*       ASSESSMENT / PLAN  INR assessment THER    Recheck INR In: 3 WEEKS    INR Location Outside lab      Anticoagulation Summary  As of 6/9/2020    INR goal:   2.5-3.5   TTR:   35.8 % (1 y)   INR used for dosing:   No new INR was available at the time of this encounter.   Warfarin maintenance plan:   2 mg (2 mg x 1) every day   Full warfarin instructions:   2 mg every day   Weekly warfarin total:   14 mg   No change documented:   Brina Caballero RN   Plan last modified:   Myra Holguin RN (3/2/2020)   Next INR check:   6/30/2020   Priority:   High   Target end date:   Indefinite    Indications    Long-term (current) use of anticoagulants [Z79.01] [Z79.01]  S/P mitral valve replacement [Z95.2]             Anticoagulation Episode Summary     INR check location:   Anticoagulation Clinic    Preferred lab:       Send INR reminders to:   [facility]    Comments:         Anticoagulation Care Providers     Provider Role Specialty Phone number    Mahamed Groves MD Referring [facility] [phone]            See the Encounter Report to view Anticoagulation Flowsheet and Dosing Calendar (Go to Encounters tab in chart review, and find the Anticoagulation Therapy Visit)    INR is therapeutic today at 2.6. Patient to continue weekly warfarin maintenance dose of 14 mg. Recheck INR in 3 weeks or sooner if any changes to health, medications, diet, activity or upcoming invasive procedure.       Brina Caballero, RN

## 2020-06-12 NOTE — DISCHARGE INSTRUCTIONS
Please keep wounds clean and dry.  Please hold Coumadin for 24 hours.  Then restart as prescribed.  Use Neosporin to the skin.  Okay to use Tylenol for pain.  Sutures to be removed in a week through primary care or in the emergency room if unable to have them removed through the clinic.

## 2020-06-12 NOTE — ED TRIAGE NOTES
Pt was standing on his patio and fell from standing position and hit the front of his face on the concrete. On warfarin. C/O neck pain.

## 2020-06-12 NOTE — ED PROVIDER NOTES
History     Chief Complaint:  Head Injury      HPI   Tomás Cruz is a 79 year old male on coumadin who presents with a head injury following a mechanical fall. The patient reports that he was sitting on his patio when he stood up and got his legs caught in something, causing a loss of balance. He has a laceration to the forehead and nose. He has pain in his forehead and face but denies neck pain. He did not lose consciousness and has not vomited. He does not report any additional injuries. His tetanus is up to date, last in 2015.  Of note, his last INR check was 3 days ago and was 2.6.    Allergies:  No known drug allergies     Medications:    Pacerone  Amoxil  Dutasteride  Lasix  Neurontin  Toprol  Pravastatin  Aldactone  Warfarin    Past Medical History:    Atrial fibrillation  CVA  Disc disorder (lumbar)  Hypersomnia  Hypertension  Lung mass  Mitral valve disorders  Non-ischemic cardiomyopathy  Polyp of colon  Renal mass  Impotence  Syncope  Tremor  Arthritis of knee (right)  Intention tremor  Neuropathy  Amnesia  Hypercalcemia  Chronic systolic congestive heart failure  CKD, stage 3  Thrombocytosis  Hyperlipidemia    Past Surgical History:    Cardioversion  Total knee arthroplasty (left)  Mitral valve replacement  Cataract removal (bilateral)  Hernia repair  Implant automatic implantable cardioverter defibrillator  Right radical nephrectomy    Family History:    History reviewed. No pertinent family history.     Social History:  Smoking status: former, quit 1982  Alcohol use: no  Marital Status:   [2]       Review of Systems   Constitutional: Negative for fever.   Gastrointestinal: Negative for vomiting.   Musculoskeletal: Negative for neck pain.   Skin: Positive for wound.   All other systems reviewed and are negative.      Physical Exam     Patient Vitals for the past 24 hrs:   BP Pulse Heart Rate Resp SpO2   06/12/20 1454 -- -- -- -- 100 %   06/12/20 1450 138/84 68 -- -- --   06/12/20 1357 (!)  143/86 -- (!) 47 18 96 %       Physical Exam  Nursing note reviewed.   HENT:      Head: Normocephalic.        Right Ear: Tympanic membrane normal.      Left Ear: Tympanic membrane normal.   Eyes:      Pupils: Pupils are equal, round, and reactive to light.   Neck:      Musculoskeletal: Normal range of motion.      Comments: Placed in c-collar at triage removed after imaging is negative.  Cardiovascular:      Rate and Rhythm: Normal rate and regular rhythm.   Pulmonary:      Effort: Pulmonary effort is normal.      Breath sounds: Normal breath sounds.   Abdominal:      General: Abdomen is flat.   Skin:     General: Skin is warm.      Capillary Refill: Capillary refill takes less than 2 seconds.   Neurological:      General: No focal deficit present.      Mental Status: He is alert.   Psychiatric:         Mood and Affect: Mood normal.         Emergency Department Course     Imaging:  Radiographic findings were communicated with the patient who voiced understanding of the findings.  CT Head wo contrast   IMPRESSION:   1. No intracranial hemorrhage or skull fractures are identified.     2. 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 No fractures are identified.   2. Multilevel degenerative changes  Reading per radiology    Procedures:    Laceration Repair        LACERATION:  A subcutaneous clean 2.5 cm laceration tot he forehead and 1 cm to nasal bridge      LOCATION:  Forehead and nose      ANESTHESIA:  Local using 1%loidocaine with epinphreine total of 5 mLs to the forehead, and 1% without 1cc no nose      PREPARATION:  Irrigation with Techni-Care and Normal Saline      DEBRIDEMENT:  no debridement      CLOSURE:  Wound was closed with One Layer.  Skin closed on forehead  with 12 x 5.0 Ethylon and nose bridge and flap on nasal tip 4 x 6.0 Ethylon using interrupted sutures.    Interventions:  1505 Tylenol 650 mg PO    Emergency  Department Course:  Past medical records, nursing notes, and vitals reviewed.  1420: I performed an exam of the patient and obtained history, as documented above.     The patient was sent for a CT while in the emergency department, findings above    1533: I rechecked the patient. Explained findings to patient.    I personally reviewed the imaging results with the Patient and answered all related questions prior to discharge.     Findings and plan explained to the Patient. Patient discharged home with instructions regarding supportive care, medications, and reasons to return. The importance of close follow-up was reviewed.   Impression & Plan      Medical Decision Making:  Tomás Cruz is a 79 year old male who presents for fall and head injury.  Patient is on anticoagulation head CT and neck CT ordered at triage are negative for subdural subarachnoid or C-spine fracture.  Clinically there is no concern for facial bone injury.  Wounds were repaired by me patient symptomatically improved was discharged in improved condition suture removal in a week and holding Coumadin in 24 hours due to anticoagulation and head and face injury.    Diagnosis:    ICD-10-CM   1. Laceration of forehead, initial encounter  S01.81XA   2. Nasal laceration, initial encounter  S01.21XA   3. Lip abrasion, initial encounter  S00.511A   4. Fall, initial encounter  W19.XXXA       Disposition:  discharged to home    Scribe Disclosure:  I, Radha Laguerre, am serving as a scribe at 2:20 PM on 6/12/2020 to document services personally performed by Royal Cuellar MD based on my observations and the provider's statements to me.     Scribe Disclosure:  I, Nayeli Hsu, am serving as a scribe at 3:33 PM on 6/12/2020 to document services personally performed by Royal Cuellar MD based on my observations and the provider's statements to me.     Radha Laguerre  6/12/2020   [facility] EMERGENCY DEPARTMENT       Royal Cuellar  MD Larry  06/14/20 0295

## 2020-06-12 NOTE — ED AVS SNAPSHOT
[facility] Emergency Department  [address]  [facility] [address]  Phone:  [phone]  Fax:  [phone]                                    Tomás Cruz   MRN: [id number]    Department:  [facility] Emergency Department   Date of Visit:  6/12/2020           After Visit Summary Signature Page    I have received my discharge instructions, and my questions have been answered. I have discussed any challenges I see with this plan with the nurse or doctor.    ..........................................................................................................................................  Patient/Patient Representative Signature      ..........................................................................................................................................  Patient Representative Print Name and Relationship to Patient    ..................................................               ................................................  Date                                   Time    ..........................................................................................................................................  Reviewed by Signature/Title    ...................................................              ..............................................  Date                                               Time          22EPIC Rev 08/18

## 2020-06-16 NOTE — TELEPHONE ENCOUNTER
Chief Complaint : PSA check     Records/Orders: PSA     Pt Contacted: send message to please get PSA done 1 week before appointment     At Rooming: video or phone

## 2020-06-18 NOTE — TELEPHONE ENCOUNTER
Reason for call:  Patient reporting a symptom    Symptom or request: Had a fall last Friday, thinks his forehead is infected. Has stitches. Swollen    Duration (how long have symptoms been present): 6/12/2020 . Went to ER for the fall    Have you been treated for this before? Yes    Additional comments: Please call to triage him. He thinks he should come in.    Phone Number patient can be reached at:  Home number on file [phone] (home)    Best Time:  Any      Can we leave a detailed message on this number:  YES    Call taken on 6/18/2020 at 9:27 AM by Dianna Sheets

## 2020-06-18 NOTE — PROGRESS NOTES
ANTICOAGULATION FOLLOW-UP CLINIC VISIT    Patient Name:  Tomás Cruz  Date:  6/18/2020  Contact Type:  Telephone/ spoke with the patient    SUBJECTIVE:  Patient Findings     Positives:   Change in health (patient seen in clinic today for laceration), Change in medications (Patient started on Augmentin)             OBJECTIVE    Recent labs: (last 7 days)     06/18/20  1225   INR 2.6*       ASSESSMENT / PLAN  INR assessment THER    Recheck INR In: 4 DAYS    INR Location Outside lab      Anticoagulation Summary  As of 6/18/2020    INR goal:   2.5-3.5   TTR:   38.2 % (1 y)   INR used for dosing:   No new INR was available at the time of this encounter.   Warfarin maintenance plan:   2 mg (2 mg x 1) every day   Full warfarin instructions:   2 mg every day   Weekly warfarin total:   14 mg   No change documented:   Brina Caballero RN   Plan last modified:   Myra Holguin RN (3/2/2020)   Next INR check:   6/22/2020   Priority:   High   Target end date:   Indefinite    Indications    Long-term (current) use of anticoagulants [Z79.01] [Z79.01]  S/P mitral valve replacement [Z95.2]             Anticoagulation Episode Summary     INR check location:   Anticoagulation Clinic    Preferred lab:       Send INR reminders to:   [facility]    Comments:         Anticoagulation Care Providers     Provider Role Specialty Phone number    Mahamed Groves MD Referring [facility] [phone]            See the Encounter Report to view Anticoagulation Flowsheet and Dosing Calendar (Go to Encounters tab in chart review, and find the Anticoagulation Therapy Visit)    INR is therapeutic. Started on Augmentin today. Returning to clinic on 6/22 for suture removal and INR recheck.     Brina Caballero RN

## 2020-06-18 NOTE — PATIENT INSTRUCTIONS
Patient Education     Recognizing and Treating Wound Infection  Wounds can become infected with harmful germs (bacteria). This prevents healing. It also increases your risk of scars. In some cases, the infection may spread to other parts of your body. An infection with the bacteria that causes tetanus can be fatal. Know what to look for and get prompt treatment for infection.      A wound that is healing normally is bright, beefy red. A wound that is not healing normally may be dark in color or have red streaks.   What are the risk factors for infection?  A wound is more likely to become infected if it:    Results from a hole (puncture), such as from a nail or piece of glass    Results from a human or animal bite    Isn't cleaned or treated within 8 hours    Occurs in your hand, foot, leg, armpit, or groin (the area where your belly meets your thighs)    Contains dirt or saliva    Heals very slowly    Occurs in a person with diabetes, alcoholism, or a weak immune system  What are the symptoms of infection?  Call your healthcare provider at the first sign of infection, such as:    Yellow, yellow-green, or foul-smelling drainage from a wound    More pain, swelling, or redness in or near a wound    A change in the color or size of a wound    Red streaks in the skin around the wound    Fever  How are infections treated?   Treatment depends on the type of infection you have, and how serious it is. Your healthcare provider may prescribe oral antibiotics to help fight bacteria. Your provider may also flush the wound with an antibiotic solution or apply an antibiotic ointment. Sometimes a pocket of pus (abscess) may form. In that case, the abscess will be opened and the fluid drained. You may need hospital care if the infection is very severe.  Preventing wound infection  Follow these steps to help keep wounds from getting infected:    Wash the wound right away with soap and water.    Apply a small amount of antibiotic  ointment. You can buy this without a prescription.    Cover wounds with a bandage or gauze dressing. Change daily.    Keep the wound clean and dry for the first 24 hours.    Change the dressing daily using sterile gloves.  Date Last Reviewed: 9/1/2017 2000-2019 [employer]. [address] [place] [address]. All rights reserved. This information is not intended as a substitute for professional medical care. Always follow your healthcare professional's instructions.

## 2020-06-18 NOTE — PROGRESS NOTES
Pt presented today for evaluation of a wound on forehead. Pt is noted to have a large scab on the forehead with a bump underneath the size of a half dollar. Pt stating the bump is painful to touch. Writer noted some redness to the area Writer will cleanse wounds with hydroperoxide.    Message handled by Nurse Triage with Deondre - provider name: AL-NP   Pt to be seen by provider, prescribe antibiotics.      Wounds were cleansed using perioxide and secondly cleanse with chlorhexidine wound cleanser.    Bacitracin applied to forehead and nose. Forehead wound covered with bandage.    Pt is to take antibiotics as prescribed in other encounter and return tomorrow if no improvement or Monday for suture removal if improving. Patient stated an understanding and agreed with plan.      Pt was noted to have a VV on Monday with PSA before.  No order for PSA writer will put an order in for this visit.    Fredis Ramos RN   [facility]

## 2020-06-18 NOTE — PROGRESS NOTES
Subjective     Tomás Cruz is a 79 year old male who presents to clinic today for the following health issues:    HPI   Wound with possible infection.  Pt presented today for nurse visit evaluation of a wound on forehead. Pt is noted to have a large scab on the forehead with a  bump underneath the size of a half dollar. Pt stating the bump is painful to touch. Writer noted some redness to the area Writer will cleanse wounds with hydroperoxide.    Message handled by Nurse Triage with [facility] - provider name: AL-NP   Pt to be seen by provider, prescribe antibiotics.    Patient had fall after catching his legs on his patio losing his balance he did not have any head injury.  Has a head and nose lack repaired in the ED. Here for nurse visit and the sutured laceration area looks infected so this NP came in for evaluation. Area noted to have some erythema and tenderness. NO drainage. He reports the bump has been there since the injury. Of note he is on coumadin. Will check an INR today.     ER Imaging:  Radiographic findings were communicated with the patient who voiced understanding of the findings.  CT Head wo contrast   IMPRESSION:   1. No intracranial hemorrhage or skull fractures are identified.     2. 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 No fractures are identified.   2. Multilevel degenerative changes  Reading per radiology     INR   Date Value Ref Range Status   04/18/2020 3.15 (H) 0.86 - 1.14 Final     INR Protime   Date Value Ref Range Status   05/18/2020 1.9 (A) 0.86 - 1.14 Final     INR Point of Care   Date Value Ref Range Status   06/18/2020 2.6 (H) 0.86 - 1.14 Final     Comment:     This test is intended for monitoring Coumadin therapy.  Results are not   accurate in patients with prolonged INR due to factor deficiency.          Patient Active Problem List   Diagnosis     Impotence of organic origin      Hypersomnia with sleep apnea     Cardiomyopathy, nonischemic     Polyp of colon     Hyperlipidemia LDL goal <100     Advance Care Planning     Syncope     S/P mitral valve replacement     Health Care Home     Long-term (current) use of anticoagulants [Z79.01]     Essential tremor     Paroxysmal atrial fibrillation (H)     History of prosthetic heart valve     Automatic implantable cardioverter-defibrillator in situ     Elevated prostate specific antigen (PSA)     Decreased GFR     Essential hypertension with goal blood pressure less than 140/90     Arthritis of knee, right     Intention tremor     Cerebral infarction (H)     Atrial flutter (H)     Chronic systolic congestive heart failure (H)     Hypercalcemia     Lung mass     Neuropathy     Amnesia     CKD (chronic kidney disease) stage 3, GFR 30-59 ml/min (H)     Thrombocytosis (H)     Past Surgical History:   Procedure Laterality Date     ANESTHESIA CARDIOVERSION N/A 4/15/2019    Procedure: ANESTHESIA CARDIOVERSION;  Surgeon: GENERIC ANESTHESIA PROVIDER;  Location: [facility] OR     ARTHROSCOPY KNEE Left     left knee arthroscopy     ARTHROSCOPY KNEE  2018    right knee partial     AS TOTAL KNEE ARTHROPLASTY Left     Dr. Castro     CARDIAC SURGERY      Mitral valve replacement     CATARACT IOL, RT/LT      Cataracts, bilateral     COLONOSCOPY      [facility] [place]     COLONOSCOPY N/A 2019    Procedure: COMBINED COLONOSCOPY, SINGLE OR MULTIPLE BIOPSY/POLYPECTOMY BY BIOPSies by cold forcep;  Surgeon: Ronald Henderson MD;  Location:  GI     HERNIA REPAIR       IMPLANT AUTOMATIC IMPLANTABLE CARDIOVERTER DEFIBRILLATOR  2013     KIDNEY SURGERY      Laparoscopic right radical nephrectomy.- due to complex hemorrhagic cyst       Social History     Tobacco Use     Smoking status: Former Smoker     Packs/day: 1.00     Years: 20.00     Pack years: 20.00     Last attempt to quit: 1982     Years since quittin.0     Smokeless  tobacco: Never Used   Substance Use Topics     Alcohol use: No     Family History   Adopted: Yes   Problem Relation Age of Onset     No Known Problems Daughter      No Known Problems Daughter      No Known Problems Daughter      No Known Problems Daughter      Diabetes No family hx of      Coronary Artery Disease No family hx of          Current Outpatient Medications   Medication Sig Dispense Refill     amoxicillin-clavulanate (AUGMENTIN) 875-125 MG tablet Take 1 tablet by mouth 2 times daily for 10 days 20 tablet 0     amiodarone (PACERONE/CODARONE) 200 MG tablet TAKE 1 TABLET BY MOUTH EVERY DAY 90 tablet 1     amoxicillin (AMOXIL) 500 MG capsule Take 4 capsules (2,000 mg) by mouth once as needed Prior to Dental appts 8 capsule 1     dutasteride (AVODART) 0.5 MG capsule Take 1 capsule (0.5 mg) by mouth daily 90 capsule 3     furosemide (LASIX) 20 MG tablet Take 1 tablet (20 mg) by mouth daily 90 tablet 1     gabapentin (NEURONTIN) 300 MG capsule Take 300 mg by mouth 3 times daily       metoprolol succinate ER (TOPROL-XL) 50 MG 24 hr tablet Take 1.5 tablets (75 mg) by mouth daily 135 tablet 1     multivitamin w/minerals (MULTI-VITAMIN) tablet Take 1 tablet by mouth daily       pravastatin (PRAVACHOL) 40 MG tablet Take 1 tablet (40 mg) by mouth daily 90 tablet 3     spironolactone (ALDACTONE) 25 MG tablet Take 1 tablet (25 mg) by mouth daily 90 tablet 1     warfarin ANTICOAGULANT (COUMADIN) 2 MG tablet TAKE 1 TABLETS (2 mg) DAILY BY MOUTH EVERY DAY OR AS PER INR RN DIRECTS 90 tablet 0     BP Readings from Last 3 Encounters:   06/18/20 122/75   06/18/20 122/75   06/12/20 135/82    Wt Readings from Last 3 Encounters:   02/17/20 94.8 kg (209 lb 1.6 oz)   01/13/20 94.3 kg (208 lb)   01/07/20 94.5 kg (208 lb 4.8 oz)             Reviewed and updated as needed this visit by Provider  Tobacco  Allergies  Meds  Problems  Med Hx  Surg Hx  Fam Hx         Review of Systems   Constitutional, HEENT, cardiovascular,  pulmonary, GI, , musculoskeletal, neuro, skin, endocrine and psych systems are negative, except as otherwise noted in the HPI.      Objective    /75 (BP Location: Left arm, Patient Position: Chair, Cuff Size: Adult Regular)   Pulse 75   Temp 97.2  F (36.2  C) (Tympanic)   Resp 18   SpO2 93%   There is no height or weight on file to calculate BMI.  Physical Exam   GENERAL: healthy, alert and no distress  CV: regular rate and rhythm, normal S1 S2, no S3 or S4, no murmur, click or rub, no peripheral edema and peripheral pulses strong  SKIN: no suspicious lesions or rashes; multiple scabbed areas on right to mid forehead and right side of nose. Tender to touch. Small area of erythema mid forehead around sutures. Sutures are approximated no drainage.             Assessment & Plan     Tomás was seen today for wound check and wound care.    Diagnoses and all orders for this visit:    Incisional infection  Area cleaned up. Multiple scabs due to blood thinning medication.   Antibiotics for skin infection around sutures.   Close watch of INR.  Close follow up.   Check in with him tomorrow and recheck in person on Monday.   Red flag symptoms discussed and if these occur present to the emergency room or call 911.  Tomás verbalizes understanding of plan of care and is in agreement.     -     amoxicillin-clavulanate (AUGMENTIN) 875-125 MG tablet; Take 1 tablet by mouth 2 times daily for 10 days  -     INR point of care    Paroxysmal atrial fibrillation (H)  Long term current use of anticoagulant therapy  Checked INR today.   Starting antibiotics and will need to recheck INR Monday.     -     amoxicillin-clavulanate (AUGMENTIN) 875-125 MG tablet; Take 1 tablet by mouth 2 times daily for 10 days  -     Cancel: INR    Elevated prostate specific antigen (PSA)  Released his labs; PSA due.   -     PSA, screen       BMI:   Estimated body mass index is 26.85 kg/m  as calculated from the following:    Height as of 1/13/20:  "1.88 m (6' 2\").    Weight as of 2/17/20: 94.8 kg (209 lb 1.6 oz).     Return in about 4 days (around 6/22/2020) for Recheck.      Yennifer Helton, [facility]    "

## 2020-06-18 NOTE — TELEPHONE ENCOUNTER
"Called # [phone]       Pt advised to come in for evaluation of wound as Pt has not been good historian and unable to explain wound.   Per ER visit:    CLOSURE:  Wound was closed with One Layer.  Skin closed on forehead  with 12 x 5.0 Ethylon and nose bridge and flap on nasal tip 4 x 6.0 Ethylon using interrupted sutures.   Medical Decision Making:  Tomás Cruz is a 79 year old male who presents for fall and head injury.  Patient is on anticoagulation head CT and neck CT ordered at triage are negative for subdural subarachnoid or C-spine fracture.  Clinically there is no concern for facial bone injury.  Wounds were repaired by me patient symptomatically improved was discharged in improved condition suture removal in a week and holding Coumadin in 24 hours due to anticoagulation and head and face injury.    Answer Assessment - Initial Assessment Questions  1. LOCATION: \"Where is the wound located?\"       head  2. WOUND APPEARANCE: \"What does the wound look like?\"       unsure  3. SIZE: If redness is present, ask: \"What is the size of the red area?\" (Inches, centimeters, or compare to size of a coin)       unsure  4. SPREAD: \"What's changed in the last day?\"  \"Do you see any red streaks coming from the wound?\"      *No Answer*  5. ONSET: \"When did it start to look infected?\"       *No Answer*  6. MECHANISM: \"How did the wound start, what was the cause?\"      *No Answer*  7. PAIN: \"Is there any pain?\" If so, ask: \"How bad is the pain?\"   (Scale 1-10; or mild, moderate, severe)      *No Answer*  8. FEVER: \"Do you have a fever?\" If so, ask: \"What is your temperature, how was it measured, and when did it start?\"      *No Answer*  9. OTHER SYMPTOMS: \"Do you have any other symptoms?\" (e.g., shaking chills, weakness, rash elsewhere on body)      *No Answer*  10. PREGNANCY: \"Is there any chance you are pregnant?\" \"When was your last menstrual period?\"        *No Answer*    Protocols used: WOUND " INFECTION-A-OH    Fredis Ramos RN   [facility]

## 2020-06-19 NOTE — PROGRESS NOTES
Pt called today as follow up. Pt is doing better today, not reports of pain or drainage. Pt advised to call if any changes otherwise will be seen on Monday.    Fredis Ramos RN   [facility]

## 2020-06-19 NOTE — TELEPHONE ENCOUNTER
INR's reviewed, patient is getting this closely monitored Prescription approved per [facility] Refill Protocol. Esperanza Vallejo R.N.

## 2020-06-22 NOTE — PROGRESS NOTES
ANTICOAGULATION FOLLOW-UP CLINIC VISIT    Patient Name:  Tomás Cruz  Date:  2020  Contact Type:  Telephone/ spoke with patient    SUBJECTIVE:  Patient Findings     Comments:   The patient was assessed for diet, medication, and activity level changes, missed or extra doses, bruising or bleeding, with no problem findings. Patient denies any identifiable changes that caused the subtherapeutic INR.           Clinical Outcomes     Negatives:   Major bleeding event, Thromboembolic event, Anticoagulation-related hospital admission, Anticoagulation-related ED visit, Anticoagulation-related fatality    Comments:   The patient was assessed for diet, medication, and activity level changes, missed or extra doses, bruising or bleeding, with no problem findings. Patient denies any identifiable changes that caused the subtherapeutic INR.              OBJECTIVE    Recent labs: (last 7 days)     20  1100   INR 2.4*       ASSESSMENT / PLAN  INR assessment SUB    Recheck INR In: 2 WEEKS    INR Location Clinic      Anticoagulation Summary  As of 2020    INR goal:   2.5-3.5   TTR:   38.8 % (1 y)   INR used for dosin.4! (2020)   Warfarin maintenance plan:   2 mg (2 mg x 1) every day   Full warfarin instructions:   2 mg every day   Weekly warfarin total:   14 mg   No change documented:   Faiza Collado RN   Plan last modified:   Myra Holguin RN (3/2/2020)   Next INR check:   2020   Priority:   High   Target end date:   Indefinite    Indications    Long-term (current) use of anticoagulants [Z79.01] [Z79.01]  S/P mitral valve replacement [Z95.2]             Anticoagulation Episode Summary     INR check location:   Anticoagulation Clinic    Preferred lab:       Send INR reminders to:   [facility]    Comments:         Anticoagulation Care Providers     Provider Role Specialty Phone number    Mahamed Groves MD Referring [facility] [phone]            See the Encounter Report to  view Anticoagulation Flowsheet and Dosing Calendar (Go to Encounters tab in chart review, and find the Anticoagulation Therapy Visit)    Slightly subtherapeutic today, but no dose change recommended at this point. Will continue to monitor. Recheck in 2 weeks.    Faiza Collado, ANELN, RN

## 2020-06-22 NOTE — PROGRESS NOTES
"Tomás Cruz is a 79 year old male who is being evaluated via a billable telephone visit.      The patient has been notified of following:     \"This telephone visit will be conducted via a call between you and your physician/provider. We have found that certain health care needs can be provided without the need for a physical exam.  This service lets us provide the care you need with a short phone conversation.  If a prescription is necessary we can send it directly to your pharmacy.  If lab work is needed we can place an order for that and you can then stop by our lab to have the test done at a later time.    Telephone visits are billed at different rates depending on your insurance coverage. During this emergency period, for some insurers they may be billed the same as an in-person visit.  Please reach out to your insurance provider with any questions.    If during the course of the call the physician/provider feels a telephone visit is not appropriate, you will not be charged for this service.\"    Patient has given verbal consent for Telephone visit?  Yes    What phone number would you like to be contacted at? [phone]    How would you like to obtain your AVS? Mail a copy    ADDITIONAL PROVIDER NOTES:  78 yo male with history of LUTS on Avodart.  Also had mildly elevated PSA and elevated exosome DX score, but has not had a biopsy.  Symptoms improved on Avodart.  Patient recently had a PSA.    OBJECTIVE:  PSA from 6/18/20 is 1.59 (3.18ng/mL when adjusted for finasteride).    ASSESSMENT AND PLAN:     Over half of today's 8-minute visit was spent counseling the patient regarding his PSA.  PSA stable at this time.  Recheck in 1 year.  Patient can follow up with Cat Zarco our PA for LUTS and PSA recheck..  Phone call duration: 8 minutes        "

## 2020-06-22 NOTE — PROGRESS NOTES
Suture removal:     Date sutures applied: 6/12/20         Where (setting) in which they applied:ER visit    Description:  Type: sutures  Location: Forehead and nose    History:    Cause of laceration: fall/trauma    Accompanying Signs & Symptoms: (staff: if yes-describe)  Redness: YES- slight redness on the forehead around the edges of the laceration.  Warmth: no  Drainage: YES, serous fluid with a light yellow tinge exudate. Pt is currently on antibiotics.   amoxicillin-clavulanate (AUGMENTIN) 875-125 MG tablet  20 tablet  0  6/18/2020 6/28/2020  --    Sig - Route: Take 1 tablet by mouth 2 times daily for 10 days - Oral        Still bleeding: no  Fevers: no    Last tetanus shot: last tetanus booster within 10 years    Pt in for suture removal. Pt was noted to still have eschar covering the forehead, bridge of nose and tip of nose. Wound on forehead was noted to be leaking fluid and exudate. Area was cleansed with Hydrogen peroxide 3% to remove some eschar. Sutures on forehead were removed along with 2 on bridge of nose. The 2 sutures on the tip of the nose were unable to be removed due to large amount of scab covering the sutures. The tip of the nose did appear to be healing by third intention. Pt was advised to return in 2 days for attempt to remove 2 sutures on end of nose. Patient stated an understanding and agreed with plan.    Once sutures on forehead were removed the area was cleansed with cholahexidine solution, normal saline, covered with bacitracin and Telfa, held in place with tape. Pt advised to keep the bandage in place until tomorrow morning. Pt advised to continue to monitor the forehead wound for drainage and call if any fever develops, or copious amounts of yellow exudate. Patient stated an understanding and agreed with plan.    Fredis Ramos RN   [facility] Triage

## 2020-06-22 NOTE — LETTER
"6/22/2020       RE: Tomás Cruz  [address] [address]     Dear Colleague,    Thank you for referring your patient, Tomás Cruz, to the [facility] UROLOGY AND INST FOR PROSTATE AND UROLOGIC CANCERS at [facility]. Please see a copy of my visit note below.    Tomás Cruz is a 79 year old male who is being evaluated via a billable telephone visit.      The patient has been notified of following:     \"This telephone visit will be conducted via a call between you and your physician/provider. We have found that certain health care needs can be provided without the need for a physical exam.  This service lets us provide the care you need with a short phone conversation.  If a prescription is necessary we can send it directly to your pharmacy.  If lab work is needed we can place an order for that and you can then stop by our lab to have the test done at a later time.    Telephone visits are billed at different rates depending on your insurance coverage. During this emergency period, for some insurers they may be billed the same as an in-person visit.  Please reach out to your insurance provider with any questions.    If during the course of the call the physician/provider feels a telephone visit is not appropriate, you will not be charged for this service.\"    Patient has given verbal consent for Telephone visit?  Yes    What phone number would you like to be contacted at? [phone]    How would you like to obtain your AVS? Mail a copy    ADDITIONAL PROVIDER NOTES:  78 yo male with history of LUTS on Avodart.  Also had mildly elevated PSA and elevated exosome DX score, but has not had a biopsy.  Symptoms improved on Avodart.  Patient recently had a PSA.    OBJECTIVE:  PSA from 6/18/20 is 1.59 (3.18ng/mL when adjusted for finasteride).    ASSESSMENT AND PLAN:     Over half of today's 8-minute visit was spent counseling the patient regarding his PSA.  " PSA stable at this time.  Recheck in 1 year.  Patient can follow up with Cat Zarco our PA for LUTS and PSA recheck..  Phone call duration: 8 minutes    Again, thank you for allowing me to participate in the care of your patient.      Sincerely,    Aston Medrano MD

## 2020-07-06 NOTE — PROGRESS NOTES
ANTICOAGULATION FOLLOW-UP CLINIC VISIT    Patient Name:  Tomás Cruz  Date:  7/6/2020  Contact Type:  Telephone/ spoke with patient    SUBJECTIVE:  Patient Findings     Comments:   The patient was assessed for diet, medication, and activity level changes, missed or extra doses, bruising or bleeding, with no problem findings.        Clinical Outcomes     Negatives:   Major bleeding event, Thromboembolic event, Anticoagulation-related hospital admission, Anticoagulation-related ED visit, Anticoagulation-related fatality    Comments:   The patient was assessed for diet, medication, and activity level changes, missed or extra doses, bruising or bleeding, with no problem findings.           OBJECTIVE    Recent labs: (last 7 days)     07/06/20  1058   INR 3.0*       ASSESSMENT / PLAN  INR assessment THER    Recheck INR In: 4 WEEKS    INR Location Clinic      Anticoagulation Summary  As of 7/6/2020    INR goal:   2.5-3.5   TTR:   42.0 % (1 y)   INR used for dosing:   3.0 (7/6/2020)   Warfarin maintenance plan:   2 mg (2 mg x 1) every day   Full warfarin instructions:   2 mg every day   Weekly warfarin total:   14 mg   No change documented:   Faiza Collado RN   Plan last modified:   Myra Holguin RN (3/2/2020)   Next INR check:   8/3/2020   Priority:   Maintenance   Target end date:   Indefinite    Indications    Long-term (current) use of anticoagulants [Z79.01] [Z79.01]  S/P mitral valve replacement [Z95.2]             Anticoagulation Episode Summary     INR check location:   Anticoagulation Clinic    Preferred lab:       Send INR reminders to:   [facility]    Comments:         Anticoagulation Care Providers     Provider Role Specialty Phone number    Mahamed Groves MD Referring [facility] [phone]            See the Encounter Report to view Anticoagulation Flowsheet and Dosing Calendar (Go to Encounters tab in chart review, and find the Anticoagulation Therapy Visit)    SINDI Haley,  RN

## 2020-08-03 NOTE — PROGRESS NOTES
ANTICOAGULATION FOLLOW-UP CLINIC VISIT    Patient Name:  Tomás Cruz  Date:  8/3/2020  Contact Type:  Telephone/ with pt     SUBJECTIVE:  Patient Findings     Comments:   The patient was assessed for diet, medication, and activity level changes, missed or extra doses, bruising or bleeding, with no problem findings.          Clinical Outcomes     Negatives:   Major bleeding event, Thromboembolic event, Anticoagulation-related hospital admission, Anticoagulation-related ED visit, Anticoagulation-related fatality    Comments:   The patient was assessed for diet, medication, and activity level changes, missed or extra doses, bruising or bleeding, with no problem findings.             OBJECTIVE    Recent labs: (last 7 days)     08/03/20  1104   INR 3.5*       ASSESSMENT / PLAN  No question data found.  Anticoagulation Summary  As of 8/3/2020    INR goal:   2.5-3.5   TTR:   49.6 % (1 y)   INR used for dosing:   3.5 (8/3/2020)   Warfarin maintenance plan:   2 mg (2 mg x 1) every day   Full warfarin instructions:   2 mg every day   Weekly warfarin total:   14 mg   No change documented:   Myra Holguin RN   Plan last modified:   Myra Holguin RN (3/2/2020)   Next INR check:   9/14/2020   Priority:   Maintenance   Target end date:   Indefinite    Indications    Long-term (current) use of anticoagulants [Z79.01] [Z79.01]  S/P mitral valve replacement [Z95.2]             Anticoagulation Episode Summary     INR check location:   Anticoagulation Clinic    Preferred lab:       Send INR reminders to:   [facility]    Comments:         Anticoagulation Care Providers     Provider Role Specialty Phone number    Mahamed Groves MD Referring [facility] [phone]            See the Encounter Report to view Anticoagulation Flowsheet and Dosing Calendar (Go to Encounters tab in chart review, and find the Anticoagulation Therapy Visit)    Dosage adjustment made based on physician directed care  plan.    Myra Holguin RN

## 2020-09-14 NOTE — PROGRESS NOTES
ANTICOAGULATION FOLLOW-UP CLINIC VISIT    Patient Name:  Tomás Cruz  Date:  9/14/2020  Contact Type:  Telephone/ [place]    SUBJECTIVE:  Patient Findings     Comments:   The patient was assessed for diet, medication, and activity level changes, missed or extra doses, bruising or bleeding, with no problem findings.          Clinical Outcomes     Negatives:   Major bleeding event, Thromboembolic event, Anticoagulation-related hospital admission, Anticoagulation-related ED visit, Anticoagulation-related fatality    Comments:   The patient was assessed for diet, medication, and activity level changes, missed or extra doses, bruising or bleeding, with no problem findings.             OBJECTIVE    Recent labs: (last 7 days)     09/14/20  1106   INR 3.2*       ASSESSMENT / PLAN  INR assessment THER    Recheck INR In: 6 WEEKS    INR Location Clinic      Anticoagulation Summary  As of 9/14/2020    INR goal:   2.5-3.5   TTR:   59.2 % (1 y)   INR used for dosing:   3.2 (9/14/2020)   Warfarin maintenance plan:   2 mg (2 mg x 1) every day   Full warfarin instructions:   2 mg every day   Weekly warfarin total:   14 mg   No change documented:   Suzi Medellin RN   Plan last modified:   Myra Holguin RN (3/2/2020)   Next INR check:   10/26/2020   Priority:   Maintenance   Target end date:   Indefinite    Indications    Long-term (current) use of anticoagulants [Z79.01] [Z79.01]  S/P mitral valve replacement [Z95.2]             Anticoagulation Episode Summary     INR check location:   Anticoagulation Clinic    Preferred lab:       Send INR reminders to:   [facility]    Comments:         Anticoagulation Care Providers     Provider Role Specialty Phone number    Mahamed Groves MD Referring [facility] [phone]            See the Encounter Report to view Anticoagulation Flowsheet and Dosing Calendar (Go to Encounters tab in chart review, and find the Anticoagulation Therapy Visit)    Patient  INR is therapeutic.  Patient will continue to take 14 mg weekly dosing and follow up in 6 weeks or sooner for any problems or concerns.        Suzi Medellin RN

## 2020-09-17 NOTE — TELEPHONE ENCOUNTER
Reviewed last refill and last [facility] visit. Patient's INR was therapeutic on 9/14 and he has another lab-only INR scheduled for 10/26.  Prescription approved per [facility] Refill Protocol.  Irma CAMPO RN  Anticoagulation Clinic  [place]

## 2020-10-06 NOTE — TELEPHONE ENCOUNTER
Chief Complaint : Follow up - 9 months    Hx/Sx: Elevated PSA, BPH w/ LUTs    Records/Orders: PSA    Pt Contacted: Called patient, gave phone number for patient to schedule lab in [place]. Switched appt to telephone    At [place]: Phone [phone]      Aaron Cho, EMT

## 2020-10-13 NOTE — PROGRESS NOTES
"Tomás Cruz is a 80 year old male who is being evaluated via a billable telephone visit.      The patient has been notified of following:     \"This telephone visit will be conducted via a call between you and your physician/provider. We have found that certain health care needs can be provided without the need for a physical exam.  This service lets us provide the care you need with a short phone conversation.  If a prescription is necessary we can send it directly to your pharmacy.  If lab work is needed we can place an order for that and you can then stop by our lab to have the test done at a later time.    Telephone visits are billed at different rates depending on your insurance coverage. During this emergency period, for some insurers they may be billed the same as an in-person visit.  Please reach out to your insurance provider with any questions.    If during the course of the call the physician/provider feels a telephone visit is not appropriate, you will not be charged for this service.\"    Patient has given verbal consent for Telephone visit?  Yes    What phone number would you like to be contacted at? [phone]     How would you like to obtain your AVS? Mail a copy    HPI: Mr. Tomás Cruz is a 80 year old year old male presenting today for evaluation of chief complaint(s): Elevated PSA and Benign Prostatic Hypertrophy    Mr. Cruz has PMH significant for HTN, HLD, afib on warfarin and amiodarone, s/p mitral valve replacement, solitary L kidney (s/p right nephrectomy for complex hemorrhagic cyst), foraminal stenosis of lumbar region who has been followed by Dr. Medrano for BPH luts (on avodart) and mildly elevated PSA and elevated exosome DX score (but has not had a biopsy).  His most recent PSA was on 6/18/20 = 1.59 (3.18ng/mL when adjusted for finasteride). He was last seen in Urology clinic on 6/22/20 by Dr. Medrano and at that time it was recommended that he followup in 1 year with a PSA " recheck.     Today he returns for a virtual visit.  His PSA is 1.91ng/dL.  Was taking an OTC supplement for his prostate, but stopped it because he thought it was causing dizzy spells.  Still may have some dizzy spells when he is working in the yard.  Stays hydrated with cold water and also drinks soda (caffeine-free diet pepsi).  No coffee or tea.  Hasn't been taking blood pressure or discussed this with his cardiologist.  Lost his balance a few months ago - he fell but caught himself and avoided larger injuries.  He really injured his face, however and saw a doctor for this and got stitches on forehead and nose.      - Still taking Avodart - started 1/7/20.    - Does have urinary frequency which is worsened by furosemide.  Nocturia x 1.  No significant urgency.  No urge incontinence.  No dysuria or hematuria.  Feels he may not always empty his bladder. No nocturnal enuresis.    - Noted that kidney function labs seem to have changed --> serum creatinine was last 1.74mg/dL on 1/13/20.  In earlier 2019, however, his creatinine was 1.0-1.3mg/dL    REVIEW OF DIAGNOSTICS:  10/8/20 - PSA 1.91ng/dL   6/18/20 - PSA 1.59ng/dL  3/11/20 - sCr 1.87mg/dL  1/07/20 - STARTED AVODART  12/23/19 - PSA 2.48ng/dL  10/7/19 - PSA 4.49ng/dL  04/2/19- PSA 4.70ng/dL  7/19/18 - PSA 5.30ng/dL  6/6/17 - PSA 4.00ng/dL  11/16/16 - PSA 4.80ng/dL  10/25/16 - PSA 5.55ng/dL  10/12/15 - PSA 3.82ng/dL  05/28/13 - PSA 3.23ng/dL    Current Outpatient Medications   Medication Sig Dispense Refill     amiodarone (PACERONE/CODARONE) 200 MG tablet TAKE 1 TABLET BY MOUTH EVERY DAY 90 tablet 1     amoxicillin (AMOXIL) 500 MG capsule TAKE 4 CAPSULES (2,000 MG) BY MOUTH ONCE AS NEEDED PRIOR TO DENTAL APPTS 8 capsule 1     dutasteride (AVODART) 0.5 MG capsule Take 1 capsule (0.5 mg) by mouth daily 90 capsule 3     furosemide (LASIX) 20 MG tablet Take 1 tablet (20 mg) by mouth daily 90 tablet 1     gabapentin (NEURONTIN) 300 MG capsule Take 300 mg by mouth 3  times daily       metoprolol succinate ER (TOPROL-XL) 50 MG 24 hr tablet TAKE 1.5 TABLETS BY MOUTH DAILY 135 tablet 1     multivitamin w/minerals (MULTI-VITAMIN) tablet Take 1 tablet by mouth daily       pravastatin (PRAVACHOL) 40 MG tablet Take 1 tablet (40 mg) by mouth daily 90 tablet 3     spironolactone (ALDACTONE) 25 MG tablet Take 1 tablet (25 mg) by mouth daily 90 tablet 1     warfarin ANTICOAGULANT (COUMADIN) 2 MG tablet TAKE 1 TABLETS (2 MG) DAILY BY MOUTH EVERY DAY OR AS PER INR RN DIRECTS 90 tablet 1       ALLERGIES: Spironolactone      REVIEW OF SYSTEMS:  As above in HPI    GENERAL PHYSICAL EXAM:   Vitals: There were no vitals taken for this visit.  There is no height or weight on file to calculate BMI.    NEURO: Alert and oriented x 3.  PSYCH: Normal mood and affect, pleasant and agreeable during interview and exam.    RADIOLOGY: The following tests were reviewed:   No renal imaging    LABS: The last test results for Mr. Tomás Cruz were reviewed:  PSA -   Lab Results   Component Value Date    PSA 1.91 10/08/2020    PSA 1.59 06/18/2020    PSA 2.48 12/23/2019    PSA 4.49 10/07/2019    PSA 4.70 04/02/2019    PSA 5.30 07/19/2018    PSA 4.00 06/06/2017    PSA 4.80 11/16/2016    PSA 5.55 10/25/2016    PSA 3.82 10/12/2015     BMP -   Recent Labs   Lab Test 01/13/20  1229 12/23/19  1047 10/07/19  1658 04/15/19  0857 04/15/19  0857 04/14/19  1914 04/14/19  1914 04/14/19  0715    137 138   < >  --    < >  --  141   POTASSIUM 5.1 5.0 4.7   < > 4.1   < >  --  4.1   CHLORIDE 104 104 103   < >  --    < >  --  112*   CO2 26 27 25   < >  --    < >  --  23   BUN 45* 39* 44*   < >  --    < >  --  30   CR 1.74* 1.75* 1.88*   < >  --    < >  --  1.22   * 138* 134*   < >  --    < >  --  106*   BRAXTON 9.5 9.4 9.7   < >  --    < >  --  8.5   MAG  --   --   --   --  2.0  --  2.1 2.0    < > = values in this interval not displayed.       CBC -   Recent Labs   Lab Test 01/13/20  1229 12/23/19  1047 10/07/19  6047    WBC 12.4* 11.0 12.9*   HGB 16.5 17.2 17.7   * 550* 476*       ASSESSMENT:   1) BPH with luts (on Avodart - started 1/2020)  2) Mildly elevated PSA and elevated exosome DX score (but has not had a biopsy).   3) Dizziness   4) Chronic kidney disease    PLAN:   - Follow up with your primary care provider to discuss your dizziness.  Purchase a blood pressure cuff.  Take twice daily and record values so you can share these with your primary care provider.   - Continue Avodart  - Anticipate that urinary symptoms will continue to improve on Avodart.  Discussed that it will shrink the prostate over the course of 2 years.  He started in in 1/2020.   - 10/26/20 - has a lab appointment at [place].  Will put in an order to recheck kidney function labs (BMP) at that time. If Cr is rising, will schedule a bladderscan PVR check to rule out significant retention. Could also consider an ultrasound of the kidneys.   - Follow up in 6 months with me.  Obtain a PSA at the lab first.     Telephone call duration: 17 minutes    Cat Zarco PA-C  Department of Urologic Surgery

## 2020-10-13 NOTE — PATIENT INSTRUCTIONS
PLAN:   - Follow up with your primary care provider to discuss your dizziness.  Purchase a blood pressure cuff.  Take twice daily and record values so you can share these with your primary care provider.   - Continue Avodart  - Anticipate that urinary symptoms will continue to improve on Avodart.  Discussed that it will shrink the prostate over the course of 2 years.  He started in in 1/2020.   - 10/26/20 - has a lab appointment at [place].  Will put in an order to recheck kidney function labs (BMP) at that time.   - Follow up in 6 months with me.  Obtain a PSA at the lab first.   - Stay safe and healthy!    [facility]

## 2020-10-13 NOTE — LETTER
"10/13/2020       RE: Tomás Cruz  [address] [address]     Dear Colleague,    Thank you for referring your patient, Tomás Cruz, to the [facility] [place] at [facility]. Please see a copy of my visit note below.    Tomás Cruz is a 80 year old male who is being evaluated via a billable telephone visit.      The patient has been notified of following:     \"This telephone visit will be conducted via a call between you and your physician/provider. We have found that certain health care needs can be provided without the need for a physical exam.  This service lets us provide the care you need with a short phone conversation.  If a prescription is necessary we can send it directly to your pharmacy.  If lab work is needed we can place an order for that and you can then stop by our lab to have the test done at a later time.    Telephone visits are billed at different rates depending on your insurance coverage. During this emergency period, for some insurers they may be billed the same as an in-person visit.  Please reach out to your insurance provider with any questions.    If during the course of the call the physician/provider feels a telephone visit is not appropriate, you will not be charged for this service.\"    Patient has given verbal consent for Telephone visit?  Yes    What phone number would you like to be contacted at? [phone]     How would you like to obtain your AVS? Mail a copy    HPI: Mr. Tomás Cruz is a 80 year old year old male presenting today for evaluation of chief complaint(s): Elevated PSA and Benign Prostatic Hypertrophy    Mr. Cruz has PMH significant for HTN, HLD, afib on warfarin and amiodarone, s/p mitral valve replacement, solitary L kidney (s/p right nephrectomy for complex hemorrhagic cyst), foraminal stenosis of lumbar region who has been followed by Dr. Medrano for BPH luts (on " avodart) and mildly elevated PSA and elevated exosome DX score (but has not had a biopsy).  His most recent PSA was on 6/18/20 = 1.59 (3.18ng/mL when adjusted for finasteride). He was last seen in Urology clinic on 6/22/20 by Dr. Medrano and at that time it was recommended that he followup in 1 year with a PSA recheck.     Today he returns for a virtual visit.  His PSA is 1.91ng/dL.  Was taking an OTC supplement for his prostate, but stopped it because he thought it was causing dizzy spells.  Still may have some dizzy spells when he is working in the yard.  Stays hydrated with cold water and also drinks soda (caffeine-free diet pepsi).  No coffee or tea.  Hasn't been taking blood pressure or discussed this with his cardiologist.  Lost his balance a few months ago - he fell but caught himself and avoided larger injuries.  He really injured his face, however and saw a doctor for this and got stitches on forehead and nose.      - Still taking Avodart - started 1/7/20.    - Does have urinary frequency which is worsened by furosemide.  Nocturia x 1.  No significant urgency.  No urge incontinence.  No dysuria or hematuria.  Feels he may not always empty his bladder. No nocturnal enuresis.    - Noted that kidney function labs seem to have changed --> serum creatinine was last 1.74mg/dL on 1/13/20.  In earlier 2019, however, his creatinine was 1.0-1.3mg/dL    REVIEW OF DIAGNOSTICS:  10/8/20 - PSA 1.91ng/dL   6/18/20 - PSA 1.59ng/dL  3/11/20 - sCr 1.87mg/dL  1/07/20 - STARTED AVODART  12/23/19 - PSA 2.48ng/dL  10/7/19 - PSA 4.49ng/dL  04/2/19- PSA 4.70ng/dL  7/19/18 - PSA 5.30ng/dL  6/6/17 - PSA 4.00ng/dL  11/16/16 - PSA 4.80ng/dL  10/25/16 - PSA 5.55ng/dL  10/12/15 - PSA 3.82ng/dL  05/28/13 - PSA 3.23ng/dL    Current Outpatient Medications   Medication Sig Dispense Refill     amiodarone (PACERONE/CODARONE) 200 MG tablet TAKE 1 TABLET BY MOUTH EVERY DAY 90 tablet 1     amoxicillin (AMOXIL) 500 MG capsule TAKE 4 CAPSULES  (2,000 MG) BY MOUTH ONCE AS NEEDED PRIOR TO DENTAL APPTS 8 capsule 1     dutasteride (AVODART) 0.5 MG capsule Take 1 capsule (0.5 mg) by mouth daily 90 capsule 3     furosemide (LASIX) 20 MG tablet Take 1 tablet (20 mg) by mouth daily 90 tablet 1     gabapentin (NEURONTIN) 300 MG capsule Take 300 mg by mouth 3 times daily       metoprolol succinate ER (TOPROL-XL) 50 MG 24 hr tablet TAKE 1.5 TABLETS BY MOUTH DAILY 135 tablet 1     multivitamin w/minerals (MULTI-VITAMIN) tablet Take 1 tablet by mouth daily       pravastatin (PRAVACHOL) 40 MG tablet Take 1 tablet (40 mg) by mouth daily 90 tablet 3     spironolactone (ALDACTONE) 25 MG tablet Take 1 tablet (25 mg) by mouth daily 90 tablet 1     warfarin ANTICOAGULANT (COUMADIN) 2 MG tablet TAKE 1 TABLETS (2 MG) DAILY BY MOUTH EVERY DAY OR AS PER INR RN DIRECTS 90 tablet 1       ALLERGIES: Spironolactone      REVIEW OF SYSTEMS:  As above in HPI    GENERAL PHYSICAL EXAM:   Vitals: There were no vitals taken for this visit.  There is no height or weight on file to calculate BMI.    NEURO: Alert and oriented x 3.  PSYCH: Normal mood and affect, pleasant and agreeable during interview and exam.    RADIOLOGY: The following tests were reviewed:   No renal imaging    LABS: The last test results for Mr. Tomás Cruz were reviewed:  PSA -   Lab Results   Component Value Date    PSA 1.91 10/08/2020    PSA 1.59 06/18/2020    PSA 2.48 12/23/2019    PSA 4.49 10/07/2019    PSA 4.70 04/02/2019    PSA 5.30 07/19/2018    PSA 4.00 06/06/2017    PSA 4.80 11/16/2016    PSA 5.55 10/25/2016    PSA 3.82 10/12/2015     BMP -   Recent Labs   Lab Test 01/13/20  1229 12/23/19  1047 10/07/19  1658 04/15/19  0857 04/15/19  0857 04/14/19  1914 04/14/19  1914 04/14/19  0715    137 138   < >  --    < >  --  141   POTASSIUM 5.1 5.0 4.7   < > 4.1   < >  --  4.1   CHLORIDE 104 104 103   < >  --    < >  --  112*   CO2 26 27 25   < >  --    < >  --  23   BUN 45* 39* 44*   < >  --    < >  --  30    CR 1.74* 1.75* 1.88*   < >  --    < >  --  1.22   * 138* 134*   < >  --    < >  --  106*   BRAXTON 9.5 9.4 9.7   < >  --    < >  --  8.5   MAG  --   --   --   --  2.0  --  2.1 2.0    < > = values in this interval not displayed.       CBC -   Recent Labs   Lab Test 01/13/20  1229 12/23/19  1047 10/07/19  1658   WBC 12.4* 11.0 12.9*   HGB 16.5 17.2 17.7   * 550* 476*       ASSESSMENT:   1) BPH with luts (on Avodart - started 1/2020)  2) Mildly elevated PSA and elevated exosome DX score (but has not had a biopsy).   3) Dizziness   4) Chronic kidney disease    PLAN:   - Follow up with your primary care provider to discuss your dizziness.  Purchase a blood pressure cuff.  Take twice daily and record values so you can share these with your primary care provider.   - Continue Avodart  - Anticipate that urinary symptoms will continue to improve on Avodart.  Discussed that it will shrink the prostate over the course of 2 years.  He started in in 1/2020.   - 10/26/20 - has a lab appointment at [place].  Will put in an order to recheck kidney function labs (BMP) at that time. If Cr is rising, will schedule a bladderscan PVR check to rule out significant retention. Could also consider an ultrasound of the kidneys.   - Follow up in 6 months with me.  Obtain a PSA at the lab first.     Telephone call duration: 17 minutes    Cat Zarco PA-C  Department of Urologic Surgery

## 2020-10-26 NOTE — PROGRESS NOTES
ANTICOAGULATION FOLLOW-UP CLINIC VISIT    Patient Name:  Tomás Cruz  Date:  10/26/2020  Contact Type:  Telephone/ [place]    SUBJECTIVE:  Patient Findings     Comments:  The patient was assessed for diet, medication, and activity level changes, missed or extra doses, bruising or bleeding, with no problem findings.          Clinical Outcomes     Negatives:  Major bleeding event, Thromboembolic event, Anticoagulation-related hospital admission, Anticoagulation-related ED visit, Anticoagulation-related fatality    Comments:  The patient was assessed for diet, medication, and activity level changes, missed or extra doses, bruising or bleeding, with no problem findings.             OBJECTIVE    Recent labs: (last 7 days)     10/26/20  1056   INR 3.10*       ASSESSMENT / PLAN  INR assessment THER    Recheck INR In: 6 WEEKS    INR Location Clinic      Anticoagulation Summary  As of 10/26/2020    INR goal:  2.5-3.5   TTR:  69.3 % (1 y)   INR used for dosing:  3.10 (10/26/2020)   Warfarin maintenance plan:  2 mg (2 mg x 1) every day   Full warfarin instructions:  2 mg every day   Weekly warfarin total:  14 mg   No change documented:  Suzi Medellin RN   Plan last modified:  Myra Holguin RN (3/2/2020)   Next INR check:  12/7/2020   Priority:  Maintenance   Target end date:  Indefinite    Indications    Long-term (current) use of anticoagulants [Z79.01] [Z79.01]  S/P mitral valve replacement [Z95.2]             Anticoagulation Episode Summary     INR check location:  Anticoagulation Clinic    Preferred lab:      Send INR reminders to:  [facility]    Comments:        Anticoagulation Care Providers     Provider Role Specialty Phone number    Mahamed Groves MD Referring [facility] [phone]            See the Encounter Report to view Anticoagulation Flowsheet and Dosing Calendar (Go to Encounters tab in chart review, and find the Anticoagulation Therapy Visit)    Patient INR is  therapeutic.  Patient will continue to take 14 mg weekly dosing and follow up in 6 weeks or sooner for any problems or concerns.        Suzi Medellin RN

## 2020-11-18 NOTE — LETTER
"11/18/2020       RE: Tomás Cruz  [address] [facility] [address]     Dear Colleague,    Thank you for referring your patient, Tomás Cruz, to the [facility]ER [facility]INIC at [facility]. Please see a copy of my visit note below.    Tomás Cruz is a 80 year old male who is being evaluated via a billable video visit.      The patient has been notified of following:     \"This video visit will be conducted via a call between you and your physician/provider. We have found that certain health care needs can be provided without the need for an in-person physical exam.  This service lets us provide the care you need with a video conversation.  If a prescription is necessary we can send it directly to your pharmacy.  If lab work is needed we can place an order for that and you can then stop by our lab to have the test done at a later time.    Video visits are billed at different rates depending on your insurance coverage.  Please reach out to your insurance provider with any questions.    If during the course of the call the physician/provider feels a video visit is not appropriate, you will not be charged for this service.\"    Patient has given verbal consent for Video visit? Yes  How would you like to obtain your AVS? MyChart  If you are dropped from the video visit, the video invite should be resent to: Send to e-mail at: [email or web address]  Will anyone else be joining your video visit? No      Myra Lazaro CMA November 18, 2020  3:24 PM       Video-Visit Details    Type of service:  Video Visit    Video time: 15 minutes    Originating Location (pt. Location): Home    Distant Location (provider location):  [facility] CANCER [facility]     Platform used for Video Visit: [facility]  Interventional Pulmonary Clinic Virtual Visit Note    November 18, 2020    Chief complaint:  Tomás Cruz is a 80 year old male " seen for   Chief Complaint   Patient presents with     Video Visit     Pulmonary Nodules        Reason for clinic visit / Chief complaint:   Right upper lobe semisolid pulmonary nodule    Assessment and Plan:  Right upper lobe semisolid pulmonary nodule, increasing intensity in size likely suggesting adenocarcinoma of the lung.  Seen by Dr. fritz in February 2020 and 2019.  I reviewed his CT scans and compared them personally.  I will refer him to thoracic surgery for further discussion of resection which he is in agreement.    History of Present Illness:  This is a 80 years old gentleman interviewed via video with his wife.  He was seen in pulmonary clinic at [facility].  He has no known pulmonary problems.  However he is a former smoker and high risk for lung cancer.       Allergies   Allergen Reactions     Spironolactone      Other reaction(s): Gynecomastia        Past Medical History:   Diagnosis Date     Atrial fibrillation (H)     Transient around time of MVR.  Had MAZE procedure by report.     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     Hypertension goal BP (blood pressure) < 140/90 4/05     Lung mass 4/30/2019     Mitral valve disorders(424.0)     s/p mitral valve replacment- 2003; SBE prophylaxsis and anticoagulated; echo 2/13- EF 30-35%     Non-ischemic cardiomyopathy (H)     EF as above.  Follows with [facility].     Polyp of colon      Renal mass     Benign per patient report, Right kidney removed.        Past Surgical History:   Procedure Laterality Date     ANESTHESIA CARDIOVERSION N/A 4/15/2019    Procedure: ANESTHESIA CARDIOVERSION;  Surgeon: GENERIC ANESTHESIA PROVIDER;  Location: [facility] OR     ARTHROSCOPY KNEE Left 2001    left knee arthroscopy     ARTHROSCOPY  KNEE  2018    right knee partial     AS TOTAL KNEE ARTHROPLASTY Left 2006    Dr. Castro     CARDIAC SURGERY      Mitral valve replacement     CATARACT IOL, RT/LT  2014    Cataracts, bilateral     COLONOSCOPY      [facility]     COLONOSCOPY N/A 2019    Procedure: COMBINED COLONOSCOPY, SINGLE OR MULTIPLE BIOPSY/POLYPECTOMY BY BIOPSies by cold forcep;  Surgeon: Ronald Henderson MD;  Location: [facility] GI     HERNIA REPAIR       IMPLANT AUTOMATIC IMPLANTABLE CARDIOVERTER DEFIBRILLATOR  2013     KIDNEY SURGERY      Laparoscopic right radical nephrectomy.- due to complex hemorrhagic cyst        Social History     Socioeconomic History     Marital status:      Spouse name: Ava     Number of children: 4     Years of education: Not on file     Highest education level: Not on file   Occupational History     Occupation: retired postman     Employer: [employer]     Employer: [email or web address]   Social Needs     Financial resource strain: Not on file     Food insecurity     Worry: Not on file     Inability: Not on file     Transportation needs     Medical: Not on file     Non-medical: Not on file   Tobacco Use     Smoking status: Former Smoker     Packs/day: 1.00     Years: 20.00     Pack years: 20.00     Quit date: 1982     Years since quittin.4     Smokeless tobacco: Never Used   Substance and Sexual Activity     Alcohol use: No     Drug use: No     Sexual activity: Yes     Partners: Female   Lifestyle     Physical activity     Days per week: Not on file     Minutes per session: Not on file     Stress: Not on file   Relationships     Social connections     Talks on phone: Not on file     Gets together: Not on file     Attends Catholic service: Not on file     Active member of club or organization: Not on file     Attends meetings of clubs or organizations: Not on file     Relationship status: Not on file     Intimate partner violence     Fear of current or ex partner:  Not on file     Emotionally abused: Not on file     Physically abused: Not on file     Forced sexual activity: Not on file   Other Topics Concern      Service Not Asked     Blood Transfusions Not Asked     Caffeine Concern No     Occupational Exposure Not Asked     Hobby Hazards Not Asked     Sleep Concern Not Asked     Stress Concern Not Asked     Weight Concern Not Asked     Special Diet Not Asked     Back Care Not Asked     Exercise No     Comment: rec 30minutes; 4-5 x/wk; hard with knee pain     Bike Helmet Not Asked     Seat Belt Yes     Self-Exams Not Asked     Parent/sibling w/ CABG, MI or angioplasty before 65F 55M? No   Social History Narrative    Worked in a chemical lab plating circuit boards        Family History   Adopted: Yes   Problem Relation Age of Onset     No Known Problems Daughter      No Known Problems Daughter      No Known Problems Daughter      No Known Problems Daughter      Diabetes No family hx of      Coronary Artery Disease No family hx of         Immunization History   Administered Date(s) Administered     Influenza (High Dose) 3 valent vaccine 02/03/2012, 01/14/2013, 11/22/2013, 10/12/2015, 10/25/2016, 10/27/2017, 09/27/2018, 10/07/2019     Influenza (IIV3) PF 10/24/2008     Influenza, Quad, High Dose, Pf, 65yr + 09/15/2020     Pneumo Conj 13-V (2010&after) 10/12/2015     Pneumococcal 23 valent 05/05/2009     TDAP Vaccine (Boostrix) 10/12/2015     Tetanus 08/11/2005       Current Outpatient Medications   Medication Sig     dutasteride (AVODART) 0.5 MG capsule Take 1 capsule (0.5 mg) by mouth daily     eplerenone (INSPRA) 25 MG tablet Take 25 mg by mouth daily     furosemide (LASIX) 20 MG tablet Take 1 tablet (20 mg) by mouth daily     gabapentin (NEURONTIN) 300 MG capsule Take 300 mg by mouth 3 times daily     metoprolol succinate ER (TOPROL-XL) 50 MG 24 hr tablet TAKE 1.5 TABLETS BY MOUTH DAILY     multivitamin w/minerals (MULTI-VITAMIN) tablet Take 1 tablet by mouth daily      pravastatin (PRAVACHOL) 40 MG tablet Take 1 tablet (40 mg) by mouth daily     warfarin ANTICOAGULANT (COUMADIN) 2 MG tablet TAKE 1 TABLETS (2 MG) DAILY BY MOUTH EVERY DAY OR AS PER INR RN DIRECTS     amiodarone (PACERONE/CODARONE) 200 MG tablet TAKE 1 TABLET BY MOUTH EVERY DAY     amoxicillin (AMOXIL) 500 MG capsule TAKE 4 CAPSULES (2,000 MG) BY MOUTH ONCE AS NEEDED PRIOR TO DENTAL APPTS (Patient not taking: Reported on 10/13/2020)     spironolactone (ALDACTONE) 25 MG tablet Take 1 tablet (25 mg) by mouth daily (Patient not taking: Reported on 10/13/2020)     No current facility-administered medications for this visit.         Review of Systems:  I have done 10 points of review systems and all negative except for those mentioned in HPI    Physical examination  Constitutional: Oriented, not in distress  Head and neck: normal posture and movements  Respiratory: Normal tidal breathing, no shortness of breath, no audible wheezing or stridor over the phone or video visit  Neurological: Alert, orientedx3, no motor deficits  Psychiatric:  Mood and affect are appropriate with insight into his/her medical condition    Data:  Lab Results   Component Value Date    WBC 12.4 01/13/2020     Lab Results   Component Value Date    RBC 5.67 01/13/2020     Lab Results   Component Value Date    HGB 16.5 01/13/2020     Lab Results   Component Value Date    HCT 50.3 01/13/2020     Lab Results   Component Value Date    MCV 89 01/13/2020     Lab Results   Component Value Date    MCH 29.1 01/13/2020     Lab Results   Component Value Date    MCHC 32.8 01/13/2020     Lab Results   Component Value Date    RDW 15.5 01/13/2020     Lab Results   Component Value Date     01/13/2020       Lab Results   Component Value Date     01/13/2020      Lab Results   Component Value Date    POTASSIUM 5.1 01/13/2020     Lab Results   Component Value Date    CHLORIDE 104 01/13/2020     Lab Results   Component Value Date    BRAXTON 9.5 01/13/2020      Lab Results   Component Value Date    CO2 26 01/13/2020     Lab Results   Component Value Date    BUN 45 01/13/2020     Lab Results   Component Value Date    CR 1.74 01/13/2020     Lab Results   Component Value Date     01/13/2020         ALLEN Ibarra MD            Again, thank you for allowing me to participate in the care of your patient.      Sincerely,    Sudhir Ibarra MD

## 2020-11-18 NOTE — LETTER
"11/18/2020      RE: Tomás Cruz  [address] [facility] [address]       Tomás Cruz is a 80 year old male who is being evaluated via a billable video visit.      The patient has been notified of following:     \"This video visit will be conducted via a call between you and your physician/provider. We have found that certain health care needs can be provided without the need for an in-person physical exam.  This service lets us provide the care you need with a video conversation.  If a prescription is necessary we can send it directly to your pharmacy.  If lab work is needed we can place an order for that and you can then stop by our lab to have the test done at a later time.    Video visits are billed at different rates depending on your insurance coverage.  Please reach out to your insurance provider with any questions.    If during the course of the call the physician/provider feels a video visit is not appropriate, you will not be charged for this service.\"    Patient has given verbal consent for Video visit? Yes  How would you like to obtain your AVS? MyChart  If you are dropped from the video visit, the video invite should be resent to: Send to e-mail at: [email or web address]  Will anyone else be joining your video visit? No      Myra Lazaro CMA November 18, 2020  3:24 PM       Video-Visit Details    Type of service:  Video Visit    Video time: 15 minutes    Originating Location (pt. Location): Home    Distant Location (provider location):  [facility] CANCER [facility]     Platform used for Video Visit: [facility]  Interventional Pulmonary Clinic Virtual Visit Note    November 18, 2020    Chief complaint:  Tomás Cruz is a 80 year old male seen for   Chief Complaint   Patient presents with     Video Visit     Pulmonary Nodules        Reason for clinic visit / Chief complaint:   Right upper lobe semisolid pulmonary nodule    Assessment and Plan:  Right upper " lobe semisolid pulmonary nodule, increasing intensity in size likely suggesting adenocarcinoma of the lung.  Seen by Dr. fritz in February 2020 and 2019.  I reviewed his CT scans and compared them personally.  I will refer him to thoracic surgery for further discussion of resection which he is in agreement.    History of Present Illness:  This is a 80 years old gentleman interviewed via video with his wife.  He was seen in pulmonary clinic at [facility].  He has no known pulmonary problems.  However he is a former smoker and high risk for lung cancer.       Allergies   Allergen Reactions     Spironolactone      Other reaction(s): Gynecomastia        Past Medical History:   Diagnosis Date     Atrial fibrillation (H)     Transient around time of MVR.  Had MAZE procedure by report.     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     Hypertension goal BP (blood pressure) < 140/90 4/05     Lung mass 4/30/2019     Mitral valve disorders(424.0)     s/p mitral valve replacment- 2003; SBE prophylaxsis and anticoagulated; echo 2/13- EF 30-35%     Non-ischemic cardiomyopathy (H)     EF as above.  Follows with [facility].     Polyp of colon      Renal mass     Benign per patient report, Right kidney removed.        Past Surgical History:   Procedure Laterality Date     ANESTHESIA CARDIOVERSION N/A 4/15/2019    Procedure: ANESTHESIA CARDIOVERSION;  Surgeon: GENERIC ANESTHESIA PROVIDER;  Location: [facility] OR     ARTHROSCOPY KNEE Left 2001    left knee arthroscopy     ARTHROSCOPY KNEE  04/2018    right knee partial     AS TOTAL KNEE ARTHROPLASTY Left 2006    Dr. Castro     CARDIAC SURGERY  2003    Mitral valve replacement     CATARACT IOL, RT/LT  2014    Cataracts, bilateral     COLONOSCOPY  2016     [facility]     COLONOSCOPY N/A 2019    Procedure: COMBINED COLONOSCOPY, SINGLE OR MULTIPLE BIOPSY/POLYPECTOMY BY BIOPSies by cold forcep;  Surgeon: Ronald Henderson MD;  Location:  GI     HERNIA REPAIR       IMPLANT AUTOMATIC IMPLANTABLE CARDIOVERTER DEFIBRILLATOR  2013     KIDNEY SURGERY      Laparoscopic right radical nephrectomy.- due to complex hemorrhagic cyst        Social History     Socioeconomic History     Marital status:      Spouse name: Ava     Number of children: 4     Years of education: Not on file     Highest education level: Not on file   Occupational History     Occupation: retired postman     Employer: [employer]     Employer: [employer]   Social Needs     Financial resource strain: Not on file     Food insecurity     Worry: Not on file     Inability: Not on file     Transportation needs     Medical: Not on file     Non-medical: Not on file   Tobacco Use     Smoking status: Former Smoker     Packs/day: 1.00     Years: 20.00     Pack years: 20.00     Quit date: 1982     Years since quittin.4     Smokeless tobacco: Never Used   Substance and Sexual Activity     Alcohol use: No     Drug use: No     Sexual activity: Yes     Partners: Female   Lifestyle     Physical activity     Days per week: Not on file     Minutes per session: Not on file     Stress: Not on file   Relationships     Social connections     Talks on phone: Not on file     Gets together: Not on file     Attends Tenriism service: Not on file     Active member of club or organization: Not on file     Attends meetings of clubs or organizations: Not on file     Relationship status: Not on file     Intimate partner violence     Fear of current or ex partner: Not on file     Emotionally abused: Not on file     Physically abused: Not on file     Forced sexual activity: Not on file   Other Topics Concern      Service Not Asked     Blood Transfusions Not Asked     Caffeine  Concern No     Occupational Exposure Not Asked     Hobby Hazards Not Asked     Sleep Concern Not Asked     Stress Concern Not Asked     Weight Concern Not Asked     Special Diet Not Asked     Back Care Not Asked     Exercise No     Comment: rec 30minutes; 4-5 x/wk; hard with knee pain     Bike Helmet Not Asked     Seat Belt Yes     Self-Exams Not Asked     Parent/sibling w/ CABG, MI or angioplasty before 65F 55M? No   Social History Narrative    Worked in a chemical lab plating circuit boards        Family History   Adopted: Yes   Problem Relation Age of Onset     No Known Problems Daughter      No Known Problems Daughter      No Known Problems Daughter      No Known Problems Daughter      Diabetes No family hx of      Coronary Artery Disease No family hx of         Immunization History   Administered Date(s) Administered     Influenza (High Dose) 3 valent vaccine 02/03/2012, 01/14/2013, 11/22/2013, 10/12/2015, 10/25/2016, 10/27/2017, 09/27/2018, 10/07/2019     Influenza (IIV3) PF 10/24/2008     Influenza, Quad, High Dose, Pf, 65yr + 09/15/2020     Pneumo Conj 13-V (2010&after) 10/12/2015     Pneumococcal 23 valent 05/05/2009     TDAP Vaccine (Boostrix) 10/12/2015     Tetanus 08/11/2005       Current Outpatient Medications   Medication Sig     dutasteride (AVODART) 0.5 MG capsule Take 1 capsule (0.5 mg) by mouth daily     eplerenone (INSPRA) 25 MG tablet Take 25 mg by mouth daily     furosemide (LASIX) 20 MG tablet Take 1 tablet (20 mg) by mouth daily     gabapentin (NEURONTIN) 300 MG capsule Take 300 mg by mouth 3 times daily     metoprolol succinate ER (TOPROL-XL) 50 MG 24 hr tablet TAKE 1.5 TABLETS BY MOUTH DAILY     multivitamin w/minerals (MULTI-VITAMIN) tablet Take 1 tablet by mouth daily     pravastatin (PRAVACHOL) 40 MG tablet Take 1 tablet (40 mg) by mouth daily     warfarin ANTICOAGULANT (COUMADIN) 2 MG tablet TAKE 1 TABLETS (2 MG) DAILY BY MOUTH EVERY DAY OR AS PER INR RN DIRECTS     amiodarone  (PACERONE/CODARONE) 200 MG tablet TAKE 1 TABLET BY MOUTH EVERY DAY     amoxicillin (AMOXIL) 500 MG capsule TAKE 4 CAPSULES (2,000 MG) BY MOUTH ONCE AS NEEDED PRIOR TO DENTAL APPTS (Patient not taking: Reported on 10/13/2020)     spironolactone (ALDACTONE) 25 MG tablet Take 1 tablet (25 mg) by mouth daily (Patient not taking: Reported on 10/13/2020)     No current facility-administered medications for this visit.         Review of Systems:  I have done 10 points of review systems and all negative except for those mentioned in HPI    Physical examination  Constitutional: Oriented, not in distress  Head and neck: normal posture and movements  Respiratory: Normal tidal breathing, no shortness of breath, no audible wheezing or stridor over the phone or video visit  Neurological: Alert, orientedx3, no motor deficits  Psychiatric:  Mood and affect are appropriate with insight into his/her medical condition    Data:  Lab Results   Component Value Date    WBC 12.4 01/13/2020     Lab Results   Component Value Date    RBC 5.67 01/13/2020     Lab Results   Component Value Date    HGB 16.5 01/13/2020     Lab Results   Component Value Date    HCT 50.3 01/13/2020     Lab Results   Component Value Date    MCV 89 01/13/2020     Lab Results   Component Value Date    MCH 29.1 01/13/2020     Lab Results   Component Value Date    MCHC 32.8 01/13/2020     Lab Results   Component Value Date    RDW 15.5 01/13/2020     Lab Results   Component Value Date     01/13/2020       Lab Results   Component Value Date     01/13/2020      Lab Results   Component Value Date    POTASSIUM 5.1 01/13/2020     Lab Results   Component Value Date    CHLORIDE 104 01/13/2020     Lab Results   Component Value Date    BRAXTON 9.5 01/13/2020     Lab Results   Component Value Date    CO2 26 01/13/2020     Lab Results   Component Value Date    BUN 45 01/13/2020     Lab Results   Component Value Date    CR 1.74 01/13/2020     Lab Results   Component Value  Date     01/13/2020         DAKOTA. Abdi Ibarra MD

## 2020-11-18 NOTE — PROGRESS NOTES
"Tomás Cruz is a 80 year old male who is being evaluated via a billable video visit.      The patient has been notified of following:     \"This video visit will be conducted via a call between you and your physician/provider. We have found that certain health care needs can be provided without the need for an in-person physical exam.  This service lets us provide the care you need with a video conversation.  If a prescription is necessary we can send it directly to your pharmacy.  If lab work is needed we can place an order for that and you can then stop by our lab to have the test done at a later time.    Video visits are billed at different rates depending on your insurance coverage.  Please reach out to your insurance provider with any questions.    If during the course of the call the physician/provider feels a video visit is not appropriate, you will not be charged for this service.\"    Patient has given verbal consent for Video visit? Yes  How would you like to obtain your AVS? MyChart  If you are dropped from the video visit, the video invite should be resent to: Send to e-mail at: [email or web address]  Will anyone else be joining your video visit? No      Myra Lazaro CMA November 18, 2020  3:24 PM       Video-Visit Details    Type of service:  Video Visit    Video time: 15 minutes    Originating Location (pt. Location): Home    Distant Location (provider location):  [facility]     Platform used for Video Visit: [facility]  Interventional Pulmonary Clinic Virtual Visit Note    November 18, 2020    Chief complaint:  Tomás Cruz is a 80 year old male seen for   Chief Complaint   Patient presents with     Video Visit     Pulmonary Nodules        Reason for clinic visit / Chief complaint:   Right upper lobe semisolid pulmonary nodule    Assessment and Plan:  Right upper lobe semisolid pulmonary nodule, increasing intensity in size likely suggesting " adenocarcinoma of the lung.  Seen by Dr. fritz in February 2020 and 2019.  I reviewed his CT scans and compared them personally.  I will refer him to thoracic surgery for further discussion of resection which he is in agreement.    History of Present Illness:  This is a 80 years old gentleman interviewed via video with his wife.  He was seen in pulmonary clinic at [facility].  He has no known pulmonary problems.  However he is a former smoker and high risk for lung cancer.       Allergies   Allergen Reactions     Spironolactone      Other reaction(s): Gynecomastia        Past Medical History:   Diagnosis Date     Atrial fibrillation (H)     Transient around time of MVR.  Had MAZE procedure by report.     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     Hypertension goal BP (blood pressure) < 140/90 4/05     Lung mass 4/30/2019     Mitral valve disorders(424.0)     s/p mitral valve replacment- 2003; SBE prophylaxsis and anticoagulated; echo 2/13- EF 30-35%     Non-ischemic cardiomyopathy (H)     EF as above.  Follows with [facility].     Polyp of colon      Renal mass     Benign per patient report, Right kidney removed.        Past Surgical History:   Procedure Laterality Date     ANESTHESIA CARDIOVERSION N/A 4/15/2019    Procedure: ANESTHESIA CARDIOVERSION;  Surgeon: GENERIC ANESTHESIA PROVIDER;  Location: [facility] OR     ARTHROSCOPY KNEE Left 2001    left knee arthroscopy     ARTHROSCOPY KNEE  04/2018    right knee partial     AS TOTAL KNEE ARTHROPLASTY Left 2006    Dr. Castro     CARDIAC SURGERY  2003    Mitral valve replacement     CATARACT IOL, RT/LT  2014    Cataracts, bilateral     COLONOSCOPY  2016    [place] [facility]     COLONOSCOPY N/A 2/21/2019    Procedure: COMBINED  COLONOSCOPY, SINGLE OR MULTIPLE BIOPSY/POLYPECTOMY BY BIOPSies by cold forcep;  Surgeon: Ronald Henderson MD;  Location: [facility] GI     HERNIA REPAIR       IMPLANT AUTOMATIC IMPLANTABLE CARDIOVERTER DEFIBRILLATOR  2013     KIDNEY SURGERY      Laparoscopic right radical nephrectomy.- due to complex hemorrhagic cyst        Social History     Socioeconomic History     Marital status:      Spouse name: Ava     Number of children: 4     Years of education: Not on file     Highest education level: Not on file   Occupational History     Occupation: retired postman     Employer: [employer]     Employer: RETIRED   Social Needs     Financial resource strain: Not on file     Food insecurity     Worry: Not on file     Inability: Not on file     Transportation needs     Medical: Not on file     Non-medical: Not on file   Tobacco Use     Smoking status: Former Smoker     Packs/day: 1.00     Years: 20.00     Pack years: 20.00     Quit date: 1982     Years since quittin.4     Smokeless tobacco: Never Used   Substance and Sexual Activity     Alcohol use: No     Drug use: No     Sexual activity: Yes     Partners: Female   Lifestyle     Physical activity     Days per week: Not on file     Minutes per session: Not on file     Stress: Not on file   Relationships     Social connections     Talks on phone: Not on file     Gets together: Not on file     Attends Pentecostalism service: Not on file     Active member of club or organization: Not on file     Attends meetings of clubs or organizations: Not on file     Relationship status: Not on file     Intimate partner violence     Fear of current or ex partner: Not on file     Emotionally abused: Not on file     Physically abused: Not on file     Forced sexual activity: Not on file   Other Topics Concern      Service Not Asked     Blood Transfusions Not Asked     Caffeine Concern No     Occupational Exposure Not Asked     Hobby Hazards Not Asked      Sleep Concern Not Asked     Stress Concern Not Asked     Weight Concern Not Asked     Special Diet Not Asked     Back Care Not Asked     Exercise No     Comment: rec 30minutes; 4-5 x/wk; hard with knee pain     Bike Helmet Not Asked     Seat Belt Yes     Self-Exams Not Asked     Parent/sibling w/ CABG, MI or angioplasty before 65F 55M? No   Social History Narrative    Worked in a chemical lab plating circuit boards        Family History   Adopted: Yes   Problem Relation Age of Onset     No Known Problems Daughter      No Known Problems Daughter      No Known Problems Daughter      No Known Problems Daughter      Diabetes No family hx of      Coronary Artery Disease No family hx of         Immunization History   Administered Date(s) Administered     Influenza (High Dose) 3 valent vaccine 02/03/2012, 01/14/2013, 11/22/2013, 10/12/2015, 10/25/2016, 10/27/2017, 09/27/2018, 10/07/2019     Influenza (IIV3) PF 10/24/2008     Influenza, Quad, High Dose, Pf, 65yr + 09/15/2020     Pneumo Conj 13-V (2010&after) 10/12/2015     Pneumococcal 23 valent 05/05/2009     TDAP Vaccine (Boostrix) 10/12/2015     Tetanus 08/11/2005       Current Outpatient Medications   Medication Sig     dutasteride (AVODART) 0.5 MG capsule Take 1 capsule (0.5 mg) by mouth daily     eplerenone (INSPRA) 25 MG tablet Take 25 mg by mouth daily     furosemide (LASIX) 20 MG tablet Take 1 tablet (20 mg) by mouth daily     gabapentin (NEURONTIN) 300 MG capsule Take 300 mg by mouth 3 times daily     metoprolol succinate ER (TOPROL-XL) 50 MG 24 hr tablet TAKE 1.5 TABLETS BY MOUTH DAILY     multivitamin w/minerals (MULTI-VITAMIN) tablet Take 1 tablet by mouth daily     pravastatin (PRAVACHOL) 40 MG tablet Take 1 tablet (40 mg) by mouth daily     warfarin ANTICOAGULANT (COUMADIN) 2 MG tablet TAKE 1 TABLETS (2 MG) DAILY BY MOUTH EVERY DAY OR AS PER INR RN DIRECTS     amiodarone (PACERONE/CODARONE) 200 MG tablet TAKE 1 TABLET BY MOUTH EVERY DAY     amoxicillin  (AMOXIL) 500 MG capsule TAKE 4 CAPSULES (2,000 MG) BY MOUTH ONCE AS NEEDED PRIOR TO DENTAL APPTS (Patient not taking: Reported on 10/13/2020)     spironolactone (ALDACTONE) 25 MG tablet Take 1 tablet (25 mg) by mouth daily (Patient not taking: Reported on 10/13/2020)     No current facility-administered medications for this visit.         Review of Systems:  I have done 10 points of review systems and all negative except for those mentioned in HPI    Physical examination  Constitutional: Oriented, not in distress  Head and neck: normal posture and movements  Respiratory: Normal tidal breathing, no shortness of breath, no audible wheezing or stridor over the phone or video visit  Neurological: Alert, orientedx3, no motor deficits  Psychiatric:  Mood and affect are appropriate with insight into his/her medical condition    Data:  Lab Results   Component Value Date    WBC 12.4 01/13/2020     Lab Results   Component Value Date    RBC 5.67 01/13/2020     Lab Results   Component Value Date    HGB 16.5 01/13/2020     Lab Results   Component Value Date    HCT 50.3 01/13/2020     Lab Results   Component Value Date    MCV 89 01/13/2020     Lab Results   Component Value Date    MCH 29.1 01/13/2020     Lab Results   Component Value Date    MCHC 32.8 01/13/2020     Lab Results   Component Value Date    RDW 15.5 01/13/2020     Lab Results   Component Value Date     01/13/2020       Lab Results   Component Value Date     01/13/2020      Lab Results   Component Value Date    POTASSIUM 5.1 01/13/2020     Lab Results   Component Value Date    CHLORIDE 104 01/13/2020     Lab Results   Component Value Date    BRAXTON 9.5 01/13/2020     Lab Results   Component Value Date    CO2 26 01/13/2020     Lab Results   Component Value Date    BUN 45 01/13/2020     Lab Results   Component Value Date    CR 1.74 01/13/2020     Lab Results   Component Value Date     01/13/2020         ALLEN Ibarra MD

## 2020-11-23 NOTE — PROGRESS NOTES
PFT Note:        Pt completed pulmonary function testing with DLCO.  Good Pt effort and cooperation.     Spirometry  Meets all ATS recommendations.    Plethysmography  All plethysmographic measurements meet ATS recommendations    DLCO  Meets all ATS recommendations. DLCO is an average of 2 maneuvers.  No recent Hgb available for DLCO correction.    November 23, 2020.2:38 PM  Marbin Chen, RT

## 2020-11-23 NOTE — TELEPHONE ENCOUNTER
RECORDS STATUS - ALL OTHER DIAGNOSIS      RECORDS RECEIVED FROM: [facility]   DATE RECEIVED: 11/30/2020   NOTES STATUS DETAILS   OFFICE NOTE from referring provider Complete Tash Nelson MD   OFFICE NOTE from medical oncologist N/A [facility]- 11/18/2020 Virtual Visit with Dr. Ibarra (Pulmonology) Pulmonary Nodules   DISCHARGE SUMMARY from hospital N/A    DISCHARGE REPORT from the ER     OPERATIVE REPORT N/A    MEDICATION LIST Complete [facility]   CLINICAL TRIAL TREATMENTS TO DATE     LABS     PATHOLOGY REPORTS  N/A   ANYTHING RELATED TO DIAGNOSIS Complete Labs last updated on 10/26/2020   GENONOMIC TESTING     TYPE:     IMAGING (NEED IMAGES & REPORT)     CT SCANS Complete CT Chest 11/16/2020, 2/7/2020    CT Head 6/12/2020     CT Cervical Spine 6/12/2020, 10/14/2020    [facility]- CT Chest 5/1/2019      Xray Chest Complete 4/13/2020, 5/14/2013    [facility]- 4/30/2019    MRI     MAMMO     ULTRASOUND     PET

## 2020-11-29 NOTE — PROGRESS NOTES
"Tomás Cruz is a 80 year old male who is being evaluated via a billable video visit.      The patient has been notified of following:     \"This video visit will be conducted via a call between you and your physician/provider. We have found that certain health care needs can be provided without the need for an in-person physical exam.  This service lets us provide the care you need with a video conversation.  If a prescription is necessary we can send it directly to your pharmacy.  If lab work is needed we can place an order for that and you can then stop by our lab to have the test done at a later time.    Video visits are billed at different rates depending on your insurance coverage.  Please reach out to your insurance provider with any questions.    If during the course of the call the physician/provider feels a video visit is not appropriate, you will not be charged for this service.\"    Patient has given verbal consent for Video visit? Yes  How would you like to obtain your AVS? Mail a copy  If you are dropped from the video visit, the video invite should be resent to: Other e-mail: [email or web address]  Will anyone else be joining your video visit? No      - Had ECHO 2 weeks ago. Needs heart procedure. Wondering what surgery takes priority.    Jennifer Lu CMA      Video-Visit Details    Type of service:  Video Visit    Video Start Time: 8:42 AM  Video End Time: 9:08 AM    Originating Location (pt. Location): Home    Distant Location (provider location):  [facility]     [facility]       THORACIC SURGERY - NEW PATIENT OFFICE VISIT      Dear Dr. Groves,    I saw Mr. Cruz at Dr. Nelson s request in consultation for the evaluation and treatment of a right lung nodule.     DEO Cruz is a 80 year old year-old man who has previously been followed by Dr. Nelson since 6/2019 for this nodule which was incidently found during a prior " hospitalization. In 8/2019 the nodule was noted to be 2.1cm, and had slightly enlarged to 2.3cm in 2/2020, and enlarged again to 2.4cm on CT in 11/2020.    He stays around the house.  He does do chores around the house, like raking the leaves.  He can walk two blocks without stopping, but does it slowly because of knee replacements.      He had a CVA, but this was asymptomatic at the time.  He is taking coumadin.     He thinks he needs a cardiac procedure, possibly catheter based and is concerned to know if he can have that now or if the lung nodule takes precedence.    Previsit Tests   Echo 10/27/20  LV ejection fraction 25-30%    PFT 11/23/20  FEV1 pre 2.44L (74% predicted)  DLCO 23.48 ml/min/mmHg (84% predicted)    CT Chest 11/16/20    Ground glass with 4-5 mm solid component    PMH    Past Medical History:   Diagnosis Date     Atrial fibrillation (H)     Transient around time of MVR.  Had MAZE procedure by report.     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     Hypertension goal BP (blood pressure) < 140/90 4/05     Lung mass 4/30/2019     Mitral valve disorders(424.0)     s/p mitral valve replacment- 2003; SBE prophylaxsis and anticoagulated; echo 2/13- EF 30-35%     Non-ischemic cardiomyopathy (H)     EF as above.  Follows with [facility].     Polyp of colon      Renal mass     Benign per patient report, Right kidney removed.      Current Outpatient Medications   Medication     amiodarone (PACERONE/CODARONE) 200 MG tablet     amoxicillin (AMOXIL) 500 MG capsule     dutasteride (AVODART) 0.5 MG capsule     eplerenone (INSPRA) 25 MG tablet     furosemide (LASIX) 20 MG tablet     gabapentin (NEURONTIN) 300 MG capsule     metoprolol succinate ER (TOPROL-XL) 50 MG 24 hr tablet      multivitamin w/minerals (MULTI-VITAMIN) tablet     pravastatin (PRAVACHOL) 40 MG tablet     spironolactone (ALDACTONE) 25 MG tablet     warfarin ANTICOAGULANT (COUMADIN) 2 MG tablet     No current facility-administered medications for this visit.        ETOH:  Rare now.  TOB:  Ages 16 - 36, 1 pack per day.    Physical examination  Deferred    From a personal perspective, he lives with his wife.  He had being doing the treadmill 30 minutes daily up until it broke one year ago.  He is a retired .  He retired about 13 years ago.  They have 4 children and 3 grandchildren.       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  The solid component is subcentimeter.    PLAN  I spent a total of 26 minutes with Mr. Cruz and his wife, more than 50% of which were spent in counseling, coordination of care, and face-to-face time. 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  This has been largely stable since August 2019, with a minimal change on the last CT of 11/16/2020.  This will require a resection, but given the small change in the last 8 months, will defer an operation now given the current COVID situation.  Mr. Cruz may undergo any cardiac procedures required, if any, at this time.    Procedure planned: Robot-assisted right thoracoscopic right upper lobectomy  Will discuss risks and benefits at next appointment, if the plan remains the same.      All questions were answered and Dereje Cruz and present family were in agreement with the plan.    I appreciate the opportunity to participate in the care of your patient and will keep you updated.    Sincerely,           Cesario Stewart MD

## 2020-11-30 NOTE — LETTER
"    11/30/2020         RE: Tomás Cruz  [address] [address]        Dear Colleague,    Thank you for referring your patient, Tomás Cruz, to the [facility]. Please see a copy of my visit note below.    Tomás Cruz is a 80 year old male who is being evaluated via a billable video visit.      The patient has been notified of following:     \"This video visit will be conducted via a call between you and your physician/provider. We have found that certain health care needs can be provided without the need for an in-person physical exam.  This service lets us provide the care you need with a video conversation.  If a prescription is necessary we can send it directly to your pharmacy.  If lab work is needed we can place an order for that and you can then stop by our lab to have the test done at a later time.    Video visits are billed at different rates depending on your insurance coverage.  Please reach out to your insurance provider with any questions.    If during the course of the call the physician/provider feels a video visit is not appropriate, you will not be charged for this service.\"    Patient has given verbal consent for Video visit? Yes  How would you like to obtain your AVS? Mail a copy  If you are dropped from the video visit, the video invite should be resent to: Other e-mail: [email or web address]  Will anyone else be joining your video visit? No      - Had ECHO 2 weeks ago. Needs heart procedure. Wondering what surgery takes priority.    Jennifer Lu CMA      Video-Visit Details    Type of service:  Video Visit    Video Start Time: 8:42 AM  Video End Time: 9:08 AM    Originating Location (pt. Location): Home    Distant Location (provider location):  [facility]     Platform used for Video Visit: [employer]       THORACIC SURGERY - NEW PATIENT OFFICE VISIT      Dear Dr. Groves,    I saw Mr. Cruz at  " Leslie s request in consultation for the evaluation and treatment of a right lung nodule.     HPI  Dereje Cruz is a 80 year old year-old man who has previously been followed by Dr. Nelson since 6/2019 for this nodule which was incidently found during a prior hospitalization. In 8/2019 the nodule was noted to be 2.1cm, and had slightly enlarged to 2.3cm in 2/2020, and enlarged again to 2.4cm on CT in 11/2020.    He stays around the house.  He does do chores around the house, like raking the leaves.  He can walk two blocks without stopping, but does it slowly because of knee replacements.      He had a CVA, but this was asymptomatic at the time.  He is taking coumadin.     He thinks he needs a cardiac procedure, possibly catheter based and is concerned to know if he can have that now or if the lung nodule takes precedence.    Previsit Tests   Echo 10/27/20  LV ejection fraction 25-30%    PFT 11/23/20  FEV1 pre 2.44L (74% predicted)  DLCO 23.48 ml/min/mmHg (84% predicted)    CT Chest 11/16/20    Ground glass with 4-5 mm solid component    PMH    Past Medical History:   Diagnosis Date     Atrial fibrillation (H)     Transient around time of MVR.  Had MAZE procedure by report.     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     Hypertension goal BP (blood pressure) < 140/90 4/05     Lung mass 4/30/2019     Mitral valve disorders(424.0)     s/p mitral valve replacment- 2003; SBE prophylaxsis and anticoagulated; echo 2/13- EF 30-35%     Non-ischemic cardiomyopathy (H)     EF as above.  Follows with [facility].     Polyp of colon      Renal mass     Benign per patient report, Right kidney removed.      Current Outpatient Medications   Medication     amiodarone (PACERONE/CODARONE) 200 MG tablet      amoxicillin (AMOXIL) 500 MG capsule     dutasteride (AVODART) 0.5 MG capsule     eplerenone (INSPRA) 25 MG tablet     furosemide (LASIX) 20 MG tablet     gabapentin (NEURONTIN) 300 MG capsule     metoprolol succinate ER (TOPROL-XL) 50 MG 24 hr tablet     multivitamin w/minerals (MULTI-VITAMIN) tablet     pravastatin (PRAVACHOL) 40 MG tablet     spironolactone (ALDACTONE) 25 MG tablet     warfarin ANTICOAGULANT (COUMADIN) 2 MG tablet     No current facility-administered medications for this visit.        ETOH:  Rare now.  TOB:  Ages 16 - 36, 1 pack per day.    Physical examination  Deferred    From a personal perspective, he lives with his wife.  He had being doing the treadmill 30 minutes daily up until it broke one year ago.  He is a retired .  He retired about 13 years ago.  They have 4 children and 3 grandchildren.       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  The solid component is subcentimeter.    PLAN  I spent a total of 26 minutes with Mr. Cruz and his wife, more than 50% of which were spent in counseling, coordination of care, and face-to-face time. 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  This has been largely stable since August 2019, with a minimal change on the last CT of 11/16/2020.  This will require a resection, but given the small change in the last 8 months, will defer an operation now given the current COVID situation.  Mr. Cruz may undergo any cardiac procedures required, if any, at this time.    Procedure planned: Robot-assisted right thoracoscopic right upper lobectomy  Will discuss risks and benefits at next appointment, if the plan remains the same.      All questions were answered and Dereje  Nancy and present family were in agreement with the plan.    I appreciate the opportunity to participate in the care of your patient and will keep you updated.    Sincerely,           Cesario Stewart MD    In error      Again, thank you for allowing me to participate in the care of your patient.        Sincerely,        Cesario Stewart MD

## 2020-11-30 NOTE — LETTER
"    11/30/2020         RE: Tomás Cruz  [address] [id number]      Tomás Cruz is a 80 year old male who is being evaluated via a billable video visit.      The patient has been notified of following:     \"This video visit will be conducted via a call between you and your physician/provider. We have found that certain health care needs can be provided without the need for an in-person physical exam.  This service lets us provide the care you need with a video conversation.  If a prescription is necessary we can send it directly to your pharmacy.  If lab work is needed we can place an order for that and you can then stop by our lab to have the test done at a later time.    Video visits are billed at different rates depending on your insurance coverage.  Please reach out to your insurance provider with any questions.    If during the course of the call the physician/provider feels a video visit is not appropriate, you will not be charged for this service.\"    Patient has given verbal consent for Video visit? Yes  How would you like to obtain your AVS? Mail a copy  If you are dropped from the video visit, the video invite should be resent to: Other e-mail: [email or web address]  Will anyone else be joining your video visit? No      - Had ECHO 2 weeks ago. Needs heart procedure. Wondering what surgery takes priority.    Jennifer Lu CMA      Video-Visit Details    Type of service:  Video Visit    Video Start Time: 8:42 AM  Video End Time: 9:08 AM    Originating Location (pt. Location): Home    Distant Location (provider location):  [facility]     Platform used for Video Visit: [facility]       THORACIC SURGERY - NEW PATIENT OFFICE VISIT      Dear Dr. Groves,    I saw Mr. Cruz at Dr. Nelson s request in consultation for the evaluation and treatment of a right lung nodule.     HPI  Dereje Cruz is a 80 year old year-old man who has previously been " followed by Dr. Nelson since 6/2019 for this nodule which was incidently found during a prior hospitalization. In 8/2019 the nodule was noted to be 2.1cm, and had slightly enlarged to 2.3cm in 2/2020, and enlarged again to 2.4cm on CT in 11/2020.    He stays around the house.  He does do chores around the house, like raking the leaves.  He can walk two blocks without stopping, but does it slowly because of knee replacements.      He had a CVA, but this was asymptomatic at the time.  He is taking coumadin.     He thinks he needs a cardiac procedure, possibly catheter based and is concerned to know if he can have that now or if the lung nodule takes precedence.    Previsit Tests   Echo 10/27/20  LV ejection fraction 25-30%    PFT 11/23/20  FEV1 pre 2.44L (74% predicted)  DLCO 23.48 ml/min/mmHg (84% predicted)    CT Chest 11/16/20    Ground glass with 4-5 mm solid component    PMH    Past Medical History:   Diagnosis Date     Atrial fibrillation (H)     Transient around time of MVR.  Had MAZE procedure by report.     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     Hypertension goal BP (blood pressure) < 140/90 4/05     Lung mass 4/30/2019     Mitral valve disorders(424.0)     s/p mitral valve replacment- 2003; SBE prophylaxsis and anticoagulated; echo 2/13- EF 30-35%     Non-ischemic cardiomyopathy (H)     EF as above.  Follows with [facility].     Polyp of colon      Renal mass     Benign per patient report, Right kidney removed.      Current Outpatient Medications   Medication     amiodarone (PACERONE/CODARONE) 200 MG tablet     amoxicillin (AMOXIL) 500 MG capsule     dutasteride (AVODART) 0.5 MG capsule     eplerenone (INSPRA) 25 MG tablet     furosemide (LASIX) 20 MG tablet      gabapentin (NEURONTIN) 300 MG capsule     metoprolol succinate ER (TOPROL-XL) 50 MG 24 hr tablet     multivitamin w/minerals (MULTI-VITAMIN) tablet     pravastatin (PRAVACHOL) 40 MG tablet     spironolactone (ALDACTONE) 25 MG tablet     warfarin ANTICOAGULANT (COUMADIN) 2 MG tablet     No current facility-administered medications for this visit.        ETOH:  Rare now.  TOB:  Ages 16 - 36, 1 pack per day.    Physical examination  Deferred    From a personal perspective, he lives with his wife.  He had being doing the treadmill 30 minutes daily up until it broke one year ago.  He is a retired .  He retired about 13 years ago.  They have 4 children and 3 grandchildren.       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  The solid component is subcentimeter.    PLAN  I spent a total of 26 minutes with Mr. Cruz and his wife, more than 50% of which were spent in counseling, coordination of care, and face-to-face time. 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  This has been largely stable since August 2019, with a minimal change on the last CT of 11/16/2020.  This will require a resection, but given the small change in the last 8 months, will defer an operation now given the current COVID situation.  Mr. Cruz may undergo any cardiac procedures required, if any, at this time.    Procedure planned: Robot-assisted right thoracoscopic right upper lobectomy  Will discuss risks and benefits at next appointment, if the plan remains the same.      All questions were answered and Dereje Cruz and present family were in agreement with the plan.    I appreciate the opportunity to participate in the care of your patient and will keep you  updated.    Sincerely,           Cesario Stewart MD    In error        Cesario Stewart MD

## 2020-11-30 NOTE — LETTER
"    11/30/2020         RE: Tomás Cruz  [address] [facility] [address]        Dear Colleague,    Thank you for referring your patient, Tomás Cruz, to the [facility]. Please see a copy of my visit note below.    Tomás Cruz is a 80 year old male who is being evaluated via a billable video visit.      The patient has been notified of following:     \"This video visit will be conducted via a call between you and your physician/provider. We have found that certain health care needs can be provided without the need for an in-person physical exam.  This service lets us provide the care you need with a video conversation.  If a prescription is necessary we can send it directly to your pharmacy.  If lab work is needed we can place an order for that and you can then stop by our lab to have the test done at a later time.    Video visits are billed at different rates depending on your insurance coverage.  Please reach out to your insurance provider with any questions.    If during the course of the call the physician/provider feels a video visit is not appropriate, you will not be charged for this service.\"    Patient has given verbal consent for Video visit? Yes  How would you like to obtain your AVS? Mail a copy  If you are dropped from the video visit, the video invite should be resent to: Other e-mail: [email or web address]  Will anyone else be joining your video visit? No      - Had ECHO 2 weeks ago. Needs heart procedure. Wondering what surgery takes priority.    Jennifer Lu CMA      Video-Visit Details    Type of service:  Video Visit    Video Start Time: 8:42 AM  Video End Time: 9:08 AM    Originating Location (pt. Location): Home    Distant Location (provider location):  [facility]     Platform used for Video Visit: [employer]       THORACIC SURGERY - NEW PATIENT OFFICE VISIT      Dear Dr. Groves,    I saw Mr. Cruz at  " Leslie s request in consultation for the evaluation and treatment of a right lung nodule.     HPI  Dereje Cruz is a 80 year old year-old man who has previously been followed by Dr. Nelson since 6/2019 for this nodule which was incidently found during a prior hospitalization. In 8/2019 the nodule was noted to be 2.1cm, and had slightly enlarged to 2.3cm in 2/2020, and enlarged again to 2.4cm on CT in 11/2020.    He stays around the house.  He does do chores around the house, like raking the leaves.  He can walk two blocks without stopping, but does it slowly because of knee replacements.      He had a CVA, but this was asymptomatic at the time.  He is taking coumadin.     He thinks he needs a cardiac procedure, possibly catheter based and is concerned to know if he can have that now or if the lung nodule takes precedence.    Previsit Tests   Echo 10/27/20  LV ejection fraction 25-30%    PFT 11/23/20  FEV1 pre 2.44L (74% predicted)  DLCO 23.48 ml/min/mmHg (84% predicted)    CT Chest 11/16/20    Ground glass with 4-5 mm solid component    PMH    Past Medical History:   Diagnosis Date     Atrial fibrillation (H)     Transient around time of MVR.  Had MAZE procedure by report.     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     Hypertension goal BP (blood pressure) < 140/90 4/05     Lung mass 4/30/2019     Mitral valve disorders(424.0)     s/p mitral valve replacment- 2003; SBE prophylaxsis and anticoagulated; echo 2/13- EF 30-35%     Non-ischemic cardiomyopathy (H)     EF as above.  Follows with [facility].     Polyp of colon      Renal mass     Benign per patient report, Right kidney removed.      Current Outpatient Medications   Medication     amiodarone (PACERONE/CODARONE) 200 MG tablet      amoxicillin (AMOXIL) 500 MG capsule     dutasteride (AVODART) 0.5 MG capsule     eplerenone (INSPRA) 25 MG tablet     furosemide (LASIX) 20 MG tablet     gabapentin (NEURONTIN) 300 MG capsule     metoprolol succinate ER (TOPROL-XL) 50 MG 24 hr tablet     multivitamin w/minerals (MULTI-VITAMIN) tablet     pravastatin (PRAVACHOL) 40 MG tablet     spironolactone (ALDACTONE) 25 MG tablet     warfarin ANTICOAGULANT (COUMADIN) 2 MG tablet     No current facility-administered medications for this visit.        ETOH:  Rare now.  TOB:  Ages 16 - 36, 1 pack per day.    Physical examination  Deferred    From a personal perspective, he lives with his wife.  He had being doing the treadmill 30 minutes daily up until it broke one year ago.  He is a retired .  He retired about 13 years ago.  They have 4 children and 3 grandchildren.       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  The solid component is subcentimeter.    PLAN  I spent a total of 26 minutes with Mr. Cruz and his wife, more than 50% of which were spent in counseling, coordination of care, and face-to-face time. 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  This has been largely stable since August 2019, with a minimal change on the last CT of 11/16/2020.  This will require a resection, but given the small change in the last 8 months, will defer an operation now given the current COVID situation.  Mr. Cruz may undergo any cardiac procedures required, if any, at this time.    Procedure planned: Robot-assisted right thoracoscopic right upper lobectomy  Will discuss risks and benefits at next appointment, if the plan remains the same.      All questions were answered and Dereje  Nancy and present family were in agreement with the plan.    I appreciate the opportunity to participate in the care of your patient and will keep you updated.    Sincerely,           Cesario Stewart MD    In error      Again, thank you for allowing me to participate in the care of your patient.        Sincerely,        Cesario Stewart MD

## 2020-12-07 NOTE — PROGRESS NOTES
"ANTICOAGULATION FOLLOW-UP CLINIC VISIT    Patient Name:  Tomás Cruz  Date:  12/7/2020  Contact Type:  Telephone    SUBJECTIVE:  Patient Findings     Positives:  Missed doses    Comments:  Called patient to discuss today's INR results: The patient was assessed for diet, medication, and activity level changes, bruising or bleeding, with no problem findings. Though patient does note he missed yesterday's dose. He will take it today along with his regular 2 mg dose, then return to 2 mg daily and recheck INR in 2 weeks. His Oncologist would like to do a resection of his lung cancer, but is holding off on scheduling for now d/t Covid. The will revisit at next appt. Patient also states his Cardiologist wants to \"put in a wire to get the top and bottom of heart working together\" but does not have a date yet. Patient saw Cardiologist 11/10/20, no mention of this in notes. Did advise patient to call [facility] at [phone] if any surgeries are scheduled so we can working on a warfarin hold plan. Patient stated understanding and is in agreement with plan. Reviewed maintenance warfarin dosing and today's increase to make up for missed dose yesterday. Patient stated understanding and is in agreement with plan.  Irma BAEZ RN  Anticoagulation Team          Clinical Outcomes     Negatives:  Major bleeding event, Thromboembolic event, Anticoagulation-related hospital admission, Anticoagulation-related ED visit, Anticoagulation-related fatality    Comments:  Called patient to discuss today's INR results: The patient was assessed for diet, medication, and activity level changes, bruising or bleeding, with no problem findings. Though patient does note he missed yesterday's dose. He will take it today along with his regular 2 mg dose, then return to 2 mg daily and recheck INR in 2 weeks. His Oncologist would like to do a resection of his lung cancer, but is holding off on scheduling for now d/t Covid. The will revisit at next " "appt. Patient also states his Cardiologist wants to \"put in a wire to get the top and bottom of heart working together\" but does not have a date yet. Patient saw Cardiologist 11/10/20, no mention of this in notes. Did advise patient to call [facility] at [phone] if any surgeries are scheduled so we can working on a warfarin hold plan. Patient stated understanding and is in agreement with plan. Reviewed maintenance warfarin dosing and today's increase to make up for missed dose yesterday. Patient stated understanding and is in agreement with plan.  Irma BAEZ RN  Anticoagulation Team             OBJECTIVE    Recent labs: (last 7 days)     20  1029   INR 2.20*       ASSESSMENT / PLAN  INR assessment SUB    Recheck INR In: 2 WEEKS    INR Location Clinic      Anticoagulation Summary  As of 2020    INR goal:  2.5-3.5   TTR:  74.0 % (1 y)   INR used for dosin.20 (2020)   Warfarin maintenance plan:  2 mg (2 mg x 1) every day   Full warfarin instructions:  : 4 mg; Otherwise 2 mg every day   Weekly warfarin total:  14 mg   Plan last modified:  Irma Damon RN (2020)   Next INR check:  2020   Priority:  Maintenance   Target end date:  Indefinite    Indications    Long-term (current) use of anticoagulants [Z79.01] [Z79.01]  S/P mitral valve replacement [Z95.2]             Anticoagulation Episode Summary     INR check location:  Anticoagulation Clinic    Preferred lab:      Send INR reminders to:  [facility]    Comments:        Anticoagulation Care Providers     Provider Role Specialty Phone number    Mahamed Groves MD Referring Family Medicine [phone]            See the Encounter Report to view Anticoagulation Flowsheet and Dosing Calendar (Go to Encounters tab in chart review, and find the Anticoagulation Therapy Visit)    Dosage adjustment made based on physician directed care plan.    Irma Damon RN                 "

## 2020-12-14 PROBLEM — G58.9 NERVE PALSY: Status: ACTIVE | Noted: 2020-01-01

## 2020-12-14 NOTE — ED NOTES
"[facility]  ED Nurse Handoff Report    Tomás Cruz is a 80 year old male   ED Chief complaint: Eye Problem  . ED Diagnosis:   Final diagnoses:   Nerve palsy     Allergies:   Allergies   Allergen Reactions     Spironolactone      Other reaction(s): Gynecomastia       Code Status: Full Code  Activity level - Baseline/Home:  Independent. Activity Level - Current:  SBA Lift room needed: No. Bariatric: No   Needed: No   Isolation: No. Infection: Not Applicable.     Vital Signs:   Vitals:    12/14/20 1515 12/14/20 1530 12/14/20 1545 12/14/20 1600   BP: (!) 143/85 (!) 141/84 (!) 148/84 (!) 149/101   Pulse: 63 61 61 64   Resp:       Temp:       SpO2: 100% 97% 100% 97%       Cardiac Rhythm:  ,      Pain level:    Patient confused: No. Patient Falls Risk: Yes.   Elimination Status: Has voided   Patient Report - Initial Complaint: Double vision. Focused Assessment: Tomás Cruz is a 80 year old male with history of atrial fibrillation, CVA, mitral valve disorder, Cardiomyopathy, hypertension, CKD, anticoagulated on Coumadin, who presents for evaluation of double vision. The patient reports that he has been experiencing bilateral double vision for the past 2-3 days and it is noticeable going sideways but improves with one eye closed. He describes that he feels like his eyes \"won't coordinate together\". The patient denies weakness, chest pain, shortness of breath, headache, vision loss, or history of similar symptoms.   Tests Performed: labs. Abnormal Results:   Labs Ordered and Resulted from Time of ED Arrival Up to the Time of Departure from the ED   CBC WITH PLATELETS DIFFERENTIAL - Abnormal; Notable for the following components:       Result Value    WBC 13.4 (*)     RDW 17.3 (*)     Platelet Count 516 (*)     Absolute Neutrophil 10.3 (*)     Absolute Monocytes 1.4 (*)     All other components within normal limits   BASIC METABOLIC PANEL - Abnormal; Notable for the following components:    " Glucose 291 (*)     Creatinine 1.50 (*)     GFR Estimate 44 (*)     GFR Estimate If Black 50 (*)     All other components within normal limits   INR - Abnormal; Notable for the following components:    INR 2.78 (*)     All other components within normal limits   GLUCOSE BY METER - Abnormal; Notable for the following components:    Glucose 299 (*)     All other components within normal limits   HEMOGLOBIN A1C - Abnormal; Notable for the following components:    Hemoglobin A1C 8.3 (*)     All other components within normal limits   COVID-19 VIRUS (CORONAVIRUS) BY PCR     .   Treatments provided: None  Family Comments: NA  OBS brochure/video discussed/provided to patient:  No  ED Medications:   Medications   CT Scan Flush (80 mLs Intravenous Given 12/14/20 1343)   iopamidol (ISOVUE-370) solution 500 mL (70 mLs Intravenous Given 12/14/20 1343)     Drips infusing:  No  For the majority of the shift, the patient's behavior Green. Interventions performed were NA.    Sepsis treatment initiated: No     Patient tested for COVID 19 prior to admission: YES    ED Nurse Name/Phone Number: Marquita Arriola RN,   4:14 PM    RECEIVING UNIT ED HANDOFF REVIEW    Above ED Nurse Handoff Report was reviewed: Yes  Reviewed by: Leonel Clayton RN on December 14, 2020 at 5:13 PM

## 2020-12-14 NOTE — ED TRIAGE NOTES
Patient presents to the ED reporting double vision. States began on Friday and has been constant since. Reports vision is fine in either eye when has only eye open, but has double vision when both eyes are open.

## 2020-12-14 NOTE — H&P
[facility]    History and Physical  Hospitalist       Date of Admission:  12/14/2020    Assessment & Plan   Tomás Cruz is a 80 year old male with a past medical history of atrial fibrillation on warfarin for anticoagulation, CVA, mitral valve disorder, cardiomyopathy, hypertension, CKD who presents with double vision.    #Possible CVA, medial right eye gaze deficit: Onset on Friday, 12/11.  He does have a history of CVA in the past.  He denies any unilateral weakness, chest pain, palpitations, headache.  No change in speech.  No issues swallowing.  CT the head did not show any new abnormality.  Case was discussed with stroke neurology in the ED.  Cannot obtain MRI as patient has pacemaker that is not compatible.   -We will continue warfarin, pharmacy to dose.  Holding aspirin per initial stroke neurology recommendations.  -Secondary risk modification: Continue statin.  Continue antihypertensive regimen.  We are now 3 days out from onset of symptoms.  -Telemetry, TTE with bubble  -Consult stroke neurology  -OT consulted.  Offered eyepatch for patient but patient declines at this time.    #DM2: A1c found to be elevated at 8.3. No formal diagnosis of DM.  Did inform patient of diagnosis.    -Will start sliding scale insulin.  Should likely start metformin on discharge.    Chronic Med Conditions   #Afib: Continue home metop, warfarin, pharm to dose  #Chronic HFrEF: EF 25-30%.  TTE last performed in 10/2020.  Not in exacerbation. Not currently taking lasix.  He reports stable weights.   #Mechanical Mitral Valve: Continuing warfarin.  Goal INR 2.5-3.5    #HTN: Continue home eplerenone, metoprolol  #HL: Continue home statin  #CKD3: Cr at baseline.   #Chronic Thrombocytopenia    DVT Prophylaxis: Warfarin  Code Status: Full Code  Dispo: Admit to inpatient, expect greater than 2 midnights.    Td Mccarty MD    Primary Care Physician   Mahamed Groves    Chief Complaint   Double VIsion    History  "is obtained from the patient, patient's chart and discussed with ER physician    History of Present Illness   Tomás Cruz is a 80 year old male with a past medical history of atrial fibrillation on warfarin for anticoagulation, CVA, mitral valve disorder, cardiomyopathy, hypertension, CKD who presents with double vision.    Patient reports that over the last 3 days he has been having double vision.  It improves when he closes 1 eye.  He feels like his eyes \"do not coordinate.\"  Patient denies any chest pain or headache.  No palpitations.  No shortness of breath, vision loss, unilateral weakness.  No recent illness.    In the ED, patient afebrile and nontachycardic.  Normotensive.  Saturating well on room air.  BMP showed creatinine at baseline.  CBC showed mild leukocytosis to 13 and chronic thrombocytosis.  His INR was therapeutic at 2.78.  CT and CTA of the head neck showed no acute intracranial abnormality.  It did show chronic infarcts in the right thalamus and bilateral cerebellar hemispheres.  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  This was discussed with stroke neurology.    Past Medical History    I have reviewed this patient's medical history and updated it with pertinent information if needed.   Past Medical History:   Diagnosis Date     Atrial fibrillation (H)     Transient around time of MVR.  Had MAZE procedure by report.     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     Hypertension goal BP (blood pressure) < 140/90 4/05     Lung mass 4/30/2019     Mitral valve disorders(424.0)     s/p mitral valve replacment- 2003; SBE prophylaxsis and anticoagulated; echo 2/13- EF 30-35%     Non-ischemic cardiomyopathy (H)     EF as above. "  Follows with [facility].     Polyp of colon      Renal mass     Benign per patient report, Right kidney removed.       Past Surgical History   I have reviewed this patient's surgical history and updated it with pertinent information if needed.  Past Surgical History:   Procedure Laterality Date     ANESTHESIA CARDIOVERSION N/A 4/15/2019    Procedure: ANESTHESIA CARDIOVERSION;  Surgeon: GENERIC ANESTHESIA PROVIDER;  Location: [facility] OR     ARTHROSCOPY KNEE Left 2001    left knee arthroscopy     ARTHROSCOPY KNEE  04/2018    right knee partial     AS TOTAL KNEE ARTHROPLASTY Left 2006    Dr. Castro     CARDIAC SURGERY  2003    Mitral valve replacement     CATARACT IOL, RT/LT  2014    Cataracts, bilateral     COLONOSCOPY  2016    [facility]     COLONOSCOPY N/A 2/21/2019    Procedure: COMBINED COLONOSCOPY, SINGLE OR MULTIPLE BIOPSY/POLYPECTOMY BY BIOPSies by cold forcep;  Surgeon: Ronald Henderson MD;  Location:  GI     HERNIA REPAIR  2005     IMPLANT AUTOMATIC IMPLANTABLE CARDIOVERTER DEFIBRILLATOR  5/2013     KIDNEY SURGERY  2008    Laparoscopic right radical nephrectomy.- due to complex hemorrhagic cyst       Prior to Admission Medications   Prior to Admission Medications   Prescriptions Last Dose Informant Patient Reported? Taking?   amiodarone (PACERONE/CODARONE) 200 MG tablet   No No   Sig: TAKE 1 TABLET BY MOUTH EVERY DAY   amoxicillin (AMOXIL) 500 MG capsule   No No   Sig: TAKE 4 CAPSULES (2,000 MG) BY MOUTH ONCE AS NEEDED PRIOR TO DENTAL APPTS   dutasteride (AVODART) 0.5 MG capsule   No No   Sig: Take 1 capsule (0.5 mg) by mouth daily   eplerenone (INSPRA) 25 MG tablet   Yes No   Sig: Take 25 mg by mouth daily   furosemide (LASIX) 20 MG tablet   No No   Sig: Take 1 tablet (20 mg) by mouth daily   gabapentin (NEURONTIN) 300 MG capsule   Yes No   Sig: Take 300 mg by mouth 3 times daily   metoprolol succinate ER (TOPROL-XL) 50 MG 24 hr tablet   No No   Sig: TAKE 1.5 TABLETS BY MOUTH DAILY    multivitamin w/minerals (MULTI-VITAMIN) tablet   Yes No   Sig: Take 1 tablet by mouth daily   pravastatin (PRAVACHOL) 40 MG tablet   No No   Sig: Take 1 tablet (40 mg) by mouth daily   spironolactone (ALDACTONE) 25 MG tablet   No No   Sig: Take 1 tablet (25 mg) by mouth daily   warfarin ANTICOAGULANT (COUMADIN) 2 MG tablet   No No   Sig: TAKE 1 TABLETS (2 MG) DAILY BY MOUTH EVERY DAY OR AS PER INR RN DIRECTS      Facility-Administered Medications: None     Allergies   Allergies   Allergen Reactions     Spironolactone      Other reaction(s): Gynecomastia       Social History   I have reviewed this patient's social history and updated it with pertinent information if needed. Tomás Cruz  reports that he quit smoking about 38 years ago. He has a 20.00 pack-year smoking history. He has never used smokeless tobacco. He reports that he does not drink alcohol or use drugs.    Family History   I have reviewed this patient's family history and updated it with pertinent information if needed.   Family History   Adopted: Yes   Problem Relation Age of Onset     No Known Problems Daughter      No Known Problems Daughter      No Known Problems Daughter      No Known Problems Daughter      Diabetes No family hx of      Coronary Artery Disease No family hx of        Review of Systems   The 10 point Review of Systems is negative other than noted in the HPI or here.     Physical Exam   Temp: 98.2  F (36.8  C)   BP: (!) 165/95 Pulse: 75   Resp: 18 SpO2: 99 %      Vital Signs with Ranges  Temp:  [98.2  F (36.8  C)] 98.2  F (36.8  C)  Pulse:  [75] 75  Resp:  [18] 18  BP: (165)/(95) 165/95  SpO2:  [99 %] 99 %  0 lbs 0 oz    Constitutional: Elderly male, NAD, Nontoxic  HEENT: Normocephalic, MMM, no elevation of JVD noted  Respiratory: Nl WOB, Clear bilaterally, No wheezes, no crackles  Cardiovascular: Irregular, +mechanical click, No murmur  GI: BS+, NT, ND, no rebound or guarding  Lymph/Hematologic: No bruising. No cervical  LAD  Skin: No rash  Musculoskeletal: Nl Tone, No edema noted  Neurologic: A&Ox3, Answers appropriately. Deficit w medial movement of right eye.  Other cranial nerves intact.  Moves all extremities. Strength 5/5 in all extremities. No pronator drift. No tremor  Psychiatric: Calm    Data   Data reviewed today:  I personally reviewed   Recent Labs   Lab 12/14/20  1221   WBC 13.4*   HGB 15.4   MCV 88   *   INR 2.78*      POTASSIUM 4.4   CHLORIDE 109   CO2 24   BUN 30   CR 1.50*   ANIONGAP 6   BRAXTON 9.0   *       Recent Results (from the past 24 hour(s))   CT Head w/o Contrast    Narrative    CT SCAN OF THE HEAD WITHOUT CONTRAST   12/14/2020 2:01 PM     HISTORY: Diplopia, double vision.    TECHNIQUE:  Axial images of the head and coronal reformations without  IV contrast material. Radiation dose for this scan was reduced using  automated exposure control, adjustment of the mA and/or kV according  to patient size, or iterative reconstruction technique.    COMPARISON: 6/12/2020.    FINDINGS: The ventricles are normal in size and configuration.  Unchanged small chronic lacunar infarcts in the right thalamus, as  well as grossly unchanged multiple chronic-appearing infarcts in the  cerebellum. Mild-to-moderate generalized brain parenchymal volume  loss. Mild scattered patchy hypoattenuation in the cerebral white  matter, nonspecific, but likely related to chronic small-vessel  ischemic disease. Scattered intracranial atherosclerotic disease in  the carotid siphons and left vertebral artery.    Bilateral lens replacements. The visualized orbits are otherwise  without acute abnormality, accounting for technique. The visualized  aspects of the paranasal sinuses and mastoids are clear. Advanced  right temporomandibular joint degenerative changes. The bony calvarium  and bones of the skull base appear intact.       Impression    IMPRESSION:  1. No acute intracranial abnormality.  2. Grossly unchanged chronic  infarcts in the right thalamus and  bilateral cerebellar hemispheres.  3. Brain atrophy and presumed chronic small-vessel ischemic changes.   CTA Head Neck with Contrast    Narrative    CT ANGIOGRAM OF THE HEAD AND NECK WITH CONTRAST  12/14/2020 2:03 PM     HISTORY: Double vision     TECHNIQUE:  CT angiography with an injection of 70mL [id number] IV  with scans through the head and neck. Images were transferred to a  separate 3-D workstation where multiplanar reformations and 3-D images  were created. Estimates of carotid stenoses are made relative to the  distal internal carotid artery diameters except as noted. Radiation  dose for this scan was reduced using automated exposure control,  adjustment of the mA and/or kV according to patient size, or iterative  reconstruction technique.    COMPARISON: CT head of same day. MRI head 5/26/2007. Outside CTA head  and neck 4/13/2019. Correlation is made with chest CT dated 2/7/2020.    CT HEAD FINDINGS: No contrast enhancing lesions. Cerebral blood flow  is grossly normal.     CT ANGIOGRAM HEAD FINDINGS: There is nonflow-limiting atherosclerosis  involving the bilateral carotid siphons, with only mild scattered  stenoses of the intracranial internal carotid arteries. The major  branches of the middle and anterior cerebral arteries appear to be  patent, without high-grade stenosis or evidence for large vessel  occlusion. The left vertebral artery is dominant. There appears to be  a severe focal stenosis involving the V4 segment of the right  vertebral artery (series 6 image 398) unchanged from the prior outside  CTA examination. Mild atherosclerosis of the V4 segment of the left  vertebral artery without flow-limiting stenosis. The basilar artery  appears to be patent. There is a fetal origin of the right posterior  cerebral artery, which appears to be patent. The major branches of the  left posterior cerebral artery are also patent.    CT ANGIOGRAM NECK FINDINGS:   Common  origin of the brachiocephalic and left common carotid arteries,  normal anatomic variant. No significant stenosis at the origins of the  great vessels. Mild atherosclerosis of the visualized aortic arch.     Right carotid artery: The right common and internal carotid arteries  are patent. No significant stenosis or atherosclerotic disease in the  carotid artery.     Left carotid artery: The left common and internal carotid arteries are  patent. No significant stenosis or atherosclerotic disease in the  carotid artery.     Vertebral arteries: Vertebral arteries are patent without evidence of  dissection. No significant stenosis.     Other findings: There is a 9 mm nodule in the lateral aspect of the  left thyroid lobe, without definite aggressive features, not  necessarily requiring follow-up by imaging criteria. Sternotomy wires.  Partially visualized left-sided cardiac pacing device with associated  leads extending into the superior vena cava. No significant change in  a 2.4 x 1.6 cm predominantly groundglass nodule in the right upper  pulmonary lobe (series 7 image 26) compared to 5/1/2019 chest CT.      Impression    IMPRESSION:  1. No new high-grade stenosis or large vessel occlusion involving the  major craniocervical arterial vasculature.  2. 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  3. Otherwise, there is no significant stenosis involving the cervical,  carotid, or vertebral arteries or the major visualized branches of the  [place].  4. Unchanged indeterminate groundglass nodule in the right upper lobe  measuring up to 2.4 cm. Continued follow-up is recommended.

## 2020-12-14 NOTE — LETTER
Key Recommendations:  Pt is admitted with right cranial nerve III palsy.  Therapy is recommending TCU.  Pt feels he will do well at home with his wife's support.  He is agreeable that I call her to verify this. Ava is agreeable to take him home with HC RN/PT/OT support.  She feels she can provide assistance for him.  Pt's a1c is 8.3 as of 12/14/20.  Ava will contact Dr Rosenberg and set up a post hospitalization visit with him for continued education on his new DM diagnosis.      Recommendations are ***    Nahed Lane RN    AVS/Discharge Summary is the source of truth; this is a helpful guide for improved communication of patient story

## 2020-12-14 NOTE — ED PROVIDER NOTES
"  History     Chief Complaint:  Double Vision     HPI   Tomás Cruz is a 80 year old male with history of atrial fibrillation, CVA, mitral valve disorder, Cardiomyopathy, hypertension, CKD, anticoagulated on Coumadin, who presents for evaluation of double vision. The patient reports that he has been experiencing bilateral double vision for the past 2-3 days and it is noticeable going sideways but improves with one eye closed. He describes that he feels like his eyes \"won't coordinate together\". The patient denies weakness, chest pain, shortness of breath, headache, vision loss, or history of similar symptoms.     Allergies:  Spironolactone    Medications:   amiodarone   amoxicillin  dutasteride  eplerenone   Furosemide   Gabapentin   metoprolol succinate ER  pravastatin   spironolactone   Coumadin     Past Medical History:    Atrial fibrillation   CVA  Disc disorder of lumbar region  Hypersomnia with sleep apnea   Hypertension   Lung mass   Mitral valve disorders  Non-ischemic cardiomyopathy   Polyp of colon   Renal mass   Cardiomyopathy  Syncope   Essential tremor    Thrombocytosis  CKD   amnesia   neuropahty    Past Surgical History:    mitral valve replacement   anesthesia cardioversion   Left knee arthroscopy   Right knee partial arthroscopy   Cataracts bilateral   Colonoscopy   Hernia repair   Implant automatic implantable cardioverter defibrillator   Right radical nephrectomy      Family History:    Family history reviewed. No pertinent family history.     Social History:  The patient was unaccompanied to the ED.  Smoking Status: Former Smoker  Smokeless Tobacco: Never Used  Alcohol Use: Negative   Drug Use: Negative  PCP: Mahamed Groves     Review of Systems   Eyes: Positive for visual disturbance (double vision; no vision loss).   Respiratory: Negative for shortness of breath.    Cardiovascular: Negative for chest pain.   Neurological: Negative for weakness and headaches.   All other systems reviewed " and are negative.    Physical Exam     Patient Vitals for the past 24 hrs:   BP Temp Pulse Resp SpO2   12/14/20 1152 (!) 165/95 98.2  F (36.8  C) 75 18 99 %     Physical Exam    General: Patient is alert and interactive when I enter the room  Head:  The scalp, face, and head appear normal  Eyes:  Conjunctivae are normal  ENT:    The nose is normal    Pinnae are normal    External acoustic canals are normal  Neck:  Trachea midline  CV:  Pulses are normal.   Resp:  No respiratory distress   Abdomen:      Soft, non-tender, non-distended  Musc:  Normal muscular tone    No major joint effusions    No asymmetric leg swelling  Skin:  No rash or lesions noted  Neuro:  Speech is normal and fluent. Face is symmetric.     Moving all extremities well. No leg drift. No arm drift. Good strength in upper and lower extremities. Cranial nerves intact. Some finger to nose ataxia b/l L>R. Unable to look left with right eye, medial rectus palsy, pupils 4mm on right and 2mm on the left, equally reactive   Psych: Awake. Alert.  Normal affect.  Appropriate interactions.    Emergency Department Course     ECG:  ECG taken at 1233, ECG read at 1257  Sinus rhythm with 1st degree AV block   Otherwise normal ECG  Rate 69 bpm. [place] interval 298 ms. QRS duration 104 ms. QT/QTc 418/447 ms. P-R-T axes 91 -28 36.    Imaging:  Radiology findings were communicated with the patient who voiced understanding of the findings.    CTA Head Neck with Contrast  1. No new high-grade stenosis or large vessel occlusion involving the  major craniocervical arterial vasculature.  2. 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  3. Otherwise, there is no significant stenosis involving the cervical,  carotid, or vertebral arteries or the major visualized branches of the  [place].  4. Unchanged indeterminate groundglass nodule in the right upper lobe  measuring up to 2.4 cm. Continued follow-up is  recommended.  Reading per radiology    CT Head w/o Contrast  1. No acute intracranial abnormality.  2. Grossly unchanged chronic infarcts in the right thalamus and  bilateral cerebellar hemispheres.  3. Brain atrophy and presumed chronic small-vessel ischemic changes.  Reading per radiology     Laboratory:  Laboratory findings were communicated with the patient who voiced understanding of the findings.    Asymptomatic COVID-19 Virus (Coronavirus) by PCR Nasopharyngeal swab: Pending    CBC: WBC 13.4 (H), HGB 15.4,  (H)  BMP: Glucose 291 (H), GFR 44 (L), o/w WNL (Creatinine 1.50 (H))    INR: 2.78 (H)    Hemoglobin A1c: 8.3 (H)   Glucose by meter (collected 1207): 299 (H)     Emergency Department Course:    1206 Nursing notes and vitals reviewed. I performed an exam of the patient as documented above.     1207 Blood drawn. This was sent to the lab for further testing, results above.     1221 IV was inserted and blood was drawn for laboratory testing, results above.    1233 EKG obtained as noted above.     1323 I spoke with Milagros Guillaume PA-C of the stroke neurology service regarding patient's presentation, findings, and plan of care.    1342 The patient was sent for a CT while in the emergency department, results above.      1438 Patient rechecked and updated.     1509 I spoke with Milagros Guillaume PA-C of the stroke neurology service regarding patient's presentation, findings, and plan of care.    1540 I spoke with Milagros Guillaume PA-C of the stroke neurology service regarding patient's presentation, findings, and plan of care.    1557 I spoke with Dr. Mccarty of the hospitalist service from [facility] regarding patient's presentation, findings, and plan of care.      Prior to admission, I personally reviewed the results with the patient and all related questions were answered. The patient verbalized understanding and is amenable to plan.     Impression & Plan        Covid-19  Tomásamanuel Ortegaiksen was evaluated during a global  COVID-19 pandemic, which necessitated consideration that the patient might be at risk for infection with the SARS-CoV-2 virus that causes COVID-19.   Applicable protocols for evaluation were followed during the patient's care.   COVID-19 was considered as part of the patient's evaluation. The plan for testing is:  a test was obtained during this visit.    Medical Decision Making:  Tomás Cruz is a 80 year old male with a history of atrial fibrillation, cardiomyopathy, lung mass and CKD who presents to the emergency department today for evaluation of double vision.  Patient describes binocular horizontal diplopia.  He also has a medial rectus palsy as he is unable to look medially when looking left.  This could represent ocular motor motor palsy.  He denies any other symptoms and is otherwise neurologically intact except some may be finger-nose ataxia although he has some tremor at baseline and has a difficulty with this secondary to his double vision.  He cannot get an MRI as he has an ICD.  Unfortunately this ICD is not MRI compatible.  EKG revealed no abnormalities.  Discussed the case with stroke neurology recommended CAT scans and CTA.  This was done which revealed chronic infarcts and severe stenosis in the right vertebral.  He is hyperglycemic at 299 and he denies any history of diabetes however his last blood sugar that I have in his chart was at 180 in January.  His hemoglobin A1c is 8.  Discussed the case with stroke neurology recommended admission for further stroke work-up.  Patient admitted to medicine in stable condition.      Diagnosis:    ICD-10-CM    1. Nerve palsy  G58.9      Disposition:   The patient is admitted into the care of Dr. Mccarty.     Scribe Disclosure:  I, Orla Severson, am serving as a scribe at 12:03 PM on 12/14/2020 to document services personally performed by Lala Worthington MD based on my observations and the provider's statements to me.     [facility]  DEPT         Lala Worthington MD  12/14/20 [id number]

## 2020-12-15 NOTE — PLAN OF CARE
To Do:  End of Shift Summary  For vital signs and complete assessments, please see documentation flowsheets.     Pertinent assessments: AAOx4 with intermittent periods of confusion. Some recall, memory loss. Assist x 1 with walker and GB. Tele-stroke completed. Wife present on iPad during assessment. Continues with blurred vision. Tolerated lunch. Eye patch in place. Tele SR with first degree HB. Some diabetic education completed. Patient not receptive to new information.      Major Shift Events: Tele-stroke, therapies completed.  Treatment Plan: Neuro/OT/PT Consult   Bedside Nurse:Nahomy Sanchez RN

## 2020-12-15 NOTE — UTILIZATION REVIEW
Admission Status; Secondary Review Determination       As part of the [facility] Utilization review plan, a self-audit is done on Medicare inpatient admission with less than 2 midnights stay. The 2014 IPPS Final Rule allows outpatient billing in the event that a hospital determines that an inpatient admission was not medically necessary under utilization review process.          (x) Outpatient status would be Appropriate- Short Stay- Post discharge review.     RATIONALE FOR DETERMINATION    80 year old male with a past medical history of atrial fibrillation on warfarin for anticoagulation, CVA, mitral valve disorder, cardiomyopathy, hypertension, CKD who presents with double vision. Possible CVA, medial right eye gaze deficit: Onset on Friday, 12/11.  He does have a history of CVA in the past. Still complaining of blurry/double vision. 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   This account and medical record number for a Medicare beneficiary was an inpatient short stay (<2 midnights) and didn't meet medical necessity for inpatient admission. We recommend billing outpatient services based on the severity of illness, intensity of service provided and the inpatient length of stay.  Please contact me within one week of receiving this letter only if you disagree with this determination. If you concur, no further action is needed.          The information on this document is developed by the utilization review team in order for the business office to ensure compliance.  This only denotes the appropriateness of proper admission status and does not reflect the quality of care rendered.         The definitions of Inpatient Status and Observation Status used in making the determination above are those provided in the CMS Coverage Manual, Chapter 1 and Chapter 6, section 70.4.      Sincerely,       ADOLFO OWENS MD    System Medical Director  Utilization  Management  [facility].

## 2020-12-15 NOTE — PROGRESS NOTES
12/15/20 1051   Quick Adds   Type of Visit Initial Occupational Therapy Evaluation   Living Environment   People in home spouse   Current Living Arrangements house   Home Accessibility stairs within home   Number of Stairs, Within Home, Primary other (see comments)  (2 flights of stairs to access main level of home frm garage)   Living Environment Comments patient uses tub/shower, has grab bar in tub. uses standard height toilet   Self-Care   Equipment Currently Used at Home none  (has walker and cane at home)   Activity/Exercise/Self-Care Comment Patient I with ADLs, completes most of the laundry per patient report   Disability/Function   Hearing Difficulty or Deaf no   Wear Glasses or Blind yes   Vision Management glasses   Concentrating, Remembering or Making Decisions Difficulty yes   Concentration Management patient reports spouse feels he has memory issues   Difficulty Communicating no   Difficulty Eating/Swallowing no   Walking or Climbing Stairs Difficulty no   Dressing/Bathing Difficulty no   Toileting issues no   Doing Errands Independently Difficulty (such as shopping)   (reports spouse does most of errands)   Fall history within last six months yes   Number of times patient has fallen within last six months 1  (fall on patio in Aug. Patient stated no falls, spouse says 1)   Change in Functional Status Since Onset of Current Illness/Injury yes   General Information   Onset of Illness/Injury or Date of Surgery 12/14/20   Referring Physician Dr. Mccarty   Patient/Family Therapy Goal Statement (OT) not stated   Additional Occupational Profile Info/Pertinent History of Current Problem Tomás Cruz is a 80 year old male with a past medical history of atrial fibrillation on warfarin for anticoagulation, CVA, mitral valve disorder, cardiomyopathy, hypertension, CKD who presents with double vision. . CTA head showed no aneurysm. 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 Cannot obtain MRI as  "patient has pacemaker that is not compatible. Patient schedule for tele stroke today   Existing Precautions/Restrictions fall   General Observations and Info patient was in bed, agreeable to OT session.   Cognitive Status Examination   Orientation Status person;place  (able to state yr & day of week, place [facility]hospital in [facility])   Affect/Mental Status (Cognitive) confused   Follows Commands follows one-step commands;delayed response/completion;repetition of directions required   Safety Deficit insight into deficits/self-awareness   Cognitive Status Comments patient is noticeably confused- difficulty determining if month was Sept or Dec. After signifincant time and hought, patient decided it was Dec. Throughout session, patient had difficulty grasping concepts, answering questions or appeared unsure of answers. Spouse reports decreased memory for past 6 months, was assessed by neurology in [place], however focus appeared to be on vertigo. No assessment completed fo emory- per spouse report. Spouse also reports increased confusion for the past 3-4 weeks. Reports patient has been an avid football fan fo ver 50 years and recently was watching a game and confused by the game, which is not baseline. OT spoke to spouse regarding observations and spouse reports this not baseline. 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\"   Sensory   Sensory Quick Adds No deficits were identified   Pain Assessment   Patient Currently in Pain Yes, see Vital Sign flowsheet   Integumentary/Edema   Integumentary/Edema no deficits were identifed   Posture   Posture not impaired   Range of Motion Comprehensive   General Range of Motion no range of motion deficits identified   Strength " Comprehensive (MMT)   General Manual Muscle Testing (MMT) Assessment no strength deficits identified   Muscle Tone Assessment   Muscle Tone Comments tremor on L    Bed Mobility   Comment (Bed Mobility) min A with bed mobility to manage bed linens, patient attempted to sit without managing them himself and needed A to untangle from B feet   Transfers   Transfer Comments min A with sit<>stand- defer to OT daily note for details   Balance   Balance Comments patient unsteady throughout all mobility, significant LOB noted when walking out of door, unsure if due to transition from vinyl to carpet d/t vision. Patient steadier with fww, however patient was not safe to ambulate alone, PT evaluation highly recommended   Instrumental Activities of Daily Living (IADL)   IADL Comments patient unsure of household responsibilities, reports spouse does finances, most errands and household chores. Patient was driving, completing most of the laundry and outdoor chores- yardwork and snow removal. Patient stated he is not safe to drive at this time.    Clinical Impression   Criteria for Skilled Therapeutic Interventions Met (OT) yes   OT Diagnosis decreased ADLs/IADls   OT Problem List-Impairments impacting ADL balance;cognition;vision   Assessment of Occupational Performance 5 or more Performance Deficits   Identified Performance Deficits decreased ADls/IADLs- dsg, toileting, bathing, safe functional/community mobility, driving, errands, cognitive tasks.    Planned Therapy Interventions (OT) ADL retraining;visual perception;cognition;progressive activity/exercise   Intervention Comments patient would benefit from spouse involvement for all education- RN was updated   Clinical Decision Making Complexity (OT) moderate complexity   Therapy Frequency (OT) Daily   Predicted Duration of Therapy 4 days   Anticipated Equipment Needs Upon Discharge (OT) shower chair;raised toilet seat;walker, rolling   Risks and Benefits of Treatment have been  explained. Yes   Patient, Family & other staff in agreement with plan of care Yes   OT Discharge Planning    OT Discharge Recommendation (DC Rec) Transitional Care Facility   OT Rationale for DC Rec patient is well below baseline for ADLS/IADLs and safe functional mobility., Further OT is recommended for ADLs, vision and cognitive  assessment instruction in compensatory techniques.    OT Brief overview of current status  Patient appears to have increased cognitive deficits, recommend spouse be involved for all education/training.    Total Evaluation Time (Minutes)   Total Evaluation Time (Minutes) 10

## 2020-12-15 NOTE — PHARMACY-ANTICOAGULATION SERVICE
Clinical Pharmacy - Warfarin Dosing Consult     Pharmacy has been consulted to manage this patient s warfarin therapy.  Indication: Mechanical Mitral Valve Replacement  Therapy Goal: INR 2.5-3.5  Warfarin Prior to Admission: Yes  Warfarin PTA Regimen: 2mg daily  Dose Comments: took dose today 12/14/20 AM,  prior to admission.    INR   Date Value Ref Range Status   12/14/2020 2.78 (H) 0.86 - 1.14 Final   12/07/2020 2.20 (H) 0.86 - 1.14 Final     Comment:     This test is intended for monitoring Coumadin therapy.  Results are not   accurate in patients with prolonged INR due to factor deficiency.       Pharmacy will monitor Tomás Cruz daily and order warfarin doses to achieve specified goal.      Please contact pharmacy as soon as possible if the warfarin needs to be held for a procedure or if the warfarin goals change.

## 2020-12-15 NOTE — PHARMACY-RX INSURANCE COVERAGE
Coverage check on SGLT2 inhibitors.  Patient has prescription insurance through [employer] through a retiree insurance benefit.    Farxiga $32/mo   Jardiance $158/mo   Invokana Not covered   Steglatro Not covered     If MD strongly prefers Jardiance over Faxiga, there is a copay discount card that could potentially reduce the cost to $0.  I'm not 100% sure it will work and I would have to register the patient's demographic information with the drug company.  For those reasons, I will wait on obtaining that card for now, unless I hear from MD.    CHARLIE Gatica, Pharmacy Technician/Liaison, Discharge Pharmacy [phone]

## 2020-12-15 NOTE — PLAN OF CARE
End of Shift Summary  For vital signs and complete assessments, please see documentation flowsheets.     Pertinent assessments: A&O, no c/o chest pain, palpitations or headache. Assist of 1, on room air, no SoB, on Tele, capillary  & 180mg/dl    Major Shift Events: admitted to unit at around 1800H    Treatment Plan: Continue home meds, Consult Neuro    Bedside Nurse: Leonel Clayton RN

## 2020-12-15 NOTE — PROGRESS NOTES
12/15/20 1404   Quick Adds   Type of Visit Initial PT Evaluation   Living Environment   People in home spouse   Current Living Arrangements house   Home Accessibility stairs to enter home   Transportation Anticipated car, drives self   Living Environment Comments Pt reports living in a house with his wife. Pt reports his wife is retired can assist around the house as needed. Pt reports the house having a tuck under garage and performing x2 flights of stairs up to the main living level. Pt reports driving and managing his own medications.    Self-Care   Activity/Exercise/Self-Care Comment Pt reports no AD use; has a walker and a cane at home.    Disability/Function   Walking or Climbing Stairs Difficulty no   Fall history within last six months no  (pt denies; per OT eval spouse reports 1 fall. )   General Information   Onset of Illness/Injury or Date of Surgery 12/14/20   Referring Physician Gwyn MNIOR   Patient/Family Therapy Goals Statement (PT) Return home   Pertinent History of Current Problem (include personal factors and/or comorbidities that impact the POC) 80-year-old male with a history of atrial fibrillation on warfarin, previous strokes, mechanical mitral valve in situ, heart failure with reduced ejection fraction, HTN and CKD who presented to the ED for evaluation of double vision.   Existing Precautions/Restrictions fall   Cognition   Orientation Status (Cognition) oriented x 3  (slow to respond)   Follows Commands (Cognition) follows one-step commands   Safety Deficit (Cognition) awareness of need for assistance;insight into deficits/self-awareness;judgment   Pain Assessment   Patient Currently in Pain No   Bed Mobility   Comment (Bed Mobility) Supine>sit with SBA   Transfers   Transfer Safety Comments Sit>stand with CGA; no AD; reaching out for external support.    Gait/Stairs (Locomotion)   Comment (Gait/Stairs) Ambulates with FWW, CGA   Balance   Balance Comments Requires bilat UE support on FWW  for safe dynamic mobility this session.    Clinical Impression   Criteria for Skilled Therapeutic Intervention yes, treatment indicated   PT Diagnosis (PT) Impaired gait   Influenced by the following impairments Impaired balance, decreased cognition/safety/insight   Functional limitations due to impairments Derceased IND with transfers, gait and stairs   Clinical Presentation Evolving/Changing   Clinical Presentation Rationale Changing cognition over the last several weeks/months, impaired insight, unclear how much assist wife can provide at home, pt with poor insight into current functional deficits.    Clinical Decision Making (Complexity) low complexity   Therapy Frequency (PT) 5x/week   Predicted Duration of Therapy Intervention (days/wks) One week   Planned Therapy Interventions (PT) gait training;patient/family education;transfer training;strengthening;balance training   Anticipated Equipment Needs at Discharge (PT) walker, rolling   Risk & Benefits of therapy have been explained evaluation/treatment results reviewed;care plan/treatment goals reviewed;patient;participants included   Clinical Impression Comments Patient with poor insight into current deficits. Declining need for walker at DC; reporting he doesnt have concerns with returning home to shovel the backyard or with driving.    PT Discharge Planning    PT Rationale for DC Rec If pt returns home will need Ax1 for all mobility/cares, FWW with all mobility, assist on the stairs, HHPT/OT, assist for all driving/medications. If wife cannot provide, pt will require TCU. Pt declining TCU to this writer.    PT Brief overview of current status  Ax1, FWW, hallway distances   Total Evaluation Time   Total Evaluation Time (Minutes) 10

## 2020-12-15 NOTE — PROGRESS NOTES
Brief update note    Examined patient at the bedside. Still complaining of blurry/double vision. 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 Pupillary response to light is intact. No other neurologic exam abnormalities.          Appreciate stroke neurology involvement. CTA head showed no aneurysm. 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    Full note to follow.    Micah Pascal MD

## 2020-12-15 NOTE — CONSULTS
Care Management Initial Consult    General Information  Assessment completed with: Patient, Spouse or significant other, (left vm with wife)  Type of CM/SW Visit: Initial Assessment    Primary Care Provider verified and updated as needed: Yes   Readmission within the last 30 days: no previous admission in last 30 days      Reason for Consult: care coordination/care conference, discharge planning    Communication Assessment  Patient's communication style: spoken language (English or Bilingual)    Hearing Difficulty or Deaf: no   Wear Glasses or Blind: yes    Cognitive  Cognitive/Neuro/Behavioral: WDL  Level of Consciousness: alert  Arousal Level: opens eyes spontaneously  Orientation: oriented x 4  Mood/Behavior: calm, combative  Best Language: 0 - No aphasia  Speech: fluent, clear, logical, spontaneous    Living Environment:   People in home: spouse     Current living Arrangements: house      Able to return to prior arrangements:     TBD    Family/Social Support:  Care provided by: self, spouse/significant other  Provides care for: no one  Marital Status:   Wife          Description of Support System: Supportive         Current Resources:   Skilled Home Care Services:  none  Community Resources: None  Equipment currently used at home: cane, straight, walker, standard  Supplies currently used at home: None       Lifestyle & Psychosocial Needs:        Socioeconomic History     Marital status:      Spouse name: Ava     Number of children: 4     Years of education: Not on file     Highest education level: Not on file   Occupational History     Occupation: retired postman     Employer: [employer]     Employer: RETIRED     Tobacco Use     Smoking status: Former Smoker     Packs/day: 1.00     Years: 20.00     Pack years: 20.00     Quit date: 1982     Years since quittin.5     Smokeless tobacco: Never Used   Substance and Sexual Activity     Alcohol use: No     Drug use: No      Sexual activity: Yes     Partners: Female       Functional Status:  Prior to admission patient needed assistance:    yes       Additional Information:  Pt is admitted with right cranial nerve III palsy.  Therapy is recommending TCU.  Pt feels he will do well at home with his wife's support.  He is agreeable that I call her to verify this. Ava is agreeable to take him home with [facility] RN/PT/OT support.  She feels she can provide assistance for him.  Pt's a1c is 8.3 as of 12/14/20.  Ava will contact Dr Rosenberg and set up a post hospitalization visit with him for continued education on his new DM diagnosis.  Wife can provide transport at discharge.     Chely Lane RN BSN   Inpatient Care Coordination  [facility]  [phone]    Nahed Lane RN

## 2020-12-15 NOTE — CONSULTS
"  [facility]    Stroke Consult Note    Reason for Consult:  \"Possible CVA\"    Chief Complaint: Eye Problem       HPI  Tomás Cruz is a 80 year old male with past medical history significant for heart failure (EF 25%), atrial fibrillation, mechanical mitral valve (on warfarin goal 2.5-3.5), hypertension, hyperlipidemia, CKD, and previous asymptomatic strokes seen on CT who presented to the [facility] ED 12/14 for evaluation of 3 days of diplopia.  He reports that he cannot recall how the symptoms started, but approximately 3 days ago he noticed that he had horizontal double vision.  When he closes either the right or left eye, his vision is normal.  He denies associated focal weakness, numbness, speech difficulty, visual field cut, or disequilibrium.    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  There is no evidence of aneurysm.  He cannot undergo MRI brain due to pacemaker.    Evaluation summarized:  MRI/Head CT: CT head with no acute pathology, demonstrates chronic right thalamic and bilateral cerebellar infarcts.  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 Patient has evidence of prior infarcts on CTH and stroke " "risk factors notable for HTN, HLD, DM, and atrial fibrillation. He reports that he has upper extremity tremors at baseline, however, he had difficulty with FTN testing in his LUE which would be consistent with midbrain ischemia and Claude syndrome. Unfortunately, he is unable to undergo MRI to confirm these suspicions and therefore recommendation is to treat as possible new infarct.    #Chronic appearing right thalamic and bilateral cerebellar infarcts on CT head    Recommendations  -Continue Coumadin. No strong indication for addition of aspirin at this time.   -LDL 68, continue PTA pravastatin 40 mg daily with goal LDL 40-70.  LDL less than 40 associated with increased risk of ICH  - A1c 8.3, new diagnosis of DM2. Long term goal <7.0.  - Goal BP <130/85 with tighter control associated with decreased overall CV risk, if tolerated. He currently records home BP and recent readings have been 130-140s/80s.   - Therapies  - PLC    Patient Follow-up    - in the next 1-2 week(s) with PCP    Thank you for this consult. No further stroke evaluation is recommended, so we will sign off. Please contact us with any additional questions.    ERASTO Power, [facility]  Neurology  To page me or covering stroke neurology team member, click here: AMCOM   Choose \"On Call\" tab at top, then search dropdown box for \"Neurology Adult\", select location, press Enter, then look for stroke/neuro ICU/telestroke.    _____________________________________________________    Past Medical History   Past Medical History:   Diagnosis Date     Atrial fibrillation (H)     Transient around time of MVR.  Had MAZE procedure by report.     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     Hypertension goal BP (blood pressure) < 140/90 4/05 "     Lung mass 4/30/2019     Mitral valve disorders(424.0)     s/p mitral valve replacment- 2003; SBE prophylaxsis and anticoagulated; echo 2/13- EF 30-35%     Non-ischemic cardiomyopathy (H)     EF as above.  Follows with [facility].     Polyp of colon      Renal mass     Benign per patient report, Right kidney removed.     Past Surgical History   Past Surgical History:   Procedure Laterality Date     ANESTHESIA CARDIOVERSION N/A 4/15/2019    Procedure: ANESTHESIA CARDIOVERSION;  Surgeon: GENERIC ANESTHESIA PROVIDER;  Location: [facility] OR     ARTHROSCOPY KNEE Left 2001    left knee arthroscopy     ARTHROSCOPY KNEE  04/2018    right knee partial     AS TOTAL KNEE ARTHROPLASTY Left 2006    Dr. Castro     CARDIAC SURGERY  2003    Mitral valve replacement     CATARACT IOL, RT/LT  2014    Cataracts, bilateral     COLONOSCOPY  2016    [facility]     COLONOSCOPY N/A 2/21/2019    Procedure: COMBINED COLONOSCOPY, SINGLE OR MULTIPLE BIOPSY/POLYPECTOMY BY BIOPSies by cold forcep;  Surgeon: Ronald Henderson MD;  Location:  GI     HERNIA REPAIR  2005     IMPLANT AUTOMATIC IMPLANTABLE CARDIOVERTER DEFIBRILLATOR  5/2013     KIDNEY SURGERY  2008    Laparoscopic right radical nephrectomy.- due to complex hemorrhagic cyst     Medications   Home Meds  Prior to Admission medications    Medication Sig Start Date End Date Taking? Authorizing Provider   eplerenone (INSPRA) 25 MG tablet Take 25 mg by mouth daily 8/3/20  Yes Reported, Patient   metoprolol succinate ER (TOPROL-XL) 50 MG 24 hr tablet TAKE 1.5 TABLETS BY MOUTH DAILY 7/13/20  Yes Mahamed Groves MD   multivitamin w/minerals (MULTI-VITAMIN) tablet Take 1 tablet by mouth daily   Yes Reported, Patient   pravastatin (PRAVACHOL) 40 MG tablet Take 1 tablet (40 mg) by mouth daily 12/20/19  Yes Mahamed Groves MD   warfarin ANTICOAGULANT (COUMADIN) 2 MG tablet TAKE 1 TABLETS (2 MG) DAILY BY MOUTH EVERY DAY OR AS PER INR RN DIRECTS 9/17/20  Yes Mahamed Groves MD    amoxicillin (AMOXIL) 500 MG capsule TAKE 4 CAPSULES (2,000 MG) BY MOUTH ONCE AS NEEDED PRIOR TO DENTAL APPTS 20   Mahamed Groves MD       Scheduled Meds    eplerenone  25 mg Oral Daily     insulin aspart  1-7 Units Subcutaneous TID AC     insulin aspart  1-5 Units Subcutaneous At Bedtime     metoprolol succinate ER  25 mg Oral Daily     [COMPLETED] perflutren diluted 1mL to 2mL with saline  3 mL Intravenous Once     pravastatin  40 mg Oral At Bedtime     sodium chloride (PF)  3 mL Intracatheter Q8H       Infusion Meds    - MEDICATION INSTRUCTIONS -       Warfarin Therapy Reminder         PRN Meds  acetaminophen, glucose **OR** dextrose **OR** glucagon, lidocaine 4%, lidocaine (buffered or not buffered), melatonin, ondansetron **OR** ondansetron, - MEDICATION INSTRUCTIONS -, sodium chloride (PF), Warfarin Therapy Reminder    Allergies   Allergies   Allergen Reactions     Spironolactone      Other reaction(s): Gynecomastia     Family History   Family History   Adopted: Yes   Problem Relation Age of Onset     No Known Problems Daughter      No Known Problems Daughter      No Known Problems Daughter      No Known Problems Daughter      Diabetes No family hx of      Coronary Artery Disease No family hx of      Social History   Social History     Tobacco Use     Smoking status: Former Smoker     Packs/day: 1.00     Years: 20.00     Pack years: 20.00     Quit date: 1982     Years since quittin.5     Smokeless tobacco: Never Used   Substance Use Topics     Alcohol use: No     Drug use: No       Review of Systems   The 10 point Review of Systems is negative other than noted in the HPI or here.        PHYSICAL EXAMINATION   Temp:  [97.8  F (36.6  C)-98.3  F (36.8  C)] 98.2  F (36.8  C)  Pulse:  [52-75] 67  Resp:  [16-20] 16  BP: (134-165)/() 150/78  SpO2:  [96 %-100 %] 96 %    Neurologic  Mental Status:  alert, oriented x 3, follows commands, speech clear and fluent, naming and repetition  normal  Cranial Nerves:  facial movements symmetric, hearing not formally tested but intact to conversation, tongue protrusion midline, pupils equal per nurse, no nystagmus appreciated but difficult to assess via telemedicine, impaired right eye adduction with left lateral gaze, no obvious ptosis (face baseline per pt)  Motor:  no abnormal movements, able to move all limbs antigravity spontaneously with no signs of hemiparesis observed, no pronator drift  Reflexes:  unable to test (telestroke)  Sensory:  light touch sensation intact and symmetric throughout upper and lower extremities (assessed by nurse), no extinction on double simultaneous stimulation (assessed by nurse)  Coordination:  LUE ataxia with FTN, some difficulty with HTS but no significant ataxia  Station/Gait:  unable to test due to telestroke    Dysphagia Screen  Per Nursing    Stroke Scales    NIHSS  Interval     Interval Comments     1a. Level of Consciousness 0-->Alert, keenly responsive   1b. LOC Questions 0-->Answers both questions correctly   1c. LOC Commands 0-->Performs both tasks correctly   2.   Best Gaze 1-->Partial gaze palsy, gaze is abnormal in one or both eyes, but forced deviation or total gaze paresis is not present   3.   Visual 0-->No visual loss   4.   Facial Palsy 0-->Normal symmetrical movements   5a. Motor Arm, Left 0-->No drift, limb holds 90 (or 45) degrees for full 10 secs   5b. Motor Arm, Right 0-->No drift, limb holds 90 (or 45) degrees for full 10 secs   6a. Motor Leg, Left 0-->No drift, leg holds 30 degree position for full 5 secs   6b. Motor Leg, right 0-->No drift, leg holds 30 degree position for full 5 secs   7.   Limb Ataxia 1-->Present in one limb   8.   Sensory 0-->Normal, no sensory loss   9.   Best Language 0-->No aphasia, normal   10. Dysarthria 0-->Normal   11. Extinction and Inattention  0-->No abnormality   Total 2 (12/15/20 1252)       Imaging  I personally reviewed all imaging; relevant findings per  HPI.    Labs Data   CBC  Recent Labs   Lab 12/15/20  0754 12/14/20  1221   WBC 12.2* 13.4*   RBC 5.46 5.55   HGB 15.2 15.4   HCT 47.7 48.9   * 516*     Basic Metabolic Panel   Recent Labs   Lab 12/15/20  0754 12/14/20  1758 12/14/20  1221     --  137   POTASSIUM 4.4 4.7 4.4   CHLORIDE 109  --  109   CO2 27  --  24   BUN 29  --  30   CR 1.54*  --  1.50*   *  --  291*   BRAXTON 8.8  --  9.0     Liver Panel  No results for input(s): PROTTOTAL, ALBUMIN, BILITOTAL, ALKPHOS, AST, ALT, BILIDIRECT in the last 168 hours.  INR    Recent Labs   Lab Test 12/15/20  0754 12/14/20  1221 12/07/20  1029   INR 2.85* 2.78* 2.20*      Lipid Profile    Recent Labs   Lab Test 12/15/20  0754 12/23/19  1047 11/15/18  0947 08/07/15  0807 08/07/15  0807 06/24/14  0839 05/28/13  1000   CHOL 136 152 130   < > 143 148 144   HDL 26* 31* 32*   < > 30* 29* 29*   LDL 68 71 71   < > 88 95 89   TRIG 212* 252* 136   < > 123 120 132   CHOLHDLRATIO  --   --   --   --  4.8 5.2* 4.9    < > = values in this interval not displayed.     A1C    Recent Labs   Lab Test 12/14/20  1221   A1C 8.3*     Troponin I  No results for input(s): TROPI in the last 168 hours.       Stroke Code / Stroke Consult Data Data Telestroke Service Details  (for non-emergent stroke consult with tele)  Video start time 12/15/20   1132   Video end time 12/15/20   1206   Type of service telemedicine diagnostic assessment of acute neurological changes   Reason telemedicine is appropriate patient requires assessment with a specialist for diagnosis and treatment of neurological symptoms   Mode of transmission secure interactive audio and video communication per Maria Alejandra   Originating site (patient location) [facility]    Distant site (provider location) Provider remote site       I have personally spent a total of 50 minutes providing care and consulting with this patient's medical providers today, with more than 50% of this time spent in consultation,  coordination of care, and discussion with the patient and/or family regarding diagnostic results, prognosis, symptom management, risks and benefits of management options, and development of plan of care.

## 2020-12-15 NOTE — PHARMACY-ADMISSION MEDICATION HISTORY
Admission medication history interview status for this patient is complete. See [facility] admission navigator for allergy information, prior to admission medications and immunization status.     Medication history interview done via telephone during Covid-19 pandemic, indicate source(s): Patient  Medication history resources (including written lists, pill bottles, clinic record):EPIC    Changes made to PTA medication list:  Added: none  Deleted: Dutasteride, Furosemide, Gabapentin  Changed: none    Medication reconciliation/reorder completed by provider prior to medication history?  Y      Prior to Admission medications    Medication Sig Last Dose Taking? Auth Provider   eplerenone (INSPRA) 25 MG tablet Take 25 mg by mouth daily 12/14/2020 at am Yes Reported, Patient   metoprolol succinate ER (TOPROL-XL) 50 MG 24 hr tablet TAKE 1.5 TABLETS BY MOUTH DAILY 12/14/2020 at am Yes Mahamed Groves MD   multivitamin w/minerals (MULTI-VITAMIN) tablet Take 1 tablet by mouth daily 12/14/2020 at am Yes Reported, Patient   pravastatin (PRAVACHOL) 40 MG tablet Take 1 tablet (40 mg) by mouth daily 12/13/2020 at hs Yes Mahamed Groves MD   warfarin ANTICOAGULANT (COUMADIN) 2 MG tablet TAKE 1 TABLETS (2 MG) DAILY BY MOUTH EVERY DAY OR AS PER INR RN DIRECTS 12/14/2020 at am Yes Mahamed Groves MD   amoxicillin (AMOXIL) 500 MG capsule TAKE 4 CAPSULES (2,000 MG) BY MOUTH ONCE AS NEEDED PRIOR TO DENTAL APPTS   Mahamed Groves MD

## 2020-12-15 NOTE — PROGRESS NOTES
[facility]    Medicine Progress Note - Hospitalist Service       Date of Admission:  12/14/2020  Length of stay: 1 days    Assessment & Plan   Tomás Cruz is a 80-year-old male with a history of atrial fibrillation on warfarin, previous strokes, mechanical mitral valve in situ, heart failure with reduced ejection fraction, HTN and CKD who presented to the ED for evaluation of double vision.    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  He was also complaining of feeling off balance.  He had no associated headache or other focal neurologic deficits.  He appeared to have right medial gaze defect on admission.  Case was discussed with stroke neurology.  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  No aneurysm seen.  Could not get MRI due to pacemaker in place.  - Stroke neurology has been consulted.  - Echocardiogram with bubble study shows no intra-atrial shunt.  Does show severely depressed EF.  - OT consulted.  Patient with trouble with balance, not appropriate for discharge to home.  PT then consulted as well.  - Eyepatch as tolerated.    Memory issues  - Patient noted to have difficulty with cognition per OT.  Difficulty grasping concepts, answering questions and appeared unsure of answers.  Spouse reported  that he had ongoing memory issues for the past 6 months, increased confusion for the past 3 to 4 weeks. Had been seen by neurology; no formal memory testing performed.  Certainly this is concerning for dementia.  - Appreciate PT and OT involvement.  Patient may benefit from placement.  Plan to discuss with social work and with patient's wife.    DM2:  - A1c found to be elevated at 8.3. No formal diagnosis of DM.  Patient informed of diagnosis.    - sliding scale insulin while inpatient  - If financially feasible patient should  "be placed on SGLT2 inhibitor on discharge due to kidney disease ASCVD, and heart failure     Chronic Med Conditions   #Afib: Continue home metop, warfarin, pharm to dose  #Chronic HFrEF: EF 25-30%.  TTE last performed in 10/2020.  Not in exacerbation. Not currently taking lasix.   EF confirmed to be 25% on admission here.  #Mechanical Mitral Valve: Continuing warfarin.  Goal INR 2.5-3.5    #HTN: Continue home eplerenone, metoprolol  #HL: Continue home statin  #CKD3: Cr at baseline.   #Chronic Thrombocytopenia    Diet: Regular  DVT Prophylaxis: Warfarin  Malnutrition: No  Sauer Catheter: No  Code Status: Full Code     Disposition Plan   Expected discharge:   Anticipate 1-2 more nights in the hospital to formulate a safe discharge plan and hopefully return to improved functional status.    Micha Pascal MD  Hospitalist Service  [facility]  ______________________________________________________________________    Interval History   Patient is still complaining of blurry vision.  Says he could not drive a car if he were asked to.  Feels very off balance.  No other focal symptoms.    Data reviewed today: I reviewed all medications, new labs and imaging results over the last 24 hours.     Physical Exam   BP (!) 150/78 (BP Location: Right arm)   Pulse 67   Temp 98.2  F (36.8  C) (Oral)   Resp 16   Ht 1.88 m (6' 2\")   Wt 89.9 kg (198 lb 4.8 oz)   SpO2 96%   BMI 25.46 kg/m    198 lbs 4.8 oz       General: Fatigued, laying flat in the bed.    HEENT: No scleral icterus. Oropharynx moist.     Neck: Supple. Normal range of motion.     Pulmonary: Normal work of breathing. Clear to auscultation bilaterally.    Cardiovascular: Regular rate and rhythm without murmur or extra heart sounds.    Abdomen: Soft and non-tender.    Extremities: No peripheral edema. No clubbing or cyanosis.     Neurologic: Awake and alert, answers questions slowly but appropriately.  See images and progress note from today " showing probable cranial nerve III palsy, impaired adduction of right eye with left lateral gaze.  No other focal deficits.    Skin: Warm and dry.    Psychiatric: Normal affect and mood.     Data    Recent Labs   Lab 12/15/20  0754 12/14/20  1758 12/14/20  1221   WBC 12.2*  --  13.4*   HGB 15.2  --  15.4   MCV 87  --  88   *  --  516*   INR 2.85*  --  2.78*     --  137   POTASSIUM 4.4 4.7 4.4   CHLORIDE 109  --  109   CO2 27  --  24   BUN 29  --  30   CR 1.54*  --  1.50*   ANIONGAP 2*  --  6   BRAXTON 8.8  --  9.0   *  --  291*     Recent Results (from the past 24 hour(s))   CT Head w/o Contrast    Narrative    CT SCAN OF THE HEAD WITHOUT CONTRAST   12/14/2020 2:01 PM     HISTORY: Diplopia, double vision.    TECHNIQUE:  Axial images of the head and coronal reformations without  IV contrast material. Radiation dose for this scan was reduced using  automated exposure control, adjustment of the mA and/or kV according  to patient size, or iterative reconstruction technique.    COMPARISON: 6/12/2020.    FINDINGS: The ventricles are normal in size and configuration.  Unchanged small chronic lacunar infarcts in the right thalamus, as  well as grossly unchanged multiple chronic-appearing infarcts in the  cerebellum. Mild-to-moderate generalized brain parenchymal volume  loss. Mild scattered patchy hypoattenuation in the cerebral white  matter, nonspecific, but likely related to chronic small-vessel  ischemic disease. Scattered intracranial atherosclerotic disease in  the carotid siphons and left vertebral artery.    Bilateral lens replacements. The visualized orbits are otherwise  without acute abnormality, accounting for technique. The visualized  aspects of the paranasal sinuses and mastoids are clear. Advanced  right temporomandibular joint degenerative changes. The bony calvarium  and bones of the skull base appear intact.       Impression    IMPRESSION:  1. No acute intracranial abnormality.  2. Grossly  unchanged chronic infarcts in the right thalamus and  bilateral cerebellar hemispheres.  3. Brain atrophy and presumed chronic small-vessel ischemic changes.    YUAN WATSON MD   CTA Head Neck with Contrast    Narrative    CT ANGIOGRAM OF THE HEAD AND NECK WITH CONTRAST  12/14/2020 2:03 PM     HISTORY: Double vision     TECHNIQUE:  CT angiography with an injection of 70mL [id number]-[id number] IV  with scans through the head and neck. Images were transferred to a  separate 3-D workstation where multiplanar reformations and 3-D images  were created. Estimates of carotid stenoses are made relative to the  distal internal carotid artery diameters except as noted. Radiation  dose for this scan was reduced using automated exposure control,  adjustment of the mA and/or kV according to patient size, or iterative  reconstruction technique.    COMPARISON: CT head of same day. MRI head 5/26/2007. Outside CTA head  and neck 4/13/2019. Correlation is made with chest CT dated 2/7/2020.    CT HEAD FINDINGS: No contrast enhancing lesions. Cerebral blood flow  is grossly normal.     CT ANGIOGRAM HEAD FINDINGS: There is nonflow-limiting atherosclerosis  involving the bilateral carotid siphons, with only mild scattered  stenoses of the intracranial internal carotid arteries. The major  branches of the middle and anterior cerebral arteries appear to be  patent, without high-grade stenosis or evidence for large vessel  occlusion. The left vertebral artery is dominant. There appears to be  a severe focal stenosis involving the V4 segment of the right  vertebral artery (series 6 image 398) unchanged from the prior outside  CTA examination. Mild atherosclerosis of the V4 segment of the left  vertebral artery without flow-limiting stenosis. The basilar artery  appears to be patent. There is a fetal origin of the right posterior  cerebral artery, which appears to be patent. The major branches of the  left posterior cerebral artery are also  patent.    CT ANGIOGRAM NECK FINDINGS:   Common origin of the brachiocephalic and left common carotid arteries,  normal anatomic variant. No significant stenosis at the origins of the  great vessels. Mild atherosclerosis of the visualized aortic arch.     Right carotid artery: The right common and internal carotid arteries  are patent. No significant stenosis or atherosclerotic disease in the  carotid artery.     Left carotid artery: The left common and internal carotid arteries are  patent. No significant stenosis or atherosclerotic disease in the  carotid artery.     Vertebral arteries: Vertebral arteries are patent without evidence of  dissection. No significant stenosis.     Other findings: There is a 9 mm nodule in the lateral aspect of the  left thyroid lobe, without definite aggressive features, not  necessarily requiring follow-up by imaging criteria. Sternotomy wires.  Partially visualized left-sided cardiac pacing device with associated  leads extending into the superior vena cava. No significant change in  a 2.4 x 1.6 cm predominantly groundglass nodule in the right upper  pulmonary lobe (series 7 image 26) compared to 5/1/2019 chest CT.      Impression    IMPRESSION:  1. No new high-grade stenosis or large vessel occlusion involving the  major craniocervical arterial vasculature.  2. 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  3. Otherwise, there is no significant stenosis involving the cervical,  carotid, or vertebral arteries or the major visualized branches of the  [facility].  4. Unchanged indeterminate groundglass nodule in the right upper  pulmonary lobe measuring up to 2.4 cm. Continued follow-up is  recommended.    YUAN WATSON MD   Echocardiogram Complete    Narrative    [id number]  [id number]  [id number]  [id number]^JESSE^[facility]  Echocardiography Laboratory  [address] [place] [address]         Name: ALFREDA CRABTREE  MRN: [id number]  : 1940  Study Date: 12/15/2020 10:00 AM  Age: 80 yrs  Gender: Male  Patient Location: [facility]  Reason For Study: CVA  Ordering Physician: GONSALO MOLINA  Referring Physician: Mahamed Groves  Performed By: Caitie Pope     BSA: 2.2 m2  Height: 74 in  Weight: 200 lb  BP: 157/91 mmHg  _____________________________________________________________________________  __        Procedure  Complete Portable Bubble Echo Adult. Optison (NDC #[id number]) given  intravenously.  _____________________________________________________________________________  __        Interpretation Summary     Left ventricular systolic function is severely reduced.LVEF 25% based on  biplane 2D tracing.The left ventricle is mildly dilated.  Mildly decreased right ventricular systolic function  Severe bi-atrial enlargement.  There is a mechanical mitral valve.Mean gradients of 6 mm hg at heart rate of  69 bpm.  There is mild (1+) aortic regurgitation.  Mild aortic root and ascending aorta dilatation.  A contrast injection (Bubble Study) was performed that was negative for flow  across the interatrial septum.        Echo dated 2019 LVEF was noted 15-20%, mean gradients 4.3 mm hg across  mechanical mitral valve.  _____________________________________________________________________________  __        Left Ventricle  The left ventricle is mildly dilated. There is normal left ventricular wall  thickness. Left ventricular systolic function is severely reduced. LVEF 25%  based on biplane 2D tracing. Diastolic function not assessed due to presence  of prosthetic mitral valve. There is severe global hypokinesia of the left  ventricle.     Right Ventricle  The right ventricle is normal size. Mildly decreased right ventricular  systolic function. There is a pacemaker lead in the right ventricle.     Atria  The left atrium is severely dilated. The right atrium is severely dilated.  There is no color Doppler  evidence of an atrial shunt. A contrast injection  (Bubble Study) was performed that was negative for flow across the interatrial  septum.     Mitral Valve  There is no mitral regurgitation noted. There is a mechanical mitral valve.  Normal prosthetic mitral valve gradients. mean gradients of 6 mm hg at heart  rate of 69 bpm.        Tricuspid Valve  There is trace tricuspid regurgitation. The right ventricular systolic  pressure is approximated at 18.3 mmHg plus the right atrial pressure.     Aortic Valve  The aortic valve is trileaflet. There is mild (1+) aortic regurgitation. No  aortic stenosis is present.     Pulmonic Valve  There is mild (1+) pulmonic valvular regurgitation. There is no pulmonic  valvular stenosis.     Vessels  Mild aortic root dilatation. 3.8 cm. The ascending aorta is Mildly dilated.  3.9 cm. Inferior vena cava not well visualized for estimation of right atrial  pressure.     Pericardium  There is no pericardial effusion.     _____________________________________________________________________________  __  MMode/2D Measurements & Calculations  IVSd: 1.0 cm  LVIDd: 6.1 cm  LVIDs: 4.8 cm  LVPWd: 0.88 cm  FS: 20.6 %     LV mass(C)d: 235.1 grams  LV mass(C)dI: 108.2 grams/m2  Ao root diam: 3.8 cm  asc Aorta Diam: 3.9 cm  LVOT diam: 2.5 cm  LVOT area: 4.9 cm2  LA Volume (BP): 141.0 ml  LA Volume Index (BP): 65.0 ml/m2  RWT: 0.29        Doppler Measurements & Calculations  MV max P.7 mmHg  MV mean P.6 mmHg  MV V2 VTI: 37.8 cm  MVA(VTI): 3.2 cm2     AI P1/2t: 483.8 msec  LV V1 max P.3 mmHg  LV V1 max: 115.4 cm/sec  LV V1 VTI: 25.0 cm  SV(LVOT): 121.9 ml  SI(LVOT): 56.1 ml/m2  PA acc time: 0.13 sec  TR max kimberly: 213.9 cm/sec  TR max P.3 mmHg           _____________________________________________________________________________  __           Report approved by: Ena Lua 12/15/2020 11:08 AM          Medications      Current Facility-Administered Medications   Medication  Dose Route Frequency     eplerenone  25 mg Oral Daily     insulin aspart  1-7 Units Subcutaneous TID AC     insulin aspart  1-5 Units Subcutaneous At Bedtime     metoprolol succinate ER  25 mg Oral Daily     pravastatin  40 mg Oral At Bedtime     sodium chloride (PF)  3 mL Intracatheter Q8H     warfarin ANTICOAGULANT  2 mg Oral ONCE at 18:00

## 2020-12-15 NOTE — PROGRESS NOTES
End of Shift Summary  For vital signs and complete assessments, please see documentation flowsheets.     Pertinent assessments: A&O, no c/o pain, on RA, BG check 144, using urinal at bedside. On tele monitoring.     Major Shift Events: call from tele tech, pt had a 6 beat run of V-tach, pt was resting comfortably   Treatment Plan: Neuro/OT Consult   Bedside Nurse: Chely Vasques RN

## 2020-12-16 NOTE — PROGRESS NOTES
Clinic Care Coordination Contact    Clinic Care Coordination Contact  OUTREACH    Referral Information:  Referral Source: ED Follow-Up         Chief Complaint   Patient presents with     Clinic Care Coordination - Initial        Universal Utilization: [facility] ED 12/14/20 DM type 2 complications  Clinic Utilization  Difficulty keeping appointments:: No  Compliance Concerns: No  No-Show Concerns: No  No PCP office visit in Past Year: No  Utilization    Last refreshed: 12/16/2020  7:41 AM: Hospital Admissions 1           Last refreshed: 12/16/2020  7:41 AM: ED Visits 3           Last refreshed: 12/16/2020  7:41 AM: No Show Count (past year) 1              Current as of: 12/16/2020  7:41 AM              Clinical Concerns:  Current Medical Concerns:    Patient Active Problem List   Diagnosis     Impotence of organic origin     Hypersomnia with sleep apnea     Cardiomyopathy, nonischemic     Polyp of colon     Hyperlipidemia LDL goal <100     Advance Care Planning     Syncope     S/P mitral valve replacement     Health Care Home     Long-term (current) use of anticoagulants [Z79.01]     Essential tremor     Paroxysmal atrial fibrillation (H)     History of prosthetic heart valve     Automatic implantable cardioverter-defibrillator in situ     Elevated prostate specific antigen (PSA)     Decreased GFR     Essential hypertension with goal blood pressure less than 140/90     Arthritis of knee, right     Intention tremor     Cerebral infarction (H)     Atrial flutter (H)     Chronic systolic congestive heart failure (H)     Hypercalcemia     Lung mass     Neuropathy     Amnesia     CKD (chronic kidney disease) stage 3, GFR 30-59 ml/min     Thrombocytosis (H)     Nerve palsy       Current Behavioral Concerns: See above    Education Provided to patient: RN CC role and reason for call.      Health Maintenance Reviewed:    Clinical Pathway: None    Medication Management:  Current Outpatient Medications   Medication Instructions      amoxicillin (AMOXIL) 2,000 mg, Oral, ONCE PRN, Prior to Dental appts     dapagliflozin (FARXIGA) 5 mg, Oral, DAILY     eplerenone (INSPRA) 25 mg, Oral, DAILY     metoprolol succinate ER (TOPROL-XL) 50 MG 24 hr tablet TAKE 1.5 TABLETS BY MOUTH DAILY     multivitamin w/minerals (MULTI-VITAMIN) tablet 1 tablet, Oral, DAILY     pravastatin (PRAVACHOL) 40 mg, Oral, DAILY     warfarin ANTICOAGULANT (COUMADIN) 2 MG tablet TAKE 1 TABLETS (2 MG) DAILY BY MOUTH EVERY DAY OR AS PER INR RN DIRECTS     Functional Status:  Bed or wheelchair confined:: No    Living Situation:  Current living arrangement:: I live in a private home with family  [facility]    Lifestyle & Psychosocial Needs:        Diet:: Diabetic diet  Inadequate nutrition (GOAL):: No  Tube Feeding: No  Inadequate activity/exercise (GOAL):: No  Significant changes in sleep pattern (GOAL): No  Transportation means:: Regular car     Gnosticist or spiritual beliefs that impact treatment:: No  Informal Support system:: Family, Spouse   Socioeconomic History     Marital status:      Spouse name: Ava     Number of children: 4     Years of education: Not on file     Highest education level: Not on file   Occupational History     Occupation: retired postman     Employer: [employer]     Employer: RETIRED     Tobacco Use     Smoking status: Former Smoker     Packs/day: 1.00     Years: 20.00     Pack years: 20.00     Quit date: 1982     Years since quittin.5     Smokeless tobacco: Never Used   Substance and Sexual Activity     Alcohol use: No     Drug use: No     Sexual activity: Yes     Partners: Female     Spoke with patient's wife Ava, patient gave verbal consent. [facility] is coming out to patient's home later today for assessment. Reviewed medication list. Ava declines care coordination and follow up calls at this time.      Resources and Interventions:  Current Resources:      Community Resources:  None  Supplies used at home:: None  Equipment Currently Used at Home: cane, straight, walker, standard  Employment Status: retired)   )    Advance Care Plan/Directive  Advanced Care Plans/Directives on file:: No  Advanced Care Plan/Directive Status: Considering Options    Referrals Placed: None     Goals: None    Patient/Caregiver understanding: Patient reports understanding and denies any additional questions or concerns at this times. RN CC engaged in AIDET communication during encounter.      Future Appointments              In 5 days RV LAB [facility],           Plan: Patient was provided with writers contact information and encouraged to call with questions, concerns, support needs. RNCC will remain available as needed.     Twila Abad RN Care Coordinator  [facility] [place] [place], [place]  Email: [email or web address]  Phone: [phone]

## 2020-12-16 NOTE — TELEPHONE ENCOUNTER
Dr Groves, wife states Tomás is supposed to come in on Monday for lab work. He should be having a glucose drawn. Can this be done via Home Care RN? Please call Martita today about this. We saw an INR draw for the 21st but nothing else. Please call Home Care RN, Martita, today.  Thank you,  Gwendolyn Patterson RN  [facility]    Additional Information    Negative: Nursing judgment    Nursing judgment    Protocols used: NO PROTOCOL AVAILABLE - INFORMATION ONLY-A-[place]

## 2020-12-16 NOTE — PROGRESS NOTES
Patient was provided with discharge instructions. Education was provided about DM2 and right cranial nerve III palsy. Information was provided to patient on how to manage symptoms, including double vision. He was also informed of home cares that are arranged. Patient understood that medication needed to be picked up from pharmacy. IV and tele removed. Patient was provided with wheelchair transportation to front door, where daughter was to be picking him up.

## 2020-12-16 NOTE — TELEPHONE ENCOUNTER
Reason for Call:  Other orders    Detailed comments: Spouse calling on behalf of patient. He has an appointment on 12/21 for the lab. They would like to have fasting labs to check for diabetes added to this appointment.     Phone Number Patient can be reached at: Home number on file [phone] (home)    Best Time: any    Can we leave a detailed message on this number? YES    Call taken on 12/16/2020 at 9:53 AM by Albania Hinson

## 2020-12-16 NOTE — TELEPHONE ENCOUNTER
Other Encounter:  Reason for Call:  Other orders     Detailed comments: Spouse calling on behalf of patient. He has an appointment on 12/21 for the lab. They would like to have fasting labs to check for diabetes added to this appointment.      Phone Number Patient can be reached at: Home number on file [phone] (home)     Best Time: any     Can we leave a detailed message on this number? YES     Call taken on 12/16/2020 at 9:53 AM by Albania Hinson

## 2020-12-16 NOTE — TELEPHONE ENCOUNTER
"LOV: 6/18/20  Labs drawn yesterday were ordered by Dr. Mccarty, med surg/hospitalist (pt was in ED on 12/14/20)   Was advised to Follow-up with PCP/lab work within 1 week    No future appt scheduled    Called patient @ [phone] -     ED/Discharge Protocol    \"Hi, my name is Laura Wolfe, RN, a registered nurse, and I am calling on behalf of Dr. Groves's office at [place].  I am calling to follow up and see how things are going for you after your recent visit.\"    \"I see that you were in the (ER/UC/IP) on 12/14/20 for T2DM.    How are you doing now that you are home?\" Doing fine    Is patient experiencing symptoms that may require a hospital visit?  No    Discharge Instructions    \"Let's review your discharge instructions.  What is/are the follow-up recommendations?  Pt. Response: Follow-up with PCP within 1 week - stated they have a home care RN coming out on Monday to do a full assessment. Does not want to schedule with PCP until they see the RN.   Requesting lab work be placed (INR, Fasting Labs for DM)    \"Were you instructed to make a follow-up appointment?\"  Pt. Response: Yes.  Has appointment been made?   No.  \"Can I help you schedule that appointment?\" No see above      \"When you see the provider, I would recommend that you bring your discharge instructions with you.    Medications    \"How many new medications are you on since your hospitalization/ED visit?\"    0-1  \"How many of your current medicines changed (dose, timing, name, etc.) while you were in the hospital/ED visit?\"   0-1  \"Do you have questions about your medications?\"   No  \"Were you newly diagnosed with heart failure, COPD, diabetes or did you have a heart attack?\"   No  For patients on insulin: \"Did you start on insulin in the hospital or did you have your insulin dose changed?\"   Yes - Diabetes Education Referral needed (unless MTM Referral already ordered)  Post Discharge Medication Reconciliation Status: discharge medications " "reconciled, continue medications without change.    Was MTM referral placed (*Make sure to put transitions as reason for referral)?   No    Call Summary    \"Do you have any questions or concerns about your condition or care plan at the moment?\"    No  Triage nurse advice given: Advised patient that if new or worsening symptoms appear (reviewed new & worsening symptoms) to call the clinic or be seen in the the ER  Patient stated an understanding and agreed with plan.    Patient was in ER 3 in the past year (assess appropriateness of ER visits.)      \"If you have questions or things don't continue to improve, we encourage you contact us through the main clinic number,  [phone].  Even if the clinic is not open, triage nurses are available 24/7 to help you.     We would like you to know that our clinic has extended hours (provide information).  We also have urgent care (provide details on closest location and hours/contact info)\"      \"Thank you for your time and take care!\"        Laura Wolfe RN  [facility]  "

## 2020-12-16 NOTE — TELEPHONE ENCOUNTER
ANTICOAGULATION  MANAGEMENT: Discharge Review    Tomás Cruz chart reviewed for anticoagulation continuity of care    Hospital Admission on 12/14/20 for nerve palsy.    Discharge disposition: Home with Home Care    Results:    Recent labs: (last 7 days)     12/14/20  1221 12/15/20  0754   INR 2.78* 2.85*     Anticoagulation inpatient management:     home regimen continued    Anticoagulation discharge instructions:     Warfarin dosing: home regimen continued   Bridging: No   INR goal change: No      Medication changes affecting anticoagulation: No    Additional factors affecting anticoagulation: No    Plan     No adjustment to anticoagulation plan needed    Patient not contacted    No adjustment to Anticoagulation Calendar was required    Suzi Medellin RN

## 2020-12-16 NOTE — PROGRESS NOTES
"Physical Therapy Discharge Summary    Reason for therapy discharge:    Discharged to home with home therapy.    Progress towards therapy goal(s). See goals on Care Plan in [facility] electronic health record for goal details.  Goals not met.  Barriers to achieving goals:   discharge from facility.    Therapy recommendation(s):    \"If pt returns home will need Ax1 for all mobility/cares, FWW with all mobility, assist on the stairs, HHPT/OT, assist for all driving/medications. If wife cannot provide, pt will require TCU. Pt declining TCU to this writer\"      "

## 2020-12-16 NOTE — TELEPHONE ENCOUNTER
Chart reviewed, noted INR within goal range at time of incident.  Will need to watch for further follow up with neurology, etc that may in future indicate changes in therapy.    Kajal Alvarado, PharmD BCACP  Anticoagulation Clinical Pharmacist

## 2020-12-16 NOTE — DISCHARGE SUMMARY
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               Discharge Disposition   Discharged to home  Condition at discharge: Stable    History of Present Illness   Per admission H&P:  \"Tomás Cruz is a 80 year old male with a past medical history of atrial fibrillation on warfarin for anticoagulation, CVA, mitral valve disorder, cardiomyopathy, hypertension, CKD who presents with double vision.     Patient reports that over the last 3 days he has been having double vision.  It improves when he closes 1 eye.  He feels like his eyes \"do not coordinate.\"  Patient denies any chest pain or headache.  No palpitations.  No shortness of breath, vision loss, unilateral weakness.  No recent illness.     In the ED, patient afebrile and nontachycardic.  Normotensive.  Saturating well on room air.  BMP showed creatinine at baseline.  CBC showed mild leukocytosis to 13 and chronic thrombocytosis.  His INR was therapeutic at 2.78.  CT and CTA of the head neck showed no acute intracranial abnormality.  It did show chronic infarcts in the right thalamus and bilateral cerebellar hemispheres.  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  This was discussed with stroke neurology.\"    Hospital Course     Double vision  Probable right cranial nerve III palsy  Patient's symptoms were ongoing for the 2 to 3 days prior to admission.  He was also complaining of feeling off balance.  He had no associated headache or other " focal neurologic deficits.  He appeared to have right medial gaze defect on admission.  Case was discussed with stroke neurology.  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  No aneurysm seen.  Patient could not get MRI due to pacemaker in place. Echocardiogram with bubble study showed no shunt.    Formal stroke neurology consultation was obtained.  The cause of his impaired  adduction of the right eye felt to be either peripheral 3rd nerve palsy or acute midbrain infarct.  Reason for possible acute midbrain infarct would be that he had difficulty with finger-to-nose in his left upper extremity, though he did report the weakness there was chronic.  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  As patient was already appropriately anticoagulated on warfarin with disease mechanical mitral valve.  Already on pravastatin.  So even if this were an acute infarct, no changes in management except for possible improved blood pressure control.  Goal would be less than 130/85.    Due to his diplopia and dizziness, OT and PT were consulted.  He was found to have trouble with balance and TCU was actually recommended, but patient and his wife preferred to discharge to home.  He was discharged to home with home PT, OT and RN.     DM2:  Patient newly diagnosed with type 2 diabetes on the basis of an A1c elevated at 8.3%.  Given his multiple vascular issues, history of stroke, heart failure with reduced ejection fraction and probable diabetic nephropathy, he was felt to be a good candidate for SGLT2 inhibitor.  He will be discharged on dapagliflozin due to cost reasons.  He is discharged on 5 mg daily and can receive further dose increases and refills per his PCP.    Consultations This Hospital Stay   NEUROLOGY IP CONSULT  OCCUPATIONAL THERAPY ADULT IP CONSULT  PHARMACY TO DOSE WARFARIN  PHARMACY LIAISON FOR MEDICATION COVERAGE CONSULT  PHYSICAL  THERAPY ADULT IP CONSULT  SOCIAL WORK IP CONSULT  PHARMACY TO DOSE WARFARIN        Code Status   Prior    Time Spent on this Encounter   I, Micah Pascal MD, personally saw the patient today and spent greater than 30 minutes discharging this patient.       Micah Pascal MD  [facility]  ______________________________________________________________________         Primary Care Physician   Mahamed Groves    Discharge Orders      Care Coordination Referral      Home care nursing referral      Home Care PT Referral for Hospital Discharge      Home Care OT Referral for Hospital Discharge      Reason for your hospital stay    Double vision.     Follow-up and recommended labs and tests     Follow up with primary care provider in 1 week for hospital follow up.     Activity    Your activity upon discharge: As tolerated     MD face to face encounter    Documentation of Face to Face and Certification for Home Health Services    I certify that patient: Tomás Cruz is under my care and that I, or a nurse practitioner or physician's assistant working with me, had a face-to-face encounter that meets the physician face-to-face encounter requirements with this patient on: December 15, 2020.    This encounter with the patient was in whole, or in part, for the following medical condition, which is the primary reason for home health care: dizziness.    I certify that, based on my findings, the following services are medically necessary home health services: Nursing, Occupational Therapy and Physical Therapy.    My clinical findings support the need for the above services because: Nurse is needed: To assess medication compliance after changes in medications or other medical regimen.., Occupational Therapy Services are needed to assess and treat cognitive ability and address ADL safety due to impairment in fine motor. and Physical Therapy Services are needed to assess and treat the  following functional impairments: debility.    Further, I certify that my clinical findings support that this patient is homebound (i.e. absences from home require considerable and taxing effort and are for medical reasons or Taoist services or infrequently or of short duration when for other reasons) because: Requires assistance of another person or specialized equipment to access medical services because patient: Range of motion limitations prevents ability to exit home safely...    Based on the above findings. I certify that this patient is confined to the home and needs intermittent skilled nursing care, physical therapy and/or speech therapy.  The patient is under my care, and I have initiated the establishment of the plan of care.  This patient will be followed by a physician who will periodically review the plan of care.  Physician/Provider to provide follow up care: Mahamed Groves    Attending [facility] physician (the Medicare certified [place] provider): Micah Pascal MD  Physician Signature: See electronic signature associated with these discharge orders.  Date: 12/15/2020     Diet    Follow this diet upon discharge: Regular       Significant Results and Procedures   Most Recent 3 CBC's:  Recent Labs   Lab Test 12/15/20  0754 12/14/20 1221 01/13/20  1229   WBC 12.2* 13.4* 12.4*   HGB 15.2 15.4 16.5   MCV 87 88 89   * 516* 481*     Most Recent 3 BMP's:  Recent Labs   Lab Test 12/15/20  0754 12/14/20  1758 12/14/20  1221 01/13/20  1229     --  137 138   POTASSIUM 4.4 4.7 4.4 5.1   CHLORIDE 109  --  109 104   CO2 27  --  24 26   BUN 29  --  30 45*   CR 1.54*  --  1.50* 1.74*   ANIONGAP 2*  --  6 8   BRAXTON 8.8  --  9.0 9.5   *  --  291* 182*     Most Recent 2 LFT's:  Recent Labs   Lab Test 01/13/20  1229 12/23/19  1047   AST 15 17   ALT 26 25   ALKPHOS 69 67   BILITOTAL 0.5 0.9     Most Recent 3 INR's:  Recent Labs   Lab Test 12/15/20  0754 12/14/20  1221 12/07/20  1029   INR  2.85* 2.78* 2.20*     Most Recent Hemoglobin A1c:  Recent Labs   Lab Test 12/14/20  1221   A1C 8.3*     Most Recent 6 glucoses:  Recent Labs   Lab Test 12/15/20  0754 12/14/20  1221 01/13/20  1229 12/23/19  1047 10/07/19  1658 08/15/19  1046   * 291* 182* 138* 134* 154*   ,   Results for orders placed or performed during the hospital encounter of 12/14/20   CT Head w/o Contrast    Narrative    CT SCAN OF THE HEAD WITHOUT CONTRAST   12/14/2020 2:01 PM     HISTORY: Diplopia, double vision.    TECHNIQUE:  Axial images of the head and coronal reformations without  IV contrast material. Radiation dose for this scan was reduced using  automated exposure control, adjustment of the mA and/or kV according  to patient size, or iterative reconstruction technique.    COMPARISON: 6/12/2020.    FINDINGS: The ventricles are normal in size and configuration.  Unchanged small chronic lacunar infarcts in the right thalamus, as  well as grossly unchanged multiple chronic-appearing infarcts in the  cerebellum. Mild-to-moderate generalized brain parenchymal volume  loss. Mild scattered patchy hypoattenuation in the cerebral white  matter, nonspecific, but likely related to chronic small-vessel  ischemic disease. Scattered intracranial atherosclerotic disease in  the carotid siphons and left vertebral artery.    Bilateral lens replacements. The visualized orbits are otherwise  without acute abnormality, accounting for technique. The visualized  aspects of the paranasal sinuses and mastoids are clear. Advanced  right temporomandibular joint degenerative changes. The bony calvarium  and bones of the skull base appear intact.       Impression    IMPRESSION:  1. No acute intracranial abnormality.  2. Grossly unchanged chronic infarcts in the right thalamus and  bilateral cerebellar hemispheres.  3. Brain atrophy and presumed chronic small-vessel ischemic changes.    YUAN WATSON MD   CTA Head Neck with Contrast    Narrative    CT  ANGIOGRAM OF THE HEAD AND NECK WITH CONTRAST  12/14/2020 2:03 PM     HISTORY: Double vision     TECHNIQUE:  CT angiography with an injection of 70mL [id number] IV  with scans through the head and neck. Images were transferred to a  separate 3-D workstation where multiplanar reformations and 3-D images  were created. Estimates of carotid stenoses are made relative to the  distal internal carotid artery diameters except as noted. Radiation  dose for this scan was reduced using automated exposure control,  adjustment of the mA and/or kV according to patient size, or iterative  reconstruction technique.    COMPARISON: CT head of same day. MRI head 5/26/2007. Outside CTA head  and neck 4/13/2019. Correlation is made with chest CT dated 2/7/2020.    CT HEAD FINDINGS: No contrast enhancing lesions. Cerebral blood flow  is grossly normal.     CT ANGIOGRAM HEAD FINDINGS: There is nonflow-limiting atherosclerosis  involving the bilateral carotid siphons, with only mild scattered  stenoses of the intracranial internal carotid arteries. The major  branches of the middle and anterior cerebral arteries appear to be  patent, without high-grade stenosis or evidence for large vessel  occlusion. The left vertebral artery is dominant. There appears to be  a severe focal stenosis involving the V4 segment of the right  vertebral artery (series 6 image 398) unchanged from the prior outside  CTA examination. Mild atherosclerosis of the V4 segment of the left  vertebral artery without flow-limiting stenosis. The basilar artery  appears to be patent. There is a fetal origin of the right posterior  cerebral artery, which appears to be patent. The major branches of the  left posterior cerebral artery are also patent.    CT ANGIOGRAM NECK FINDINGS:   Common origin of the brachiocephalic and left common carotid arteries,  normal anatomic variant. No significant stenosis at the origins of the  great vessels. Mild atherosclerosis of the  visualized aortic arch.     Right carotid artery: The right common and internal carotid arteries  are patent. No significant stenosis or atherosclerotic disease in the  carotid artery.     Left carotid artery: The left common and internal carotid arteries are  patent. No significant stenosis or atherosclerotic disease in the  carotid artery.     Vertebral arteries: Vertebral arteries are patent without evidence of  dissection. No significant stenosis.     Other findings: There is a 9 mm nodule in the lateral aspect of the  left thyroid lobe, without definite aggressive features, not  necessarily requiring follow-up by imaging criteria. Sternotomy wires.  Partially visualized left-sided cardiac pacing device with associated  leads extending into the superior vena cava. No significant change in  a 2.4 x 1.6 cm predominantly groundglass nodule in the right upper  pulmonary lobe (series 7 image 26) compared to 2019 chest CT.      Impression    IMPRESSION:  1. No new high-grade stenosis or large vessel occlusion involving the  major craniocervical arterial vasculature.  2. 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  3. Otherwise, there is no significant stenosis involving the cervical,  carotid, or vertebral arteries or the major visualized branches of the  [place].  4. Unchanged indeterminate groundglass nodule in the right upper  pulmonary lobe measuring up to 2.4 cm. Continued follow-up is  recommended.    YUAN WATSON MD   Echocardiogram Complete    Narrative    [id number]  [id number]  [id number]  [id number]^JESSE^GONSALO           [facility]  Echocardiography Laboratory  [address] [place] [address]        Name: ALFREDA CRABTREE  MRN: [id number]  : 1940  Study Date: 12/15/2020 10:00 AM  Age: 80 yrs  Gender: Male  Patient Location: [facility]  Reason For Study: CVA  Ordering Physician: GONSALO MOLINA  Referring Physician: Yaneli  Mahamed  Performed By: Caitie Pope     BSA: 2.2 m2  Height: 74 in  Weight: 200 lb  BP: 157/91 mmHg  _____________________________________________________________________________  __        Procedure  Complete Portable Bubble Echo Adult. Optison (NDC #[id number]) given  intravenously.  _____________________________________________________________________________  __        Interpretation Summary     Left ventricular systolic function is severely reduced.LVEF 25% based on  biplane 2D tracing.The left ventricle is mildly dilated.  Mildly decreased right ventricular systolic function  Severe bi-atrial enlargement.  There is a mechanical mitral valve.Mean gradients of 6 mm hg at heart rate of  69 bpm.  There is mild (1+) aortic regurgitation.  Mild aortic root and ascending aorta dilatation.  A contrast injection (Bubble Study) was performed that was negative for flow  across the interatrial septum.        Echo dated 04/14/2019 LVEF was noted 15-20%, mean gradients 4.3 mm hg across  mechanical mitral valve.  _____________________________________________________________________________  __        Left Ventricle  The left ventricle is mildly dilated. There is normal left ventricular wall  thickness. Left ventricular systolic function is severely reduced. LVEF 25%  based on biplane 2D tracing. Diastolic function not assessed due to presence  of prosthetic mitral valve. There is severe global hypokinesia of the left  ventricle.     Right Ventricle  The right ventricle is normal size. Mildly decreased right ventricular  systolic function. There is a pacemaker lead in the right ventricle.     Atria  The left atrium is severely dilated. The right atrium is severely dilated.  There is no color Doppler evidence of an atrial shunt. A contrast injection  (Bubble Study) was performed that was negative for flow across the interatrial  septum.     Mitral Valve  There is no mitral regurgitation noted. There is a mechanical mitral  valve.  Normal prosthetic mitral valve gradients. mean gradients of 6 mm hg at heart  rate of 69 bpm.        Tricuspid Valve  There is trace tricuspid regurgitation. The right ventricular systolic  pressure is approximated at 18.3 mmHg plus the right atrial pressure.     Aortic Valve  The aortic valve is trileaflet. There is mild (1+) aortic regurgitation. No  aortic stenosis is present.     Pulmonic Valve  There is mild (1+) pulmonic valvular regurgitation. There is no pulmonic  valvular stenosis.     Vessels  Mild aortic root dilatation. 3.8 cm. The ascending aorta is Mildly dilated.  3.9 cm. Inferior vena cava not well visualized for estimation of right atrial  pressure.     Pericardium  There is no pericardial effusion.     _____________________________________________________________________________  __  MMode/2D Measurements & Calculations  IVSd: 1.0 cm  LVIDd: 6.1 cm  LVIDs: 4.8 cm  LVPWd: 0.88 cm  FS: 20.6 %     LV mass(C)d: 235.1 grams  LV mass(C)dI: 108.2 grams/m2  Ao root diam: 3.8 cm  asc Aorta Diam: 3.9 cm  LVOT diam: 2.5 cm  LVOT area: 4.9 cm2  LA Volume (BP): 141.0 ml  LA Volume Index (BP): 65.0 ml/m2  RWT: 0.29        Doppler Measurements & Calculations  MV max P.7 mmHg  MV mean P.6 mmHg  MV V2 VTI: 37.8 cm  MVA(VTI): 3.2 cm2     AI P1/2t: 483.8 msec  LV V1 max P.3 mmHg  LV V1 max: 115.4 cm/sec  LV V1 VTI: 25.0 cm  SV(LVOT): 121.9 ml  SI(LVOT): 56.1 ml/m2  PA acc time: 0.13 sec  TR max kimberly: 213.9 cm/sec  TR max P.3 mmHg           _____________________________________________________________________________  __           Report approved by: Ena Lua 12/15/2020 11:08 AM            Discharge Medications   Discharge Medication List as of 12/15/2020  5:42 PM      START taking these medications    Details   dapagliflozin (FARXIGA) 5 MG TABS tablet Take 1 tablet (5 mg) by mouth daily, 5 mg, Oral, DAILY Starting Tue 12/15/2020, Disp-30 tablet, R-0, E-Prescribe         CONTINUE  these medications which have NOT CHANGED    Details   amoxicillin (AMOXIL) 500 MG capsule TAKE 4 CAPSULES (2,000 MG) BY MOUTH ONCE AS NEEDED PRIOR TO DENTAL APPTS, Disp-8 capsule,R-1, E-Prescribe      eplerenone (INSPRA) 25 MG tablet Take 25 mg by mouth daily, Historical      metoprolol succinate ER (TOPROL-XL) 50 MG 24 hr tablet TAKE 1.5 TABLETS BY MOUTH DAILY, Disp-135 tablet,R-1, E-Prescribe      multivitamin w/minerals (MULTI-VITAMIN) tablet Take 1 tablet by mouth daily, Historical      pravastatin (PRAVACHOL) 40 MG tablet Take 1 tablet (40 mg) by mouth daily, Disp-90 tablet, R-3, E-Prescribe      warfarin ANTICOAGULANT (COUMADIN) 2 MG tablet TAKE 1 TABLETS (2 MG) DAILY BY MOUTH EVERY DAY OR AS PER INR RN DIRECTS, Disp-90 tablet, R-1, E-Prescribe           Allergies   Allergies   Allergen Reactions     Spironolactone      Other reaction(s): Gynecomastia

## 2020-12-16 NOTE — TELEPHONE ENCOUNTER
Routing to PharmD to review note. Neuro was unable to rule out possible new infarct. Does not look like any change in coumadin plan was recommended. Can you please review and advise any changes moving forward.     Alessandra Medellin RN   [facility]  [place], [place], [place]

## 2020-12-16 NOTE — PLAN OF CARE
Occupational Therapy Discharge Summary    Reason for therapy discharge:    Discharged to home with home therapy.    Progress towards therapy goal(s). See goals on Care Plan in [facility] electronic health record for goal details.  Goals not met.  Barriers to achieving goals:   discharge on same date as initial evaluation.    Therapy recommendation(s):    Continued therapy is recommended.  Rationale/Recommendations:  patient is well below baseline for ADLS/IADLs and safe functional mobility. Further OT is recommended for ADLs, vision and cognitive  assessment instruction in compensatory techniques. .

## 2020-12-17 NOTE — TELEPHONE ENCOUNTER
Reason for Call:  Other     Detailed comments: home care calling. They state that patients spouse has all sorts of questions and requests that [facility] will need to discuss with Dr Groves. Will discuss details with Dr Groves or Nurse    Phone Number Patient can be reached at: Other phone number: Martita from home care [phone]    Best Time: any    Can we leave a detailed message on this number? YES    Call taken on 12/17/2020 at 3:36 PM by Nahed Hoffman

## 2020-12-17 NOTE — TELEPHONE ENCOUNTER
Additional labs are not indicated as he had all labs needed for now done while in the hospital - he has new onset diabetes for certain as his A1c was 8.3 and anything >6.5 is diagnostic.  He needs a followup with me - either video or in person is recommended.

## 2020-12-18 NOTE — TELEPHONE ENCOUNTER
Jsoie Groves,  We have a request from Dereje's wife to begin homecare on 12\21.  This is past the 48 hr Medicare window, if you would please reply to this message, we can use as a verbal order.  Thank you in advance,    SN 1 visit on 12\21 for comprehensive assessment of homecare needs.    Sincerely,  Edilia Moon RN, BSN  [facility]  [phone]

## 2020-12-21 NOTE — TELEPHONE ENCOUNTER
[facility] now requests orders and shares plan of care/discharge summaries for some patients through [employer].  Please REPLY TO THIS MESSAGE OR ROUTE BACK TO THE AUTHOR in order to give authorization for orders when needed.  This is considered a verbal order, you will still receive a faxed copy of orders for signature.  Thank you for your assistance in improving collaboration for our patients.    ORDER  SN 1 week 5, 3 SATHISH Ricks RN

## 2020-12-21 NOTE — TELEPHONE ENCOUNTER
ANTICOAGULATION MANAGEMENT     Patient Name:  Tomás Cruz  Date:  2020    ASSESSMENT /SUBJECTIVE:    Today's INR result of 2.7 is therapeutic. Goal INR of 2.5-3.5      Warfarin dose taken: Warfarin taken as instructed    Diet: No new diet changes affecting INR    Medication changes/ interactions: No new medications/supplements affecting INR    Previous INR: Therapeutic     S/S of bleeding or thromboembolism: No    New injury or illness: No    Upcoming surgery, procedure or cardioversion: No    Additional findings: None      PLAN:    Telephone call with home care nurse Millicent regarding INR result and instructed:     Warfarin Dosing Instructions: Continue your current warfarin dose 2 mg daily    Instructed patient to follow up no later than: 1 week  Orders given to  Homecare nurse/facility to recheck    Education provided: Monitoring for bleeding signs and symptoms and Monitoring for clotting signs and symptoms      Nurse Millicent verbalizes understanding and agrees to warfarin dosing plan.    Instructed to call the Anticoagulation Clinic for any changes, questions or concerns. (#[phone])        Jeana Romero RN      OBJECTIVE:  Recent labs: (last 7 days)     20  1221 12/15/20  0754 20   INR 2.78* 2.85* 2.7*             Anticoagulation Summary  As of 2020    INR goal:  2.5-3.5   TTR:  75.7 % (11.9 mo)   INR used for dosin.7 (2020)   Warfarin maintenance plan:  2 mg (2 mg x 1) every day   Full warfarin instructions:  2 mg every day   Weekly warfarin total:  14 mg   No change documented:  Jeana Romero RN   Plan last modified:  Irma Damon RN (2020)   Next INR check:  2020   Priority:  Maintenance   Target end date:  Indefinite    Indications    Long-term (current) use of anticoagulants [Z79.01] [Z79.01]  S/P mitral valve replacement [Z95.2]             Anticoagulation Episode Summary     INR check location:      Preferred lab:  EXTERNAL LAB    Send INR  reminders to:  GERMÁN ROUSSEAU    Comments:  Home Care       Anticoagulation Care Providers     Provider Role Specialty Phone number    Mahamed Groves MD Referring Family Medicine [phone]

## 2020-12-21 NOTE — PROGRESS NOTES
"Assessment & Plan   Type 2 diabetes mellitus with hyperglycemia, without long-term current use of insulin (H)  New onset and currently uncontrolled.  Hopefully should be better with initiation of medication and diet changes.  Will get a meter and have him see diabetic education.  - dapagliflozin (FARXIGA) 5 MG TABS tablet  Dispense: 90 tablet; Refill: 3  - Hemoglobin A1c  - blood glucose monitoring (NO BRAND SPECIFIED) meter device kit  Dispense: 1 kit; Refill: 0  - blood glucose (NO BRAND SPECIFIED) test strip  Dispense: 100 strip; Refill: 6  - blood glucose calibration (NO BRAND SPECIFIED) solution  Dispense: 1 Bottle; Refill: 3  - thin (NO BRAND SPECIFIED) lancets  Dispense: 100 each; Refill: 11  - alcohol swab prep pads  Dispense: 100 each; Refill: 3  - AMBULATORY ADULT DIABETES EDUCATOR REFERRAL    Nerve palsy  Diplopia  Further plan per ophthalmology and neurology    Ingrowing nail with infection  Signs of infection and will treat with antibiotic.  Continue with daily soaks and antibiotic ointment can help soften the nail as well as treat local infection.  - cephALEXin (KEFLEX) 500 MG capsule  Dispense: 21 capsule; Refill: 0    Stage 3 chronic kidney disease, unspecified whether stage 3a or 3b CKD  Stable - continue medication.    Hyperlipidemia LDL goal <100  Controlled - continue medication.  - pravastatin (PRAVACHOL) 40 MG tablet  Dispense: 90 tablet; Refill: 3       BMI:   Estimated body mass index is 25.81 kg/m  as calculated from the following:    Height as of this encounter: 1.88 m (6' 2\").    Weight as of this encounter: 91.2 kg (201 lb).         -See Patient Instructions    No follow-ups on file.      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 No   Problems taking medications regularly:  None  Medication changes since discharge: start taking dapagliflozin 5mg   Problems adhering to non-medication therapy:  None  Look at toe on the left foot, 1st digit. Pt suspicious of infection.      Summary of hospitalization:  [facility] discharge summary reviewed  Diagnostic Tests/Treatments reviewed.  Follow up needed: DM Ed; physical therapy,OT  Other Healthcare Providers Involved in Patient s Care:         specialists  Update since discharge: stable.       Post Discharge Medication Reconciliation: discharge medications reconciled, continue medications without change.  Plan of care communicated with patient              Left 1st toe - ingrown and now red the last 4-5 daysReviewed and updated as needed this visit by provider:  Tobacco  Allergies  Meds  Problems  Med Hx  Surg Hx  Fam Hx          Review of Systems   Constitutional, HEENT, cardiovascular, pulmonary, GI, , musculoskeletal, neuro, skin, endocrine and psych systems are negative, except as otherwise noted.        Objective   /78   Pulse 74   Temp 96.3  F (35.7  C) (Tympanic)   Ht 1.88 m (6' 2\")   Wt 91.2 kg (201 lb)   SpO2 99%   BMI 25.81 kg/m   Body mass index is 25.81 kg/m .  Physical Exam   GENERAL: healthy, alert, well nourished, well hydrated, no distress  HENT: ear canals- normal; TMs- normal; Nose- normal; Mouth- no ulcers, no lesions  NECK: no tenderness, no adenopathy, no asymmetry, no masses, no stiffness; thyroid- normal to palpation  RESP: lungs clear to auscultation - no rales, no rhonchi, no wheezes  CV: regular rates and rhythm, normal S1 S2, no S3 or S4 and no " murmur, no click or rub -  ABDOMEN: soft, no tenderness, no  hepatosplenomegaly, no masses, normal bowel sounds  MS: extremities- no gross deformities noted, no edema  SKIN: no suspicious lesions, no rashes  NEURO: strength and tone- normal, sensory exam- grossly normal, mentation- intact, speech- normal, reflexes- symmetric  Diabetic foot exam: normal DP and PT pulses, no trophic changes or ulcerative lesions and normal sensory exam  LYMPHATICS: ant. cervical- normal, post. cervical- normal, axillary- normal, supraclavicular- normal, inguinal- normal    Diagnostic Test Results  Lab Results   Component Value Date    A1C 8.3 12/14/2020    A1C 5.5 05/16/2012        No results found for any visits on 12/29/20.

## 2020-12-22 NOTE — RESULT ENCOUNTER NOTE
Note to Staff: please call the patient to explain results and to check on current symptoms.  Also send a result note if they would like that.     - blood glucose level is mildly elevated and will address further at follow-up appointment on 12/29/2020.

## 2020-12-28 NOTE — TELEPHONE ENCOUNTER
Called # below     Left a detailed VM with a verbal okay for orders     Myra Holguin RN, BSN  [facility]

## 2020-12-28 NOTE — PROGRESS NOTES
ANTICOAGULATION MANAGEMENT     Patient Name:  Tomás Cruz  Date:  12/28/2020    ASSESSMENT /SUBJECTIVE:    Today's INR result of 3.1 is therapeutic. Goal INR of 2.5-3.5      Warfarin dose taken: Warfarin taken as instructed    Diet: No new diet changes affecting INR    Medication changes/ interactions: No new medications/supplements affecting INR    Previous INR: Therapeutic     S/S of bleeding or thromboembolism: No    New injury or illness: No    Upcoming surgery, procedure or cardioversion: No    Additional findings: None      PLAN:    Telephone call with home care nurse Juliana regarding INR result and instructed:     Warfarin Dosing Instructions: Continue your current warfarin dose 2 mg daily    Instructed patient to follow up no later than: 2 weeks  Orders given to  Homecare nurse/facility to recheck    Education provided: None required      Juliana, home care, verbalizes understanding and agrees to warfarin dosing plan.    Instructed to call the Anticoagulation Clinic for any changes, questions or concerns. (#[phone])        Faye Sam RN      OBJECTIVE:  Recent labs: (last 7 days)     12/28/20   INR 3.1*         No question data found.  Anticoagulation Summary  As of 12/28/2020    INR goal:  2.5-3.5   TTR:  77.6 % (11.9 mo)   INR used for dosing:  3.1 (12/28/2020)   Warfarin maintenance plan:  2 mg (2 mg x 1) every day   Full warfarin instructions:  2 mg every day   Weekly warfarin total:  14 mg   Plan last modified:  Irma Damon RN (12/7/2020)   Next INR check:     Priority:  Maintenance   Target end date:  Indefinite    Indications    Long-term (current) use of anticoagulants [Z79.01] [Z79.01]  S/P mitral valve replacement [Z95.2]             Anticoagulation Episode Summary     INR check location:      Preferred lab:  EXTERNAL LAB    Send INR reminders to:  GERM[facility] ROUSSE[facility]    Comments:  Home Care       Anticoagulation Care Providers     Provider Role Specialty Phone number    Yaneli  Mahamed URIBE MD [facility] [phone]

## 2020-12-28 NOTE — TELEPHONE ENCOUNTER
Reason for Call: Request for an order for Verbal Orders for Physical Therapy    Order or referral being requested: Verbal Physical Therapy Order    Date needed: This week- asap    Has the patient been seen by the PCP for this problem? Not Applicable    Additional comments:     1x a week for 1 week   2x a week for 2 weeks     For strength, endurance, transfers    Please call Marcie at [facility] at [phone]    Phone number Patient can be reached at:  [phone]    Best Time:  any    Can we leave a detailed message on this number?  Not Applicable    Call taken on 12/28/2020 at 12:53 PM by Monica Stoll

## 2020-12-29 PROBLEM — E11.9 DIABETES MELLITUS, TYPE 2 (H): Status: ACTIVE | Noted: 2020-01-01

## 2020-12-29 PROBLEM — E11.65 TYPE 2 DIABETES MELLITUS WITH HYPERGLYCEMIA, WITHOUT LONG-TERM CURRENT USE OF INSULIN (H): Status: ACTIVE | Noted: 2020-01-01

## 2020-12-29 NOTE — PATIENT INSTRUCTIONS
Patient Education    What Is Diabetes?  If you have diabetes, your body does not make enough insulin (a hormone), or the insulin it makes does not work the way it should. Diabetes is a lifelong disease.  Glucose (sugar) is your body's main source of energy. Insulin carries glucose from the bloodstream into your body's cells. But if you have diabetes, glucose builds up in your blood. This is called high blood glucose (high blood sugar).  High blood glucose can lead to damage in many parts of your body, including your eyes, kidneys, heart, blood vessels, nerves and skin.  What are the symptoms of diabetes?  Symptoms include:    Extreme thirst    Needing to urinate more often    Headache    Hunger    Blurred vision    Feeling drowsy or tired    Slow healing after an illness or injury    Frequent infections.  What should I do if I have diabetes?  Your first step is to learn how to manage your diabetes. The goal is to keep your blood glucose as close to normal as possible. (A normal level is 70 to 100.) By doing this, you can prevent or control damage to your body.  To manage your diabetes, you will need to:    Eat a wide range of healthy foods.    Manage your weight.    Be physically active.    Test your blood glucose as prescribed.    Control your blood pressure and cholesterol.    Take your medicines as prescribed.  Are there different kinds of diabetes?  There are two basic kinds of diabetes: type 1 and type 2.  Type 1 diabetes  Type 1 diabetes occurs when the cells that make insulin are destroyed by your body's immune system. Your body can no longer make insulin on its own. People who have type 1 diabetes must take insulin to manage it.  Type 2 diabetes  Type 2 diabetes occurs when the cells of your body cannot use insulin or glucose normally. Over time, your body cannot make enough insulin to meet your body's needs. This is the most common kind of diabetes.  You are more likely to develop type 2 diabetes if  you:    Are overweight    Have high blood pressure    Have high cholesterol    Are not physically active    Have a family history of type 2 diabetes    Are , /, ,  American or     Are a woman who has given birth to a baby weighing over 9 pounds, or you have had gestational diabetes (diabetes that occurs in pregnancy).  For informational purposes only. Not to replace the advice of your health care provider.  Copyright   2006 [facility]. All rights reserved. Element ID [id number] - REV 12/15.     Patient Education   Diet: Diabetes  Food is an important tool that you can use to control diabetes and stay healthy. Eating well-balanced meals in the correct amounts will help you control your blood glucose levels and prevent low blood sugar reactions. It will also help you reduce the health risks of diabetes. There is no one specific diet that is right for everyone with diabetes. But there are general guidelines to follow. A registered dietitian (RD) will create a tailored diet approach that s just right for you. He or she will also help you plan healthy meals and snacks. If you have any questions, call your dietitian for advice.     Guidelines for success  Talk with your healthcare provider before starting a diabetes diet or weight loss program. If you haven't talked with a dietitian yet, ask your provider for a referral. The following guidelines can help you succeed:    Select foods from the 6 food groups below. Your dietitian will help you find food choices within each group. He or she will also show you serving sizes and how many servings you can have at each meal.  ? Grains, beans, and starchy vegetables  ? Vegetables  ? Fruit  ? Milk or yogurt  ? Meat, poultry, fish, or tofu  ? Healthy fats    Check your blood sugar levels as directed by your provider. Take any medicine as prescribed by your provider.    Learn to read food labels and pick the  right portion sizes.    Eat only the amount of food in your meal plan. Eat about the same amount of food at regular times each day. Don t skip meals. Eat meals 4 to 5 hours apart, with snacks in between.    Limit alcohol. It raises blood sugar levels. Drink water or calorie-free diet drinks that use safe sweeteners.    Eat less fat to help lower your risk of heart disease. Use nonfat or low-fat dairy products and lean meats. Avoid fried foods. Use cooking oils that are unsaturated, such as olive, canola, or peanut oil.    Talk with your dietitian about safe sugar substitutes.    Avoid added salt. It can contribute to high blood pressure, which can cause heart disease. People with diabetes already have a risk of high blood pressure and heart disease.    Stay at a healthy weight. If you need to lose weight, cut down on your portion sizes. But don t skip meals. Exercise is an important part of any weight management program. Talk with your provider about an exercise program that s right for you.    For more information about the best diet plan for you, talk with a registered dietitian (RD). To find an RD in your area, contact:  ? Academy of Nutrition and Dietetics www.eatright.org  ? The American Diabetes Association [phone] www.diabetes.org  Date Last Reviewed: 8/1/2016 2000-2018 [employer]. [address] [place] [address], [place] [address]. All rights reserved. This information is not intended as a substitute for professional medical care. Always follow your healthcare professional's instructions.         Patient Education   Healthy Meals for Diabetes     A healthcare provider will help you develop a meal plan that fits your needs.     Ask your healthcare team to help you make a meal plan that fits your needs. Your meal plan tells you when to eat your meals and snacks, what kinds of foods to eat, and how much of each food to eat. You don t have to give up all the foods you like. But you do need to  follow some guidelines.  Choose healthy carbohydrates  Starches, sugars, and fiber are all types of carbohydrates. Fiber can help lower your cholesterol and triglycerides. Fiber is also healthy for your heart. You should have 20 to 35 grams of total fiber each day. Fiber-rich foods include:    Whole-grain breads and cereals    Bulgur wheat    Brown rice       Whole-wheat pasta    Fruits and vegetables    Dry beans, and peas   Keep track of the amount of carbohydrates you eat. This can help you keep the right balance of physical activity and medicine. The amount of carbohydrates needed will vary for each person. It depends on many things such as your health, the medicines you take, and how active you are. Your healthcare team will help you figure out the right amount of carbohydrates for you. You may start with around 45  grams of carbohydrates per meal, depending on your situation.   Here are some examples of foods containing about 15 grams of carbohydrates (1 serving of carbohydrates):    1/2 cup of canned or frozen fruit    A small piece of fresh fruit (4 ounces)    1 slice of bread    1/2 cup of oatmeal    1/3 cup of rice    4 to 6 crackers    1/2 English muffin    1/2 cup of black beans    1/4 of a large baked potato (3 ounces)    2/3 cup of plain fat-free yogurt    1 cup of soup    1/2 cup of casserole    6 chicken nuggets    2-inch-square brownie or cake without frosting    2 small cookies    1/2 cup of ice cream or sherbet  Choose healthy protein foods  Eating protein that is low in fat can help you control your weight. It also helps keep your heart healthy. Low-fat protein foods include:    Fish    Plant proteins, such as dry beans and peas, nuts, and soy products like tofu and soymilk    Lean meat with all visible fat removed    Poultry with the skin removed    Low-fat or nonfat milk, cheese, and yogurt  Limit unhealthy fats and sugar  Saturated and trans fats are unhealthy for your heart. They raise LDL  (bad) cholesterol. Fat is also high in calories, so it can make you gain weight. To cut down on unhealthy fats and sugar, limit these foods:    Butter or margarine    Palm and palm kernel oils and coconut oil    Cream    Cheese    Bbo    Lunch meats       Ice cream    Sweet bakery goods such as pies, muffins, and donuts    Jams and jellies    Candy bars    Regular sodas   How much to eat  The amount of food you eat affects your blood sugar. It also affects your weight. Your healthcare team will tell you how much of each type of food you should eat.    Use measuring cups and spoons and a food scale to measure serving sizes.    Learn what a correct serving size looks like on your plate. This will help when you are away from home and can t measure your servings.    Eat only the number of servings given on your meal plan for each food. Don t take seconds.    Learn to read food labels. Be sure to look at serving size, total carbohydrates, fiber, calories, sugar, and saturated and trans fats. Look for healthier alternatives to foods that have added sugar.    Plan ahead for parties so you can still have a good time without going overboard with unhealthy food choices. Set a good example yourself by bringing a healthy dish to pot lucks.   Choose healthy snacks  When it comes to snacks, we usually think about foods with added sugar and fats. But there are many other options for healthier snack choices. Here are a few snack ideas to choose from:  Snacks with less than 5 grams of carbohydrates    1 piece of string cheese    3 celery sticks plus 1 tablespoon of peanut butter    5 cherry tomatoes plus 1 tablespoon of ranch dressing    1 hard-boiled egg    1/4 cup of fresh blueberries     5 baby carrots    1 cup of light popcorn    1/2 cup of sugar-free gelatin    15 almonds  Snacks with about 10 to 20 grams of carbohydrates    1/3 cup of hummus plus 1 cup of fresh cut nonstarchy vegetables (carrots, green peppers, broccoli,  celery, or a combination)    1/2 cup of fresh or canned fruit plus 1/4 cup of cottage cheese    1/2 cup of tuna salad with 4 crackers    2 rice cakes and a tablespoon of peanut butter    1 small apple or orange    3 cups light popcorn    1/2 of a turkey sandwich (1 slice of whole-wheat bread, 2 ounces of turkey, and mustard)  Portion sizes are important to controlling your blood sugar and staying at a healthy weight. Stock up on healthy snack items so you always have them on hand.  When to eat  Your meal plan will likely include breakfast, lunch, dinner, and some snacks.    Try to eat your meals and snacks at about the same times each day.    Eat all your meals and snacks. Skipping a meal or snack can make your blood sugar drop too low. It can also cause you to eat too much at the next meal or snack. Then your blood sugar could get too high.  Date Last Reviewed: 7/1/2016 2000-2018 [employer]. [address] [place] [address]. All rights reserved. This information is not intended as a substitute for professional medical care. Always follow your healthcare professional's instructions.         Patient Education   Diabetes: Meal Planning    You can help keep your blood sugar level in your target range by eating healthy foods. Your healthcare team can help you create a low-fat, nutritious meal plan. Take an active role in your diabetes management by following your meal plan and working with your healthcare team.  Make your meal plan  A meal plan gives guidelines for the types and amounts of food you should eat. The goal is to balance food and insulin (or other diabetes medications) so your blood sugars will be in your target range. Your dietitian will help you make a flexible meal plan that includes many foods that you like.  Watch serving sizes  Your meal plan will group foods by servings. To learn how much a serving is, start by measuring food portions at each meal. Soon you ll know what a  serving looks like on your plate. Ask your healthcare provider about how to balance servings of different foods.  Eat from all the food groups  The basis of a healthy meal plan is variety (eating lots of different foods). Choose lean meats, fresh fruits and vegetables, whole grains, and low-fat or nonfat dairy products. Eating a wide variety of foods provides the nutrients your body needs. It can also keep you from getting bored with your meal plan.  Learn about carbohydrates, fats, and protein    Carbohydrates are starches, sugars, and fiber. They are found in many foods, including fruit, bread, pasta, milk, and sweets. Of all the foods you eat, carbohydrates have the most effect on your blood sugar. Your dietitian may teach you about carb counting, a way to figure out the number of carbohydrates in a meal.    Fats have the most calories. They also have the most effect on your weight and your risk of heart disease. When you have diabetes, it s important to control your weight and protect your heart. Foods that are high in fat include whole milk, cheese, snack foods, and desserts.    Protein is important for building and repairing muscles and bones. Choose low-fat protein sources, such as fish, egg whites, and skinless chicken.  Reduce liquid sugars  Extra calories from sodas, sports drinks, and fruit drinks make it hard to keep blood sugar in range. Cut as many liquid sugars from your meal plan as you can.  This includes most fruit juices, which are often high in natural or added sugar. Instead, drink plenty of water and other sugar-free beverages.  Eat less fat  If you need to lose weight, try to reduce the amount of fat in your diet. This can also help lower your cholesterol level to keep blood vessels healthier. Cut fat by using only small amounts of liquid oil for cooking. Read food labels carefully to avoid foods with unhealthy trans fats.  Timing your meals  When it comes to blood sugar control, when you eat  is as important as what you eat. You may need to eat several small meals spaced evenly throughout the day to stay in your target range. So don t skip breakfast or wait until late in the day to get most of your calories. Doing so can cause your blood sugar to rise too high or fall too low.   Date Last Reviewed: 3/1/2016    0704-1737 [employer]. [address] [place] [address], [place] [address]. All rights reserved. This information is not intended as a substitute for professional medical care. Always follow your healthcare professional's instructions.         Patient Education   Long-Term Complications of Diabetes    Diabetes can cause health problems over time. These are called complications. They are more likely to happen if your blood sugar is often too high. Over time, high blood sugar can damage blood vessels in your body. It is important to keep your blood sugar in your target range. This can help prevent or delay complications from diabetes.  Possible complications  Complications of diabetes include:    Eye problems, including damage to the blood vessels in the eyes (retinopathy), pressure in the eye (glaucoma), and clouding of the eye s lens (a cataract). Eye problems can eventually lead to irreversible blindness.     Tooth and gum problems (periodontal disease), causing loss of teeth and bone    Blood vessel (vascular) disease leading to circulation problems, heart attack or stroke, or a need for amputation of a limb     Problems with sexual function leading to erectile dysfunction in men and sexual discomfort in women     Kidney disease (nephropathy) can eventually lead to kidney failure, which may require dialysis or kidney transplant     Nerve problems (neuropathy), causing pain or loss of feeling in your feet and other parts of your body, potentially leading to an amputation of a limb     High blood pressure (hypertension), putting strain on your heart and blood vessels    Serious infections,  possibly leading to loss of toes, feet, or limbs  How to avoid complications  The serious consequences of these complications may be avoidable for most people with diabetes by managing your blood glucose, blood pressure, and cholesterol levels. This can help you feel better and stay healthy. You can manage diabetes by tracking your blood sugar. You can also eat healthy and exercise to avoid gaining weight. And you should take medicine if directed by your healthcare provider.  Date Last Reviewed: 5/1/2016 2000-2018 [employer]. [address] [place] [address], [place] [address]. All rights reserved. This information is not intended as a substitute for professional medical care. Always follow your healthcare professional's instructions.

## 2021-01-01 ENCOUNTER — NURSE TRIAGE (OUTPATIENT)
Dept: FAMILY MEDICINE | Facility: CLINIC | Age: 81
End: 2021-01-01

## 2021-01-01 ENCOUNTER — TRANSFERRED RECORDS (OUTPATIENT)
Dept: HEALTH INFORMATION MANAGEMENT | Facility: CLINIC | Age: 81
End: 2021-01-01

## 2021-01-01 ENCOUNTER — ANTICOAGULATION THERAPY VISIT (OUTPATIENT)
Dept: FAMILY MEDICINE | Facility: CLINIC | Age: 81
End: 2021-01-01
Payer: COMMERCIAL

## 2021-01-01 ENCOUNTER — APPOINTMENT (OUTPATIENT)
Dept: CT IMAGING | Facility: CLINIC | Age: 81
DRG: 083 | End: 2021-01-01
Attending: EMERGENCY MEDICINE
Payer: MEDICARE

## 2021-01-01 ENCOUNTER — HOSPITAL ENCOUNTER (INPATIENT)
Facility: CLINIC | Age: 81
LOS: 1 days | DRG: 083 | End: 2021-03-16
Attending: EMERGENCY MEDICINE | Admitting: INTERNAL MEDICINE
Payer: MEDICARE

## 2021-01-01 ENCOUNTER — ANTICOAGULATION THERAPY VISIT (OUTPATIENT)
Dept: FAMILY MEDICINE | Facility: CLINIC | Age: 81
End: 2021-01-01

## 2021-01-01 ENCOUNTER — OFFICE VISIT (OUTPATIENT)
Dept: FAMILY MEDICINE | Facility: CLINIC | Age: 81
End: 2021-01-01
Payer: COMMERCIAL

## 2021-01-01 ENCOUNTER — IMMUNIZATION (OUTPATIENT)
Dept: NURSING | Facility: CLINIC | Age: 81
End: 2021-01-01
Payer: COMMERCIAL

## 2021-01-01 ENCOUNTER — VIRTUAL VISIT (OUTPATIENT)
Dept: EDUCATION SERVICES | Facility: CLINIC | Age: 81
End: 2021-01-01
Payer: COMMERCIAL

## 2021-01-01 ENCOUNTER — TELEPHONE (OUTPATIENT)
Dept: FAMILY MEDICINE | Facility: CLINIC | Age: 81
End: 2021-01-01

## 2021-01-01 ENCOUNTER — APPOINTMENT (OUTPATIENT)
Dept: GENERAL RADIOLOGY | Facility: CLINIC | Age: 81
DRG: 083 | End: 2021-01-01
Attending: EMERGENCY MEDICINE
Payer: MEDICARE

## 2021-01-01 ENCOUNTER — IMMUNIZATION (OUTPATIENT)
Dept: NURSING | Facility: CLINIC | Age: 81
End: 2021-01-01
Attending: INTERNAL MEDICINE
Payer: COMMERCIAL

## 2021-01-01 ENCOUNTER — MYC MEDICAL ADVICE (OUTPATIENT)
Dept: FAMILY MEDICINE | Facility: CLINIC | Age: 81
End: 2021-01-01

## 2021-01-01 ENCOUNTER — PATIENT OUTREACH (OUTPATIENT)
Dept: EDUCATION SERVICES | Facility: CLINIC | Age: 81
End: 2021-01-01
Attending: FAMILY MEDICINE
Payer: COMMERCIAL

## 2021-01-01 ENCOUNTER — HEALTH MAINTENANCE LETTER (OUTPATIENT)
Age: 81
End: 2021-01-01

## 2021-01-01 ENCOUNTER — TRANSFERRED RECORDS (OUTPATIENT)
Dept: MULTI SPECIALTY CLINIC | Facility: CLINIC | Age: 81
End: 2021-01-01

## 2021-01-01 VITALS
HEIGHT: 72 IN | WEIGHT: 192.46 LBS | DIASTOLIC BLOOD PRESSURE: 77 MMHG | SYSTOLIC BLOOD PRESSURE: 137 MMHG | BODY MASS INDEX: 26.07 KG/M2 | TEMPERATURE: 104 F | OXYGEN SATURATION: 85 %

## 2021-01-01 VITALS
HEIGHT: 74 IN | HEART RATE: 86 BPM | TEMPERATURE: 95.7 F | WEIGHT: 197 LBS | SYSTOLIC BLOOD PRESSURE: 130 MMHG | DIASTOLIC BLOOD PRESSURE: 82 MMHG | OXYGEN SATURATION: 98 % | BODY MASS INDEX: 25.28 KG/M2

## 2021-01-01 DIAGNOSIS — L60.0 INGROWN NAIL: ICD-10-CM

## 2021-01-01 DIAGNOSIS — E11.65 TYPE 2 DIABETES MELLITUS WITH HYPERGLYCEMIA, WITHOUT LONG-TERM CURRENT USE OF INSULIN (H): ICD-10-CM

## 2021-01-01 DIAGNOSIS — Z95.2 S/P MITRAL VALVE REPLACEMENT: ICD-10-CM

## 2021-01-01 DIAGNOSIS — E11.65 TYPE 2 DIABETES MELLITUS WITH HYPERGLYCEMIA, WITHOUT LONG-TERM CURRENT USE OF INSULIN (H): Primary | ICD-10-CM

## 2021-01-01 DIAGNOSIS — I48.91 ATRIAL FIBRILLATION, UNSPECIFIED TYPE (H): ICD-10-CM

## 2021-01-01 DIAGNOSIS — Z79.01 LONG TERM CURRENT USE OF ANTICOAGULANT THERAPY: ICD-10-CM

## 2021-01-01 DIAGNOSIS — Z79.01 LONG TERM CURRENT USE OF ANTICOAGULANT THERAPY: Primary | ICD-10-CM

## 2021-01-01 DIAGNOSIS — D75.839 THROMBOCYTOSIS: ICD-10-CM

## 2021-01-01 DIAGNOSIS — T14.8XXA HEMATOMA OF SKIN: ICD-10-CM

## 2021-01-01 DIAGNOSIS — I42.9 CARDIOMYOPATHY, UNSPECIFIED TYPE (H): ICD-10-CM

## 2021-01-01 DIAGNOSIS — H53.2 DIPLOPIA: ICD-10-CM

## 2021-01-01 DIAGNOSIS — N18.30 STAGE 3 CHRONIC KIDNEY DISEASE, UNSPECIFIED WHETHER STAGE 3A OR 3B CKD (H): ICD-10-CM

## 2021-01-01 DIAGNOSIS — R55 NEAR SYNCOPE: ICD-10-CM

## 2021-01-01 DIAGNOSIS — S06.5XAA SUBDURAL HEMATOMA (H): ICD-10-CM

## 2021-01-01 DIAGNOSIS — I48.0 PAROXYSMAL ATRIAL FIBRILLATION (H): ICD-10-CM

## 2021-01-01 DIAGNOSIS — M79.675 PAIN OF TOE OF LEFT FOOT: Primary | ICD-10-CM

## 2021-01-01 DIAGNOSIS — I50.9 HEART FAILURE, UNSPECIFIED HF CHRONICITY, UNSPECIFIED HEART FAILURE TYPE (H): ICD-10-CM

## 2021-01-01 LAB
ABO + RH BLD: NORMAL
ABO + RH BLD: NORMAL
ACANTHOCYTES BLD QL SMEAR: SLIGHT
ALBUMIN SERPL-MCNC: 3.9 G/DL (ref 3.4–5)
ALP SERPL-CCNC: 69 U/L (ref 40–150)
ALT SERPL W P-5'-P-CCNC: 25 U/L (ref 0–70)
ANION GAP SERPL CALCULATED.3IONS-SCNC: 5 MMOL/L (ref 3–14)
ANION GAP SERPL CALCULATED.3IONS-SCNC: 8 MMOL/L (ref 3–14)
ANISOCYTOSIS BLD QL SMEAR: SLIGHT
AST SERPL W P-5'-P-CCNC: 14 U/L (ref 0–45)
BASE DEFICIT BLDV-SCNC: 3.7 MMOL/L
BASE DEFICIT BLDV-SCNC: 4.7 MMOL/L
BASOPHILS # BLD AUTO: 0.4 10E9/L (ref 0–0.2)
BASOPHILS NFR BLD AUTO: 0.8 %
BASOPHILS NFR BLD AUTO: 1.2 %
BILIRUB SERPL-MCNC: 0.5 MG/DL (ref 0.2–1.3)
BLD GP AB SCN SERPL QL: NORMAL
BLOOD BANK CMNT PATIENT-IMP: NORMAL
BUN SERPL-MCNC: 41 MG/DL (ref 7–30)
BUN SERPL-MCNC: 41 MG/DL (ref 7–30)
CALCIUM SERPL-MCNC: 8.9 MG/DL (ref 8.5–10.1)
CALCIUM SERPL-MCNC: 9.5 MG/DL (ref 8.5–10.1)
CAPILLARY BLOOD COLLECTION: NORMAL
CHLORIDE SERPL-SCNC: 106 MMOL/L (ref 94–109)
CHLORIDE SERPL-SCNC: 109 MMOL/L (ref 94–109)
CO2 SERPL-SCNC: 22 MMOL/L (ref 20–32)
CO2 SERPL-SCNC: 25 MMOL/L (ref 20–32)
CREAT SERPL-MCNC: 1.55 MG/DL (ref 0.66–1.25)
CREAT SERPL-MCNC: 1.57 MG/DL (ref 0.66–1.25)
CREAT UR-MCNC: 49 MG/DL
DIFFERENTIAL METHOD BLD: ABNORMAL
DIFFERENTIAL METHOD BLD: ABNORMAL
ELLIPTOCYTES BLD QL SMEAR: SLIGHT
EOSINOPHIL # BLD AUTO: 0.4 10E9/L (ref 0–0.7)
EOSINOPHIL NFR BLD AUTO: 1.3 %
EOSINOPHIL NFR BLD AUTO: 2.5 %
ERYTHROCYTE [DISTWIDTH] IN BLOOD BY AUTOMATED COUNT: 16.4 % (ref 10–15)
ERYTHROCYTE [DISTWIDTH] IN BLOOD BY AUTOMATED COUNT: 17.3 % (ref 10–15)
GFR SERPL CREATININE-BSD FRML MDRD: 41 ML/MIN/{1.73_M2}
GFR SERPL CREATININE-BSD FRML MDRD: 42 ML/MIN/{1.73_M2}
GLUCOSE BLDC GLUCOMTR-MCNC: 210 MG/DL (ref 70–99)
GLUCOSE BLDC GLUCOMTR-MCNC: 227 MG/DL (ref 70–99)
GLUCOSE BLDC GLUCOMTR-MCNC: 241 MG/DL (ref 70–99)
GLUCOSE SERPL-MCNC: 185 MG/DL (ref 70–99)
GLUCOSE SERPL-MCNC: 209 MG/DL (ref 70–99)
HBA1C MFR BLD: 7.6 % (ref 0–5.6)
HCO3 BLDV-SCNC: 22 MMOL/L (ref 21–28)
HCO3 BLDV-SCNC: 24 MMOL/L (ref 21–28)
HCT VFR BLD AUTO: 48.8 % (ref 40–53)
HCT VFR BLD AUTO: 50.3 % (ref 40–53)
HGB BLD-MCNC: 15.4 G/DL (ref 13.3–17.7)
HGB BLD-MCNC: 16.1 G/DL (ref 13.3–17.7)
IMM GRANULOCYTES # BLD: 0.3 10E9/L (ref 0–0.4)
IMM GRANULOCYTES NFR BLD: 1.1 %
INR BLD: 2.3 (ref 0.86–1.14)
INR BLD: 2.4 (ref 0.86–1.14)
INR PPP: 2 (ref 0.9–1.1)
INR PPP: 2 (ref 0.9–1.1)
INR PPP: 2.6 (ref 0.9–1.1)
INR PPP: 3.68 (ref 0.86–1.14)
INR PPP: 3.9 (ref 0.86–1.14)
INR PPP: 4 (ref 0.9–1.1)
LABORATORY COMMENT REPORT: NORMAL
LYMPHOCYTES # BLD AUTO: 2.1 10E9/L (ref 0.8–5.3)
LYMPHOCYTES NFR BLD AUTO: 7.1 %
LYMPHOCYTES NFR BLD AUTO: 7.2 %
MCH RBC QN AUTO: 27.3 PG (ref 26.5–33)
MCH RBC QN AUTO: 27.7 PG (ref 26.5–33)
MCHC RBC AUTO-ENTMCNC: 31.6 G/DL (ref 31.5–36.5)
MCHC RBC AUTO-ENTMCNC: 32 G/DL (ref 31.5–36.5)
MCV RBC AUTO: 85 FL (ref 78–100)
MCV RBC AUTO: 88 FL (ref 78–100)
MICROALBUMIN UR-MCNC: 13 MG/L
MICROALBUMIN/CREAT UR: 25.71 MG/G CR (ref 0–17)
MONOCYTES # BLD AUTO: 3.3 10E9/L (ref 0–1.3)
MONOCYTES NFR BLD AUTO: 10.8 %
MONOCYTES NFR BLD AUTO: 11.9 %
NEUTROPHILS # BLD AUTO: 23.5 10E9/L (ref 1.6–8.3)
NEUTROPHILS NFR BLD AUTO: 77.6 %
NEUTROPHILS NFR BLD AUTO: 78.5 %
NRBC # BLD AUTO: 0 10*3/UL
NRBC BLD AUTO-RTO: 0 /100
O2/TOTAL GAS SETTING VFR VENT: ABNORMAL %
PCO2 BLDV: 40 MM HG (ref 40–50)
PCO2 BLDV: 56 MM HG (ref 40–50)
PH BLDV: 7.24 PH (ref 7.32–7.43)
PH BLDV: 7.35 PH (ref 7.32–7.43)
PLATELET # BLD AUTO: 1107 10E9/L (ref 150–450)
PLATELET # BLD AUTO: 707 10E9/L (ref 150–450)
PLATELET # BLD EST: ABNORMAL 10*3/UL
PO2 BLDV: 66 MM HG (ref 25–47)
PO2 BLDV: 67 MM HG (ref 25–47)
POTASSIUM SERPL-SCNC: 4 MMOL/L (ref 3.4–5.3)
POTASSIUM SERPL-SCNC: 4.7 MMOL/L (ref 3.4–5.3)
PROT SERPL-MCNC: 7.3 G/DL (ref 6.8–8.8)
RBC # BLD AUTO: 5.56 10E12/L (ref 4.4–5.9)
RBC # BLD AUTO: 5.89 10E12/L (ref 4.4–5.9)
RETINOPATHY: NORMAL
SARS-COV-2 RNA RESP QL NAA+PROBE: NEGATIVE
SODIUM SERPL-SCNC: 136 MMOL/L (ref 133–144)
SODIUM SERPL-SCNC: 139 MMOL/L (ref 133–144)
SPECIMEN EXP DATE BLD: NORMAL
SPECIMEN SOURCE: NORMAL
TROPONIN I SERPL-MCNC: <0.015 UG/L (ref 0–0.04)
WBC # BLD AUTO: 15.8 10E9/L (ref 4–11)
WBC # BLD AUTO: 30 10E9/L (ref 4–11)

## 2021-01-01 PROCEDURE — 84484 ASSAY OF TROPONIN QUANT: CPT | Performed by: EMERGENCY MEDICINE

## 2021-01-01 PROCEDURE — C9803 HOPD COVID-19 SPEC COLLECT: HCPCS

## 2021-01-01 PROCEDURE — 82803 BLOOD GASES ANY COMBINATION: CPT | Performed by: EMERGENCY MEDICINE

## 2021-01-01 PROCEDURE — 94003 VENT MGMT INPAT SUBQ DAY: CPT

## 2021-01-01 PROCEDURE — 11730 AVULSION NAIL PLATE SIMPLE 1: CPT | Performed by: FAMILY MEDICINE

## 2021-01-01 PROCEDURE — 99292 CRITICAL CARE ADDL 30 MIN: CPT

## 2021-01-01 PROCEDURE — 83036 HEMOGLOBIN GLYCOSYLATED A1C: CPT | Performed by: FAMILY MEDICINE

## 2021-01-01 PROCEDURE — 86850 RBC ANTIBODY SCREEN: CPT | Performed by: EMERGENCY MEDICINE

## 2021-01-01 PROCEDURE — 82043 UR ALBUMIN QUANTITATIVE: CPT | Performed by: FAMILY MEDICINE

## 2021-01-01 PROCEDURE — 31500 INSERT EMERGENCY AIRWAY: CPT

## 2021-01-01 PROCEDURE — 36416 COLLJ CAPILLARY BLOOD SPEC: CPT | Performed by: FAMILY MEDICINE

## 2021-01-01 PROCEDURE — 999N001017 HC STATISTIC GLUCOSE BY METER IP

## 2021-01-01 PROCEDURE — 5A1935Z RESPIRATORY VENTILATION, LESS THAN 24 CONSECUTIVE HOURS: ICD-10-PCS | Performed by: EMERGENCY MEDICINE

## 2021-01-01 PROCEDURE — 250N000011 HC RX IP 250 OP 636: Performed by: INTERNAL MEDICINE

## 2021-01-01 PROCEDURE — 99234 HOSP IP/OBS SM DT SF/LOW 45: CPT | Mod: 25 | Performed by: FAMILY MEDICINE

## 2021-01-01 PROCEDURE — 250N000009 HC RX 250: Performed by: EMERGENCY MEDICINE

## 2021-01-01 PROCEDURE — 258N000003 HC RX IP 258 OP 636: Performed by: EMERGENCY MEDICINE

## 2021-01-01 PROCEDURE — 85610 PROTHROMBIN TIME: CPT | Performed by: FAMILY MEDICINE

## 2021-01-01 PROCEDURE — 70450 CT HEAD/BRAIN W/O DYE: CPT

## 2021-01-01 PROCEDURE — 86901 BLOOD TYPING SEROLOGIC RH(D): CPT | Performed by: EMERGENCY MEDICINE

## 2021-01-01 PROCEDURE — 85025 COMPLETE CBC W/AUTO DIFF WBC: CPT | Performed by: FAMILY MEDICINE

## 2021-01-01 PROCEDURE — 80048 BASIC METABOLIC PNL TOTAL CA: CPT | Performed by: EMERGENCY MEDICINE

## 2021-01-01 PROCEDURE — C9132 KCENTRA, PER I.U.: HCPCS | Performed by: EMERGENCY MEDICINE

## 2021-01-01 PROCEDURE — 250N000015 HC RX FACTOR IP MED 636 OP 636: Performed by: EMERGENCY MEDICINE

## 2021-01-01 PROCEDURE — G0108 DIAB MANAGE TRN  PER INDIV: HCPCS | Mod: 95

## 2021-01-01 PROCEDURE — 99207 PR NO CHARGE NURSE ONLY: CPT | Performed by: FAMILY MEDICINE

## 2021-01-01 PROCEDURE — 200N000001 HC R&B ICU

## 2021-01-01 PROCEDURE — 80053 COMPREHEN METABOLIC PANEL: CPT | Performed by: FAMILY MEDICINE

## 2021-01-01 PROCEDURE — 51702 INSERT TEMP BLADDER CATH: CPT

## 2021-01-01 PROCEDURE — G0463 HOSPITAL OUTPT CLINIC VISIT: HCPCS | Performed by: PHYSICIAN ASSISTANT

## 2021-01-01 PROCEDURE — 999N000157 HC STATISTIC RCP TIME EA 10 MIN

## 2021-01-01 PROCEDURE — 36415 COLL VENOUS BLD VENIPUNCTURE: CPT | Performed by: FAMILY MEDICINE

## 2021-01-01 PROCEDURE — 250N000011 HC RX IP 250 OP 636

## 2021-01-01 PROCEDURE — 91300 PR COVID VAC PFIZER DIL RECON 30 MCG/0.3 ML IM: CPT

## 2021-01-01 PROCEDURE — 250N000009 HC RX 250: Performed by: INTERNAL MEDICINE

## 2021-01-01 PROCEDURE — 0001A PR COVID VAC PFIZER DIL RECON 30 MCG/0.3 ML IM: CPT

## 2021-01-01 PROCEDURE — 72125 CT NECK SPINE W/O DYE: CPT

## 2021-01-01 PROCEDURE — 85025 COMPLETE CBC W/AUTO DIFF WBC: CPT | Performed by: EMERGENCY MEDICINE

## 2021-01-01 PROCEDURE — 85610 PROTHROMBIN TIME: CPT | Performed by: EMERGENCY MEDICINE

## 2021-01-01 PROCEDURE — 250N000011 HC RX IP 250 OP 636: Performed by: EMERGENCY MEDICINE

## 2021-01-01 PROCEDURE — 94002 VENT MGMT INPAT INIT DAY: CPT

## 2021-01-01 PROCEDURE — 0002A PR COVID VAC PFIZER DIL RECON 30 MCG/0.3 ML IM: CPT

## 2021-01-01 PROCEDURE — 99291 CRITICAL CARE FIRST HOUR: CPT | Performed by: INTERNAL MEDICINE

## 2021-01-01 PROCEDURE — 99291 CRITICAL CARE FIRST HOUR: CPT | Mod: 25

## 2021-01-01 PROCEDURE — 96366 THER/PROPH/DIAG IV INF ADDON: CPT

## 2021-01-01 PROCEDURE — 96368 THER/DIAG CONCURRENT INF: CPT

## 2021-01-01 PROCEDURE — 96365 THER/PROPH/DIAG IV INF INIT: CPT

## 2021-01-01 PROCEDURE — 86900 BLOOD TYPING SEROLOGIC ABO: CPT | Performed by: EMERGENCY MEDICINE

## 2021-01-01 PROCEDURE — 999N000065 XR CHEST PORT 1 VW

## 2021-01-01 PROCEDURE — 258N000003 HC RX IP 258 OP 636: Performed by: INTERNAL MEDICINE

## 2021-01-01 PROCEDURE — 87635 SARS-COV-2 COVID-19 AMP PRB: CPT | Performed by: EMERGENCY MEDICINE

## 2021-01-01 RX ORDER — FENTANYL CITRATE 50 UG/ML
100 INJECTION, SOLUTION INTRAMUSCULAR; INTRAVENOUS ONCE
Status: COMPLETED | OUTPATIENT
Start: 2021-01-01 | End: 2021-01-01

## 2021-01-01 RX ORDER — DEXTROSE MONOHYDRATE 25 G/50ML
25-50 INJECTION, SOLUTION INTRAVENOUS
Status: DISCONTINUED | OUTPATIENT
Start: 2021-01-01 | End: 2021-01-01 | Stop reason: HOSPADM

## 2021-01-01 RX ORDER — ATROPINE SULFATE 10 MG/ML
2 SOLUTION/ DROPS OPHTHALMIC
Status: DISCONTINUED | OUTPATIENT
Start: 2021-01-01 | End: 2021-01-01 | Stop reason: HOSPADM

## 2021-01-01 RX ORDER — METOPROLOL SUCCINATE 100 MG/1
TABLET, EXTENDED RELEASE ORAL
Qty: 90 TABLET | Refills: 1 | Status: ON HOLD | OUTPATIENT
Start: 2021-01-01 | End: 2021-01-01

## 2021-01-01 RX ORDER — FLASH GLUCOSE SENSOR
1 KIT MISCELLANEOUS
Qty: 6 EACH | Refills: 3 | Status: SHIPPED | OUTPATIENT
Start: 2021-01-01

## 2021-01-01 RX ORDER — ETOMIDATE 2 MG/ML
20 INJECTION INTRAVENOUS ONCE
Status: COMPLETED | OUTPATIENT
Start: 2021-01-01 | End: 2021-01-01

## 2021-01-01 RX ORDER — DEXTROSE MONOHYDRATE 100 MG/ML
INJECTION, SOLUTION INTRAVENOUS CONTINUOUS PRN
Status: DISCONTINUED | OUTPATIENT
Start: 2021-01-01 | End: 2021-01-01 | Stop reason: HOSPADM

## 2021-01-01 RX ORDER — MORPHINE SULFATE 2 MG/ML
2-4 INJECTION, SOLUTION INTRAMUSCULAR; INTRAVENOUS EVERY 30 MIN PRN
Status: DISCONTINUED | OUTPATIENT
Start: 2021-01-01 | End: 2021-01-01 | Stop reason: HOSPADM

## 2021-01-01 RX ORDER — MORPHINE SULFATE 2 MG/ML
2-4 INJECTION, SOLUTION INTRAMUSCULAR; INTRAVENOUS EVERY 10 MIN PRN
Status: DISCONTINUED | OUTPATIENT
Start: 2021-01-01 | End: 2021-01-01 | Stop reason: HOSPADM

## 2021-01-01 RX ORDER — SODIUM CHLORIDE 9 MG/ML
INJECTION, SOLUTION INTRAVENOUS CONTINUOUS
Status: DISCONTINUED | OUTPATIENT
Start: 2021-01-01 | End: 2021-01-01 | Stop reason: HOSPADM

## 2021-01-01 RX ORDER — PROPOFOL 10 MG/ML
5-75 INJECTION, EMULSION INTRAVENOUS CONTINUOUS
Status: DISCONTINUED | OUTPATIENT
Start: 2021-01-01 | End: 2021-01-01 | Stop reason: HOSPADM

## 2021-01-01 RX ORDER — METOPROLOL SUCCINATE 50 MG/1
TABLET, EXTENDED RELEASE ORAL
Qty: 135 TABLET | Refills: 1 | Status: SHIPPED | OUTPATIENT
Start: 2021-01-01

## 2021-01-01 RX ORDER — NICOTINE POLACRILEX 4 MG
15-30 LOZENGE BUCCAL
Status: DISCONTINUED | OUTPATIENT
Start: 2021-01-01 | End: 2021-01-01 | Stop reason: HOSPADM

## 2021-01-01 RX ORDER — FENTANYL CITRATE 50 UG/ML
INJECTION, SOLUTION INTRAMUSCULAR; INTRAVENOUS
Status: COMPLETED
Start: 2021-01-01 | End: 2021-01-01

## 2021-01-01 RX ORDER — WARFARIN SODIUM 2 MG/1
2 TABLET ORAL DAILY
Qty: 90 TABLET | Refills: 1 | Status: SHIPPED | OUTPATIENT
Start: 2021-01-01

## 2021-01-01 RX ORDER — AMOXICILLIN 500 MG/1
2000 CAPSULE ORAL
Qty: 8 CAPSULE | Refills: 1 | Status: SHIPPED | OUTPATIENT
Start: 2021-01-01

## 2021-01-01 RX ORDER — PROPOFOL 10 MG/ML
INJECTION, EMULSION INTRAVENOUS
Status: COMPLETED
Start: 2021-01-01 | End: 2021-01-01

## 2021-01-01 RX ORDER — MORPHINE SULFATE 4 MG/ML
4 INJECTION, SOLUTION INTRAMUSCULAR; INTRAVENOUS ONCE
Status: COMPLETED | OUTPATIENT
Start: 2021-01-01 | End: 2021-01-01

## 2021-01-01 RX ADMIN — ATROPINE SULFATE 2 DROP: 10 SOLUTION OPHTHALMIC at 12:54

## 2021-01-01 RX ADMIN — MIDAZOLAM HYDROCHLORIDE 2 MG: 1 INJECTION, SOLUTION INTRAMUSCULAR; INTRAVENOUS at 16:01

## 2021-01-01 RX ADMIN — ETOMIDATE INJECTION 20 MG: 2 SOLUTION INTRAVENOUS at 05:29

## 2021-01-01 RX ADMIN — SODIUM CHLORIDE: 9 INJECTION, SOLUTION INTRAVENOUS at 10:29

## 2021-01-01 RX ADMIN — FENTANYL CITRATE 100 MCG: 50 INJECTION, SOLUTION INTRAMUSCULAR; INTRAVENOUS at 05:47

## 2021-01-01 RX ADMIN — MIDAZOLAM HYDROCHLORIDE 2 MG: 1 INJECTION, SOLUTION INTRAMUSCULAR; INTRAVENOUS at 15:08

## 2021-01-01 RX ADMIN — MORPHINE SULFATE 4 MG: 2 INJECTION, SOLUTION INTRAMUSCULAR; INTRAVENOUS at 15:08

## 2021-01-01 RX ADMIN — ATROPINE SULFATE 2 DROP: 10 SOLUTION OPHTHALMIC at 14:39

## 2021-01-01 RX ADMIN — PROPOFOL 30 MCG/KG/MIN: 10 INJECTION, EMULSION INTRAVENOUS at 05:40

## 2021-01-01 RX ADMIN — MORPHINE SULFATE 4 MG: 4 INJECTION, SOLUTION INTRAMUSCULAR; INTRAVENOUS at 12:02

## 2021-01-01 RX ADMIN — ROCURONIUM BROMIDE 100 MG: 10 INJECTION, SOLUTION INTRAVENOUS at 05:30

## 2021-01-01 RX ADMIN — MORPHINE SULFATE 4 MG: 2 INJECTION, SOLUTION INTRAMUSCULAR; INTRAVENOUS at 16:01

## 2021-01-01 RX ADMIN — MIDAZOLAM HYDROCHLORIDE 2 MG: 1 INJECTION, SOLUTION INTRAMUSCULAR; INTRAVENOUS at 12:02

## 2021-01-01 RX ADMIN — PHYTONADIONE 10 MG: 10 INJECTION, EMULSION INTRAMUSCULAR; INTRAVENOUS; SUBCUTANEOUS at 06:33

## 2021-01-01 RX ADMIN — PROTHROMBIN, COAGULATION FACTOR VII HUMAN, COAGULATION FACTOR IX HUMAN, COAGULATION FACTOR X HUMAN, PROTEIN C, PROTEIN S HUMAN, AND WATER 2264 UNITS: KIT at 06:27

## 2021-01-01 ASSESSMENT — ACTIVITIES OF DAILY LIVING (ADL)
ADLS_ACUITY_SCORE: 16
ADLS_ACUITY_SCORE: 16
ADLS_ACUITY_SCORE: 14

## 2021-01-01 ASSESSMENT — MIFFLIN-ST. JEOR
SCORE: 1653
SCORE: 1626
SCORE: 1678.34

## 2021-01-05 NOTE — TELEPHONE ENCOUNTER
[facility] now requests orders and shares plan of care/discharge summaries for some patients through [facility].     MD SUMMARY/PLAN OF CARE    Cognitive Performance Test  Level 5.0 indicates mild functional decline, beginning deficits in abstract thought processes.  Persons begin to have difficulty using complex information.  Problems are observed with memory, judgment, reasoning and planning ahead.  Difficulties may be seen with the performance of complex daily tasks such as calculating, managing finances, job performance, driving, meal preparation, shopping or following a complex medication schedule. Monitoring or partial assistance may be needed. At this level, a person can still learn new information.  A driving evaluation may be indicated for safety.    Lindsey Peng, OTR/L  [facility]  [phone]  [email or web address]

## 2021-01-06 NOTE — TELEPHONE ENCOUNTER
Called patient @ [phone] -     Advised of recommendations below - patient noted he stopped driving 1 month ago. Doesn't feel safe driving.   Stated he does not need an evaluation at this time.       Laura Wolfe RN  [facility]

## 2021-01-06 NOTE — TELEPHONE ENCOUNTER
Please see my chart message below     Please review and advise     pharmacy pended     Myra Holguin RN, BSN  [facility]

## 2021-01-08 NOTE — TELEPHONE ENCOUNTER
Routing to PCP to review and advise if medical necessity letter appropriate.    Fredis OLIVARES RN   [facility]

## 2021-01-08 NOTE — LETTER
[address] [id number] [phone]       January 13, 2021    Tomás Cruz  [address] [facility] [address]    To Whom it May Concern:    ATTN:  Appeals Department    Patients Name: Tomás Cruz  YOB: 1940    Name of person submitting request form: Dr. Mahamed Groves    Requested Equipment: FREESTYLE GLORIA 14 DAY SENSOR  Dosage: 1 Device every 14 days   Length of Treatment::  Lifetime    Diagnosis:   Type 2 diabetes mellitus with hyperglycemia, without long-term current use of insulin (H) [E11.65]    To Whom It May Concern:    We are asking you to reconsider coverage on the FREESTYLE GLORIA 14 DAY SENSOR for the above patient for Type 2 diabetes mellitus with hyperglycemia, without long-term current use of insulin.    The above patient needs to have the FREESTYLE GLORIA 14 DAY SENSOR due to Type 2 diabetes mellitus with hyperglycemia, without long-term current use of insulin ICD-10 code E11.65.      Please consider covering this equipment.  Contact my office if there are any further questions.      Sincerely,          Marc Groves M.D./JACKY RN

## 2021-01-11 NOTE — PROGRESS NOTES
ANTICOAGULATION MANAGEMENT     Patient Name:  Tomás Cruz  Date:  2021    ASSESSMENT /SUBJECTIVE:    Today's INR result of 2.0 is subtherapeutic. Goal INR of 2.5-3.5      Warfarin dose taken: Warfarin taken differently, but did not change total weekly dose. Patient states he missed a dose last week but he doubled up on it the following day. The weekly amount was unchanged.    Diet: No new diet changes affecting INR    Medication changes/ interactions: No new medications/supplements affecting INR    Previous INR: Therapeutic 3.1    S/S of bleeding or thromboembolism: No    New injury or illness: No    Upcoming surgery, procedure or cardioversion: No    Additional findings: No identifiable reasons for the low INR today.      PLAN:    Telephone call with home care nurse Geena MCDANIEL (patient in the background) regarding INR result and instructed:     Warfarin Dosing Instructions: 4mg today then continue your current warfarin dose of 2mg daily    Instructed patient to follow up no later than: 1 week  Orders given to  Homecare nurse/facility to recheck    Education provided: Monitoring for clotting signs and symptoms and When to seek medical attention/emergency care      Geena verbalizes understanding and agrees to warfarin dosing plan.    Instructed to call the Anticoagulation Clinic for any changes, questions or concerns. (#[phone])        Jennifer Martinez RN CACP       OBJECTIVE:  Recent labs: (last 7 days)     21   INR 2.0*         INR assessment SUB    Recheck INR In: 8 DAYS    INR Location Homecare INR      Anticoagulation Summary  As of 2021    INR goal:  2.5-3.5   TTR:  79.8 % (11.9 mo)   INR used for dosin.0 (2021)   Warfarin maintenance plan:  2 mg (2 mg x 1) every day   Full warfarin instructions:  : 4 mg; Otherwise 2 mg every day   Weekly warfarin total:  14 mg   Plan last modified:  Irma Damon RN (2020)   Next INR check:  2021   Priority:   Maintenance   Target end date:  Indefinite    Indications    Long-term (current) use of anticoagulants [Z79.01] [Z79.01]  S/P mitral valve replacement [Z95.2]             Anticoagulation Episode Summary     INR check location:      Preferred lab:  EXTERNAL LAB    Send INR reminders to:  GERMÁN ROUSSEAU    Comments:  Home Care       Anticoagulation Care Providers     Provider Role Specialty Phone number    Mahamed Groves MD Referring Family Medicine [phone]

## 2021-01-13 NOTE — TELEPHONE ENCOUNTER
Letter done.   Faxed to insurance  [employer] sent      Laura Wolfe RN  [facility]

## 2021-01-13 NOTE — TELEPHONE ENCOUNTER
Yes a medical necessity letter would be appropriate-please write this and fax to the number listed-also please reply back if further questions.

## 2021-01-18 NOTE — PROGRESS NOTES
"Diabetes Self-Management Education & Support  Type of Service: Telephone Visit    Audio only visit done, as patient does not have access to audio-visual technology.    Individual visit provided, given no group classes are available for 2 months.     How would patient like to obtain AVS? Leatha        Presents for: Initial Assessment for new diagnosis    SUBJECTIVE/OBJECTIVE:  Presents for: Initial Assessment for new diagnosis  Accompanied by: Spouse  Focus of Visit: CGM, Healthy Eating  Diabetes type: Type 2  Date of diagnosis: 12/29/20  Diabetes management related comments/concerns: food questions/ when can I have a chocolate malt again?  Cultural Influences/Ethnic Background:  American      Diabetes Symptoms & Complications:  Visual change: Yes  Symptom course: Improving  Complications assessed today?: No    Patient Problem List and Family Medical History reviewed for relevant medical history, current medical status, and diabetes risk factors.    Vitals:  There were no vitals taken for this visit.  Estimated body mass index is 25.81 kg/m  as calculated from the following:    Height as of 12/29/20: 1.88 m (6' 2\").    Weight as of 12/29/20: 91.2 kg (201 lb).   Last 3 BP:   BP Readings from Last 3 Encounters:   12/29/20 122/78   12/15/20 (!) 142/67   06/18/20 122/75       History   Smoking Status     Former Smoker     Packs/day: 1.00     Years: 20.00     Quit date: 6/1/1982   Smokeless Tobacco     Never Used       Labs:  Lab Results   Component Value Date    A1C 8.3 12/14/2020     Lab Results   Component Value Date     12/21/2020     Lab Results   Component Value Date    LDL 68 12/15/2020     HDL Cholesterol   Date Value Ref Range Status   12/15/2020 26 (L) >39 mg/dL Final   ]  GFR Estimate   Date Value Ref Range Status   12/15/2020 42 (L) >60 mL/min/[1.73_m2] Final     Comment:     Non  GFR Calc  Starting 12/18/2018, serum creatinine based estimated GFR (eGFR) will be   calculated using the " Chronic Kidney Disease Epidemiology Collaboration   (CKD-EPI) equation.       GFR Estimate If Black   Date Value Ref Range Status   12/15/2020 49 (L) >60 mL/min/[1.73_m2] Final     Comment:      GFR Calc  Starting 12/18/2018, serum creatinine based estimated GFR (eGFR) will be   calculated using the Chronic Kidney Disease Epidemiology Collaboration   (CKD-EPI) equation.       Lab Results   Component Value Date    CR 1.54 12/15/2020     No results found for: MICROALBUMIN    Healthy Eating:  Meals include: Breakfast, Lunch, Dinner, Evening Snack  Breakfast: Cheerios honey nut, 1 cup milk skim  Lunch: PBJ sandwich or ham sandwich and milk  Dinner: pork chops, green beans, mashed potatoes; milk  Snacks: evening popcorn and diet Pepsi  Beverages: Milk, Water, Diet soda    Being Active:  Being Active Assessed Today: Yes  Exercise:: Currently not exercising(have a treadmill and other machines)  Barrier to exercise: Physical limitation    Monitoring:  Monitoring Assessed Today: No  Times checking blood sugar at home (number): Never    Getting Yehuda tomorrow.     Taking Medications:  Diabetes Medication(s)     Sodium-Glucose Co-Transporter 2 (SGLT2) Inhibitors       dapagliflozin (FARXIGA) 5 MG TABS tablet    Take 1 tablet (5 mg) by mouth daily          Taking Medication Assessed Today: Yes  Current Treatments: Diet, Oral Medication (taken by mouth)  Problems taking diabetes medications regularly?: No    Problem Solving:  Problem Solving Assessed Today: Yes  Is the patient at risk for hypoglycemia?: Yes(had some reactions with symptoms but not recently)    Hypoglycemia symptoms  Dizziness or Light-Headedness: Yes  Sweats: Yes         Reducing Risks:  Reducing Risks Assessed Today: No  Has dilated eye exam at least once a year?: Yes    Healthy Coping:  Healthy Coping Assessed Today: Yes  Emotional response to diabetes: Ready to learn  Informal Support system:: Spouse  Stage of change: PREPARATION (Decided to  change - considering how)  Support resources: Offerings in Clinic Communities  Patient Activation Measure Survey Score:  ANICETO Score (Last Two) 2/3/2012   ANICETO Raw Score 34   Activation Score 43.4   ANICETO Level 1       Diabetes knowledge and skills assessment:   Patient is knowledgeable in diabetes management concepts related to:     Patient needs further education on the following diabetes management concepts: Healthy Eating, Being Active, Monitoring, Reducing Risks and Healthy Coping    Based on learning assessment above, most appropriate setting for further diabetes education would be: Individual setting.      INTERVENTIONS:    Education provided today on:  AADE Self-Care Behaviors:  Healthy Eating: carbohydrate counting  Being Active: relationship to blood glucose  Monitoring: individual blood glucose targets, frequency of monitoring and Yehuda  Reducing Risks: prevention, early diagnostic measures and treatment of complications, foot care and A1C - goals, relating to blood glucose levels, how often to check  Healthy Coping: recognize feelings about diagnosis and benefits of making appropriate lifestyle changes    Opportunities for ongoing education and support in diabetes-self management were discussed.    Pt verbalized understanding of concepts discussed and recommendations provided today.       Education Materials Provided:  [employer] Healthy Living with Diabetes Book      ASSESSMENT:  Would like information on carb counts. Has new treadmill but has not been using lately due to not feeling well. Had hypoglycemic symptoms for a while. Wife is helping and has questions.   Yehuda will be very helpful to him in seeing effect of food and activity. Will follow up in 2 weeks to review those results.     Patient's most recent   Lab Results   Component Value Date    A1C 8.3 12/14/2020    is not meeting goal of <8.0    PLAN  See Patient Instructions for co-developed, patient-stated behavior change goals.  Follow up in 2 weeks.      Estela Rivera RDN, [place], [facility]    Time Spent: 60 minutes  Encounter Type: Individual    Any diabetes medication dose changes were made via the CDE Protocol and Collaborative Practice Agreement with the patient's primary care provider. A copy of this encounter was shared with the provider.

## 2021-01-20 NOTE — TELEPHONE ENCOUNTER
Patient dental appointment is tomorrow needs the amoxicillian today so can start taking it before the appointment       Nandini Cortez

## 2021-01-21 NOTE — PROGRESS NOTES
ANTICOAGULATION FOLLOW-UP CLINIC VISIT    Patient Name:  Tomás Cruz  Date:  2021  Contact Type:  Telephone/ Geena WALSH RN    SUBJECTIVE:  Patient Findings     Positives:  Change in medications    Comments:  Dentist started him on amox for 10 days today. Patient denies any identifiable changes that caused the subtherapeutic INR. Second low INR in a row and did not respond to boost last week.           Clinical Outcomes     Negatives:  Major bleeding event, Thromboembolic event, Anticoagulation-related hospital admission, Anticoagulation-related ED visit, Anticoagulation-related fatality    Comments:  Dentist started him on amox for 10 days today. Patient denies any identifiable changes that caused the subtherapeutic INR. Second low INR in a row and did not respond to boost last week.              OBJECTIVE    Recent labs: (last 7 days)     21   INR 2.0*       ASSESSMENT / PLAN  INR assessment SUB    Recheck INR In: 4 DAYS    INR Location [facility]R      Anticoagulation Summary  As of 2021    INR goal:  2.5-3.5   TTR:  78.4 % (11.9 mo)   INR used for dosin.0 (2021)   Warfarin maintenance plan:  4 mg (2 mg x 2) every Mon; 2 mg (2 mg x 1) all other days   Full warfarin instructions:  : 4 mg; Otherwise 4 mg every Mon; 2 mg all other days   Weekly warfarin total:  16 mg   Plan last modified:  Suzi Medellin, RN (2021)   Next INR check:  2021   Priority:  Maintenance   Target end date:  Indefinite    Indications    Long-term (current) use of anticoagulants [Z79.01] [Z79.01]  S/P mitral valve replacement [Z95.2]             Anticoagulation Episode Summary     INR check location:      Preferred lab:  EXTERNAL LAB    Send INR reminders to:  GERMÁN ROUSSEAU    Comments:  Home Care       Anticoagulation Care Providers     Provider Role Specialty Phone number    Mahamed Groves MD Referring Family Medicine [phone]            See the Encounter Report to view  Anticoagulation Flowsheet and Dosing Calendar (Go to Encounters tab in chart review, and find the Anticoagulation Therapy Visit)    Patient INR is sub therapeutic today.  Patient will take 4 mg today.  Will then increase weekly maintenance dose to 16 mg and follow up in 4 days or sooner if there are any concerns or problems.     Discussed signs of clotting with patient and when to seek care for concerns.  Patient verbalized understanding    Rationale to adjustments: 14.3% Dosage adjustment made based on physician directed care plan.      Suzi Medellin RN

## 2021-01-25 NOTE — PROGRESS NOTES
ANTICOAGULATION MANAGEMENT     Patient Name:  Tomás Cruz  Date:  2021    ASSESSMENT /SUBJECTIVE:    Today's INR result of 2.6 is therapeutic. Goal INR of 2.5-3.5      Warfarin dose taken: Warfarin taken as instructed    Diet: No new diet changes affecting INR    Medication changes/ interactions: No new medications/supplements affecting INR    Previous INR: subtherapeutic     S/S of bleeding or thromboembolism: No    New injury or illness: No    Upcoming surgery, procedure or cardioversion: No    Additional findings: None      PLAN:    Telephone call with home care nurse Geena regarding INR result and instructed:     Warfarin Dosing Instructions: Continue your current warfarin dose 4 mg on Mon; 2 mg ROW    Instructed patient to follow up no later than: 1 week  Orders given to  Homecare nurse/facility to recheck    Education provided: None required      Geena verbalizes understanding and agrees to warfarin dosing plan.    Instructed to call the Anticoagulation Clinic for any changes, questions or concerns. (#[phone])        Estela Henry RN      OBJECTIVE:  Recent labs: (last 7 days)     21   INR 2.0* 2.6*         INR assessment THER    Recheck INR In: 1 WEEK    INR Location [facility]      Anticoagulation Summary  As of 2021    INR goal:  2.5-3.5   TTR:  77.5 % (11.9 mo)   INR used for dosin.6 (2021)   Warfarin maintenance plan:  4 mg (2 mg x 2) every Mon; 2 mg (2 mg x 1) all other days   Full warfarin instructions:  4 mg every Mon; 2 mg all other days   Weekly warfarin total:  16 mg   Plan last modified:  Suzi Medellin RN (2021)   Next INR check:  2021   Priority:  Maintenance   Target end date:  Indefinite    Indications    Long-term (current) use of anticoagulants [Z79.01] [Z79.01]  S/P mitral valve replacement [Z95.2]             Anticoagulation Episode Summary     INR check location:      Preferred lab:  EXTERNAL LAB    Send INR reminders  to:  GERMÁN ROUSSEAU    Comments:  Home Care       Anticoagulation Care Providers     Provider Role Specialty Phone number    Mahamed Groves MD Referring Family Medicine [phone]

## 2021-01-29 NOTE — TELEPHONE ENCOUNTER
OK for requested orders:    SN 1 week 1. 1 as needed    To continue to assess/educate on symptoms and med , nutrition/DM education, and home/fall safety measure compliance.

## 2021-01-29 NOTE — TELEPHONE ENCOUNTER
[facility] now requests orders and shares plan of care/discharge summaries for some patients through [employer].  Please REPLY TO THIS MESSAGE OR ROUTE BACK TO THE AUTHOR in order to give authorization for orders when needed.  This is considered a verbal order, you will still receive a faxed copy of orders for signature.  Thank you for your assistance in improving collaboration for our patients.    ADDITIONAL ORDER  SN 1 week 1. 1 as needed    To continue to assess/educate on symptoms and med , nutrition/DM education, and home/fall safety measure compliance.    Geena Holland RN   [phone]  [email or web address]

## 2021-02-02 NOTE — PROGRESS NOTES
ANTICOAGULATION MANAGEMENT     Patient Name:  Tomás Cruz  Date:  2021    ASSESSMENT /SUBJECTIVE:    Today's INR result of 4.0 is supratherapeutic. Goal INR of 2.5-3.5      Warfarin dose taken: Warfarin taken as instructed    Diet: No new diet changes affecting INR    Medication changes/ interactions: Received 1st dose of COVID19 vaccine on 21    Previous INR: Therapeutic     S/S of bleeding or thromboembolism: No    New injury or illness: No    Upcoming surgery, procedure or cardioversion: No    Additional findings: Home Care discharging today. Patient will now go into lab for INR management.       PLAN:    Telephone call with home care nurse KIRSTEN Seay regarding INR result and instructed:     Warfarin Dosing Instructions: hold tomorrow due to already taking warfarin today then continue your current warfarin dose of 4mg every Mon; 2mg all other days of the week     Instructed patient to follow up no later than: 1 week  Lab visit scheduled    Education provided: Target INR goal and significance of current INR result, Importance of therapeutic range and potential effects on INR followin COVID19 vaccine and inflammation in the body.       Geena & Dereje verbalizes understanding and agrees to warfarin dosing plan.    Instructed to call the Anticoagulation Clinic for any changes, questions or concerns. (#[phone])        Yasmani Mistry RN      OBJECTIVE:  Recent labs: (last 7 days)     21   INR 4.0*         No question data found.  Anticoagulation Summary  As of 2021    INR goal:  2.5-3.5   TTR:  76.7 % (11.9 mo)   INR used for dosin.0 (2021)   Warfarin maintenance plan:  4 mg (2 mg x 2) every Mon; 2 mg (2 mg x 1) all other days   Full warfarin instructions:  4 mg every Mon; 2 mg all other days   Weekly warfarin total:  16 mg   Plan last modified:  Suzi Medellin RN (2021)   Next INR check:     Priority:  Maintenance   Target end date:  Indefinite    Indications    Long-term  (current) use of anticoagulants [Z79.01] [Z79.01]  S/P mitral valve replacement [Z95.2]             Anticoagulation Episode Summary     INR check location:      Preferred lab:  EXTERNAL LAB    Send INR reminders to:  GERMÁN ROUSSEAU    Comments:  Home Care       Anticoagulation Care Providers     Provider Role Specialty Phone number    Mahamed Groves MD Referring Family Medicine [phone]

## 2021-02-03 NOTE — PROGRESS NOTES
"Diabetes Self-Management Education & Support  Type of Service: Telephone Visit    Audio only visit done, as patient does not have access to audio-visual technology.    Individual visit provided, given no group classes are available for 2 months.     How would patient like to obtain AVS? Leatha    Presents for: Follow-up    SUBJECTIVE/OBJECTIVE:  Presents for: Follow-up  Accompanied by: Spouse  Focus of Visit: CGM, Healthy Eating  Type of CGM visit: Personal CGM Follow-up  Diabetes type: Type 2  Date of diagnosis: 12/29/20  Disease course: Improving  Diabetes management related comments/concerns: food questions  Cultural Influences/Ethnic Background:  American      Diabetes Symptoms & Complications:  Visual change: Yes  Symptom course: Improving  Weight trend: Decreasing  Complications assessed today?: Yes  Heart disease: Yes  Nephropathy: Yes  Peripheral neuropathy: Yes    Patient Problem List and Family Medical History reviewed for relevant medical history, current medical status, and diabetes risk factors.    Vitals:  There were no vitals taken for this visit.  Estimated body mass index is 25.81 kg/m  as calculated from the following:    Height as of 12/29/20: 1.88 m (6' 2\").    Weight as of 12/29/20: 91.2 kg (201 lb).   Last 3 BP:   BP Readings from Last 3 Encounters:   12/29/20 122/78   12/15/20 (!) 142/67   06/18/20 122/75       History   Smoking Status     Former Smoker     Packs/day: 1.00     Years: 20.00     Quit date: 6/1/1982   Smokeless Tobacco     Never Used       Labs:  Lab Results   Component Value Date    A1C 8.3 12/14/2020     Lab Results   Component Value Date     12/21/2020     Lab Results   Component Value Date    LDL 68 12/15/2020     HDL Cholesterol   Date Value Ref Range Status   12/15/2020 26 (L) >39 mg/dL Final   ]  GFR Estimate   Date Value Ref Range Status   12/15/2020 42 (L) >60 mL/min/[1.73_m2] Final     Comment:     Non  GFR Calc  Starting 12/18/2018, serum " creatinine based estimated GFR (eGFR) will be   calculated using the Chronic Kidney Disease Epidemiology Collaboration   (CKD-EPI) equation.       GFR Estimate If Black   Date Value Ref Range Status   12/15/2020 49 (L) >60 mL/min/[1.73_m2] Final     Comment:      GFR Calc  Starting 12/18/2018, serum creatinine based estimated GFR (eGFR) will be   calculated using the Chronic Kidney Disease Epidemiology Collaboration   (CKD-EPI) equation.       Lab Results   Component Value Date    CR 1.54 12/15/2020     No results found for: MICROALBUMIN    Healthy Eating:  Healthy Eating Assessed Today: Yes  Meals include: Breakfast, Lunch, Dinner, Evening Snack  Breakfast: Cheerios honey nut, 1 cup milk skim . 8am  Lunch: PBJ sandwich or ham sandwich and milk  Dinner: pork chops, green beans, mashed potatoes; milk  Snacks: evening popcorn and diet Pepsi  Other: mandarin oranges  Beverages: Milk, Water, Diet soda    Being Active:  Being Active Assessed Today: No  Exercise:: Currently not exercising(have a treadmill and other machines)  Barrier to exercise: Physical limitation    Monitoring:  Monitoring Assessed Today: Yes  Blood Glucose Meter: CGM  Times checking blood sugar at home (number): 4  Times checking blood sugar at home (per): Day    Yehuda data: 14 day time in target 86% with average 130    Taking Medications:  Diabetes Medication(s)     Sodium-Glucose Co-Transporter 2 (SGLT2) Inhibitors       dapagliflozin (FARXIGA) 5 MG TABS tablet    Take 1 tablet (5 mg) by mouth daily          Taking Medication Assessed Today: Yes  Current Treatments: Diet, Oral Medication (taken by mouth)  Problems taking diabetes medications regularly?: No    Problem Solving:  Problem Solving Assessed Today: Yes  Is the patient at risk for hypoglycemia?: Yes(had some reactions with symptoms but not recently)  Hypoglycemia Frequency: Rarely    Hypoglycemia symptoms  Dizziness or Light-Headedness: Yes  Sweats: Yes    Reducing  Risks:  Reducing Risks Assessed Today: No  Has dilated eye exam at least once a year?: Yes    Healthy Coping:  Healthy Coping Assessed Today: Yes  Emotional response to diabetes: Ready to learn  Informal Support system:: Spouse  Stage of change: ACTION (Actively working towards change)  Support resources: Offerings in Clinic Communities  Patient Activation Measure Survey Score:  ANICETO Score (Last Two) 2/3/2012   ANICETO Raw Score 34   Activation Score 43.4   ANICETO Level 1       Diabetes knowledge and skills assessment:   Patient is knowledgeable in diabetes management concepts related to: Healthy Eating, Being Active, Monitoring and Healthy Coping    Patient needs further education on the following diabetes management concepts: Healthy Eating, Monitoring and Reducing Risks    Based on learning assessment above, most appropriate setting for further diabetes education would be: Individual setting.      INTERVENTIONS:    Education provided today on:  AADE Self-Care Behaviors:  Healthy Eating: portion control and answered food questions  Monitoring: Holly data review  Taking Medication: review of taking his prescribed diabetes med  Healthy Coping: encouragement on his progress    Opportunities for ongoing education and support in diabetes-self management were discussed.    Pt verbalized understanding of concepts discussed and recommendations provided today.       Education Materials Provided:  No new materials provided today      ASSESSMENT:  Patient is using Holly and blood sugars are doing very well at 86% time in range. He has not taken the Farxiga for a couple weeks due to confusion with his supply but will start that again. Spent a lot of time on food questions and how to use Holly to see how foods affect his blood sugars.         Patient's most recent   Lab Results   Component Value Date    A1C 8.3 12/14/2020    is not meeting goal of <8.0    PLAN  See Patient Instructions for co-developed, patient-stated behavior change  goals.ded to patient today  Continue healthy eating pattern.   Follow up 1 month.     Time Spent: 60 minutes  Encounter Type: Individual    Any diabetes medication dose changes were made via the CDE Protocol and Collaborative Practice Agreement with the patient's primary care provider. A copy of this encounter was shared with the provider.

## 2021-02-09 NOTE — TELEPHONE ENCOUNTER
ANTICOAGULATION MANAGEMENT      Tomás Cruz due for annual renewal of referral to anticoagulation monitoring. Order pended for your review and signature.      ANTICOAGULATION SUMMARY      Warfarin indication(s)     Heart Valve Replacement    Heart valve present?  Mechanical MVR       Current goal range   INR: 2.5-3.5     Goal appropriate for indication? Yes, INR 2.5-3.5 appropriate for hx  Mechanical MVR, Mechanical AVR with risk factors or older generation (Delphine-Shiley (Tilting disc), Moon-Davis (Caged Ball) or Monoleaflet valve)     Current duration of therapy Indefinite/long term therapy   Time in Therapeutic Range (TTR)  (Goal > 60%) 74.6 %       Office visit with referring provider's group within last year yes on 12/29/20       Alessandra Medellin RN   [facility]  [place], [place], [place]

## 2021-02-09 NOTE — PROGRESS NOTES
ANTICOAGULATION FOLLOW-UP CLINIC VISIT    Patient Name:  Tomás Cruz  Date:  2/9/2021  Contact Type:  Telephone/ Dereje    SUBJECTIVE:  Patient Findings     Comments:  Patient denies any identifiable changes that caused the supratherapeutic INR. Second supra INR in a row.           Clinical Outcomes     Negatives:  Major bleeding event, Thromboembolic event, Anticoagulation-related hospital admission, Anticoagulation-related ED visit, Anticoagulation-related fatality    Comments:  Patient denies any identifiable changes that caused the supratherapeutic INR. Second supra INR in a row.              OBJECTIVE    Recent labs: (last 7 days)     02/09/21  1059   INR 3.90*       ASSESSMENT / PLAN  INR assessment SUPRA    Recheck INR In: 2 WEEKS    INR Location Clinic      Anticoagulation Summary  As of 2/9/2021    INR goal:  2.5-3.5   TTR:  74.6 % (11.9 mo)   INR used for dosing:  3.90 (2/9/2021)   Warfarin maintenance plan:  3 mg (2 mg x 1.5) every Mon; 2 mg (2 mg x 1) all other days   Full warfarin instructions:  2/9: Hold; Otherwise 3 mg every Mon; 2 mg all other days   Weekly warfarin total:  15 mg   Plan last modified:  Suzi Medellin RN (2/9/2021)   Next INR check:  2/23/2021   Priority:  Maintenance   Target end date:  Indefinite    Indications    Long-term (current) use of anticoagulants [Z79.01] [Z79.01]  S/P mitral valve replacement [Z95.2]             Anticoagulation Episode Summary     INR check location:      Preferred lab:  EXTERNAL LAB    Send INR reminders to:  [facility]    Comments:        Anticoagulation Care Providers     Provider Role Specialty Phone number    Mahamed Groves MD Referring Family Medicine [phone]            See the Encounter Report to view Anticoagulation Flowsheet and Dosing Calendar (Go to Encounters tab in chart review, and find the Anticoagulation Therapy Visit)    Patient INR is supra therapeutic today.  Patient will adjust dosing today by holding and  then decrease maintenance dosing to 15 mg and follow up in 2 weeks or sooner if there are any concerns or problems.    Signs of bleeding and when appropriate to seek care for symptoms reviewed.    Adjustment Rational: 6.3%  Dosage adjustment made based on physician directed care plan.      Suzi Medellin RN

## 2021-02-23 NOTE — PROGRESS NOTES
ANTICOAGULATION FOLLOW-UP CLINIC VISIT    Patient Name:  Tomás Cruz  Date:  2021  Contact Type:  Telephone/ Dereje    SUBJECTIVE:  Patient Findings     Positives:  Missed doses    Comments:  Patient missed a dose on , states he made it up the following day.        Clinical Outcomes     Negatives:  Major bleeding event, Thromboembolic event, Anticoagulation-related hospital admission, Anticoagulation-related ED visit, Anticoagulation-related fatality    Comments:  Patient missed a dose on , states he made it up the following day.           OBJECTIVE    Recent labs: (last 7 days)     21  1108   INR 2.4*       ASSESSMENT / PLAN  INR assessment SUB    Recheck INR In: 2 WEEKS    INR Location Clinic      Anticoagulation Summary  As of 2021    INR goal:  2.5-3.5   TTR:  73.3 % (11.9 mo)   INR used for dosin.4 (2021)   Warfarin maintenance plan:  3 mg (2 mg x 1.5) every Mon; 2 mg (2 mg x 1) all other days   Full warfarin instructions:  3 mg every Mon; 2 mg all other days   Weekly warfarin total:  15 mg   No change documented:  Suzi Medellin RN   Plan last modified:  Suzi Medellin RN (2021)   Next INR check:  3/9/2021   Priority:  Maintenance   Target end date:  Indefinite    Indications    Long-term (current) use of anticoagulants [Z79.01] [Z79.01]  S/P mitral valve replacement [Z95.2]             Anticoagulation Episode Summary     INR check location:      Preferred lab:  EXTER[facility]    Send INR reminders to:  [facility]    Comments:        Anticoagulation Care Providers     Provider Role Specialty Phone number    Mahamed Groves MD Referring Family Medicine [phone]            See the Encounter Report to view Anticoagulation Flowsheet and Dosing Calendar (Go to Encounters tab in chart review, and find the Anticoagulation Therapy Visit)    Patient INR is sub therapeutic today.  Since patient made up for missed dose yesterday, will then  continue weekly maintenance dose of 15 mg and follow up in 2 weeks or sooner if there are any concerns or problems.     Discussed signs of clotting with patient and when to seek care for concerns.  Patient verbalized understanding    Rationale to adjustments: Dosage adjustment made based on physician directed care plan.      Suzi Medellin RN

## 2021-02-23 NOTE — PROGRESS NOTES
Anticoagulation Management    Unable to reach Dereje today.    Today's INR result of 2.4 is subtherapeutic (goal INR of 2.5-3.5).  Result received from: Clinic Lab    Follow up required to discuss out of range INR     LM with spouse to call Maddi. Transfer to [phone].      Anticoagulation clinic to follow up    Suzi Medellin RN

## 2021-03-03 NOTE — PROGRESS NOTES
"Diabetes Self-Management Education & Support  Type of Service: Telephone Visit    Audio only visit done, as patient does not have access to audio-visual technology.    Individual visit provided, given no group classes are available for 2 months.     How would patient like to obtain AVS? Mail a copy        Presents for: Follow-up    SUBJECTIVE/OBJECTIVE:  Presents for: Follow-up  Diabetes education in the past 24mo: Yes  Focus of Visit: CGM, Healthy Eating, Reducing Risks  Type of CGM visit: Personal CGM Follow-up  Diabetes type: Type 2  Date of diagnosis: 12/29/20  Disease course: Stable  Diabetes management related comments/concerns: has redness and pain in big toe left foot  Cultural Influences/Ethnic Background:  American      Diabetes Symptoms & Complications:  Visual change: Yes  Other: Yes  Symptom course: Improving  Weight trend: Decreasing  Complications assessed today?: Yes  Heart disease: Yes  Nephropathy: Yes  Peripheral neuropathy: Yes    Patient Problem List and Family Medical History reviewed for relevant medical history, current medical status, and diabetes risk factors.    Vitals:  There were no vitals taken for this visit.  Estimated body mass index is 25.81 kg/m  as calculated from the following:    Height as of 12/29/20: 1.88 m (6' 2\").    Weight as of 12/29/20: 91.2 kg (201 lb).   Last 3 BP:   BP Readings from Last 3 Encounters:   12/29/20 122/78   12/15/20 (!) 142/67   06/18/20 122/75       History   Smoking Status     Former Smoker     Packs/day: 1.00     Years: 20.00     Quit date: 6/1/1982   Smokeless Tobacco     Never Used       Labs:  Lab Results   Component Value Date    A1C 8.3 12/14/2020     Lab Results   Component Value Date     12/21/2020     Lab Results   Component Value Date    LDL 68 12/15/2020     HDL Cholesterol   Date Value Ref Range Status   12/15/2020 26 (L) >39 mg/dL Final   ]  GFR Estimate   Date Value Ref Range Status   12/15/2020 42 (L) >60 mL/min/[1.73_m2] Final     " Comment:     Non  GFR Calc  Starting 12/18/2018, serum creatinine based estimated GFR (eGFR) will be   calculated using the Chronic Kidney Disease Epidemiology Collaboration   (CKD-EPI) equation.       GFR Estimate If Black   Date Value Ref Range Status   12/15/2020 49 (L) >60 mL/min/[1.73_m2] Final     Comment:      GFR Calc  Starting 12/18/2018, serum creatinine based estimated GFR (eGFR) will be   calculated using the Chronic Kidney Disease Epidemiology Collaboration   (CKD-EPI) equation.       Lab Results   Component Value Date    CR 1.54 12/15/2020     No results found for: MICROALBUMIN    Healthy Eating:  Healthy Eating Assessed Today: No  Meals include: Breakfast, Lunch, Dinner, Evening Snack  Breakfast: Cheerios honey nut, 1 cup milk skim . 8am  Lunch: PBJ sandwich or ham sandwich and milk  Dinner: pork chops, green beans, mashed potatoes; milk  Snacks: evening popcorn and diet Pepsi  Other: mandarin oranges  Beverages: Milk, Water, Diet soda    Being Active:  Being Active Assessed Today: No  Exercise:: Currently not exercising(have a treadmill and other machines)  Barrier to exercise: Physical limitation    Monitoring:  Monitoring Assessed Today: Yes  Blood Glucose Meter: CGM  Times checking blood sugar at home (number): 1  Times checking blood sugar at home (per): Day    Using Yehuda with 68% time in target but only 1 scan /day.    Taking Medications:  Diabetes Medication(s)     Sodium-Glucose Co-Transporter 2 (SGLT2) Inhibitors       dapagliflozin (FARXIGA) 5 MG TABS tablet    Take 1 tablet (5 mg) by mouth daily          Taking Medication Assessed Today: No  Current Treatments: Diet, Oral Medication (taken by mouth)  Problems taking diabetes medications regularly?: No    Problem Solving:  Problem Solving Assessed Today: No  Is the patient at risk for hypoglycemia?: Yes(had some reactions with symptoms but not recently)  Hypoglycemia Frequency: Rarely    Hypoglycemia  symptoms  Dizziness or Light-Headedness: Yes  Sweats: Yes         Reducing Risks:  Reducing Risks Assessed Today: No  CAD Risks: Diabetes Mellitus, Male sex  Has dilated eye exam at least once a year?: Yes    Healthy Coping:  Healthy Coping Assessed Today: Yes  Emotional response to diabetes: Ready to learn  Informal Support system:: Spouse  Stage of change: ACTION (Actively working towards change)  Support resources: Offerings in Clinic Communities  Patient Activation Measure Survey Score:  ANICETO Score (Last Two) 2/3/2012   ANICETO Raw Score 34   Activation Score 43.4   ANICETO Level 1       Diabetes knowledge and skills assessment:   Patient is knowledgeable in diabetes management concepts related to: Healthy Eating and Taking Medication    Patient needs further education on the following diabetes management concepts: Monitoring and Reducing Risks    Based on learning assessment above, most appropriate setting for further diabetes education would be: Individual setting.      INTERVENTIONS:    Education provided today on:  AADE Self-Care Behaviors:  Monitoring: purpose and frequency of monitoring  Reducing Risks: prevention, early diagnostic measures and treatment of complications, foot care and A1C - goals, relating to blood glucose levels, how often to check    Opportunities for ongoing education and support in diabetes-self management were discussed.    Pt verbalized understanding of concepts discussed and recommendations provided today.       Education Materials Provided:  No new materials provided today      ASSESSMENT:  Patient has been soaking foot in hot water due to toe pain. Advised against hot water and recommend he see doctor since has been going on 2-3 weeks.   Needs to scan glucose 3 times/day and explained why.     Goals Addressed as of 3/3/2021 at 4:02 PM                 Today      Monitoring (pt-stated)   40%    Added 2/3/21 by Estela Rivera RD     My Goal: I will continue to use my Yehuda to see how  different foods or meals affect my blood sugar in the 2 hours after eating.       I plan to meet my goal by this date: 3/3/21              Patient's most recent   Lab Results   Component Value Date    A1C 8.3 12/14/2020    is not meeting goal of <8.0    PLAN  See Patient Instructions for co-developed, patient-stated behavior change goals.  See doctor about toe and for diabetes check.   Follow up in a month.     Estela Rivera RDN, [place], [facility]    Time Spent: 35 minutes  Encounter Type: Individual    Any diabetes medication dose changes were made via the CDE Protocol and Collaborative Practice Agreement with the patient's primary care provider. A copy of this encounter was shared with the provider.

## 2021-03-09 NOTE — PROGRESS NOTES
Anticoagulation Management    Unable to reach [facility] today.    Today's INR result of 2.3 is subtherapeutic (goal INR of 2.5-3.5).  Result received from: Clinic Lab    Follow up required to discuss out of range INR     No instructions provided. Unable to leave voicemail. Tried both numbers for patient. Transfer to [phone].      Anticoagulation clinic to follow up    Suzi Medellin RN

## 2021-03-11 NOTE — TELEPHONE ENCOUNTER
Patient calls requesting a referral to podiatry in [place] because his big toe on his left foot is infected.  Please advise patient.

## 2021-03-11 NOTE — TELEPHONE ENCOUNTER
"Patient notes ongoing left big toe pain, the past 6 weeks. Notes it is pink around the nail. Some swelling. It is very painful if he bumps it on something. Otherwise it is \"bearable\" throughout the day. He is soaking it and putting antibiotic ointment on. Patient denies fever, chills, drainage. Doesn't want to go back to podiatrist he saw in [place].     SEE WITHIN 3 DAYS IN OFFICE:  You need to be examined.  Appointment made for 3/15.     SINDI Telles, RN  [place] Triage     Additional Information    Localized redness and swelling of skin around nail (i.e., cuticle area or nail fold)    Answer Assessment - Initial Assessment Questions  1. ONSET: \"When did the pain start?\"       6 weeks ago  2. LOCATION: \"Where is the pain located?\"   (e.g., around nail, entire toe, at foot joint)       Left foot, big toe  3. PAIN: \"How bad is the pain?\"    (Scale 1-10; or mild, moderate, severe)    -  MILD (1-3): doesn't interfere with normal activities     -  MODERATE (4-7): interferes with normal activities (e.g., work or school) or awakens from sleep, limping     -  SEVERE (8-10): excruciating pain, unable to do any normal activities, unable to walk      Uncomfortable when I walk, bearable during course of day. Worse when I bump it, very carefully put on socks.   4. APPEARANCE: \"What does the toe look like?\" (e.g., redness, swelling, bruising, pallor)      Pink around edges  5. CAUSE: \"What do you think is causing the toe pain?\"      Ingrown toenail  6. OTHER SYMPTOMS: \"Do you have any other symptoms?\" (e.g., leg pain, rash, fever, numbness)      No fever, soak it sometimes, put this antibiotic cream on it. Very tender.   7. PREGNANCY: \"Is there any chance you are pregnant?\" \"When was your last menstrual period?\"      n/a    Protocols used: TOE PAIN-A-OH      "

## 2021-03-15 PROBLEM — C13.0: Status: ACTIVE | Noted: 2021-01-01

## 2021-03-15 NOTE — PROGRESS NOTES
Assessment & Plan     Diplopia  Corrected w/ glasses. Pt follows w/ ophthalmology.     Heart failure, unspecified HF chronicity, unspecified heart failure type (H)      Type 2 diabetes mellitus with hyperglycemia, without long-term current use of insulin (H)  No tingling in extremities. Sugars ~150.  - Albumin Random Urine Quantitative with Creat Ratio  - Hemoglobin A1c  - Comprehensive metabolic panel (BMP + Alb, Alk Phos, ALT, AST, Total. Bili, TP)    Atrial fibrillation, unspecified type (H)      Thrombocytosis (H)  - CBC with platelets and differential    Stage 3 chronic kidney disease, unspecified whether stage 3a or 3b CKD  - Albumin Random Urine Quantitative with Creat Ratio  - Comprehensive metabolic panel (BMP + Alb, Alk Phos, ALT, AST, Total. Bili, TP)    Near syncope  Possibly syncope in setting of orthostatic hypotension. Disorientation preceding fall, absence of chest pain or palpitations argues against cardiac etiology. Unlikely this was a seizure, as pt has no h/o seizure, denies urinary/fecal incontinence. Story is somewhat concerning for TIA, especially given pt's history of CVA. Physical exam negative for any focal neurologic deficits.  - CBC with platelets and differential  - Comprehensive metabolic panel (BMP + Alb, Alk Phos, ALT, AST, Total. Bili, TP)    Hematoma of skin  Trauma to leg in setting of warfarin use.  -- Ice/heat, elevation    Ingrown nail  Pain and erythema in distal toe, no pain in joint to suggest gout. Toe nail clearly ingrown, pincer shaped nail.     Informed verbal consent obtained.  Ring-block anesthetic with lidocaine 1% without epinephrine.  Sterilely prepped with Hibiclens.  The medial and lateral edges of the nail showed signs of ingrown irritation and pincher type shape therefore a wedge cut was done and bacitracin ointment placed in the wound and dressed with a pressure dressing.  he was instructed to rest this evening and change the dressing every 12 hours with  "antibiotic ointment until healed.    -- Wedge cut    S/P mitral valve replacement  - warfarin ANTICOAGULANT (COUMADIN) 2 MG tablet  Dispense: 90 tablet; Refill: 1        No follow-ups on file.      Marc Groves MD      [address] [address]  [employer]   Office: [phone]       Subjective   Dereje is a 80 year old who presents for the following health issues     HPI   Pt fell 2-3 days ago and hit back of head. Got up to use the bathroom at night, became confused and disoriented, fell. Denies loc, denies tunnel-vision preceding fall, denies urinary or fecal incontinence, denies cp or palpitations before fall. No h/o seizure. Woke up in the morning with L lower leg pain, had bruise and edema there.    Additionally, pt endorses double vision for months. Feels it is improving. Follows with ophthalmology, diplopia corrected w/ glasses. Did go to hospital in recent months for diplopia but left AMA (?).    Finally, has had toe pain for \"months\". Pt thinks he may have an ingrown toe nail.        Musculoskeletal problem/pain  Onset/Duration: fall was x 2 -3 days ago and toe pain is ongoing   Description  Location: big toe and leg - left  Joint Swelling: YES  Redness: YES and bruising of the left leg   Pain: YES  Warmth: YES  Intensity:  moderate, severe  Progression of Symptoms:  worsening  Accompanying signs and symptoms:   Fevers: no  Numbness/tingling/weakness: no  History  Trauma to the area: YES-  Fall:Pt reports getting up from the toilet and felll and hit head and left leg. Pt reports left leg swelling and pain. No known trauma to the left big toe.   Recent illness:  no  Previous similar problem: YES- toe problem is ongoing   Previous evaluation:  no  Precipitating or alleviating factors:  Aggravating factors include: standing and walking  Therapies tried and outcome: Tylenol         Review of Systems   Constitutional, HEENT, cardiovascular, pulmonary, gi " "and gu systems are negative, except as otherwise noted.      Objective    /82   Pulse 86   Temp 95.7  F (35.4  C) (Tympanic)   Ht 1.88 m (6' 2\")   Wt 89.4 kg (197 lb)   SpO2 98%   BMI 25.29 kg/m    Body mass index is 25.29 kg/m .     Physical Exam   GENERAL: healthy, alert and no distress  EYES: Eyes grossly normal to inspection, PERRL and conjunctivae and sclerae normal  HENT: ear canals and TM's normal, nose and mouth without ulcers or lesions  NECK: no adenopathy, no asymmetry, masses, or scars  RESP: lungs clear to auscultation - no rales, rhonchi or wheezes  CV: irregularly irregular rate, normal S1 S2, no S3 or S4, no murmur, click or rub, peripheral pulses strong  MS: no gross musculoskeletal defects noted, L lower leg ecchymosis and edema w/ tenderness to palpation, distal L great toe erythematous and tender to palpation -pincher deformity of nail  NEURO: CN 2-12 normal, Normal strength and tone in all extremities, mentation intact and speech normal  PSYCH: mentation appears normal, affect normal/bright    No results found for this or any previous visit (from the past 24 hour(s)).    Allen Borjas, MS4 Student,  has participated in the care of this patient.     Provider Disclosure:  I agree with above History, Review of Systems, Physical exam and Plan.  I have reviewed the content of the documentation and have edited it as needed. I have personally performed the services documented here and the documentation accurately represents those services and the decisions I have made.      Electronically signed by:          Marc Groves M.D.          "

## 2021-03-16 PROBLEM — S06.5XAA SUBDURAL HEMATOMA (H): Status: ACTIVE | Noted: 2021-01-01

## 2021-03-16 NOTE — CONSULTS
NEUROSURGERY CONSULT    Neurosurgery was asked by Dr. Garcia to consult for a traumatic subdural hematoma.      PLAN:  After reviewing the imaging and exam with Dr. Chung, we do not appreciate any indication for immediate surgical intervention as the likelihood of any improvement is unfortunately incredibly low. From a Neurosurgical standpoint, we feel that it would be in his best interest to be admitted to the [facility] Intensive Care Unit by the Medical team with [facility] involvement. The head of the bed should be at 30 degrees at all times and the systolic blood pressure should be maintained at 150 mmHg or less at all times. A repeat head CT may be repeated in six hours to assess the stability of the bleed should NCC feel that this is appropriate. Please refrain from using any Aspirin or anti-inflammatory products.     [facility] certainly has the right to change any of our parameters or suggestions.      Please page our on-call service for any questions or concerns.    ______________________________________________________________________    HPI:    Tomás Cruz is a pleasant 80 year old male who unfortunately fell a couple days ago and then fell again few hours ago at home subsequently resulting in an altered mental status prompting his wife to call EMS. He is anticoagulated on warfarin secondary to a a-fib and a  valve. At the time we were notified his eyes were already fixed, nonreactive and dilated. He had already been intubated. His INR is 3.68.        Past Medical History:   Diagnosis Date     Atrial fibrillation (H)     Transient around time of MVR.  Had MAZE procedure by report.     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     Hypertension goal BP (blood pressure) < 140/90       Lung mass 2019     Mitral valve disorders(424.0)     s/p mitral valve replacment- ; SBE prophylaxsis and anticoagulated; echo - EF 30-35%     Non-ischemic cardiomyopathy (H)     EF as above.  Follows with [facility].     Polyp of colon      Renal mass     Benign per patient report, Right kidney removed.       Past Surgical History:   Procedure Laterality Date     ANESTHESIA CARDIOVERSION N/A 4/15/2019    Procedure: ANESTHESIA CARDIOVERSION;  Surgeon: GENERIC ANESTHESIA PROVIDER;  Location: [facility] OR     ARTHROSCOPY KNEE Left     left knee arthroscopy     ARTHROSCOPY KNEE  2018    right knee partial     AS TOTAL KNEE ARTHROPLASTY Left     Dr. Castro     CARDIAC SURGERY      Mitral valve replacement     CATARACT IOL, RT/LT      Cataracts, bilateral     COLONOSCOPY      [facility] [place] [facility]     COLONOSCOPY N/A 2019    Procedure: COMBINED COLONOSCOPY, SINGLE OR MULTIPLE BIOPSY/POLYPECTOMY BY BIOPSies by cold forcep;  Surgeon: Ronald Henderson MD;  Location: [facility] GI     HERNIA REPAIR       IMPLANT AUTOMATIC IMPLANTABLE CARDIOVERTER DEFIBRILLATOR  2013     KIDNEY SURGERY      Laparoscopic right radical nephrectomy.- due to complex hemorrhagic cyst       Allergies   Allergen Reactions     Spironolactone      Other reaction(s): Gynecomastia       Social History     Tobacco Use     Smoking status: Former Smoker     Packs/day: 1.00     Years: 20.00     Pack years: 20.00     Quit date: 1982     Years since quittin.8     Smokeless tobacco: Never Used   Substance Use Topics     Alcohol use: No       Family History   Adopted: Yes   Problem Relation Age of Onset     No Known Problems Daughter      No Known Problems Daughter      No Known Problems Daughter      No Known Problems Daughter      Diabetes No family hx of      Coronary Artery Disease No family hx of        Home Medications  Dapagliflozin  Eplerenone  Metoprolol  Pravastatin   Warfarin     PRN  Medications    HOLD MEDICATION    ROS: 10 point ROS neg other than the symptoms noted above in the HPI.    Vitals:    BP (!) 156/98   Pulse 80   Resp 17   Ht 6' (1.829 m)   Wt 198 lb 6.6 oz (90 kg)   SpO2 99%   BMI 26.91 kg/m    Body mass index is 26.91 kg/m .  No intake/output data recorded.    Exam:    Head: Normocephalic, without obvious abnormality, atraumatic, no facial asymmetry. Laceration.   Eyes: Pupils fixed, nonreactive and dilated.  Throat: lips, mucosa, and tongue normal; teeth and gums normal.   Neck: supple, symmetrical, trachea midline, no adenopathy and thyroid: not enlarged, symmetric, no tenderness/mass/nodules.   Lungs: clear to auscultation bilaterally - intubated.   Heart: regular rate and rhythm.   Abdomen: soft, non-tender; bowel sounds normal; no masses, no organomegaly.   Pulses: 2+ and symmetric.   Skin: Skin color, texture, turgor normal. No rashes or lesions.   NG placed.  Does not follow commands.  Does not respond to noxious stimuli.    ECG/Telemetry:  ECG taken at 0601, ECG read at 0602  Atrial fibrillation with PVCs. Right bundle branch block. Left posterior fascicular block. T-wave abnormality, consider inferolateral ischemia. Abnormal ECG.   No significant change compared to prior, dated 12/14/2020.  Rate 87 bpm. DC interval 258 ms. QRS duration 172 ms. QT/QTc 498/599 ms. P-R-T axes 132 126 -44.     Imaging: CT HEAD W/O CONTRAST - 3/16/2021 6:02 AM  Impression per radiology read - I have personally reviewed the images with the patient.    1.  2.7 cm in greatest thickness acute subdural hematoma overlying lateral aspect of left cerebral hemisphere. Mild heterogeneity of hemorrhage could possibly be due to active bleeding.  2.  2.1 cm in greatest thickness subdural hematoma along falx. Mild extension over tentorial leaflets.  3.  Associated mass effect with 2.5 cm left-to-right midline shift. Effacement of basilar cisterns.  4.  Mild entrapment of right lateral ventricle.  5.   Sulcal effacement throughout both cerebral hemispheres.  6.  Mild to moderate bilateral subarachnoid hemorrhage.    Available labs at time of consult:       Recent Labs   Lab 03/16/21  0527 03/15/21  1100   WBC 30.0* 15.8*   HGB 16.1 15.4   HCT 50.3 48.8   MCV 85 88   PLT 1,107* 707*     Recent Labs   Lab 03/16/21  0527 03/15/21  1100   WBC 30.0* 15.8*   HGB 16.1 15.4   HCT 50.3 48.8   MCV 85 88   PLT 1,107* 707*     No results for input(s): SED, CRP in the last 168 hours.  Recent Labs   Lab 03/16/21  0527 03/15/21  1100   HGB 16.1 15.4     Recent Labs   Lab 03/16/21 0527 03/09/21  1054   INR 3.68* 2.3*     Recent Labs   Lab 03/16/21  0527 03/15/21  1100   PLT 1,107* 707*     Recent Labs   Lab 03/16/21  0527 03/15/21  1100   WBC 30.0* 15.8*         Respectfully,    JEFFERY Rodriguez, PADOMINICK  [facility] Neurosurgery  [facility]     Tel: [phone]      All imaging, physical findings, and the above plan have been reviewed with Dr. Chung.

## 2021-03-16 NOTE — ED NOTES
Bed: ST01  Expected date:   Expected time:   Means of arrival:   [facility] 2 hours ago unresponsive VSS Hx TIA eta 8

## 2021-03-16 NOTE — ED NOTES
Pt has a hematoma on his left shin, bruises on his bilateral knees. Town skin tears on his right elbow

## 2021-03-16 NOTE — ED NOTES
Pt's wife Ava called for an update. Wife informed that pt had labs, xray and CT. The provider is waiting for consultation with neurology and would call her back with more information. Pt's information given to

## 2021-03-16 NOTE — ED TRIAGE NOTES
Pt was brought in from home by EMS due fall, two hours ago. Wife noted increased confusion and called for EMS. Pt arrived at ED with snoring respirations, O 2 saturations in high 90's. Pt noted wiggling his toes. Pt has skin tear to his right elbow with a hematoma.    Pt is on blood thinners as per EMS. Pt has history of TIA and recently diagnosed with Diabetes.      Pt has @ 18 G IV by EMS. Blood glucose 229  As per EMS

## 2021-03-16 NOTE — DEATH PRONOUNCEMENT
Death Note  I was called to bedside to pronounce patient. I pronounced him dead at 1700. Family was at bedside when he passed away.    jeffrey redmond  March 16, 2021

## 2021-03-16 NOTE — TELEPHONE ENCOUNTER
Writer called back Martita.  Martita would like ok to wait until Monday to admit Pt to home care at the request of Pt and Wife.   Martita inquired if able to perform INR draw at home.  Wife would also like a fasting glucose, for wife's peace of mind, regarding new dx of diabetes.    Writer gave verbal ok for all of the above.    Fredis OLIVARES RN   [facility]       Hide Include Location In Plan Question?: No Detail Level: Zone

## 2021-03-16 NOTE — ED NOTES
DATE:  3/16/2021   TIME OF RECEIPT FROM LAB:  0624  LAB TEST:  Platelets  LAB VALUE:  1107  RESULTS GIVEN WITH READ-BACK TO (PROVIDER):  Aston Garcia MD  TIME LAB VALUE REPORTED TO PROVIDER:   0624

## 2021-03-16 NOTE — H&P
Critical Care HandP    CC: Coma    HPI: 81 yo man who is on anticoagulation who fell and had gradually decreasing LOC progressing to coma. Imaging showed large intracranial hemorrhage. Not an operative candidate because of poor prognosis. Patient continues to be comatose here.    Past Medical History:   Diagnosis Date     Atrial fibrillation (H)     Transient around time of MVR.  Had MAZE procedure by report.     CVA (cerebral infarction)      Diabetes mellitus (H)      Disc disorder of lumbar region 10/05, 1/07, 8/08    moderate to severe foraminal stenosis - rt      HYPERSOMNI W SLEEP APNEA 06/2007    Patient reports he was evaluated with a sleep study and told he does not have significant sleep apnea.     Hypertension goal BP (blood pressure) < 140/90 04/2005     Lung mass 04/30/2019     Mitral valve disorders(424.0)     s/p mitral valve replacment- 2003; SBE prophylaxsis and anticoagulated; echo 2/13- EF 30-35%     Non-ischemic cardiomyopathy (H)     EF as above.  Follows with [facility].     Polyp of colon      Renal mass     Benign per patient report, Right kidney removed.     Past Surgical History:   Procedure Laterality Date     ANESTHESIA CARDIOVERSION N/A 4/15/2019    Procedure: ANESTHESIA CARDIOVERSION;  Surgeon: GENERIC ANESTHESIA PROVIDER;  Location: [facility] OR     ARTHROSCOPY KNEE Left 2001    left knee arthroscopy     ARTHROSCOPY KNEE  04/2018    right knee partial     AS TOTAL KNEE ARTHROPLASTY Left 2006    Dr. Castro     CARDIAC SURGERY  2003    Mitral valve replacement     CATARACT IOL, RT/LT  2014    Cataracts, bilateral     COLONOSCOPY  2016    [facility] [place] [facility]     COLONOSCOPY N/A 2/21/2019    Procedure: COMBINED COLONOSCOPY, SINGLE OR MULTIPLE BIOPSY/POLYPECTOMY BY BIOPSies by cold forcep;  Surgeon: Ronald Henderson MD;  Location:  GI     HERNIA REPAIR  2005     IMPLANT AUTOMATIC IMPLANTABLE CARDIOVERTER DEFIBRILLATOR  5/2013     KIDNEY SURGERY  2008    Laparoscopic right radical  nephrectomy.- due to complex hemorrhagic cyst     Social History     Tobacco Use     Smoking status: Former Smoker     Packs/day: 1.00     Years: 20.00     Pack years: 20.00     Quit date: 1982     Years since quittin.8     Smokeless tobacco: Never Used   Substance Use Topics     Alcohol use: No     Drug use: No     Family History   Adopted: Yes   Problem Relation Age of Onset     No Known Problems Daughter      No Known Problems Daughter      No Known Problems Daughter      No Known Problems Daughter      Diabetes No family hx of      Coronary Artery Disease No family hx of      PE: Temp:  [93.9  F (34.4  C)-95.5  F (35.3  C)] 95.5  F (35.3  C)  Pulse:  [66-97] 70  Resp:  [12-28] 16  BP: (134-171)/() 147/91  FiO2 (%):  [30 %] 30 %  SpO2:  [98 %-100 %] 100 %    Ventilation Mode: CMV/AC  (Continuous Mandatory Ventilation/ Assist Control)  FiO2 (%): 30 %  Rate Set (breaths/minute): 18 breaths/min  Tidal Volume Set (mL): 550 mL  PEEP (cm H2O): 5 cmH2O  Oxygen Concentration (%): 30 %  Resp: 16    Gen - stated age, unresponsive  HEENT - spontaneous respiratory efforts are present, no response to pain  CV - RRR  Resp - LCTAB  Abd - +BS, soft  Ext - warm, good pulses    ROUTINE ICU LABS (Last four results)  CMP  Recent Labs   Lab 21  0527 03/15/21  1100    136   POTASSIUM 4.0 4.7   CHLORIDE 109 106   CO2 22 25   ANIONGAP 8 5   * 185*   BUN 41* 41*   CR 1.55* 1.57*   GFRESTIMATED 42* 41*   GFRESTBLACK 48* 48*   BRAXTON 9.5 8.9   PROTTOTAL  --  7.3   ALBUMIN  --  3.9   BILITOTAL  --  0.5   ALKPHOS  --  69   AST  --  14   ALT  --  25     CBC  Recent Labs   Lab 21  0527 03/15/21  1100   WBC 30.0* 15.8*   RBC 5.89 5.56   HGB 16.1 15.4   HCT 50.3 48.8   MCV 85 88   MCH 27.3 27.7   MCHC 32.0 31.6   RDW 17.3* 16.4*   PLT 1,107* 707*     INR  Recent Labs   Lab 21  0527 21  1054   INR 3.68* 2.3*     Arterial Blood Gas  Recent Labs   Lab 21  0641   O2PER Vent     Results for  orders placed or performed during the hospital encounter of 03/16/21   Head CT w/o contrast    Impression    IMPRESSION:  1.  2.7 cm in greatest thickness acute subdural hematoma overlying lateral aspect of left cerebral hemisphere. Mild heterogeneity of hemorrhage could possibly be due to active bleeding.  2.  2.1 cm in greatest thickness subdural hematoma along falx. Mild extension over tentorial leaflets.  3.  Associated mass effect with 2.5 cm left-to-right midline shift. Effacement of basilar cisterns.  4.  Mild entrapment of right lateral ventricle.  5.  Sulcal effacement throughout both cerebral hemispheres.  6.  Mild to moderate bilateral subarachnoid hemorrhage.    Results were called to Dr. JENNIFER CAMPOS at 3/16/2021 6:12 AM.    CT Cervical Spine w/o Contrast    Impression    IMPRESSION:  1.  No definite fracture.   XR Chest Port 1 View    Impression    IMPRESSION: ET tube 5.5 cm above the mei.                 A/P:  81 yo man with catastrophic intracranial hemorrhage. No surgical treatment was possible. Spoke with Neuro Surgeons who felt that comfort care was the most appropriate approach given lack of treatment options. Wife is agreeable to this given his previously stated wishes. Says they have an advanced directive at home that articulates this as well. Daughters also in agreement. Plan will be to continue current medical management while waiting family visiting but not to perform CPR if he sustains a cardiac arrest. There is no need for sedation at this time but will plan to administer when we compassionately extubate.     CC time: 50 minutes including time spent talking with wife and daughters about patient's wishes for his care.    Dina Peters MD.

## 2021-03-16 NOTE — ED PROVIDER NOTES
History   Chief Complaint:  Fall and Altered Mental Status       HPI   History and ROS limited by altered mental status and is obtained primarily from EMS and chart review.  Tomás Cruz is a 80 year old male with history of atrial fibrillation and mechanical valve on Warfarin, DM, stroke, and CKD who presents via EMS for evaluation of altered mental status.  EMS reports the patient fell approximately 2.5 hours prior to presentation.  Details of the fall, including if he hit his head are unclear.  Wife told EMS that he slowly became more confused and somnolent, ultimately leading her to call 911.  For EMS he was not really responding to sternal rub but did balk when they tried to insert an oral airway.  Blood sugar was 200 and his vitals were stable en route.  Per chart review, he also fell 3 - 4 days ago and hit the back of his head.  He apparently became disoriented when getting up to the bathroom.      Review of Systems   Unable to perform ROS: Mental status change     Allergies:  Spironolactone    Medications:  Dapagliflozin  Eplerenone  Metoprolol  Pravastatin   Warfarin     Past Medical History:    Atrial fibrillation   Stroke   Mitral valve regurgitation  Nonischemic cardiomyopathy   Hypertension  Hyperlipidemia   Chronic kidney disease  Obstructive sleep apnea   Diabetes mellitus  Cerebral infarction  Lung mass  Renal mass  Benign prostatic hypertrophy   Colon polyp  Neuropathy  Hypercalcemia   Thrombocytosis     Past Surgical History:    Cardioversion  AICD placement  Mitral valve replacement  Total knee arthroplasty, left   Knee arthroscopy, left   Knee arthroscopy, left   Cataract extraction  Hernia repair  Radical nephrectomy    Family History:    The patient is adopted.  Biologic family history is unknown.     Social History:  Presents to the ED alone.  PCP: Mahamed Groevs     Physical Exam     Patient Vitals for the past 24 hrs:   BP Pulse Resp SpO2 Height Weight   03/16/21 0630 (!) 168/96 94  14 99 % -- --   03/16/21 0620 (!) 164/89 94 18 99 % -- --   03/16/21 0610 (!) 158/102 90 16 98 % -- --   03/16/21 0600 (!) 153/102 86 12 98 % -- --   03/16/21 0556 (!) 169/109 92 18 98 % 1.829 m (6') 90 kg (198 lb 6.6 oz)   03/16/21 0550 (!) 169/109 -- -- -- -- --   03/16/21 0547 (!) 169/114 -- -- -- -- --   03/16/21 0540 -- 97 18 100 % -- --   03/16/21 0530 -- 87 18 100 % -- --   03/16/21 0520 (!) 164/95 85 28 99 % -- --       Physical Exam  Constitutional: Well developed, nontox appearance  Head: Atraumatic.   Mouth/Throat: Oropharynx is clear  Neck:  no stridor, no step offs  Eyes: no scleral icterus, fixed, dilated  Cardiovascular: RRR, 2+ bilat radial pulses  Pulmonary/Chest: nml resp effort, Clear BS bilat  Abdominal: ND, soft, no rebound or guarding   Ext: Warm, well perfused, no edema, LLE shin hematoma  Neurological: GCS 4  Skin: Skin is warm and dry.   Psychiatric: Behavior is normal. Thought content normal.   Nursing note and vitals reviewed.        Emergency Department Course   ECG:  ECG taken at 0601, ECG read at 0602  Atrial fibrillation with PVCs. Right bundle branch block. Left posterior fascicular block. T-wave abnormality, consider inferolateral ischemia. Abnormal ECG.   No significant change compared to prior, dated 12/14/2020.  Rate 87 bpm. AR interval 258 ms. QRS duration 172 ms. QT/QTc 498/599 ms. P-R-T axes 132 126 -44.     Imaging:  Head CT without contrast:  1.  2.7 cm in greatest thickness acute subdural hematoma overlying lateral aspect of left cerebral hemisphere. Mild heterogeneity of hemorrhage could possibly be due to active bleeding.  2.  2.1 cm in greatest thickness subdural hematoma along falx. Mild extension over tentorial leaflets.  3.  Associated mass effect with 2.5 cm left-to-right midline shift. Effacement of basilar cisterns.  4.  Mild entrapment of right lateral ventricle.  5.  Sulcal effacement throughout both cerebral hemispheres.  6.  Mild to moderate bilateral  subarachnoid hemorrhage.  Report per radiology.     Cervical spine CT without contrast:  1.  No definite fracture.  Report per radiology.     Chest X-ray, Portable (1 view):  ET tube 5.5 cm above the mei.    Report per radiology.      Laboratory:   Initial labs drawn at 0527:  CBC: WBC 30, HGB 16.1, PLT 1107 (HH)   BMP: Glucose 209 (H), BUN 41 (H), Creatinine 1.55 (H), GFR 42 (L), otherwise WNL   Troponin I: <0.015   INR: 3.68 (H)   Blood gas, venous: pH 7.35, pCO2 40, pO2 67 (H), Bicarb 22, Base Deficit 3.7   ABO/Rh Type and Screen: A positive, antibody screen negative     Asymptomatic COVID19 Virus PCR, nasopharyngeal: pending      Procedures:  [facility]    -Intubation    Date/Time: 3/16/2021 5:30 AM  Performed by: Aston Garcia MD  Authorized by: Aston Garcia MD       PRE-PROCEDURE DETAILS     Patient status:  Unresponsive    Mallampati score:  II    Pretreatment medications:  None (Etomidate)    Paralytics:  Rocuronium      PROCEDURE DETAILS     Preoxygenation:  Bag valve mask    CPR in progress: no      Intubation method:  Oral    Oral intubation technique:  Video-assisted    Laryngoscope size: Glidescope.    Tube size (mm):  7.5    Tube type:  Cuffed    Number of attempts:  1    Cricoid pressure: yes      Tube visualized through cords: yes      PLACEMENT ASSESSMENT     ETT to teeth:  25    Tube secured with:  ETT baltazar    Breath sounds:  Equal and absent over the epigastrium    Placement verification: chest rise, condensation, CXR verification, direct visualization, equal breath sounds, ETCO2 detector and tube exhalation      CXR findings:  ETT in proper place  PROCEDURE   Patient Tolerance:  Patient tolerated the procedure well with no immediate complications        Interventions:   (0529) Etomidate, 20 mg, IV injection   (0529) Rocuronium, 100 mg, IV injection  (0538) Fentanyl, 100 mcg, IV injection   (0540) Propofol, 30 mcg/kg/min, IV infusion  (0627)  Kcentra, 2264 units, IV infusion   (0633) Phytonadione, 10 mg, IV infusion    Emergency Department Course:  (0519) The patient was brought directly into a critical care room by EMS secondary to the acuity of the patient's condition.  I met the patient in the room on arrival and performed an exam of the patient as documented above.   (0520) A peripheral IV established by EMS was continued. Blood was drawn from the patient. This was sent for laboratory testing, findings above.  (0526) A C-collar was applied.  (0527) A second peripheral IV was established. Blood was drawn from the patient. This was sent for laboratory testing, findings above.     (0530) I intubated the patient as per the above procedure note.  (0535) The patient had a 16F Sauer catheter placed without difficulty.   (0542) The patient was sent for a head and cervical spine CT scans, findings above.    (0555) The patient returned from CT scan.  (0559) The patient had a portable chest x-ray done in the emergency department.   (0601) EKG was done, interpretation as above.   (0614) I spoke to Dr. Bernardino Davis of radiology regarding the results of the patient's head CT.  (0617) I spoke to Bo Camacho PA-C with the neurosurgery service regarding the patient.  (0645) I discussed the patient with Dr. Rich Ledbetter of the hospitalist service, who will admit the patient to an ICU bed for further monitoring, evaluation, and treatment.   (0700) I spoke to the patient's wife, Ava, regarding the patient.    Impression & Plan     Medical Decision Makin-year-old male presenting with altered mental status status post fall    Differential diagnosis includes intracranial abnormality such as hemorrhage, traumatic injury NOS.  Patient was intubated shortly after arrival given altered mental status and for airway protection.  Imaging ordered as noted above and significant for a large left-sided subdural with midline shift.  Labs as noted above significant  for elevated INR.  Given patient is anticoagulated, Kcentra was ordered for reversal.   I discussed the patient's diagnosis and disposition with his wife she reports that she will plan to come to the hospital.  Neurosurgery was consulted and evaluated the patient in the emergency department recommending no operative intervention at this time.  The patient was admitted to the ICU prior to neurosurgery evaluation in the ED.  Given the neurologic deficits are noted prior to intubation, goals of care including comfort care will likely need to be discussed with the patient's wife after she arrives.  The patient was admitted to the ICU in critical condition.    Critical Care Time: was 70 minutes for this patient excluding procedures    Covid-19  Tomás Cruz was evaluated during a global COVID-19 pandemic, which necessitated consideration that the patient might be at risk for infection with the SARS-CoV-2 virus that causes COVID-19.   Applicable protocols for evaluation were followed during the patient's care.   COVID-19 was considered as part of the patient's evaluation. The plan for testing is:  a test was obtained during this visit.    Diagnosis:    ICD-10-CM    1. Subdural hematoma (H)  S06.5X9A Asymptomatic SARS-CoV-2 COVID-19 Virus (Coronavirus) by PCR     Blood gas venous         Scribe Disclosure:  I, Melanie Ortiz, am serving as a scribe at 5:19 AM on 3/16/2021 to document services personally performed by Aston Garcia MD based on my observations and the provider's statements to me.          Aston Garcia MD  03/16/21 [id number]

## 2021-03-16 NOTE — PROGRESS NOTES
Pt intubated in the ED with 7.5 ETT secured 24 at the teeth and confirmed with BBS, ETCO2 and CXR (Pending). Pt placed on transport vent with Rate of 18, Vt 550, PEEP of 5 and 30% FIO2. Pt was taken to CT and he is currently back to the ED. RT will cont to follow and monitor.

## 2021-03-17 ENCOUNTER — TELEPHONE (OUTPATIENT)
Dept: FAMILY MEDICINE | Facility: CLINIC | Age: 81
End: 2021-03-17

## 2021-03-17 DIAGNOSIS — Z79.01 LONG TERM CURRENT USE OF ANTICOAGULANT THERAPY: ICD-10-CM

## 2021-03-17 DIAGNOSIS — Z95.2 S/P MITRAL VALVE REPLACEMENT: ICD-10-CM

## 2021-03-17 NOTE — TELEPHONE ENCOUNTER
ANTICOAGULATION  MANAGEMENT: Discharge Review    Tomás Cruz chart reviewed for anticoagulation continuity of care    Hospital Admission on 3/16/21 for subdural hematoma.    Discharge disposition: Patient passed away    Results:    Recent labs: (last 7 days)     03/16/21  0527   INR 3.68*       ACC episode resolved.     Alessandra Medellin, KIRSTEN   [facility]  [place], [place], [place]

## 2021-03-17 NOTE — PROGRESS NOTES
Pt's family informed RN that they were ready to proceed with comfort care. Pt compassionately extubated at 1220. Family at bedside throughout the day to say their goodbyes. Pt passed at 1700. Belongings sent home with spouse.

## 2021-03-23 NOTE — DISCHARGE SUMMARY
[facility]    Discharge Summary  [facility] Intensive [facility]    Date of Admission:  3/16/2021  Date of Discharge:  3/16/2021  5:00 PM  Discharging Provider: Dina Peters    Discharge Diagnoses   Active Problems:    Subdural hematoma (H)      History of Present Illness   Tomás Cruz is an 80 year old male who presented with altered LOC after a fall.    Hospital Course   Tomás Cruz was admitted on 3/16/2021.  The following problems were addressed during his hospitalization:    81 yo man with catastrophic intracranial hemorrhage. No surgical treatment was possible. Spoke with Neuro Surgeons who felt that comfort care was the most appropriate approach given lack of treatment options. Patient was compassionately extubated and  the same day, family present at bedside.    Dina Peters MD        Primary Care Physician   Mahamed Groves      Discharge Disposition   Patient  during this admission      Consultations This Hospital Stay   PHARMACY TO DOSE PHYTONADIONE  PHARMACY IP CONSULT

## 2021-05-25 NOTE — PROGRESS NOTES
[facility]      OUTPATIENT OCCUPATIONAL THERAPY  EVALUATION  PLAN OF TREATMENT FOR OUTPATIENT REHABILITATION  (COMPLETE FOR INITIAL CLAIMS ONLY)  Patient's Last Name, First Name, M.I.  YOB: 1940  Tomás Cruz                          Provider's Name  [facility] Medical Record No.  [id number]                               Onset Date:  12/14/20   Start of Care Date:  12/15/20     Type:     ___PT   _X_OT   ___SLP Medical Diagnosis:  possible CVA                        OT Diagnosis:  decreased ADLs/IADls   Visits from SOC:  1   _________________________________________________________________________________  Plan of Treatment/Functional Goals    Planned Interventions: ADL retraining, visual perception, cognition, progressive activity/exercise   Goals: See Occupational Therapy Goals on Care Plan in [employer] electronic health record.    Therapy Frequency: Daily  Predicted Duration of Therapy Intervention: 4 days  _________________________________________________________________________________    I CERTIFY THE NEED FOR THESE SERVICES FURNISHED UNDER        THIS PLAN OF TREATMENT AND WHILE UNDER MY CARE     (Physician co-signature of this document indicates review and certification of the therapy plan).                Certification date from: 12/15/20, Certification date to: 12/15/20    Referring Physician: Dr. Mccarty            Initial Assessment        See Occupational Therapy evaluation dated 12/15/20 in Epic electronic health record.

## 2021-06-02 NOTE — PLAN OF CARE
[facility]      OUTPATIENT PHYSICAL THERAPY EVALUATION  PLAN OF TREATMENT FOR OUTPATIENT REHABILITATION  (COMPLETE FOR INITIAL CLAIMS ONLY)  Patient's Last Name, First Name, M.I.  YOB: 1940  Tomás Cruz                        Provider's Name  [facility] Medical Record No.  [id number]                               Onset Date:  12/14/20   Start of Care Date:      12/15/21   Type:     _X_PT   ___OT   ___SLP Medical Diagnosis:   double vision                        PT Diagnosis:  Impaired gait   Visits from SOC:  1   _________________________________________________________________________________  Plan of Treatment/Functional Goals    Planned Interventions: gait training, patient/family education, transfer training, strengthening, balance training     Goals: See Physical Therapy Goals on Care Plan in [facility] electronic health record.    Therapy Frequency: 5x/week  Predicted Duration of Therapy Intervention: One week  _________________________________________________________________________________    I CERTIFY THE NEED FOR THESE SERVICES FURNISHED UNDER        THIS PLAN OF TREATMENT AND WHILE UNDER MY CARE     (Physician co-signature of this document indicates review and certification of the therapy plan).               ,      Referring Physician: Gwyn MINOR            Initial Assessment        See Physical Therapy evaluation dated   in Epic electronic health record.

## 2021-10-28 NOTE — TELEPHONE ENCOUNTER
Aware. Thanks.   Called patient and left a message to please call clinic to get checked in for today appointment.

## 2023-01-01 NOTE — MR AVS SNAPSHOT
Tomás Cruz   5/7/2018 1:30 PM   Anticoagulation Therapy Visit    Description:  77 year old male   Provider:   ANTICOAGULATION CLINIC   Department:   Nurse           INR as of 5/7/2018     Today's INR 1.8!      Anticoagulation Summary as of 5/7/2018     INR goal 2.5-3.5   Today's INR 1.8!   Full instructions 5/7: 4 mg; Otherwise 2 mg every day   Next INR check 5/11/2018    Indications   Long-term (current) use of anticoagulants [Z79.01] [Z79.01]  S/P mitral valve replacement [Z95.2]         Your next Anticoagulation Clinic appointment(s)     May 11, 2018 11:00 AM CDT   Anticoagulation [facility] with  [facility] CLIN[facility]    [address] [place] [id number] [phone]              Contact Numbers     Clinic Number:         May 2018 Details    Sun Mon Tue Wed Thu Fri Sat       1               2               3               4               5                 6               7      4 mg   See details      8      2 mg         9      2 mg         10      2 mg         11            12                 13               14               15               16               17               18               19                 20               21               22               23               24               25               26                 27               28               29               30               31                  Date Details   05/07 This INR check       Date of next INR:  5/11/2018         How to take your warfarin dose     To take:  2 mg Take 1 of the 2 mg tablets.    To take:  4 mg Take 2 of the 2 mg tablets.           
(2) cough or sneeze

## 2023-01-17 NOTE — PROGRESS NOTES
Suture removal:     Date sutures applied: 6/12/20         Where (setting) in which they applied:ER visit    Description:  Type: sutures  Location: tip of nose    History:    Cause of laceration: fall    Accompanying Signs & Symptoms: (staff: if yes-describe)  Redness: no  Warmth: no  Drainage: no  Still bleeding: no  Fevers: no    Last tetanus shot: last tetanus booster within 10 years    Pt came in for removal of last two sutures in nose. Both sutures were removed. PT tolerated well , advised to continue to monitor for infection and call with worsening condition.  Patient stated an understanding and agreed with plan.    Fredis Ramos RN   [facility]     Low Dose Naltrexone Counseling- I discussed with the patient the potential risks and side effects of low dose naltrexone including but not limited to: more vivid dreams, headaches, nausea, vomiting, abdominal pain, fatigue, dizziness, and anxiety.

## 2025-07-05 NOTE — PLAN OF CARE
Pt transferred from Obs unit at 1600, VSS, A-Fib/Flutter w/ RVR around 120's when transferred but is now CVR around 80-90 bpm, SBA, A/OX4, cardiac diet but will be NPO 6 hours prior to cardioversion tomorrow, BLE +2/+1, IV lasix given once, LS clear, K-3.9, denies pain, at 2130 pt was c/o SOB along w/ a frequent non-productive cough, pt placed on O2 at 2 LPM MD christopher ROSENTHAL and robitussin was ordered and given, cough has reduced, pt no longer c/o SOB, discharge in 2-3 days to [facility].     yes

## (undated) DEVICE — ENDO FORCEP ENDOJAW BIOPSY 2.8MMX230CM FB-220U

## (undated) DEVICE — KIT ENDO TURNOVER/PROCEDURE W/CLEAN A SCOPE LINERS 103888

## (undated) RX ORDER — FENTANYL CITRATE 50 UG/ML
INJECTION, SOLUTION INTRAMUSCULAR; INTRAVENOUS
Status: DISPENSED
Start: 2019-02-21